# Patient Record
Sex: FEMALE | Race: OTHER | NOT HISPANIC OR LATINO
[De-identification: names, ages, dates, MRNs, and addresses within clinical notes are randomized per-mention and may not be internally consistent; named-entity substitution may affect disease eponyms.]

---

## 2017-08-29 ENCOUNTER — APPOINTMENT (OUTPATIENT)
Dept: HEMATOLOGY ONCOLOGY | Facility: CLINIC | Age: 72
End: 2017-08-29
Payer: MEDICARE

## 2017-08-29 ENCOUNTER — OUTPATIENT (OUTPATIENT)
Dept: OUTPATIENT SERVICES | Facility: HOSPITAL | Age: 72
LOS: 1 days | Discharge: HOME | End: 2017-08-29

## 2017-08-29 ENCOUNTER — RESULT REVIEW (OUTPATIENT)
Age: 72
End: 2017-08-29

## 2017-08-29 VITALS
SYSTOLIC BLOOD PRESSURE: 130 MMHG | RESPIRATION RATE: 16 BRPM | OXYGEN SATURATION: 98 % | TEMPERATURE: 98 F | BODY MASS INDEX: 30.72 KG/M2 | WEIGHT: 152.12 LBS | HEART RATE: 82 BPM | DIASTOLIC BLOOD PRESSURE: 70 MMHG

## 2017-08-29 DIAGNOSIS — Z98.51 TUBAL LIGATION STATUS: Chronic | ICD-10-CM

## 2017-08-29 DIAGNOSIS — D68.2 HEREDITARY DEFICIENCY OF OTHER CLOTTING FACTORS: ICD-10-CM

## 2017-08-29 DIAGNOSIS — Z98.89 OTHER SPECIFIED POSTPROCEDURAL STATES: Chronic | ICD-10-CM

## 2017-08-29 PROCEDURE — 99214 OFFICE O/P EST MOD 30 MIN: CPT

## 2018-06-15 PROBLEM — Z87.39 HISTORY OF ARTHRITIS: Status: RESOLVED | Noted: 2018-06-15 | Resolved: 2018-06-15

## 2018-06-15 PROBLEM — M23.90 INTERNAL DERANGEMENT OF KNEE, UNSPECIFIED LATERALITY: Status: ACTIVE | Noted: 2018-06-15

## 2018-06-15 PROBLEM — Z82.49 FAMILY HISTORY OF HYPERTENSION: Status: ACTIVE | Noted: 2018-06-15

## 2018-06-15 PROBLEM — S92.302A: Status: RESOLVED | Noted: 2018-06-15 | Resolved: 2018-06-15

## 2018-06-15 PROBLEM — Z79.52 ON CORTICOSTEROID THERAPY: Status: RESOLVED | Noted: 2018-06-15 | Resolved: 2018-06-15

## 2018-06-15 PROBLEM — M75.40 SHOULDER IMPINGEMENT SYNDROME: Status: RESOLVED | Noted: 2018-06-15 | Resolved: 2018-06-15

## 2018-06-15 PROBLEM — M48.061 LUMBAR SPINAL STENOSIS: Status: RESOLVED | Noted: 2018-06-15 | Resolved: 2018-06-15

## 2018-06-15 PROBLEM — M47.817 SPONDYLOSIS OF LUMBOSACRAL REGION, UNSPECIFIED SPINAL OSTEOARTHRITIS COMPLICATION STATUS: Status: RESOLVED | Noted: 2018-06-15 | Resolved: 2018-06-15

## 2018-06-15 PROBLEM — Z78.9 DOES NOT USE ILLICIT DRUGS: Status: ACTIVE | Noted: 2018-06-15

## 2018-06-22 ENCOUNTER — APPOINTMENT (OUTPATIENT)
Dept: ORTHOPEDIC SURGERY | Facility: CLINIC | Age: 73
End: 2018-06-22

## 2018-06-22 DIAGNOSIS — Z87.39 PERSONAL HISTORY OF OTHER DISEASES OF THE MUSCULOSKELETAL SYSTEM AND CONNECTIVE TISSUE: ICD-10-CM

## 2018-06-22 DIAGNOSIS — M47.817 SPONDYLOSIS W/OUT MYELOPATHY OR RADICULOPATHY, LUMBOSACRAL REGION: ICD-10-CM

## 2018-06-22 DIAGNOSIS — M23.90 UNSPECIFIED INTERNAL DERANGEMENT OF UNSPECIFIED KNEE: ICD-10-CM

## 2018-06-22 DIAGNOSIS — M75.40 IMPINGEMENT SYNDROME OF UNSPECIFIED SHOULDER: ICD-10-CM

## 2018-06-22 DIAGNOSIS — M48.061 SPINAL STENOSIS, LUMBAR REGION WITHOUT NEUROGENIC CLAUDICATION: ICD-10-CM

## 2018-06-22 DIAGNOSIS — Z78.9 OTHER SPECIFIED HEALTH STATUS: ICD-10-CM

## 2018-06-22 DIAGNOSIS — Z82.49 FAMILY HISTORY OF ISCHEMIC HEART DISEASE AND OTHER DISEASES OF THE CIRCULATORY SYSTEM: ICD-10-CM

## 2018-06-22 DIAGNOSIS — S92.302A FRACTURE OF UNSPECIFIED METATARSAL BONE(S), LEFT FOOT, INITIAL ENCOUNTER FOR CLOSED FRACTURE: ICD-10-CM

## 2018-06-22 DIAGNOSIS — Z79.52 LONG TERM (CURRENT) USE OF SYSTEMIC STEROIDS: ICD-10-CM

## 2018-07-05 ENCOUNTER — EMERGENCY (EMERGENCY)
Facility: HOSPITAL | Age: 73
LOS: 1 days | Discharge: HOME | End: 2018-07-05
Attending: EMERGENCY MEDICINE
Payer: MEDICARE

## 2018-07-05 VITALS
RESPIRATION RATE: 18 BRPM | SYSTOLIC BLOOD PRESSURE: 174 MMHG | WEIGHT: 149.91 LBS | DIASTOLIC BLOOD PRESSURE: 106 MMHG | OXYGEN SATURATION: 100 % | TEMPERATURE: 98 F | HEART RATE: 71 BPM

## 2018-07-05 VITALS
TEMPERATURE: 98 F | HEART RATE: 82 BPM | DIASTOLIC BLOOD PRESSURE: 88 MMHG | SYSTOLIC BLOOD PRESSURE: 144 MMHG | OXYGEN SATURATION: 100 % | RESPIRATION RATE: 17 BRPM

## 2018-07-05 DIAGNOSIS — Z98.51 TUBAL LIGATION STATUS: Chronic | ICD-10-CM

## 2018-07-05 DIAGNOSIS — Z98.89 OTHER SPECIFIED POSTPROCEDURAL STATES: Chronic | ICD-10-CM

## 2018-07-05 LAB
ALBUMIN SERPL ELPH-MCNC: 4.4 G/DL — SIGNIFICANT CHANGE UP (ref 3.3–5)
ALP SERPL-CCNC: 65 U/L — SIGNIFICANT CHANGE UP (ref 40–120)
ALT FLD-CCNC: 23 U/L — SIGNIFICANT CHANGE UP (ref 10–45)
ANION GAP SERPL CALC-SCNC: 15 MMOL/L — SIGNIFICANT CHANGE UP (ref 5–17)
APTT BLD: 33.4 SEC — SIGNIFICANT CHANGE UP (ref 27.5–37.4)
AST SERPL-CCNC: 21 U/L — SIGNIFICANT CHANGE UP (ref 10–40)
BILIRUB SERPL-MCNC: 0.3 MG/DL — SIGNIFICANT CHANGE UP (ref 0.2–1.2)
BUN SERPL-MCNC: 16 MG/DL — SIGNIFICANT CHANGE UP (ref 7–23)
CALCIUM SERPL-MCNC: 9.3 MG/DL — SIGNIFICANT CHANGE UP (ref 8.4–10.5)
CHLORIDE SERPL-SCNC: 104 MMOL/L — SIGNIFICANT CHANGE UP (ref 96–108)
CO2 SERPL-SCNC: 24 MMOL/L — SIGNIFICANT CHANGE UP (ref 22–31)
CREAT SERPL-MCNC: 0.99 MG/DL — SIGNIFICANT CHANGE UP (ref 0.5–1.3)
EOSINOPHIL # BLD AUTO: 0.1 K/UL — SIGNIFICANT CHANGE UP (ref 0–0.5)
EOSINOPHIL NFR BLD AUTO: 3 % — SIGNIFICANT CHANGE UP (ref 0–6)
GLUCOSE SERPL-MCNC: 111 MG/DL — HIGH (ref 70–99)
HCT VFR BLD CALC: 44.6 % — SIGNIFICANT CHANGE UP (ref 34.5–45)
HGB BLD-MCNC: 15.2 G/DL — SIGNIFICANT CHANGE UP (ref 11.5–15.5)
INR BLD: 1.63 RATIO — HIGH (ref 0.88–1.16)
LYMPHOCYTES # BLD AUTO: 55 % — HIGH (ref 13–44)
LYMPHOCYTES # BLD AUTO: 7.1 K/UL — HIGH (ref 1–3.3)
MCHC RBC-ENTMCNC: 32.1 PG — SIGNIFICANT CHANGE UP (ref 27–34)
MCHC RBC-ENTMCNC: 34.2 GM/DL — SIGNIFICANT CHANGE UP (ref 32–36)
MCV RBC AUTO: 93.8 FL — SIGNIFICANT CHANGE UP (ref 80–100)
MONOCYTES # BLD AUTO: 0.5 K/UL — SIGNIFICANT CHANGE UP (ref 0–0.9)
MONOCYTES NFR BLD AUTO: 2 % — SIGNIFICANT CHANGE UP (ref 2–14)
NEUTROPHILS # BLD AUTO: 4.5 K/UL — SIGNIFICANT CHANGE UP (ref 1.8–7.4)
NEUTROPHILS NFR BLD AUTO: 38 % — LOW (ref 43–77)
PLATELET # BLD AUTO: 175 K/UL — SIGNIFICANT CHANGE UP (ref 150–400)
POTASSIUM SERPL-MCNC: 4.5 MMOL/L — SIGNIFICANT CHANGE UP (ref 3.5–5.3)
POTASSIUM SERPL-SCNC: 4.5 MMOL/L — SIGNIFICANT CHANGE UP (ref 3.5–5.3)
PROT SERPL-MCNC: 7.2 G/DL — SIGNIFICANT CHANGE UP (ref 6–8.3)
PROTHROM AB SERPL-ACNC: 17.8 SEC — HIGH (ref 9.8–12.7)
RBC # BLD: 4.75 M/UL — SIGNIFICANT CHANGE UP (ref 3.8–5.2)
RBC # FLD: 12 % — SIGNIFICANT CHANGE UP (ref 10.3–14.5)
SODIUM SERPL-SCNC: 143 MMOL/L — SIGNIFICANT CHANGE UP (ref 135–145)
WBC # BLD: 12.5 K/UL — HIGH (ref 3.8–10.5)
WBC # FLD AUTO: 12.5 K/UL — HIGH (ref 3.8–10.5)

## 2018-07-05 PROCEDURE — 70496 CT ANGIOGRAPHY HEAD: CPT

## 2018-07-05 PROCEDURE — 99284 EMERGENCY DEPT VISIT MOD MDM: CPT | Mod: 25

## 2018-07-05 PROCEDURE — 70498 CT ANGIOGRAPHY NECK: CPT

## 2018-07-05 PROCEDURE — 70450 CT HEAD/BRAIN W/O DYE: CPT

## 2018-07-05 PROCEDURE — 70498 CT ANGIOGRAPHY NECK: CPT | Mod: 26

## 2018-07-05 PROCEDURE — 99284 EMERGENCY DEPT VISIT MOD MDM: CPT

## 2018-07-05 PROCEDURE — 85610 PROTHROMBIN TIME: CPT

## 2018-07-05 PROCEDURE — 70450 CT HEAD/BRAIN W/O DYE: CPT | Mod: 26,59

## 2018-07-05 PROCEDURE — 85730 THROMBOPLASTIN TIME PARTIAL: CPT

## 2018-07-05 PROCEDURE — 85027 COMPLETE CBC AUTOMATED: CPT

## 2018-07-05 PROCEDURE — 80053 COMPREHEN METABOLIC PANEL: CPT

## 2018-07-05 PROCEDURE — 70496 CT ANGIOGRAPHY HEAD: CPT | Mod: 26

## 2018-07-05 RX ORDER — SODIUM CHLORIDE 9 MG/ML
1000 INJECTION INTRAMUSCULAR; INTRAVENOUS; SUBCUTANEOUS ONCE
Qty: 0 | Refills: 0 | Status: COMPLETED | OUTPATIENT
Start: 2018-07-05 | End: 2018-07-05

## 2018-07-05 RX ORDER — ACETAMINOPHEN 500 MG
1000 TABLET ORAL ONCE
Qty: 0 | Refills: 0 | Status: COMPLETED | OUTPATIENT
Start: 2018-07-05 | End: 2018-07-05

## 2018-07-05 RX ORDER — METOCLOPRAMIDE HCL 10 MG
10 TABLET ORAL ONCE
Qty: 0 | Refills: 0 | Status: COMPLETED | OUTPATIENT
Start: 2018-07-05 | End: 2018-07-05

## 2018-07-05 NOTE — ED ADULT NURSE REASSESSMENT NOTE - NS ED NURSE REASSESS COMMENT FT1
Patient is a&ox3. Sitting in bed. No change in her symptoms. Awaiting Ct scan results. Will continue to monitor.

## 2018-07-05 NOTE — ED ADULT NURSE NOTE - CHPI ED SYMPTOMS NEG
no loss of consciousness/no numbness/no nausea/no fever/no change in level of consciousness/no blurred vision/no confusion/no dizziness/no vomiting

## 2018-07-05 NOTE — ED PROVIDER NOTE - FAMILY HISTORY
Family history of prothrombin gene mutation, first cousin     Father  Still living? Unknown  Family history of heart disease, Age at diagnosis: Age Unknown

## 2018-07-05 NOTE — ED ADULT NURSE NOTE - PMH
DVT (deep venous thrombosis), right  october 2013  GERD (gastroesophageal reflux disease)    Herniated lumbar intervertebral disc    HTN - Hypertension    Hx of Breast Cancer    Lower back pain    PE (pulmonary embolism)  3 yrs ago, was on Lovenox and coumadin  Prothrombin gene mutation

## 2018-07-05 NOTE — ED ADULT NURSE NOTE - OBJECTIVE STATEMENT
73F comes to ED c/o head pressure x10 days.  Patient states she noticed posterior head pressure 10 days ago. She states she has PMH clotting disorder for which she is on coumadin. Patient states she has pressure constantly. Denies any trauma/blurry vision/unilateral weakness/fever/chills/N/V/D/abdominal pain/urinary symptoms. She states she saw her MD who told her she had unsteady gait and to get to ED for further eval. Patient is a&ox3 in no distress. States she has felt more depressed with this head pressure, and is very tired. She denies SI/HI. States she feels her body is weak as well. On exam, equal strength all 4 extremities, no abnormalities in gait, soft abdomen, no changes in sensation, no facial droop. Son is at bedside. Will continue to monitor.

## 2018-07-05 NOTE — ED PROVIDER NOTE - MEDICAL DECISION MAKING DETAILS
74 yo F with hx of clotting disorder on coumadin p/w 10 day pressure like headache in the occipital region associated with dizziness. Pt appears well HD stable afebrile, reproducible pain in the sub-occiput; however, given hx of clots in the past, will pursue basic labs/INR/Ct/cta, pain control, reassess   Kiesha Garcia, PGY-2 EM

## 2018-07-05 NOTE — ED ADULT TRIAGE NOTE - CHIEF COMPLAINT QUOTE
I'm on coumadin and for the past couple of weeks I feel pressure in the back of my head and all I do is sleep.

## 2018-07-05 NOTE — ED PROVIDER NOTE - PLAN OF CARE
Please follow up with a neurologist as soon as possible. You can call the [facility] at the following number: [phone]. You can also make an appointment at the following location.  [facility] at [place]  Phone: [phone]  Address: [address] [place] [address], [address] [place] [address]  Office Hours: Monday – Friday: 8am – 5pm   Please follow up with a primary care provider as soon as possible. If you do not have a primary care doctor, you can make an appointment with one at the following location.   [facility] at [place]  Phone: [phone]  Fax: [phone]  Address: [address] [place] [address], [facility] [address], [place] [address]    Additionally, you may use the following web address to find a [facility] physician close to you: [email or web address]   You may take 600mg of ibuprofen (example: motrin or advil) every 4-6 hours for baseline pain control as indicated with respect to the warnings on the label. This is an over the counter medication. You may take 1000mg of Tylenol every 6 hours for baseline pain control with respect to the warnings on the label. Please return to the emergency department if you experience worsening headache, or if you develop neck stiffness, fever/chills, changes in vision, chest pain, shortness of breath, difficulty breathing, palpitations, lightheadedness, weakness, dizziness, numbness, tingling, abdominal pain, nausea, vomiting, diarrhea, changes in your urine, blood in the urine, painful urination, syncope (passing out), or for any other concerns.

## 2018-07-05 NOTE — ED PROVIDER NOTE - OBJECTIVE STATEMENT
74 yo pmh dvt/pe on coumadin (due to unknown bleeding disorder), htn, GERD c/o 10 days occipital headache, at times associated with dizziness and visual changes. Headache is described as pressure, dissimilar to prior headaches in the past. Pt reports the headache has been constant. Denies any recent illness or infectious signs including fever/chills. 74 yo pmh dvt/pe on coumadin (due to unknown bleeding disorder), htn, GERD c/o 10 days occipital headache, at times associated with dizziness and visual changes. Headache is described as pressure, dissimilar to prior headaches in the past. Pt reports the headache has been constant. Did not take anything OTC for the discomfort. Denies any recent illness or infectious signs including fever/chills. Patient denies neck pain, trauma/injury, loc, cp, sob, back pain, abd pain/n/v/d, urinary symptoms, recent travel and sickness.

## 2018-07-05 NOTE — ED ADULT NURSE NOTE - PSH
h/o Wrist Surgery    S/P arthroscopy of right knee  2012  S/P Breast Lumpectomy  cyst removed from left breast.  S/P tubal ligation

## 2018-07-05 NOTE — ED PROVIDER NOTE - CARE PLAN
Principal Discharge DX:	Headache Principal Discharge DX:	Headache  Assessment and plan of treatment:	Please follow up with a neurologist as soon as possible. You can call the [facility] at the following number: [phone]. You can also make an appointment at the following location.  [facility] at [place]  Phone: [phone]  Address: [address] [place] [address]  Office Hours: Monday – Friday: 8am – 5pm   Please follow up with a primary care provider as soon as possible. If you do not have a primary care doctor, you can make an appointment with one at the following location.   [facility] at [place]  Phone: [phone]  Fax: [phone]  Address: [address] [place] [address], [facility] [address], [place] [address]    Additionally, you may use the following web address to find a [facility] physician close to you: [email or web address]   You may take 600mg of ibuprofen (example: motrin or advil) every 4-6 hours for baseline pain control as indicated with respect to the warnings on the label. This is an over the counter medication. You may take 1000mg of Tylenol every 6 hours for baseline pain control with respect to the warnings on the label. Please return to the emergency department if you experience worsening headache, or if you develop neck stiffness, fever/chills, changes in vision, chest pain, shortness of breath, difficulty breathing, palpitations, lightheadedness, weakness, dizziness, numbness, tingling, abdominal pain, nausea, vomiting, diarrhea, changes in your urine, blood in the urine, painful urination, syncope (passing out), or for any other concerns.

## 2018-09-05 ENCOUNTER — APPOINTMENT (OUTPATIENT)
Dept: ORTHOPEDIC SURGERY | Facility: CLINIC | Age: 73
End: 2018-09-05
Payer: MEDICARE

## 2018-09-05 PROCEDURE — 20610 DRAIN/INJ JOINT/BURSA W/O US: CPT | Mod: LT

## 2018-09-05 PROCEDURE — 99214 OFFICE O/P EST MOD 30 MIN: CPT | Mod: 25

## 2018-09-17 ENCOUNTER — INPATIENT (INPATIENT)
Facility: HOSPITAL | Age: 73
LOS: 13 days | Discharge: HOME | DRG: 982 | End: 2018-10-01
Attending: INTERNAL MEDICINE | Admitting: INTERNAL MEDICINE
Payer: MEDICARE

## 2018-09-17 VITALS
HEART RATE: 72 BPM | OXYGEN SATURATION: 99 % | DIASTOLIC BLOOD PRESSURE: 87 MMHG | TEMPERATURE: 98 F | RESPIRATION RATE: 20 BRPM | WEIGHT: 145.51 LBS | SYSTOLIC BLOOD PRESSURE: 144 MMHG

## 2018-09-17 DIAGNOSIS — Z98.51 TUBAL LIGATION STATUS: Chronic | ICD-10-CM

## 2018-09-17 DIAGNOSIS — N18.3 CHRONIC KIDNEY DISEASE, STAGE 3 (MODERATE): ICD-10-CM

## 2018-09-17 DIAGNOSIS — J06.9 ACUTE UPPER RESPIRATORY INFECTION, UNSPECIFIED: ICD-10-CM

## 2018-09-17 DIAGNOSIS — Z98.89 OTHER SPECIFIED POSTPROCEDURAL STATES: Chronic | ICD-10-CM

## 2018-09-17 DIAGNOSIS — I10 ESSENTIAL (PRIMARY) HYPERTENSION: ICD-10-CM

## 2018-09-17 DIAGNOSIS — I26.99 OTHER PULMONARY EMBOLISM WITHOUT ACUTE COR PULMONALE: ICD-10-CM

## 2018-09-17 LAB
ALBUMIN SERPL ELPH-MCNC: 4.3 G/DL — SIGNIFICANT CHANGE UP (ref 3.3–5)
ALP SERPL-CCNC: 72 U/L — SIGNIFICANT CHANGE UP (ref 40–120)
ALT FLD-CCNC: 18 U/L — SIGNIFICANT CHANGE UP (ref 10–45)
ANION GAP SERPL CALC-SCNC: 14 MMOL/L — SIGNIFICANT CHANGE UP (ref 5–17)
APTT BLD: 22.7 SEC — LOW (ref 27.5–37.4)
AST SERPL-CCNC: 21 U/L — SIGNIFICANT CHANGE UP (ref 10–40)
BASOPHILS # BLD AUTO: 0.3 K/UL — HIGH (ref 0–0.2)
BASOPHILS NFR BLD AUTO: 2.2 % — HIGH (ref 0–2)
BILIRUB SERPL-MCNC: 0.5 MG/DL — SIGNIFICANT CHANGE UP (ref 0.2–1.2)
BUN SERPL-MCNC: 13 MG/DL — SIGNIFICANT CHANGE UP (ref 7–23)
CALCIUM SERPL-MCNC: 10.1 MG/DL — SIGNIFICANT CHANGE UP (ref 8.4–10.5)
CHLORIDE SERPL-SCNC: 98 MMOL/L — SIGNIFICANT CHANGE UP (ref 96–108)
CO2 SERPL-SCNC: 24 MMOL/L — SIGNIFICANT CHANGE UP (ref 22–31)
CREAT SERPL-MCNC: 1.02 MG/DL — SIGNIFICANT CHANGE UP (ref 0.5–1.3)
EOSINOPHIL # BLD AUTO: 0.2 K/UL — SIGNIFICANT CHANGE UP (ref 0–0.5)
EOSINOPHIL NFR BLD AUTO: 1.4 % — SIGNIFICANT CHANGE UP (ref 0–6)
GAS PNL BLDV: SIGNIFICANT CHANGE UP
GLUCOSE SERPL-MCNC: 87 MG/DL — SIGNIFICANT CHANGE UP (ref 70–99)
HCT VFR BLD CALC: 46.3 % — HIGH (ref 34.5–45)
HGB BLD-MCNC: 15.5 G/DL — SIGNIFICANT CHANGE UP (ref 11.5–15.5)
INR BLD: 1.17 RATIO — HIGH (ref 0.88–1.16)
LYMPHOCYTES # BLD AUTO: 43.8 % — SIGNIFICANT CHANGE UP (ref 13–44)
LYMPHOCYTES # BLD AUTO: 5.2 K/UL — HIGH (ref 1–3.3)
MCHC RBC-ENTMCNC: 30.6 PG — SIGNIFICANT CHANGE UP (ref 27–34)
MCHC RBC-ENTMCNC: 33.4 GM/DL — SIGNIFICANT CHANGE UP (ref 32–36)
MCV RBC AUTO: 91.6 FL — SIGNIFICANT CHANGE UP (ref 80–100)
MONOCYTES # BLD AUTO: 0.6 K/UL — SIGNIFICANT CHANGE UP (ref 0–0.9)
MONOCYTES NFR BLD AUTO: 4.7 % — SIGNIFICANT CHANGE UP (ref 2–14)
NEUTROPHILS # BLD AUTO: 5.6 K/UL — SIGNIFICANT CHANGE UP (ref 1.8–7.4)
NEUTROPHILS NFR BLD AUTO: 47.9 % — SIGNIFICANT CHANGE UP (ref 43–77)
PLATELET # BLD AUTO: 158 K/UL — SIGNIFICANT CHANGE UP (ref 150–400)
POTASSIUM SERPL-MCNC: 3.6 MMOL/L — SIGNIFICANT CHANGE UP (ref 3.5–5.3)
POTASSIUM SERPL-SCNC: 3.6 MMOL/L — SIGNIFICANT CHANGE UP (ref 3.5–5.3)
PROT SERPL-MCNC: 7.3 G/DL — SIGNIFICANT CHANGE UP (ref 6–8.3)
PROTHROM AB SERPL-ACNC: 12.7 SEC — SIGNIFICANT CHANGE UP (ref 9.8–12.7)
RAPID RVP RESULT: DETECTED
RBC # BLD: 5.06 M/UL — SIGNIFICANT CHANGE UP (ref 3.8–5.2)
RBC # FLD: 12.4 % — SIGNIFICANT CHANGE UP (ref 10.3–14.5)
RV+EV RNA SPEC QL NAA+PROBE: DETECTED
SODIUM SERPL-SCNC: 136 MMOL/L — SIGNIFICANT CHANGE UP (ref 135–145)
WBC # BLD: 11.8 K/UL — HIGH (ref 3.8–10.5)
WBC # FLD AUTO: 11.8 K/UL — HIGH (ref 3.8–10.5)

## 2018-09-17 PROCEDURE — 71045 X-RAY EXAM CHEST 1 VIEW: CPT | Mod: 26

## 2018-09-17 PROCEDURE — 71275 CT ANGIOGRAPHY CHEST: CPT | Mod: 26

## 2018-09-17 PROCEDURE — 93308 TTE F-UP OR LMTD: CPT | Mod: 26

## 2018-09-17 PROCEDURE — 99218: CPT

## 2018-09-17 PROCEDURE — 99223 1ST HOSP IP/OBS HIGH 75: CPT

## 2018-09-17 PROCEDURE — 93010 ELECTROCARDIOGRAM REPORT: CPT

## 2018-09-17 RX ORDER — IPRATROPIUM/ALBUTEROL SULFATE 18-103MCG
3 AEROSOL WITH ADAPTER (GRAM) INHALATION ONCE
Qty: 0 | Refills: 0 | Status: COMPLETED | OUTPATIENT
Start: 2018-09-17 | End: 2018-09-17

## 2018-09-17 RX ORDER — IPRATROPIUM/ALBUTEROL SULFATE 18-103MCG
3 AEROSOL WITH ADAPTER (GRAM) INHALATION ONCE
Qty: 0 | Refills: 0 | Status: DISCONTINUED | OUTPATIENT
Start: 2018-09-17 | End: 2018-09-17

## 2018-09-17 RX ORDER — SODIUM CHLORIDE 9 MG/ML
1000 INJECTION INTRAMUSCULAR; INTRAVENOUS; SUBCUTANEOUS
Qty: 0 | Refills: 0 | Status: DISCONTINUED | OUTPATIENT
Start: 2018-09-17 | End: 2018-09-17

## 2018-09-17 RX ORDER — WARFARIN SODIUM 2.5 MG/1
7.5 TABLET ORAL ONCE
Qty: 0 | Refills: 0 | Status: COMPLETED | OUTPATIENT
Start: 2018-09-17 | End: 2018-09-17

## 2018-09-17 RX ORDER — ENOXAPARIN SODIUM 100 MG/ML
70 INJECTION SUBCUTANEOUS EVERY 12 HOURS
Qty: 0 | Refills: 0 | Status: DISCONTINUED | OUTPATIENT
Start: 2018-09-18 | End: 2018-09-22

## 2018-09-17 RX ORDER — ATORVASTATIN CALCIUM 80 MG/1
10 TABLET, FILM COATED ORAL AT BEDTIME
Qty: 0 | Refills: 0 | Status: DISCONTINUED | OUTPATIENT
Start: 2018-09-17 | End: 2018-10-01

## 2018-09-17 RX ORDER — VERAPAMIL HCL 240 MG
120 CAPSULE, EXTENDED RELEASE PELLETS 24 HR ORAL DAILY
Qty: 0 | Refills: 0 | Status: DISCONTINUED | OUTPATIENT
Start: 2018-09-17 | End: 2018-09-17

## 2018-09-17 RX ORDER — IPRATROPIUM/ALBUTEROL SULFATE 18-103MCG
3 AEROSOL WITH ADAPTER (GRAM) INHALATION EVERY 4 HOURS
Qty: 0 | Refills: 0 | Status: DISCONTINUED | OUTPATIENT
Start: 2018-09-17 | End: 2018-09-18

## 2018-09-17 RX ORDER — ENOXAPARIN SODIUM 100 MG/ML
70 INJECTION SUBCUTANEOUS ONCE
Qty: 0 | Refills: 0 | Status: COMPLETED | OUTPATIENT
Start: 2018-09-17 | End: 2018-09-17

## 2018-09-17 RX ORDER — PANTOPRAZOLE SODIUM 20 MG/1
40 TABLET, DELAYED RELEASE ORAL
Qty: 0 | Refills: 0 | Status: DISCONTINUED | OUTPATIENT
Start: 2018-09-17 | End: 2018-10-01

## 2018-09-17 RX ORDER — INFLUENZA VIRUS VACCINE 15; 15; 15; 15 UG/.5ML; UG/.5ML; UG/.5ML; UG/.5ML
0.5 SUSPENSION INTRAMUSCULAR ONCE
Qty: 0 | Refills: 0 | Status: COMPLETED | OUTPATIENT
Start: 2018-09-17 | End: 2018-10-01

## 2018-09-17 RX ADMIN — Medication 3 MILLILITER(S): at 13:11

## 2018-09-17 RX ADMIN — WARFARIN SODIUM 7.5 MILLIGRAM(S): 2.5 TABLET ORAL at 16:50

## 2018-09-17 RX ADMIN — Medication 125 MILLIGRAM(S): at 13:07

## 2018-09-17 RX ADMIN — Medication 3 MILLILITER(S): at 23:05

## 2018-09-17 RX ADMIN — Medication 3 MILLILITER(S): at 15:24

## 2018-09-17 RX ADMIN — ENOXAPARIN SODIUM 70 MILLIGRAM(S): 100 INJECTION SUBCUTANEOUS at 15:24

## 2018-09-17 NOTE — ED PROVIDER NOTE - PHYSICAL EXAMINATION
GENERAL: tachypneic with hoarse voice; able to speak in full sentences.   HEAD:  Atraumatic, Normocephalic  EYES: EOMI, PERRLA, conjunctiva and sclera clear  ENT: MMM; oropharynx clear  NECK: Supple, No JVD  CHEST/LUNG: +rhonchorous breath sounds with expiratory wheezing.   HEART: Regular rate and rhythm; No murmurs, rubs, or gallops  ABDOMEN: Soft, Nontender, Nondistended; Bowel sounds present  EXTREMITIES:  2+ Peripheral Pulses, No clubbing, cyanosis, or edema  PSYCH: AAOx3  NEUROLOGY: no focal motor or sensory deficits. 5/5 muscle strength in all extremities.   SKIN: No rashes or lesions GENERAL: tachypneic with hoarse voice; able to speak in full sentences.   HEAD:  Atraumatic, Normocephalic  EYES: EOMI, PERRLA, conjunctiva and sclera clear  ENT: MMM; oropharynx clear  NECK: Supple, No JVD  CHEST/LUNG: +rhonchorous breath sounds with expiratory wheezing.   HEART: Regular rate and rhythm; No murmurs, rubs, or gallops  ABDOMEN: Soft, Nontender, Nondistended; Bowel sounds present  EXTREMITIES:  2+ Peripheral Pulses, b/l LE non-pitting edema.   PSYCH: AAOx3  NEUROLOGY: no focal motor or sensory deficits. 5/5 muscle strength in all extremities.   SKIN: No rashes or lesions

## 2018-09-17 NOTE — H&P ADULT - NSHPREVIEWOFSYSTEMS_GEN_ALL_CORE
REVIEW OF SYSTEMS:    CONSTITUTIONAL: No weakness, fevers or chills  ENMT:  No ear pain, No vertigo or throat pain  EYES: No visual changes  or photophobia  NECK: No pain or stiffness  RESPIRATORY: see HPI   CARDIOVASCULAR: No chest pain or palpitations  GASTROINTESTINAL: No abdominal or epigastric pain. No nausea, vomiting, or hematemesis; No diarrhea or constipation. No melena or hematochezia.  MUSCULOSKELETAL: No joint swelling  or warmth.  GENITOURINARY: No dysuria, frequency or hematuria  NEUROLOGICAL: No numbness or weakness  PSYCHIATRIC: No depression or anhedonia.  ENDOCRINE: No sx hypoglycemic episodes.  SKIN: No itching, burning, rashes, or lesions

## 2018-09-17 NOTE — H&P ADULT - HISTORY OF PRESENT ILLNESS
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 Patient reports her symptoms started on Friday. About 1 -week prior she travelled by car to [place] No sick contacts but on Friday started developing shortness of breath, chest congestion, and coughing. The cough is nonproductive. She feels myalgia but no fever or chills. Her dyspnea became progressively worse. Initially shortness of breath was with exertion and now with slight activities becomes dyspneic. She has chronic leg edema but symmetrical in nature. She denies pleuritic chest pain, palpitation, tearing back pain, and denies syncope or near-syncope episode. She did miss her warfarin dose last week and her INR has been monitored once monthly. She otherwise reports adherence to warfarin and does not know exact INR range.

## 2018-09-17 NOTE — H&P ADULT - ATTENDING COMMENTS
Patient assigned to me by night hospitalist in charge for management and care for patient for this evening only. Care to be resumed by day hospitalist (Dr. Ricks) in the morning and thereafter.

## 2018-09-17 NOTE — ED CDU PROVIDER INITIAL DAY NOTE - PROGRESS NOTE DETAILS
CDU PROGRESS NOTE BEN ALVARES: Received pt at 1900 sign-out. Pt resting in stretcher in NAD s/p CTA chest r/o P.E. Case/plan reviewed. VSS. Pt states she is feeling better, but has occasional SOB. S1 S2 noted, RRR, diminshed breath sounds left. BS x4 with soft, nontender abdomen. Will continue to monitor on Telemetry. CDU PROGRESS NOTE BEN ALVARES: Notified by Radiology that patient has multiple Pulmonary Emboli involving left pulmonary artery, upper lobe branches, distal and subsegmental branches. Dr. Millicent rodriguez and Dr. Tello made aware. Pt saturating 95% on RA, no signs of respiratory distress, Diminshed breath sounds left. Will give 2liter nasal cannula and continue to monitor. CDU PROGRESS NOTE BEN ALVARES: Notified by Radiology that patient has multiple Pulmonary Emboli involving left pulmonary artery, upper lobe branches, distal and subsegmental branches. Dr. Millicent rodriguez and Dr. Velazco made aware. Pt saturating 95% on RA, no signs of respiratory distress, Diminished breath sounds left. Will give 2liter nasal cannula and continue to monitor. Hospitalist informed, patient admitted under Dr. Ricks. Pt was medicated in ED Warfarin 7.5mg po and Lovenox 70mg subcut. INR was subtherapeutic at 1.17. Pt admitted to Medicine/Telemetry for P.E and Coumadin failure. c/d/w Dr. Ricks and Dr. Velazco.

## 2018-09-17 NOTE — ED ADULT NURSE NOTE - OBJECTIVE STATEMENT
74 yo  Pt brought to room 33  from triage c/o of  Cough for several days. Sent by PCP for further eval. Pt also endorsed R lower back pain. Pain is non-radiating. Cough is described as dry and non productive. b/l wheeze upon ausculation. Denies Fever or chills. No Dizziness weakness or SOB. Denies N/V. #20 gauge in RAC x's 1 attempt pt. tolerated well, labs drawn and sent, EKG and CXR at bedside. Dr. Schmid aware and at bedside evaluating.

## 2018-09-17 NOTE — ED ADULT NURSE NOTE - CHPI ED NUR SYMPTOMS NEG
no body aches/no fever/no edema/no headache/no hemoptysis/no diaphoresis/no chest pain/no chills/no shortness of breath

## 2018-09-17 NOTE — ED PROVIDER NOTE - OBJECTIVE STATEMENT
74 y/o F hx of HTN, PE on Coumadin, prothrombin gene mutation, chronic back pain presents with dyspnea x 3 day duration.    Patient has been experiencing progressive non-productive cough with hoarseness of voice and difficulty breathing. No associated fevers or chest pain (though notes some discomfort with coughing). Patient denies any hx of asthma/COPD; no smoking hx. Seen by PMD today and sent in to ED for further management. Denies rhinorrhea or myalgias but notes chest congestion and chills. 74 y/o F hx of breast Ca s/p lupectomy, HTN, PE on Coumadin, prothrombin gene mutation, chronic back pain presents with dyspnea x 3 day duration.    Patient has been experiencing progressive non-productive cough with hoarseness of voice and difficulty breathing. No associated fevers or chest pain (though notes some discomfort with coughing). Patient denies any hx of asthma/COPD; no smoking hx. Seen by PMD today and sent in to ED for further management. Denies rhinorrhea or myalgias but notes chest congestion and chills.

## 2018-09-17 NOTE — ED CDU PROVIDER INITIAL DAY NOTE - MEDICAL DECISION MAKING DETAILS
Attending Jaymie Velazco: 72 y/o female with multiple medical issues placed in the cdu for concern for subtherapeutic INR and breathing treatments. pt with h/o PE, is subtherapeutic with INR. with concern for possible PE will give lovenox and continue to monitor INR. pt started on breathing treatments in the ED with improvement. will continue to monitor INR and respiratory status

## 2018-09-17 NOTE — ED PROVIDER NOTE - PROGRESS NOTE DETAILS
Principal Discharge DX:	Fall, initial encounter  Goal:	prevent falls  Assessment and plan of treatment:	fall precautions, supervision  Secondary Diagnosis:	Hypertensive urgency  Assessment and plan of treatment:	continue routine meds  Secondary Diagnosis:	Type 2 diabetes mellitus without complication, without long-term current use of insulin  Assessment and plan of treatment:	off hypoglycemis. monitor blood sugar  Secondary Diagnosis:	Closed fracture of first lumbar vertebra, unspecified fracture morphology, initial encounter  Assessment and plan of treatment:	pain control Subtherapeutic INR; will bridge patient with dose of therapeutic lovenox and increase dose of Coumadin to 7.5 mg (taking 5 mg at home) no rv strain on echo pt with known cta - tx w anticoag will not cta at this time Patient reports improvement with Nebs; less WOB; will give patient two more treatments. Attending Jaymie Velazco: I received sign out. pt feeling better. RVP positive for enterovirus. will d/w CDU for lovenox teaching to bridge to coumadin

## 2018-09-17 NOTE — ED ADULT NURSE NOTE - CHIEF COMPLAINT QUOTE
pt is labored breathing states a couple of days  pt has been coughing for days non productive  sent form md yadira hull

## 2018-09-17 NOTE — H&P ADULT - ASSESSMENT
74 yo F w/ breast CA s/p removal, hx of prothrombin gene mutation, CKD III, prior hx of unprovoked DVT/PE ~ 4-6yr ago (on warfarin) presents with shortness of breath 2/2 to pulmonary embolism c/b viral URI

## 2018-09-17 NOTE — H&P ADULT - NSHPPHYSICALEXAM_GEN_ALL_CORE
PHYSICAL EXAM:  Vital Signs Last 24 Hrs  T(C): 36.6 (09-17-18 @ 19:39)  T(F): 97.9 (09-17-18 @ 19:39), Max: 98.5 (09-17-18 @ 11:22)  HR: 88 (09-17-18 @ 19:39) (72 - 88)  BP: 124/78 (09-17-18 @ 19:39)  BP(mean): --  RR: 18 (09-17-18 @ 19:39) (18 - 20)  SpO2: 95% (09-17-18 @ 19:39) (95% - 99%)  Wt(kg): --    Constitutional: NAD, awake and alert  EYES: EOMI  ENT:  Normal Hearing, no tonsillar exudates   Neck: Soft and supple , no thyromegaly   Respiratory: Breath sounds +wheezing expiratory, no distress, no cyanosis. No crackles   Cardiovascular: S1 and S2, regular rate and rhythm, no Murmurs, gallops or rubs, no JVD,    Gastrointestinal: Bowel Sounds present, soft, nontender, nondistended, no guarding, no rebound  Extremities: No cyanosis or clubbing; warm to touch  Vascular: 2+ peripheral pulses lower ex  Neurological: No focal deficits, CN II-XII intact bilaterally, sensation to light touch intact in all extremities, gait intact. Pupils are equally reactive to light and symmetrical in size.   Musculoskeletal: 5/5 strength b/l upper and lower extremities; no joint swelling.  Skin: No rashes  Psych: no depression or anhedonia, AAOx3  HEME: no bruises, no nose bleeds

## 2018-09-17 NOTE — ED CDU PROVIDER INITIAL DAY NOTE - PHYSICAL EXAMINATION
GENERAL: tachypneic with hoarse voice; able to speak in full sentences.   HEAD:  Atraumatic, Normocephalic  EYES: EOMI, PERRLA, conjunctiva and sclera clear  ENT: MMM; oropharynx clear  NECK: Supple, No JVD  CHEST/LUNG: +rhonchorous breath sounds with expiratory wheezing.   HEART: Regular rate and rhythm; No murmurs, rubs, or gallops  ABDOMEN: Soft, Nontender, Nondistended; Bowel sounds present  EXTREMITIES:  2+ Peripheral Pulses, b/l LE non-pitting edema.   PSYCH: AAOx3  NEUROLOGY: no focal motor or sensory deficits. 5/5 muscle strength in all extremities.   SKIN: No rashes or lesions

## 2018-09-17 NOTE — ED PROVIDER NOTE - MEDICAL DECISION MAKING DETAILS
geraldo mckeon cough inc sob boady aches - sats 100% - w inc work of breathing - nebs and steroids -- ck inr if subtherapeutic consider cta r/o pe -- ho prev pe -pocus r/o rv strain - rvp and reeeval 74 y/o F hx of HTN, PE on Coumadin, prothrombin gene mutation, chronic back pain presents with dyspnea x 3 day duration. DDx includes bronchitis, PNA vs. recurrent PE though less likely with adventitious breath sounds. Plan: CXR, EKG, bronchodilators, steroids, labs, reassess.   geraldo mckeon cough inc sob boady aches - sats 100% - w inc work of breathing - nebs and steroids -- ck inr if subtherapeutic consider cta r/o pe -- ho prev pe -pocus r/o rv strain - rvp and reeeval 74 y/o F hx of HTN, PE on Coumadin, prothrombin gene mutation, chronic back pain presents with dyspnea x 3 day duration. DDx includes bronchitis, PNA vs. recurrent PE though less likely source of dypsnea given adventitious breath sounds. Plan: CXR, EKG, bronchodilators, steroids, labs, reassess.   geraldo mckeon cough inc sob boady aches - sats 100% - w inc work of breathing - nebs and steroids -- ck inr if subtherapeutic consider cta r/o pe -- ho prev pe -pocus r/o rv strain - rvp and reeeval

## 2018-09-17 NOTE — ED CDU PROVIDER DISPOSITION NOTE - CLINICAL COURSE
74 y/o F hx of HTN, PE on Coumadin, prothrombin gene mutation, chronic back pain presents with dyspnea x 3 day duration. Patient has been experiencing progressive non-productive cough with hoarseness of voice and difficulty breathing. No associated fevers or chest pain, denies any hx of asthma/COPD; no smoking hx. Seen by PMD (Dr. Arthur Ricks) today and sent in to ED for further management.   Patient noted to have abnormal CT A Chest concerning for P.E involving the left pulmonary artery, upper lobe, distal and subsegmental branches. Pt was medicated in ED Warfarin 7.5mg po and Lovenox 70mg subcut. INR was subtherapeutic at 1.17. Pt admitted to Medicine for P.E and Coumadin failure. c/d/w Dr. Ricks and Dr. Velazco

## 2018-09-17 NOTE — H&P ADULT - PROBLEM SELECTOR PLAN 3
-Will need caution with lovenox dosing and CKD III  -monitor daily GFR   -Avoid nephrotoxins  -monitor electrolytes

## 2018-09-17 NOTE — ED CDU PROVIDER INITIAL DAY NOTE - OBJECTIVE STATEMENT
72 y/o F hx of HTN, PE on Coumadin, prothrombin gene mutation, chronic back pain presents with dyspnea x 3 day duration.  Patient has been experiencing progressive non-productive cough with hoarseness of voice and difficulty breathing. No associated fevers or chest pain (though notes some discomfort with coughing). Patient denies any hx of asthma/COPD; no smoking hx. Seen by PMD (Dr. Arthur Ricks) today and sent in to ED for further management. Denies rhinorrhea or myalgias but notes chest congestion and chills.

## 2018-09-17 NOTE — H&P ADULT - PROBLEM SELECTOR PLAN 2
-Hold home verapamil in case patient became hypotensive.   -resume CCB tomorrow if no hypotension and otherwise stable.

## 2018-09-17 NOTE — ED CDU PROVIDER INITIAL DAY NOTE - DETAILS
CHEST PAIN  -TELE  -FREQ EVAL  -CTA R/O PE  -Duonebs Q4-6 hrs  -Therapeutic INR  -CASE D/W ATTENDING

## 2018-09-17 NOTE — ED ADULT NURSE NOTE - NSIMPLEMENTINTERV_GEN_ALL_ED
Implemented All Universal Safety Interventions:  [place] to call system. Call bell, personal items and telephone within reach. Instruct patient to call for assistance. Room bathroom lighting operational. Non-slip footwear when patient is off stretcher. Physically safe environment: no spills, clutter or unnecessary equipment. Stretcher in lowest position, wheels locked, appropriate side rails in place.

## 2018-09-17 NOTE — H&P ADULT - PROBLEM SELECTOR PLAN 4
-No signs/sx to suggest superimposed bacterial pneumonia  -hold antimicrobial therapy  -supportive care for positive RVP.  -c/w duoneb q4-6hr for wheezing , if worsening can consider steroid therapy

## 2018-09-17 NOTE — ED ADULT NURSE REASSESSMENT NOTE - NS ED NURSE REASSESS COMMENT FT1
Pt report received from MALENA RN. Pt transferred to cdu bed 3 for dyspnea. Pt a/ox3 denies respiratory distress, sob, dyspnea, C/P, palpitations, n/v/d at this time.  VSS, afebrile, IV clean/dry/intact. Pt aware of plan of care, call bell within reach ,safety maintained. Will monitor and assess.

## 2018-09-18 DIAGNOSIS — D72.828 OTHER ELEVATED WHITE BLOOD CELL COUNT: ICD-10-CM

## 2018-09-18 DIAGNOSIS — I26.99 OTHER PULMONARY EMBOLISM WITHOUT ACUTE COR PULMONALE: ICD-10-CM

## 2018-09-18 DIAGNOSIS — E87.2 ACIDOSIS: ICD-10-CM

## 2018-09-18 LAB
ANION GAP SERPL CALC-SCNC: 16 MMOL/L — SIGNIFICANT CHANGE UP (ref 5–17)
BUN SERPL-MCNC: 13 MG/DL — SIGNIFICANT CHANGE UP (ref 7–23)
CALCIUM SERPL-MCNC: 9.7 MG/DL — SIGNIFICANT CHANGE UP (ref 8.4–10.5)
CHLORIDE SERPL-SCNC: 106 MMOL/L — SIGNIFICANT CHANGE UP (ref 96–108)
CO2 SERPL-SCNC: 13 MMOL/L — LOW (ref 22–31)
CREAT SERPL-MCNC: 0.77 MG/DL — SIGNIFICANT CHANGE UP (ref 0.5–1.3)
GLUCOSE SERPL-MCNC: 119 MG/DL — HIGH (ref 70–99)
HCT VFR BLD CALC: 45.2 % — HIGH (ref 34.5–45)
HGB BLD-MCNC: 16.2 G/DL — HIGH (ref 11.5–15.5)
INR BLD: 1.72 RATIO — HIGH (ref 0.88–1.16)
MAGNESIUM SERPL-MCNC: 2.2 MG/DL — SIGNIFICANT CHANGE UP (ref 1.6–2.6)
MCHC RBC-ENTMCNC: 32.3 PG — SIGNIFICANT CHANGE UP (ref 27–34)
MCHC RBC-ENTMCNC: 35.8 GM/DL — SIGNIFICANT CHANGE UP (ref 32–36)
MCV RBC AUTO: 90.2 FL — SIGNIFICANT CHANGE UP (ref 80–100)
PLATELET # BLD AUTO: 166 K/UL — SIGNIFICANT CHANGE UP (ref 150–400)
POTASSIUM SERPL-MCNC: 4.7 MMOL/L — SIGNIFICANT CHANGE UP (ref 3.5–5.3)
POTASSIUM SERPL-SCNC: 4.7 MMOL/L — SIGNIFICANT CHANGE UP (ref 3.5–5.3)
PROTHROM AB SERPL-ACNC: 19 SEC — HIGH (ref 9.8–12.7)
RBC # BLD: 5.01 M/UL — SIGNIFICANT CHANGE UP (ref 3.8–5.2)
RBC # FLD: 13.5 % — SIGNIFICANT CHANGE UP (ref 10.3–14.5)
SODIUM SERPL-SCNC: 135 MMOL/L — SIGNIFICANT CHANGE UP (ref 135–145)
WBC # BLD: 16.47 K/UL — HIGH (ref 3.8–10.5)
WBC # FLD AUTO: 16.47 K/UL — HIGH (ref 3.8–10.5)

## 2018-09-18 PROCEDURE — 99233 SBSQ HOSP IP/OBS HIGH 50: CPT | Mod: GC

## 2018-09-18 PROCEDURE — 93970 EXTREMITY STUDY: CPT | Mod: 26

## 2018-09-18 RX ORDER — IPRATROPIUM/ALBUTEROL SULFATE 18-103MCG
3 AEROSOL WITH ADAPTER (GRAM) INHALATION
Qty: 0 | Refills: 0 | Status: DISCONTINUED | OUTPATIENT
Start: 2018-09-18 | End: 2018-09-22

## 2018-09-18 RX ORDER — BUDESONIDE, MICRONIZED 100 %
0.5 POWDER (GRAM) MISCELLANEOUS EVERY 12 HOURS
Qty: 0 | Refills: 0 | Status: DISCONTINUED | OUTPATIENT
Start: 2018-09-18 | End: 2018-09-22

## 2018-09-18 RX ORDER — VERAPAMIL HCL 240 MG
120 CAPSULE, EXTENDED RELEASE PELLETS 24 HR ORAL DAILY
Qty: 0 | Refills: 0 | Status: DISCONTINUED | OUTPATIENT
Start: 2018-09-18 | End: 2018-09-20

## 2018-09-18 RX ADMIN — Medication 3 MILLILITER(S): at 18:25

## 2018-09-18 RX ADMIN — PANTOPRAZOLE SODIUM 40 MILLIGRAM(S): 20 TABLET, DELAYED RELEASE ORAL at 05:44

## 2018-09-18 RX ADMIN — Medication 20 MILLIGRAM(S): at 14:22

## 2018-09-18 RX ADMIN — Medication 200 MILLIGRAM(S): at 21:46

## 2018-09-18 RX ADMIN — Medication 600 MILLIGRAM(S): at 10:57

## 2018-09-18 RX ADMIN — Medication 3 MILLILITER(S): at 21:47

## 2018-09-18 RX ADMIN — Medication 200 MILLIGRAM(S): at 14:18

## 2018-09-18 RX ADMIN — ENOXAPARIN SODIUM 70 MILLIGRAM(S): 100 INJECTION SUBCUTANEOUS at 05:44

## 2018-09-18 RX ADMIN — Medication 3 MILLILITER(S): at 09:36

## 2018-09-18 RX ADMIN — Medication 3 MILLILITER(S): at 14:23

## 2018-09-18 RX ADMIN — ATORVASTATIN CALCIUM 10 MILLIGRAM(S): 80 TABLET, FILM COATED ORAL at 21:46

## 2018-09-18 RX ADMIN — Medication 20 MILLIGRAM(S): at 21:46

## 2018-09-18 RX ADMIN — ENOXAPARIN SODIUM 70 MILLIGRAM(S): 100 INJECTION SUBCUTANEOUS at 18:24

## 2018-09-18 RX ADMIN — Medication 3 MILLILITER(S): at 05:47

## 2018-09-18 RX ADMIN — Medication 0.5 MILLIGRAM(S): at 18:36

## 2018-09-18 NOTE — CONSULT NOTE ADULT - ASSESSMENT
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 mutation and a subtherapeutic INR on coumadin - would not consider this a coumadin "failure"     marked metabolic acidosis on SMA7 - perhaps lab error as the VBG is without significant acidemia    h/o breast cancer s/p lumpectomy    hypertension    PLAN/RECOMMENDATIONS:    oxygen supplementation to keep saturation greater than 92%  solumedrol 20mg IVP q6h  albuterol/atrovent nebs q4h holding 2am dose  pulmicort 0.5mg nebs q12h  robitussin DM/tessalon/hycodan (watch for sedation on narcotic based antitussive)  lovenox (full dose) until INR is therapeutic on coumadin - hematology evaluation pending  GI prophylaxis on A/C and steroids - protonix  lower extremity Duplex - if DVTs are present, would consider placement of an IVC filter    Thank you for the courtesy of this referral. Plan of care discussed at length with the patient at bedside and the hematology team.    I will be out of the hospital tomorrow. Please call [phone] with questions or clinical changes    Rd Lira MD, [facility] - [phone]  Pulmonary Medicine

## 2018-09-18 NOTE — CONSULT NOTE ADULT - ASSESSMENT
74 y/o F with history of recurrent DVT/PE and reported history of Prothrombin gene mutation managed as an outpatient with coumadin who is presenting with new PE/DVT in the setting of a subtherapeutic (almost normal) INR.     #PE/DVT  Agree with full anticoagulation with lovenox for now.   Patient expressed her desire to go home without lovenox and on coumadin again.   If patient unable to reliably maintain INR 2-3 although it is untested it is certainly reasonable to consider using DOAC in this setting such as Xarelto.   Monitor O2, on supplemental O2 at the moment.   Nebs as per pulmonary.

## 2018-09-18 NOTE — PROGRESS NOTE ADULT - SUBJECTIVE AND OBJECTIVE BOX
Patient is a 73y old  Female who presents with a chief complaint of shortness of breath (17 Sep 2018 21:18)  73y    HPI:  74 yo F w/ breast CA s/p removal, hx of prothrombin gene mutation, CKD III, prior hx of unprovoked DVT/PE ~ 4-6yr ago (on warfarin) presents with shortness of breath. Patient reports her symptoms started on Friday. About 1 -week prior she travelled by car to [place] No sick contacts but on Friday started developing shortness of breath, chest congestion, and coughing. The cough is nonproductive. She feels myalgia but no fever or chills. Her dyspnea became progressively worse. Initially shortness of breath was with exertion and now with slight activities becomes dyspneic. She has chronic leg edema but symmetrical in nature. She denies pleuritic chest pain, palpitation, tearing back pain, and denies syncope or near-syncope episode. She did miss her warfarin dose last week and her INR has been monitored once monthly. She otherwise reports adherence to warfarin and does not know exact INR range. (17 Sep 2018 21:18)            MEDICATIONS  (STANDING):  ALBUTerol/ipratropium for Nebulization 3 milliLiter(s) Nebulizer every 4 hours  atorvastatin 10 milliGRAM(s) Oral at bedtime  enoxaparin Injectable 70 milliGRAM(s) SubCutaneous every 12 hours  guaiFENesin  milliGRAM(s) Oral every 12 hours  influenza   Vaccine 0.5 milliLiter(s) IntraMuscular once  pantoprazole    Tablet 40 milliGRAM(s) Oral before breakfast    MEDICATIONS  (PRN):      Allergies    No Known Allergies    Intolerances        FAMILY HISTORY:  Family history of prothrombin gene mutation (Mother): first cousin  Family history of heart disease (Father): father        Social HX        REVIEW OF SYSTEMS:  CONSTITUTIONAL: No fever, weight loss, or fatigue  EYES: No eye pain, visual disturbances, or discharge  ENMT:  No difficulty hearing, tinnitus, vertigo; No sinus or throat pain  NECK: No pain or stiffness  BREASTS: No pain, masses, or nipple discharge  RESPIRATORY: No cough, wheezing, chills or hemoptysis; No shortness of breath  CARDIOVASCULAR: No chest pain, palpitations, dizziness, or leg swelling  GASTROINTESTINAL: No abdominal or epigastric pain. No nausea, vomiting, or hematemesis; No diarrhea or constipation. No melena or hematochezia.  GENITOURINARY: No dysuria, frequency, hematuria, or incontinence  NEUROLOGICAL: No headaches, memory loss, loss of strength, numbness, or tremors  SKIN: No itching, burning, rashes, or lesions   LYMPH NODES: No enlarged glands  ENDOCRINE: No heat or cold intolerance; No hair loss  MUSCULOSKELETAL: No joint pain or swelling; No muscle, back, or extremity pain  PSYCHIATRIC: No depression, anxiety, mood swings, or difficulty sleeping  HEME/LYMPH: No easy bruising, or bleeding gums  ALLERY AND IMMUNOLOGIC: No hives or eczema        Vital Signs Last 24 Hrs  T(C): 36.8 (18 Sep 2018 06:21), Max: 36.9 (17 Sep 2018 11:22)  T(F): 98.3 (18 Sep 2018 06:21), Max: 98.5 (17 Sep 2018 11:22)  HR: 74 (18 Sep 2018 06:21) (72 - 88)  BP: 133/78 (18 Sep 2018 06:21) (121/84 - 155/90)  BP(mean): --  RR: 20 (18 Sep 2018 06:21) (18 - 20)  SpO2: 97% (18 Sep 2018 06:21) (95% - 99%)      PHYSICAL EXAM:  GENERAL: NAD, well-groomed, well-developed  HEAD:  Atraumatic, Normocephalic  EYES: EOMI, PERRLA, conjunctiva and sclera clear  ENMT: No tonsillar erythema, exudates, or enlargement; Moist mucous membranes, Good dentition, No lesions  NECK: Supple, No JVD, Normal thyroid  NERVOUS SYSTEM:  Alert & Oriented X3, Good concentration; Motor Strength 5/5 B/L upper and lower extremities; DTRs 2+ intact and symmetric  CHEST/LUNG: Clear to auscultation  bilaterally; No rales, rhonchi, wheezing, or rubs  HEART: Regular rate and rhythm; No murmurs, rubs, or gallops  ABDOMEN: Soft, Nontender, Nondistended; Bowel sounds present  EXTREMITIES:  2+ Peripheral Pulses, No clubbing, cyanosis, or edema  LYMPH: No lymphadenopathy noted  SKIN: No rashes or lesions        CBC Full  -  ( 17 Sep 2018 13:14 )  WBC Count : 11.8 K/uL  Hemoglobin : 15.5 g/dL  Hematocrit : 46.3 %  Platelet Count - Automated : 158 K/uL  Mean Cell Volume : 91.6 fl  Mean Cell Hemoglobin : 30.6 pg  Mean Cell Hemoglobin Concentration : 33.4 gm/dL  Auto Neutrophil # : 5.6 K/uL  Auto Lymphocyte # : 5.2 K/uL  Auto Monocyte # : 0.6 K/uL  Auto Eosinophil # : 0.2 K/uL  Auto Basophil # : 0.3 K/uL  Auto Neutrophil % : 47.9 %  Auto Lymphocyte % : 43.8 %  Auto Monocyte % : 4.7 %  Auto Eosinophil % : 1.4 %  Auto Basophil % : 2.2 %    09-18    135  |  106  |  13  ----------------------------<  119<H>  4.7   |  13<L>  |  0.77    Ca    9.7      18 Sep 2018 06:35  Mg     2.2     09-18    TPro  7.3  /  Alb  4.3  /  TBili  0.5  /  DBili  x   /  AST  21  /  ALT  18  /  AlkPhos  72  09-17    PT/INR - ( 17 Sep 2018 13:02 )   PT: 12.7 sec;   INR: 1.17 ratio         PTT - ( 17 Sep 2018 13:02 )  PTT:22.7 sec Patient is a 73y old  Female who presents with a chief complaint of shortness of breath (17 Sep 2018 21:18)  73y    HPI:  74 yo F w/ breast CA s/p removal, hx of prothrombin gene mutation, CKD III, prior hx of unprovoked DVT/PE ~ 4-6yr ago (on warfarin) presents with shortness of breath. Patient reports her symptoms started on Friday. About 1 -week prior she travelled by car to [place] No sick contacts but on Friday started developing shortness of breath, chest congestion, and coughing. The cough is nonproductive. She feels myalgia but no fever or chills. Her dyspnea became progressively worse. Initially shortness of breath was with exertion and now with slight activities becomes dyspneic. Pt was evaluated in office and appeared very uncomfortable w/ cough and dyspnea and she was referred to the ER for evaluation.  She has chronic leg edema but symmetrical in nature. She denies pleuritic chest pain, palpitation, tearing back pain, and denies syncope or near-syncope episode. She did miss one warfarin dose last week and her INR has been monitored once monthly. She otherwise reports adherence to warfarin and does not know exact INR range. (17 Sep 2018 21:18)        Allergies and Intolerances:        Allergies:  	No Known Allergies:     Home Medications:   * Patient Currently Takes Medications as of 17-Sep-2018 21:22 documented in Structured Notes  · 	verapamil 120 mg oral tablet: 1 tab(s) orally once a day, Last Dose Taken:    · 	omeprazole 40 mg oral delayed release capsule: 1 cap(s) orally once a day, Last Dose Taken:    · 	Lipitor 10 mg oral tablet: 1 tab(s) orally once a day, Last Dose Taken:    · 	Coumadin: 4mg on Monday and Thursday and 2.5mg rest of days , Last Dose Taken:      .    Patient History:    Past Medical History:  DVT (deep venous thrombosis), right  october 2013  GERD (gastroesophageal reflux disease)    Herniated lumbar intervertebral disc    HTN - Hypertension    Hx of Breast Cancer    Lower back pain    PE (pulmonary embolism)  3 yrs ago, was on Lovenox and coumadin  Prothrombin gene mutation.     Past Surgical History:  h/o Wrist Surgery    S/P arthroscopy of right knee  2012  S/P Breast Lumpectomy  cyst removed from left breast.  S/P tubal ligation.     Family History:FAMILY HISTORY:  Family history of prothrombin gene mutation (Mother): first cousin  Family history of heart disease (Father): father     Social History:  Social History (marital status, living situation, occupation, tobacco use, alcohol and drug use, and sexual history): Patient reports no prior or current use of tobacco products, illicit drugs, or alcohol consumption.	     Tobacco Screening:  · Core Measure Site	No	  · Has the patient used tobacco in the past 30 days?	No	        Review of Systems:  Review of Systems: REVIEW OF SYSTEMS:   CONSTITUTIONAL: No weakness, fevers or chills  ENMT:  No ear pain, No vertigo or throat pain  EYES: No visual changes  or photophobia  NECK: No pain or stiffness  RESPIRATORY: see HPI   CARDIOVASCULAR: No chest pain or palpitations  GASTROINTESTINAL: No abdominal or epigastric pain. No nausea, vomiting, or hematemesis; No diarrhea or constipation. No melena or hematochezia.  MUSCULOSKELETAL: No joint swelling  or warmth.  GENITOURINARY: No dysuria, frequency or hematuria  NEUROLOGICAL: No numbness or weakness  PSYCHIATRIC: No depression or anhedonia.  ENDOCRINE: No sx hypoglycemic episodes. SKIN: No itching, burning, rashes, or lesions 	  Vital Signs Last 24 Hrs  T(C): 36.8 (18 Sep 2018 06:21), Max: 36.9 (17 Sep 2018 11:22)  T(F): 98.3 (18 Sep 2018 06:21), Max: 98.5 (17 Sep 2018 11:22)  HR: 74 (18 Sep 2018 06:21) (72 - 88)  BP: 133/78 (18 Sep 2018 06:21) (121/84 - 155/90)  BP(mean): --  RR: 20 (18 Sep 2018 06:21) (18 - 20)  SpO2: 97% (18 Sep 2018 06:21) (95% - 99%)      PHYSICAL EXAM:  GENERAL: NAD, well-groomed, well-developed  HEAD:  Atraumatic, Normocephalic  EYES:  conjunctiva and sclera clear  ENMT: No tonsillar erythema, exudates, or enlargement; Moist mucous membranes, Good dentition, No lesions  NECK: Supple, No JVD, Normal thyroid  NERVOUS SYSTEM:  Alert & Oriented X3, Good concentration; Motor Strength 5/5 B/L upper and lower extremities  CHEST/LUNG: scattered wheezes and rhonchi bilaterally; No rales or rubs  HEART: Regular rate and rhythm; No murmurs, rubs, or gallops  ABDOMEN: Soft, Nontender, Nondistended; Bowel sounds present, neg HSM  EXTREMITIES:   No clubbing, cyanosis, 1+ edema  LYMPH: No lymphadenopathy noted  SKIN: No rashes or lesions        CBC Full  -  ( 17 Sep 2018 13:14 )  WBC Count : 11.8 K/uL  Hemoglobin : 15.5 g/dL  Hematocrit : 46.3 %  Platelet Count - Automated : 158 K/uL  Mean Cell Volume : 91.6 fl  Mean Cell Hemoglobin : 30.6 pg  Mean Cell Hemoglobin Concentration : 33.4 gm/dL  Auto Neutrophil # : 5.6 K/uL  Auto Lymphocyte # : 5.2 K/uL  Auto Monocyte # : 0.6 K/uL  Auto Eosinophil # : 0.2 K/uL  Auto Basophil # : 0.3 K/uL  Auto Neutrophil % : 47.9 %  Auto Lymphocyte % : 43.8 %  Auto Monocyte % : 4.7 %  Auto Eosinophil % : 1.4 %  Auto Basophil % : 2.2 %    09-18    135  |  106  |  13  ----------------------------<  119<H>  4.7   |  13<L>  |  0.77    Ca    9.7      18 Sep 2018 06:35  Mg     2.2     09-18    TPro  7.3  /  Alb  4.3  /  TBili  0.5  /  DBili  x   /  AST  21  /  ALT  18  /  AlkPhos  72  09-17    PT/INR - ( 17 Sep 2018 13:02 )   PT: 12.7 sec;   INR: 1.17 ratio         PTT - ( 17 Sep 2018 13:02 )  PTT:22.7 sec    < from: CT Angio Chest w/ IV Cont (09.17.18 @ 19:29) >  EXAM:  CT ANGIO CHEST (W)AW IC                            PROCEDURE DATE:  09/17/2018            INTERPRETATION:  CLINICAL INFORMATION: Shortness of breath. INR   subtherapeutic.    COMPARISON: CTA chest 6/27/2014    PROCEDURE:   CT Angiography of the Chest.  90 ml of Omnipaque 350 was injected intravenously. 10 ml were discarded.  Sagittal and coronal reformats were performed as well as 3D (MIP)   reconstructions.      FINDINGS:    CHEST:     LUNGS AND LARGE AIRWAYS: Patent central airways.  Bibasilar dependent   atelectasis. Left lower lobe calcified granuloma  PLEURA: Filling defects visualized within the left pulmonary artery, left   upper and lower lobar branches as well as the distal segmental and   subsegmental branches. Emboli are also noted within the segmental and   subsegmental branches of the right lower lobe pulmonary arterial branches.  VESSELS: Within normal limits. The main pulmonary artery measures   approximately 2.8 cm.  HEART: Heart size is normal. No pericardial effusion. No evidence of   right heart strain. Coronary artery calcifications.  MEDIASTINUM AND NAKIA: No lymphadenopathy.  CHEST WALL AND LOWER NECK: Within normal limits.  VISUALIZED UPPER ABDOMEN: Within normal limits.  BONES: Degenerative changes of the spine.    IMPRESSION:   Pulmonary embolism involving the left pulmonary artery, left upper lobe   are branches, as well as the distal segmental subsegmental branches.   Emboli are also noted of the right lower lobe segmental and subsegmental   branches.  No evidence of right heart strain or pulmonary infarct.    Findings were discussed with BEN Stephenson by Dr. Nunez on   9/17/2018 at 7:42 PM with read back.                    KIMBERLY NUNEZ M.D., RADIOLOGY RESIDENT  This document has been electronically signed.    < end of copied text > Patient is a 73y old  Female who presents with a chief complaint of shortness of breath (17 Sep 2018 21:18)  73y    HPI:  72 yo F w/ breast CA s/p removal, hx of prothrombin gene mutation, CKD III, prior hx of unprovoked DVT/PE ~ 4-6yr ago (on warfarin) presents with shortness of breath. Patient reports her symptoms started on Friday. About 1 -week prior she travelled by car to [place] No sick contacts but on Friday started developing shortness of breath, chest congestion, and coughing. The cough is nonproductive. She feels myalgia but no fever or chills. Her dyspnea became progressively worse. Initially shortness of breath was with exertion and now with slight activities becomes dyspneic. Pt was evaluated in office and appeared very uncomfortable w/ cough and dyspnea and she was referred to the ER for evaluation.  She has chronic leg edema but symmetrical in nature. She denies pleuritic chest pain, palpitation, tearing back pain, and denies syncope or near-syncope episode. She did miss one warfarin dose last week and her INR has been monitored once monthly. She otherwise reports adherence to warfarin and does not know exact INR range. (17 Sep 2018 21:18)        Allergies and Intolerances:        Allergies:  	No Known Allergies:     Home Medications:   * Patient Currently Takes Medications as of 17-Sep-2018 21:22 documented in Structured Notes  · 	verapamil 120 mg oral tablet: 1 tab(s) orally once a day, Last Dose Taken:    · 	omeprazole 40 mg oral delayed release capsule: 1 cap(s) orally once a day, Last Dose Taken:    · 	Lipitor 10 mg oral tablet: 1 tab(s) orally once a day, Last Dose Taken:    · 	Coumadin: 4mg on Monday and Thursday and 2.5mg rest of days , Last Dose Taken:      .    Patient History:    Past Medical History:  DVT (deep venous thrombosis), right  october 2013  GERD (gastroesophageal reflux disease)    Herniated lumbar intervertebral disc    HTN - Hypertension    Hx of Breast Cancer    Lower back pain    PE (pulmonary embolism)  3 yrs ago, was on Lovenox and coumadin  Prothrombin gene mutation.     Past Surgical History:  h/o Wrist Surgery    S/P arthroscopy of right knee  2012  S/P Breast Lumpectomy  cyst removed from left breast.  S/P tubal ligation.     Family History:FAMILY HISTORY:  Family history of prothrombin gene mutation (Mother): first cousin  Family history of heart disease (Father): father     Social History:  Social History (marital status, living situation, occupation, tobacco use, alcohol and drug use, and sexual history): Patient reports no prior or current use of tobacco products, illicit drugs, or alcohol consumption.	     Tobacco Screening:  · Core Measure Site	No	  · Has the patient used tobacco in the past 30 days?	No	        Review of Systems:  Review of Systems: REVIEW OF SYSTEMS:   CONSTITUTIONAL: No weakness, fevers or chills  ENMT:  No ear pain, No vertigo or throat pain  EYES: No visual changes  or photophobia  NECK: No pain or stiffness  RESPIRATORY: see HPI   CARDIOVASCULAR: No chest pain or palpitations  GASTROINTESTINAL: No abdominal or epigastric pain. No nausea, vomiting, or hematemesis; No diarrhea or constipation. No melena or hematochezia.  MUSCULOSKELETAL: No joint swelling  or warmth.  GENITOURINARY: No dysuria, frequency or hematuria  NEUROLOGICAL: No numbness or weakness  PSYCHIATRIC: No depression or anhedonia.  ENDOCRINE: No sx hypoglycemic episodes. SKIN: No itching, burning, rashes, or lesions 	  Vital Signs Last 24 Hrs  T(C): 36.8 (18 Sep 2018 06:21), Max: 36.9 (17 Sep 2018 11:22)  T(F): 98.3 (18 Sep 2018 06:21), Max: 98.5 (17 Sep 2018 11:22)  HR: 74 (18 Sep 2018 06:21) (72 - 88)  BP: 133/78 (18 Sep 2018 06:21) (121/84 - 155/90)  BP(mean): --  RR: 20 (18 Sep 2018 06:21) (18 - 20)  SpO2: 97% (18 Sep 2018 06:21) (95% - 99%)      PHYSICAL EXAM:  GENERAL: NAD, well-groomed, well-developed  HEAD:  Atraumatic, Normocephalic  EYES:  conjunctiva and sclera clear  ENMT: No tonsillar erythema, exudates, or enlargement; Moist mucous membranes, Good dentition, No lesions  NECK: Supple, No JVD, Normal thyroid  NERVOUS SYSTEM:  Alert & Oriented X3, Good concentration; Motor Strength 5/5 B/L upper and lower extremities  CHEST/LUNG: scattered wheezes and rhonchi bilaterally; No rales or rubs  HEART: Regular rate and rhythm; No murmurs, rubs, or gallops  ABDOMEN: Soft, Nontender, Nondistended; Bowel sounds present, neg HSM  EXTREMITIES:   No clubbing, cyanosis, 2+ edema  LYMPH: No lymphadenopathy noted  SKIN: No rashes or lesions        CBC Full  -  ( 17 Sep 2018 13:14 )  WBC Count : 11.8 K/uL  Hemoglobin : 15.5 g/dL  Hematocrit : 46.3 %  Platelet Count - Automated : 158 K/uL  Mean Cell Volume : 91.6 fl  Mean Cell Hemoglobin : 30.6 pg  Mean Cell Hemoglobin Concentration : 33.4 gm/dL  Auto Neutrophil # : 5.6 K/uL  Auto Lymphocyte # : 5.2 K/uL  Auto Monocyte # : 0.6 K/uL  Auto Eosinophil # : 0.2 K/uL  Auto Basophil # : 0.3 K/uL  Auto Neutrophil % : 47.9 %  Auto Lymphocyte % : 43.8 %  Auto Monocyte % : 4.7 %  Auto Eosinophil % : 1.4 %  Auto Basophil % : 2.2 %    09-18    135  |  106  |  13  ----------------------------<  119<H>  4.7   |  13<L>  |  0.77    Ca    9.7      18 Sep 2018 06:35  Mg     2.2     09-18    TPro  7.3  /  Alb  4.3  /  TBili  0.5  /  DBili  x   /  AST  21  /  ALT  18  /  AlkPhos  72  09-17    PT/INR - ( 17 Sep 2018 13:02 )   PT: 12.7 sec;   INR: 1.17 ratio         PTT - ( 17 Sep 2018 13:02 )  PTT:22.7 sec    < from: CT Angio Chest w/ IV Cont (09.17.18 @ 19:29) >  EXAM:  CT ANGIO CHEST (W)AW IC                            PROCEDURE DATE:  09/17/2018            INTERPRETATION:  CLINICAL INFORMATION: Shortness of breath. INR   subtherapeutic.    COMPARISON: CTA chest 6/27/2014    PROCEDURE:   CT Angiography of the Chest.  90 ml of Omnipaque 350 was injected intravenously. 10 ml were discarded.  Sagittal and coronal reformats were performed as well as 3D (MIP)   reconstructions.      FINDINGS:    CHEST:     LUNGS AND LARGE AIRWAYS: Patent central airways.  Bibasilar dependent   atelectasis. Left lower lobe calcified granuloma  PLEURA: Filling defects visualized within the left pulmonary artery, left   upper and lower lobar branches as well as the distal segmental and   subsegmental branches. Emboli are also noted within the segmental and   subsegmental branches of the right lower lobe pulmonary arterial branches.  VESSELS: Within normal limits. The main pulmonary artery measures   approximately 2.8 cm.  HEART: Heart size is normal. No pericardial effusion. No evidence of   right heart strain. Coronary artery calcifications.  MEDIASTINUM AND NAKIA: No lymphadenopathy.  CHEST WALL AND LOWER NECK: Within normal limits.  VISUALIZED UPPER ABDOMEN: Within normal limits.  BONES: Degenerative changes of the spine.    IMPRESSION:   Pulmonary embolism involving the left pulmonary artery, left upper lobe   are branches, as well as the distal segmental subsegmental branches.   Emboli are also noted of the right lower lobe segmental and subsegmental   branches.  No evidence of right heart strain or pulmonary infarct.    Findings were discussed with BEN Stephenson by Dr. Nunez on   9/17/2018 at 7:42 PM with read back.                    KIMBERLY NUNEZ M.D., RADIOLOGY RESIDENT  This document has been electronically signed.    < end of copied text >

## 2018-09-18 NOTE — CONSULT NOTE ADULT - CONSULT REASON
pulmonary emboli; rhinorviral infection; dyspnea pulmonary emboli; rhinoviral infection; bronchospasm; dyspnea

## 2018-09-18 NOTE — PROGRESS NOTE ADULT - ASSESSMENT
· Assessment		  74 yo F w/ breast CA s/p removal, hx of prothrombin gene mutation, CKD III, prior hx of unprovoked DVT/PE ~ 4-6yr ago (on warfarin) presents with shortness of breath 2/2 to pulmonary embolism c/b viral URI

## 2018-09-18 NOTE — CONSULT NOTE ADULT - SUBJECTIVE AND OBJECTIVE BOX
HPI:  74 yo F w/ apparent remote history of breast CA s/p surgical management and a history of reported (cannot find result in EMR) prothrombin gene mutation and prior hx of unprovoked DVT/PE starting 6 years ago managed with warfarin presents with SOB/cough. Patient reports her symptoms started on Friday 9/14 and about one week prior she travelled by car to [place]. She had no sick contacts but on that day started developing shortness of breath, chest congestion, and coughing. The cough is nonproductive. She feels myalgia but no fever or chills. Her dyspnea became progressively worse. Initially shortness of breath was with exertion and now with slight activities becomes dyspneic. She has chronic leg edema but symmetrical in nature. She denies pleuritic chest pain, palpitation, tearing back pain, and denies syncope or near-syncope episode. She did miss her warfarin dose last week and her INR has been monitored once monthly. She otherwise reports adherence to warfarin and does not know exact INR range. On admission her INR was 1.17. CT angio showed pulmonary embolism.       PAST MEDICAL & SURGICAL HISTORY:  Prothrombin gene mutation  GERD (gastroesophageal reflux disease)  DVT (deep venous thrombosis), right: october 2013  Herniated lumbar intervertebral disc  Lower back pain  PE (pulmonary embolism): 3 yrs ago, was on Lovenox and coumadin  Hx of Breast Cancer  HTN - Hypertension  S/P tubal ligation  S/P arthroscopy of right knee: 2012  S/P Breast Lumpectomy: cyst removed from left breast.  h/o Wrist Surgery      Allergies    No Known Allergies    Intolerances        MEDICATIONS  (STANDING):  ALBUTerol/ipratropium for Nebulization 3 milliLiter(s) Nebulizer <User Schedule>  atorvastatin 10 milliGRAM(s) Oral at bedtime  benzonatate 200 milliGRAM(s) Oral three times a day  buDESOnide   0.5 milliGRAM(s) Respule 0.5 milliGRAM(s) Inhalation every 12 hours  enoxaparin Injectable 70 milliGRAM(s) SubCutaneous every 12 hours  guaiFENesin/dextromethorphan  Syrup 10 milliLiter(s) Oral four times a day  HYDROcodone/homatropine Syrup 5 milliLiter(s) Oral <User Schedule>  influenza   Vaccine 0.5 milliLiter(s) IntraMuscular once  methylPREDNISolone sodium succinate Injectable 20 milliGRAM(s) IV Push every 6 hours  pantoprazole    Tablet 40 milliGRAM(s) Oral before breakfast    MEDICATIONS  (PRN):      FAMILY HISTORY:  Family history of prothrombin gene mutation (Mother): first cousin  Family history of heart disease (Father): father      SOCIAL HISTORY: No EtOH, no tobacco    REVIEW OF SYSTEMS:    CONSTITUTIONAL: No weakness, fevers or chills  EYES/ENT: No visual changes;  No vertigo or throat pain   NECK: No pain or stiffness  RESPIRATORY: +SOB +COUGH  CARDIOVASCULAR: No chest pain or palpitations  GASTROINTESTINAL: No abdominal or epigastric pain. No nausea, vomiting, or hematemesis; No diarrhea or constipation. No melena or hematochezia.  GENITOURINARY: No dysuria, frequency or hematuria  NEUROLOGICAL: No numbness or weakness  SKIN: No itching, burning, rashes, or lesions   All other review of systems is negative unless indicated above.        T(F): 98.3 (09-18-18 @ 06:21), Max: 98.3 (09-18-18 @ 06:21)  HR: 74 (09-18-18 @ 06:21)  BP: 133/78 (09-18-18 @ 06:21)  RR: 20 (09-18-18 @ 06:21)  SpO2: 97% (09-18-18 @ 06:21)  Wt(kg): --    GENERAL: NAD, well-developed  HEAD:  Atraumatic, Normocephalic  EYES: EOMI, PERRLA, conjunctiva and sclera clear  NECK: Supple, No JVD  CHEST/LUNG: Wheezing +   HEART: Regular rate and rhythm; No murmurs, rubs, or gallops  ABDOMEN: Soft, Nontender, Nondistended; Bowel sounds present  EXTREMITIES:  2+ Peripheral Pulses, No clubbing, cyanosis, or edema  NEUROLOGY: non-focal  SKIN: No rashes or lesions                          16.2   16.47 )-----------( 166      ( 18 Sep 2018 07:54 )             45.2       09-18    135  |  106  |  13  ----------------------------<  119<H>  4.7   |  13<L>  |  0.77    Ca    9.7      18 Sep 2018 06:35  Mg     2.2     09-18    TPro  7.3  /  Alb  4.3  /  TBili  0.5  /  DBili  x   /  AST  21  /  ALT  18  /  AlkPhos  72  09-17      Magnesium, Serum: 2.2 mg/dL (09-18 @ 06:35)      PT/INR - ( 17 Sep 2018 13:02 )   PT: 12.7 sec;   INR: 1.17 ratio         PTT - ( 17 Sep 2018 13:02 )  PTT:22.7 sec    < from: CT Angio Chest w/ IV Cont (09.17.18 @ 19:29) >  IMPRESSION:   Pulmonary embolism involving the left pulmonary artery, left upper lobe   are branches, as well as the distal segmental subsegmental branches.   Emboli are also noted of the right lower lobe segmental and subsegmental   branches.  No evidence of right heart strain or pulmonary infarct.    < end of copied text >

## 2018-09-18 NOTE — PROGRESS NOTE ADULT - PROBLEM SELECTOR PLAN 2
No signs/sx to suggest superimposed bacterial pneumonia  -hold antimicrobial therapy  -supportive care for positive RVP.  -c/w duoneb q4-6hr for wheezing , if worsening can consider steroid therapy.   pulm evaluation

## 2018-09-18 NOTE — CONSULT NOTE ADULT - ATTENDING COMMENTS
The patient has not achieved her goal of an INR in the range of 2 to 3; I discussed increasing her Warfarin dosing; the use of direct acting factor X inhibitors was discussed. Traditionally we have reserved these medication for short term use and not indefinite [id number].  Follow up visit to be scheduled  the patient and Dr Ricks will occur before the patient choses between Warfarin and other less reversible anticoagulation agents

## 2018-09-18 NOTE — CONSULT NOTE ADULT - SUBJECTIVE AND OBJECTIVE BOX
[facility] - [facility]  CONSULT NOTE    HPI: 73 year old gentlewoman, lifelong non-smoker, with personal and family history of prothrombin gene mutation complicated by DVT/PE ~ 5 years ago maintained on lifelong warfarin. ~ 1 week ago, the patient travelled by car to [place]. Several days later, the patient noted the onset of shortness of breath and a hacking dry cough associated with chest congestion and wheeze. The shortness of breath continue to worsen to the point that she was unable to perform basic activities without symptoms. She had no fevers, chills or sweats. No chest pain/pressure or palpitations. No lightheadedness or dizziness. In the ER, the patient was noted to have bilateral pulmonary emboli without evidence of right heart strain. Asked to evaluate    PMHX:  Prothrombin gene mutation  DVT (deep venous thrombosis), right  PE (pulmonary embolism)  GERD (gastroesophageal reflux disease)  Herniated lumbar intervertebral disc  Lower back pain  Breast Cancer  Hypertension      PSHX:  tubal ligation  arthroscopy of right knee  Breast Lumpectomy  Wrist Surgery  Wrist Surgery      FAMILY HISTORY:  mother - prothrombin gene mutation   cousin -  prothrombin gene mutation   father - CAD    SOCIAL HISTORY:    Pulmonary Medications:   ALBUTerol/ipratropium for Nebulization 3 milliLiter(s) Nebulizer every 4 hours  guaiFENesin  milliGRAM(s) Oral every 12 hours      Antimicrobials:      Cardiology:      Other:  atorvastatin 10 milliGRAM(s) Oral at bedtime  enoxaparin Injectable 70 milliGRAM(s) SubCutaneous every 12 hours  influenza   Vaccine 0.5 milliLiter(s) IntraMuscular once  pantoprazole    Tablet 40 milliGRAM(s) Oral before breakfast      Allergies    No Known Allergies    HOME MEDICATIONS: see  H & P    REVIEW OF SYSTEMS:  Constitutional: As per HPI  HEENT: Within normal limits  CV: As per HPI  Resp: As per HPI  GI: Within normal limits   : Within normal limits  Musculoskeletal: Within normal limits  Skin: Within normal limits  Neurological: Within normal limits  Psychiatric: Within normal limits  Endocrine: Within normal limits  Hematologic/Lymphatic: Within normal limits  Allergic/Immunologic: Within normal limits    [x] All other systems negative    OBJECTIVE:    I&O's Detail    17 Sep 2018 07:01  -  18 Sep 2018 07:00  --------------------------------------------------------  IN:  Total IN: 0 mL    OUT:    Voided: 300 mL  Total OUT: 300 mL    Total NET: -300 mL      18 Sep 2018 07:01  -  18 Sep 2018 10:49  --------------------------------------------------------  IN:    Oral Fluid: 420 mL  Total IN: 420 mL    OUT:  Total OUT: 0 mL    Total NET: 420 mL    PHYSICAL EXAM:  ICU Vital Signs Last 24 Hrs  T(C): 36.8 (18 Sep 2018 06:21), Max: 36.9 (17 Sep 2018 11:22)  T(F): 98.3 (18 Sep 2018 06:21), Max: 98.5 (17 Sep 2018 11:22)  HR: 74 (18 Sep 2018 06:21) (72 - 88)  BP: 133/78 (18 Sep 2018 06:21) (121/84 - 155/90)  BP(mean): --  ABP: --  ABP(mean): --  RR: 20 (18 Sep 2018 06:21) (18 - 20)  SpO2: 97% (18 Sep 2018 06:21) (95% - 99%)    General: Awake. Alert. Cooperative. No distress. Appears stated age 	  HEENT:   Atraumatic. Normocephalic. Anicteric. Normal oral mucosa. PERRL. EOMI.  Neck: Supple. Trachea midline. Thyroid without enlargement/tenderness/nodules. No carotid bruit. No JVD.	  Cardiovascular: Regular rate and rhythm. S1 S2 normal. No murmurs, rubs or gallops.  Respiratory: Respirations unlabored. Clear to auscultation and percussion bilaterally. No curvature.  Abdomen: Soft. Non-tender. Non-distended. No organomegaly. No masses. Normal bowel sounds.  Extremities: Warm to touch. No clubbing or cyanosis. No pedal edema.  Pulses: 2+ peripheral pulses all extremities.	  Skin: Normal skin color. No rashes or lesions. No ecchymoses. No cyanosis. Warm to touch.  Lymph Nodes: Cervical, supraclavicular and axillary nodes normal  Neurological: Motor and sensory examination equal and normal. A and O x 3  Psychiatry: Appropriate mood and affect.      LABS:                          15.5   11.8  )-----------( 158      ( 17 Sep 2018 13:14 )             46.3     09-18    135  |  106  |  13  ----------------------------<  119<H>  4.7   |  13<L>  |  0.77    09-17    136  |  98  |  13  ----------------------------<  87  3.6   |  24  |  1.02    Ca      9.7      09-18    Ca      10.1      09-17      Mg       2.2     09-18    TPro  7.3  /  Alb  4.3  /  TBili  0.5  /  DBili  x   /  AST  21  /  ALT  18  /  AlkPhos  72  09-17    PT/INR - ( 17 Sep 2018 13:02 )   PT: 12.7 sec;   INR: 1.17 ratio       PTT - ( 17 Sep 2018 13:02 )  PTT:22.7 sec    Venous Blood Gas:  09-17 @ 13:14  7.36/49/30/27/49  VBG Lactate: 2.5                  MICROBIOLOGY:       RADIOLOGY:  [x ] Chest radiographs reviewed and interpreted by me [facility]  CONSULT NOTE    HPI: 73 year old gentlewoman, lifelong non-smoker, with personal and family history of prothrombin gene mutation complicated by DVT/PE ~ 5 years ago now maintained on lifelong warfarin. ~ 1 week ago, the patient travelled by car to [place] to visit her son. She also took trips to a On license of [facility] and [place]. Several days later, the patient noted the onset a hacking dry cough associated with chest congestion and wheeze. She subsequently developed shortness of breath with exertion which continued to worsen over the last few days. She was sent to the ER by Dr. Ricks yesterday for further evaluation. She had no fevers, chills or sweats. No chest pain/pressure or palpitations. No lightheadedness or dizziness. She has chronic mild lower extremity swelling somewhat worse of late. In the ER, the patient was noted to have bilateral pulmonary emboli without evidence of right heart strain and a rhinoviral infection. Asked to evaluate    PMHX:  Prothrombin gene mutation  DVT (deep venous thrombosis), right  PE (pulmonary embolism)  GERD (gastroesophageal reflux disease)  Herniated lumbar intervertebral disc  Lower back pain  Breast Cancer  Hypertension      PSHX:  tubal ligation  arthroscopy of right knee  Lumpectomy  wrist Surgery    FAMILY HISTORY:  mother - prothrombin gene mutation   cousin -  prothrombin gene mutation   father - CAD    SOCIAL HISTORY:  ; lifelong non-smoker but exposed to second hand smoke (/both sons)    Pulmonary Medications:   ALBUTerol/ipratropium for Nebulization 3 milliLiter(s) Nebulizer every 4 hours  guaiFENesin  milliGRAM(s) Oral every 12 hours      Antimicrobials:      Cardiology:      Other:  atorvastatin 10 milliGRAM(s) Oral at bedtime  enoxaparin Injectable 70 milliGRAM(s) SubCutaneous every 12 hours  influenza   Vaccine 0.5 milliLiter(s) IntraMuscular once  pantoprazole    Tablet 40 milliGRAM(s) Oral before breakfast      Allergies    No Known Allergies    HOME MEDICATIONS: see  H & P    REVIEW OF SYSTEMS:  Constitutional: As per HPI  HEENT: Within normal limits  CV: As per HPI  Resp: As per HPI  GI: Within normal limits   : Within normal limits  Musculoskeletal: Within normal limits  Skin: Within normal limits  Neurological: Within normal limits  Psychiatric: Within normal limits  Endocrine: Within normal limits  Hematologic/Lymphatic: Within normal limits  Allergic/Immunologic: Within normal limits    [x] All other systems negative    OBJECTIVE:    I&O's Detail    17 Sep 2018 07:01  -  18 Sep 2018 07:00  --------------------------------------------------------  IN:  Total IN: 0 mL    OUT:    Voided: 300 mL  Total OUT: 300 mL    Total NET: -300 mL      18 Sep 2018 07:01  -  18 Sep 2018 10:49  --------------------------------------------------------  IN:    Oral Fluid: 420 mL  Total IN: 420 mL    OUT:  Total OUT: 0 mL    Total NET: 420 mL    PHYSICAL EXAM:  ICU Vital Signs Last 24 Hrs  T(C): 36.8 (18 Sep 2018 06:21), Max: 36.9 (17 Sep 2018 11:22)  T(F): 98.3 (18 Sep 2018 06:21), Max: 98.5 (17 Sep 2018 11:22)  HR: 74 (18 Sep 2018 06:21) (72 - 88)  BP: 133/78 (18 Sep 2018 06:21) (121/84 - 155/90)  BP(mean): --  ABP: --  ABP(mean): --  RR: 20 (18 Sep 2018 06:21) (18 - 20)  SpO2: 97% (18 Sep 2018 06:21) (95% - 99%) on 3lpm nasal canula    General: Awake. Alert. Cooperative. Hacking cough. Appears stated age 	  HEENT:   Atraumatic. Normocephalic. Anicteric. Normal oral mucosa. PERRL. EOMI.  Neck: Supple. Trachea midline. Thyroid without enlargement/tenderness/nodules. No carotid bruit. No JVD.	  Cardiovascular: Regular rate and rhythm. S1 S2 normal. No murmurs, rubs or gallops.  Respiratory: Respirations unlabored. Diffuse wheeze with prolonged expiratory phase of respiration. No curvature.  Abdomen: Soft. Non-tender. Non-distended. No organomegaly. No masses. Normal bowel sounds.  Extremities: Warm to touch. No clubbing or cyanosis. Mild bilateral lower extremity edema  Pulses: 2+ peripheral pulses all extremities.	  Skin: Normal skin color. No rashes or lesions. No ecchymoses. No cyanosis. Warm to touch.  Lymph Nodes: Cervical, supraclavicular and axillary nodes normal  Neurological: Motor and sensory examination equal and normal. A and O x 3  Psychiatry: Appropriate mood and affect.      LABS:                          15.5   11.8  )-----------( 158      ( 17 Sep 2018 13:14 )             46.3     09-18    135  |  106  |  13  ----------------------------<  119<H>  4.7   |  13<L>  |  0.77    09-17    136  |  98  |  13  ----------------------------<  87  3.6   |  24  |  1.02    Ca      9.7      09-18    Ca      10.1      09-17      Mg       2.2     09-18    TPro  7.3  /  Alb  4.3  /  TBili  0.5  /  DBili  x   /  AST  21  /  ALT  18  /  AlkPhos  72  09-17    PT/INR - ( 17 Sep 2018 13:02 )   PT: 12.7 sec;   INR: 1.17 ratio       PTT - ( 17 Sep 2018 13:02 )  PTT:22.7 sec    Venous Blood Gas:  09-17 @ 13:14  7.36/49/30/27/49  VBG Lactate: 2.5    < from: US TTE 2D F/U, Limited w/o Contrast (ED) (09.17.18 @ 13:58) >    Procedure was performed in the Emergency Department by a credentialed  Emergency Medicine Attending Physician    EXAM:  ER TTE LIMITED      ORDER COMMENTS:      PROCEDURE DATE:  09/17/2018    FOCUSED ED ULTRASOUND REPORT    INTERPRETATION:  A focused transthoracic cardiac ultrasound examination   was performed.   No pericardial effusion was present.    No global wall motion abnormality was identified  no right heart strain   IMPRESSION:   No Pericardial Effusion.    ARLINE MENDEZ ATTENDING EM PHYSICIAN  This document has been electronically signed. Sep 17 2018  2:00PM      < end of copied text >  ---------------------------------------------------------------------------------------------------------------    MICROBIOLOGY:     Rapid Respiratory Viral Panel (09.17.18 @ 13:23)    Rapid RVP Result: Detected: The [employer] RVP Rapid uses polymerase chain reaction (PCR) and melt  curve analysis to screen for adenovirus; coronavirus HKU1, NL63, 229E,  OC43; human metapneumovirus (hMPV); human enterovirus/rhinovirus  (Entero/RV); influenza A; influenza A/H1;influenza A/H3; influenza  A/H1-2009; influenza B; parainfluenza viruses 1, 2, 3, 4; respiratory  syncytial virus; Bordetella pertussis; Mycoplasma pneumoniae; and  Chlamydophila pneumoniae.    Entero/Rhinovirus (RapRVP): Detected    RADIOLOGY:  [x ] Chest radiographs reviewed and interpreted by me    < from: Xray Chest 1 View AP/PA (09.17.18 @ 12:55) >    EXAM:  XR CHEST AP OR PA 1V                          PROCEDURE DATE:  09/17/2018      INTERPRETATION:  A single chest x-ray was obtained on September 17, 2018.    Indication: Dyspnea.    Impression:    The heart is normal in size. The lungs are clear. Degenerative changes of   the thoracic spine. No pneumothorax.    MEKA BURGOS M.D., ATTENDING RADIOLOGIST  This document has been electronically signed. Sep 17 2018  1:47PM      < end of copied text >  ---------------------------------------------------------------------------------------------------------------    < from: CT Angio Chest w/ IV Cont (09.17.18 @ 19:29) >    EXAM:  CT ANGIO CHEST (W)AW IC                            PROCEDURE DATE:  09/17/2018            INTERPRETATION:  CLINICAL INFORMATION: Shortness of breath. INR   subtherapeutic.    COMPARISON: CTA chest 6/27/2014    PROCEDURE:   CT Angiography of the Chest.  90 ml of Omnipaque 350 was injected intravenously. 10 ml were discarded.  Sagittal and coronal reformats were performed as well as 3D (MIP)   reconstructions.      FINDINGS:    CHEST:     LUNGS AND LARGE AIRWAYS: Patent central airways.  Bibasilar dependent   atelectasis. Left lower lobe calcified granuloma  PLEURA: Filling defects visualized within the left pulmonary artery, left   upper and lower lobar branches as well as the distal segmental and   subsegmental branches. Emboli are also noted within the segmental and   subsegmental branches of the right lower lobe pulmonary arterial branches.  VESSELS: Within normal limits. The main pulmonary artery measures   approximately 2.8 cm.  HEART: Heart size is normal. No pericardial effusion. No evidence of   right heart strain. Coronary artery calcifications.  MEDIASTINUM AND NAKIA: No lymphadenopathy.  CHEST WALL AND LOWER NECK: Within normal limits.  VISUALIZED UPPER ABDOMEN: Within normal limits.  BONES: Degenerative changes of the spine.    IMPRESSION:   Pulmonary embolism involving the left pulmonary artery, left upper lobe   are branches, as well as the distal segmental subsegmental branches.   Emboli are also noted of the right lower lobe segmental and subsegmental   branches.  No evidence of right heart strain or pulmonary infarct.    Findings were discussed with BEN Stephenson by Dr. Sosa on   9/17/2018 at 7:42 PM with read back.    KIMBERLY SOSA M.D., RADIOLOGY RESIDENT  This document has been electronically signed.  MARTA COLLINS M.D., ATTENDING RADIOLOGIST  This document has been electronically signed. Sep 17 2018  8:26PM      < end of copied text >  ---------------------------------------------------------------------------------------------------------------

## 2018-09-19 LAB
ANION GAP SERPL CALC-SCNC: 11 MMOL/L — SIGNIFICANT CHANGE UP (ref 5–17)
ANION GAP SERPL CALC-SCNC: 12 MMOL/L — SIGNIFICANT CHANGE UP (ref 5–17)
BASOPHILS # BLD AUTO: 0 K/UL — SIGNIFICANT CHANGE UP (ref 0–0.2)
BASOPHILS NFR BLD AUTO: 0 % — SIGNIFICANT CHANGE UP (ref 0–2)
BUN SERPL-MCNC: 17 MG/DL — SIGNIFICANT CHANGE UP (ref 7–23)
BUN SERPL-MCNC: 17 MG/DL — SIGNIFICANT CHANGE UP (ref 7–23)
CALCIUM SERPL-MCNC: 9.3 MG/DL — SIGNIFICANT CHANGE UP (ref 8.4–10.5)
CALCIUM SERPL-MCNC: 9.6 MG/DL — SIGNIFICANT CHANGE UP (ref 8.4–10.5)
CHLORIDE SERPL-SCNC: 103 MMOL/L — SIGNIFICANT CHANGE UP (ref 96–108)
CHLORIDE SERPL-SCNC: 103 MMOL/L — SIGNIFICANT CHANGE UP (ref 96–108)
CO2 SERPL-SCNC: 23 MMOL/L — SIGNIFICANT CHANGE UP (ref 22–31)
CO2 SERPL-SCNC: 26 MMOL/L — SIGNIFICANT CHANGE UP (ref 22–31)
CREAT SERPL-MCNC: 0.92 MG/DL — SIGNIFICANT CHANGE UP (ref 0.5–1.3)
CREAT SERPL-MCNC: 0.98 MG/DL — SIGNIFICANT CHANGE UP (ref 0.5–1.3)
EOSINOPHIL # BLD AUTO: 0 K/UL — SIGNIFICANT CHANGE UP (ref 0–0.5)
EOSINOPHIL NFR BLD AUTO: 0 % — SIGNIFICANT CHANGE UP (ref 0–6)
GLUCOSE SERPL-MCNC: 133 MG/DL — HIGH (ref 70–99)
GLUCOSE SERPL-MCNC: 134 MG/DL — HIGH (ref 70–99)
HCT VFR BLD CALC: 40.1 % — SIGNIFICANT CHANGE UP (ref 34.5–45)
HGB BLD-MCNC: 13.9 G/DL — SIGNIFICANT CHANGE UP (ref 11.5–15.5)
IMM GRANULOCYTES NFR BLD AUTO: 0.3 % — SIGNIFICANT CHANGE UP (ref 0–1.5)
INR BLD: 1.89 RATIO — HIGH (ref 0.88–1.16)
LYMPHOCYTES # BLD AUTO: 45.6 % — HIGH (ref 13–44)
LYMPHOCYTES # BLD AUTO: 7.84 K/UL — HIGH (ref 1–3.3)
MAGNESIUM SERPL-MCNC: 2.1 MG/DL — SIGNIFICANT CHANGE UP (ref 1.6–2.6)
MANUAL SMEAR VERIFICATION: SIGNIFICANT CHANGE UP
MCHC RBC-ENTMCNC: 31.4 PG — SIGNIFICANT CHANGE UP (ref 27–34)
MCHC RBC-ENTMCNC: 34.7 GM/DL — SIGNIFICANT CHANGE UP (ref 32–36)
MCV RBC AUTO: 90.5 FL — SIGNIFICANT CHANGE UP (ref 80–100)
MONOCYTES # BLD AUTO: 0.23 K/UL — SIGNIFICANT CHANGE UP (ref 0–0.9)
MONOCYTES NFR BLD AUTO: 1.3 % — LOW (ref 2–14)
NEUTROPHILS # BLD AUTO: 9.05 K/UL — HIGH (ref 1.8–7.4)
NEUTROPHILS NFR BLD AUTO: 52.8 % — SIGNIFICANT CHANGE UP (ref 43–77)
PHOSPHATE SERPL-MCNC: 2.8 MG/DL — SIGNIFICANT CHANGE UP (ref 2.5–4.5)
PLAT MORPH BLD: NORMAL — SIGNIFICANT CHANGE UP
PLATELET # BLD AUTO: 170 K/UL — SIGNIFICANT CHANGE UP (ref 150–400)
POTASSIUM SERPL-MCNC: 3.9 MMOL/L — SIGNIFICANT CHANGE UP (ref 3.5–5.3)
POTASSIUM SERPL-MCNC: 4.9 MMOL/L — SIGNIFICANT CHANGE UP (ref 3.5–5.3)
POTASSIUM SERPL-SCNC: 3.9 MMOL/L — SIGNIFICANT CHANGE UP (ref 3.5–5.3)
POTASSIUM SERPL-SCNC: 4.9 MMOL/L — SIGNIFICANT CHANGE UP (ref 3.5–5.3)
PROTHROM AB SERPL-ACNC: 21.6 SEC — HIGH (ref 10–13.1)
RBC # BLD: 4.43 M/UL — SIGNIFICANT CHANGE UP (ref 3.8–5.2)
RBC # FLD: 13.5 % — SIGNIFICANT CHANGE UP (ref 10.3–14.5)
RBC BLD AUTO: NORMAL — SIGNIFICANT CHANGE UP
SODIUM SERPL-SCNC: 138 MMOL/L — SIGNIFICANT CHANGE UP (ref 135–145)
SODIUM SERPL-SCNC: 140 MMOL/L — SIGNIFICANT CHANGE UP (ref 135–145)
WBC # BLD: 17.18 K/UL — HIGH (ref 3.8–10.5)
WBC # FLD AUTO: 17.18 K/UL — HIGH (ref 3.8–10.5)

## 2018-09-19 RX ORDER — WARFARIN SODIUM 2.5 MG/1
7.5 TABLET ORAL ONCE
Qty: 0 | Refills: 0 | Status: COMPLETED | OUTPATIENT
Start: 2018-09-19 | End: 2018-09-19

## 2018-09-19 RX ADMIN — ENOXAPARIN SODIUM 70 MILLIGRAM(S): 100 INJECTION SUBCUTANEOUS at 16:20

## 2018-09-19 RX ADMIN — ENOXAPARIN SODIUM 70 MILLIGRAM(S): 100 INJECTION SUBCUTANEOUS at 05:02

## 2018-09-19 RX ADMIN — WARFARIN SODIUM 7.5 MILLIGRAM(S): 2.5 TABLET ORAL at 22:59

## 2018-09-19 RX ADMIN — Medication 0.5 MILLIGRAM(S): at 05:02

## 2018-09-19 RX ADMIN — Medication 200 MILLIGRAM(S): at 22:59

## 2018-09-19 RX ADMIN — PANTOPRAZOLE SODIUM 40 MILLIGRAM(S): 20 TABLET, DELAYED RELEASE ORAL at 05:02

## 2018-09-19 RX ADMIN — Medication 200 MILLIGRAM(S): at 05:02

## 2018-09-19 RX ADMIN — ATORVASTATIN CALCIUM 10 MILLIGRAM(S): 80 TABLET, FILM COATED ORAL at 22:59

## 2018-09-19 RX ADMIN — Medication 120 MILLIGRAM(S): at 05:02

## 2018-09-19 RX ADMIN — Medication 3 MILLILITER(S): at 09:29

## 2018-09-19 RX ADMIN — Medication 3 MILLILITER(S): at 18:22

## 2018-09-19 RX ADMIN — Medication 20 MILLIGRAM(S): at 16:20

## 2018-09-19 RX ADMIN — Medication 20 MILLIGRAM(S): at 03:10

## 2018-09-19 RX ADMIN — Medication 0.5 MILLIGRAM(S): at 18:23

## 2018-09-19 RX ADMIN — Medication 3 MILLILITER(S): at 05:02

## 2018-09-19 RX ADMIN — Medication 3 MILLILITER(S): at 22:59

## 2018-09-19 RX ADMIN — Medication 3 MILLILITER(S): at 14:01

## 2018-09-19 RX ADMIN — Medication 20 MILLIGRAM(S): at 22:58

## 2018-09-19 RX ADMIN — Medication 200 MILLIGRAM(S): at 13:54

## 2018-09-19 RX ADMIN — Medication 20 MILLIGRAM(S): at 09:28

## 2018-09-19 NOTE — PROGRESS NOTE ADULT - PROBLEM SELECTOR PLAN 2
No signs/sx to suggest superimposed bacterial pneumonia  -hold antimicrobial therapy  -supportive care for positive RVP.  -c/w duoneb q4-6hr for wheezing   continue IV steroids  robitussin prn cough   appreciate pulm evaluation

## 2018-09-19 NOTE — PROGRESS NOTE ADULT - ASSESSMENT
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 Pt now on Lovenox. Review of office chart shows that pt has been intermittently subtherapeutic over the last several months. INR in range only in august but pt claims to have missed several doses since then and most recent INR <2. Heme note appreciated and in lieu of ? lack of data treating pt's with prothrombin G mutations w/ Xa inhibitors, will continue with coumadin and pt urged to maintain compliance w/ dosing. Continue Lovenox until INR is therapeutic.

## 2018-09-19 NOTE — PROGRESS NOTE ADULT - SUBJECTIVE AND OBJECTIVE BOX
Patient is a 73y old  Female who presents with a chief complaint of shortness of breath (18 Sep 2018 13:51)      INTERVAL HPI/OVERNIGHT EVENTS:    Pt with persistent cough, slightly improved but  increased with talking    T(C): 36.7 (09-19-18 @ 12:00), Max: 36.8 (09-19-18 @ 00:53)  HR: 74 (09-19-18 @ 12:00) (70 - 75)  BP: 116/72 (09-19-18 @ 12:00) (105/69 - 116/72)  RR: 18 (09-19-18 @ 12:00) (18 - 22)  SpO2: 96% (09-19-18 @ 12:00) (96% - 99%)  Wt(kg): --  I&O's Summary    18 Sep 2018 07:01  -  19 Sep 2018 07:00  --------------------------------------------------------  IN: 900 mL / OUT: 0 mL / NET: 900 mL    19 Sep 2018 07:01  -  19 Sep 2018 15:28  --------------------------------------------------------  IN: 900 mL / OUT: 0 mL / NET: 900 mL        LABS:                        13.9   17.18 )-----------( 170      ( 19 Sep 2018 08:01 )             40.1     09-19    138  |  103  |  17  ----------------------------<  134<H>  3.9   |  23  |  0.92    Ca    9.6      19 Sep 2018 06:10  Phos  2.8     09-19  Mg     2.1     09-19      PT/INR - ( 19 Sep 2018 08:02 )   PT: 21.6 sec;   INR: 1.89 ratio             CAPILLARY BLOOD GLUCOSE              REVIEW OF SYSTEMS:  CONSTITUTIONAL: No fever, weight loss, or fatigue  EYES: No eye pain, visual disturbances, or discharge  ENMT:  No difficulty hearing, tinnitus, vertigo; No sinus or throat pain  NECK: No pain or stiffness  BREASTS: No pain, masses, or nipple discharge  RESPIRATORY: cough, wheezing, No shortness of breath  CARDIOVASCULAR: No chest pain, palpitations, dizziness, or leg swelling  GASTROINTESTINAL: No abdominal or epigastric pain. No nausea, vomiting, or hematemesis; No diarrhea or constipation. No melena or hematochezia.  GENITOURINARY: No dysuria, frequency, hematuria, or incontinence  NEUROLOGICAL: No headaches, memory loss, loss of strength, numbness, or tremors  SKIN: No itching, burning, rashes, or lesions   LYMPH NODES: No enlarged glands  ENDOCRINE: No heat or cold intolerance; No hair loss  MUSCULOSKELETAL: No joint pain or swelling; No muscle, back, or extremity pain  PSYCHIATRIC: No depression, anxiety, mood swings, or difficulty sleeping  HEME/LYMPH: No easy bruising, or bleeding gums  ALLERY AND IMMUNOLOGIC: No hives or eczema      Consultant(s) Notes Reviewed:  [x ] YES  [ ] NO    PHYSICAL EXAM:  GENERAL: NAD, well-groomed, well-developed  HEAD:  Atraumatic, Normocephalic  EYES:  conjunctiva and sclera clear  ENMT: No tonsillar erythema, exudates, or enlargement; Moist mucous membranes, Good dentition, No lesions  NECK: Supple, No JVD  NERVOUS SYSTEM:  Alert & Oriented X3, Good concentration; Motor Strength 5/5 B/L upper and lower extremities  CHEST/LUNG: scattered rhonchi and  wheezing at bases  HEART: Regular rate and rhythm; No murmurs, rubs, or gallops  ABDOMEN: Soft, Nontender, Nondistended; Bowel sounds present, neg HSM  EXTREMITIES:  2+  edema  LYMPH: No lymphadenopathy noted  SKIN: SMALL ecchymosis above right ankle    < from: VA Duplex Lower Ext Vein Scan, Bilat (09.18.18 @ 16:47) >  XAM:  DUPLEX SCAN EXT VEINS LOWER BI                            PROCEDURE DATE:  09/18/2018            INTERPRETATION:  CLINICAL INFORMATION: A prior examination, dated   10/10/2013, showed, DVT affecting a right peroneal vein, currently short   of breath, rule out DVT.    TECHNIQUE: Duplex sonography of the BILATERAL LOWER extremities with   color and spectral Doppler, with and without compression.      FINDINGS:    There is normal compressibility of the bilateral common femoral, femoral   and popliteal veins. No calf vein thrombosis is detected.    Doppler examination shows normal spontaneous and phasic flow.    IMPRESSION:     No evidence of bilateral lower extremity deep venous thrombosis.                    < end of copied text >

## 2018-09-20 LAB
BASOPHILS # BLD AUTO: 0 K/UL — SIGNIFICANT CHANGE UP (ref 0–0.2)
BASOPHILS NFR BLD AUTO: 0 % — SIGNIFICANT CHANGE UP (ref 0–2)
EOSINOPHIL # BLD AUTO: 0 K/UL — SIGNIFICANT CHANGE UP (ref 0–0.5)
EOSINOPHIL NFR BLD AUTO: 0 % — SIGNIFICANT CHANGE UP (ref 0–6)
HCT VFR BLD CALC: 39.3 % — SIGNIFICANT CHANGE UP (ref 34.5–45)
HGB BLD-MCNC: 13.7 G/DL — SIGNIFICANT CHANGE UP (ref 11.5–15.5)
INR BLD: 1.72 RATIO — HIGH (ref 0.88–1.16)
LYMPHOCYTES # BLD AUTO: 45 % — HIGH (ref 13–44)
LYMPHOCYTES # BLD AUTO: 8.74 K/UL — HIGH (ref 1–3.3)
MAGNESIUM SERPL-MCNC: 2.2 MG/DL — SIGNIFICANT CHANGE UP (ref 1.6–2.6)
MANUAL SMEAR VERIFICATION: SIGNIFICANT CHANGE UP
MCHC RBC-ENTMCNC: 31.6 PG — SIGNIFICANT CHANGE UP (ref 27–34)
MCHC RBC-ENTMCNC: 34.9 GM/DL — SIGNIFICANT CHANGE UP (ref 32–36)
MCV RBC AUTO: 90.8 FL — SIGNIFICANT CHANGE UP (ref 80–100)
MONOCYTES # BLD AUTO: 0.78 K/UL — SIGNIFICANT CHANGE UP (ref 0–0.9)
MONOCYTES NFR BLD AUTO: 4 % — SIGNIFICANT CHANGE UP (ref 2–14)
NEUTROPHILS # BLD AUTO: 9.91 K/UL — HIGH (ref 1.8–7.4)
NEUTROPHILS NFR BLD AUTO: 51 % — SIGNIFICANT CHANGE UP (ref 43–77)
NRBC # BLD: 0 /100 — SIGNIFICANT CHANGE UP (ref 0–0)
PHOSPHATE SERPL-MCNC: 3.2 MG/DL — SIGNIFICANT CHANGE UP (ref 2.5–4.5)
PLAT MORPH BLD: NORMAL — SIGNIFICANT CHANGE UP
PLATELET # BLD AUTO: 175 K/UL — SIGNIFICANT CHANGE UP (ref 150–400)
PROTHROM AB SERPL-ACNC: 19.6 SEC — HIGH (ref 10–13.1)
RBC # BLD: 4.33 M/UL — SIGNIFICANT CHANGE UP (ref 3.8–5.2)
RBC # FLD: 13.4 % — SIGNIFICANT CHANGE UP (ref 10.3–14.5)
RBC BLD AUTO: SIGNIFICANT CHANGE UP
WBC # BLD: 19.43 K/UL — HIGH (ref 3.8–10.5)
WBC # FLD AUTO: 19.43 K/UL — HIGH (ref 3.8–10.5)

## 2018-09-20 RX ORDER — VERAPAMIL HCL 240 MG
120 CAPSULE, EXTENDED RELEASE PELLETS 24 HR ORAL DAILY
Qty: 0 | Refills: 0 | Status: DISCONTINUED | OUTPATIENT
Start: 2018-09-20 | End: 2018-09-21

## 2018-09-20 RX ORDER — WARFARIN SODIUM 2.5 MG/1
7.5 TABLET ORAL ONCE
Qty: 0 | Refills: 0 | Status: COMPLETED | OUTPATIENT
Start: 2018-09-20 | End: 2018-09-20

## 2018-09-20 RX ADMIN — Medication 3 MILLILITER(S): at 23:19

## 2018-09-20 RX ADMIN — WARFARIN SODIUM 7.5 MILLIGRAM(S): 2.5 TABLET ORAL at 23:19

## 2018-09-20 RX ADMIN — Medication 3 MILLILITER(S): at 19:00

## 2018-09-20 RX ADMIN — ATORVASTATIN CALCIUM 10 MILLIGRAM(S): 80 TABLET, FILM COATED ORAL at 23:19

## 2018-09-20 RX ADMIN — Medication 0.5 MILLIGRAM(S): at 18:35

## 2018-09-20 RX ADMIN — Medication 200 MILLIGRAM(S): at 15:01

## 2018-09-20 RX ADMIN — Medication 200 MILLIGRAM(S): at 05:13

## 2018-09-20 RX ADMIN — Medication 3 MILLILITER(S): at 09:20

## 2018-09-20 RX ADMIN — Medication 20 MILLIGRAM(S): at 18:35

## 2018-09-20 RX ADMIN — Medication 0.5 MILLIGRAM(S): at 05:14

## 2018-09-20 RX ADMIN — Medication 120 MILLIGRAM(S): at 05:13

## 2018-09-20 RX ADMIN — ENOXAPARIN SODIUM 70 MILLIGRAM(S): 100 INJECTION SUBCUTANEOUS at 15:02

## 2018-09-20 RX ADMIN — ENOXAPARIN SODIUM 70 MILLIGRAM(S): 100 INJECTION SUBCUTANEOUS at 05:13

## 2018-09-20 RX ADMIN — Medication 20 MILLIGRAM(S): at 05:13

## 2018-09-20 RX ADMIN — Medication 3 MILLILITER(S): at 05:14

## 2018-09-20 RX ADMIN — Medication 200 MILLIGRAM(S): at 23:19

## 2018-09-20 RX ADMIN — PANTOPRAZOLE SODIUM 40 MILLIGRAM(S): 20 TABLET, DELAYED RELEASE ORAL at 05:13

## 2018-09-20 RX ADMIN — Medication 20 MILLIGRAM(S): at 23:19

## 2018-09-20 RX ADMIN — Medication 20 MILLIGRAM(S): at 12:05

## 2018-09-20 RX ADMIN — Medication 3 MILLILITER(S): at 15:01

## 2018-09-20 NOTE — PHARMACOTHERAPY INTERVENTION NOTE - COMMENTS
Pharmacy student Gal Lee counseled patient on warfarin therapy - indication, administration, monitoring, interactions, consistent vitamin K in diet, and signs and symptoms of possible adverse effects. Pharmacy student Gal Lee counseled patient on warfarin therapy - indication, administration, monitoring, interactions, consistent vitamin K in diet, and signs and symptoms of possible adverse effects. Warfarin education booklet provided to patient.

## 2018-09-20 NOTE — PROGRESS NOTE ADULT - ASSESSMENT
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 mutation and a subtherapeutic INR on coumadin - would not consider this a coumadin "failure"     marked metabolic acidosis on SMA7 - improved    h/o breast cancer s/p lumpectomy    hypertension    PLAN/RECOMMENDATIONS:    oxygen supplementation to keep saturation greater than 92% - trial on room air  continue solumedrol 20mg IVP q6h  albuterol/atrovent nebs q4h holding 2am dose  pulmicort 0.5mg nebs q12h  robitussin DM/tessalon/hycodan (watch for sedation on narcotic based antitussive)  lovenox (full dose) until INR is therapeutic on coumadin - should not be considered a coumadin "failure" given the subtherapeutic INR when admitted  GI prophylaxis on A/C and steroids - protonix  no indication for IVC filter in the absence of lower extremity clot  verapamil/lipitor    Will follow with you. Plan of care discussed with the patient at bedside and Dr. Ricks.    Rd Lira MD, [facility] - [phone]  Pulmonary Medicine

## 2018-09-20 NOTE — PROGRESS NOTE ADULT - SUBJECTIVE AND OBJECTIVE BOX
Patient is a 73y old  Female who presents with a chief complaint of shortness of breath (19 Sep 2018 15:27)      INTERVAL HPI/OVERNIGHT EVENTS:  T(C): 36.8 (09-20-18 @ 05:03), Max: 36.8 (09-20-18 @ 05:03)  HR: 66 (09-20-18 @ 05:03) (62 - 74)  BP: 102/64 (09-20-18 @ 05:03) (102/64 - 116/72)  RR: 18 (09-20-18 @ 05:03) (16 - 18)  SpO2: 96% (09-20-18 @ 05:03) (95% - 96%)  Wt(kg): --  I&O's Summary    19 Sep 2018 07:01  -  20 Sep 2018 07:00  --------------------------------------------------------  IN: 1380 mL / OUT: 250 mL / NET: 1130 mL        LABS:                        13.9   17.18 )-----------( 170      ( 19 Sep 2018 08:01 )             40.1     09-19    138  |  103  |  17  ----------------------------<  134<H>  3.9   |  23  |  0.92    Ca    9.6      19 Sep 2018 06:10  Phos  3.2     09-20  Mg     2.2     09-20      PT/INR - ( 20 Sep 2018 07:43 )   PT: 19.6 sec;   INR: 1.72 ratio             CAPILLARY BLOOD GLUCOSE              REVIEW OF SYSTEMS:  CONSTITUTIONAL: No fever, weight loss, or fatigue  EYES: No eye pain, visual disturbances, or discharge  ENMT:  No difficulty hearing, tinnitus, vertigo; No sinus or throat pain  NECK: No pain or stiffness  BREASTS: No pain, masses, or nipple discharge  RESPIRATORY: No cough, wheezing, chills or hemoptysis; No shortness of breath  CARDIOVASCULAR: No chest pain, palpitations, dizziness, or leg swelling  GASTROINTESTINAL: No abdominal or epigastric pain. No nausea, vomiting, or hematemesis; No diarrhea or constipation. No melena or hematochezia.  GENITOURINARY: No dysuria, frequency, hematuria, or incontinence  NEUROLOGICAL: No headaches, memory loss, loss of strength, numbness, or tremors  SKIN: No itching, burning, rashes, or lesions   LYMPH NODES: No enlarged glands  ENDOCRINE: No heat or cold intolerance; No hair loss  MUSCULOSKELETAL: No joint pain or swelling; No muscle, back, or extremity pain  PSYCHIATRIC: No depression, anxiety, mood swings, or difficulty sleeping  HEME/LYMPH: No easy bruising, or bleeding gums  ALLERY AND IMMUNOLOGIC: No hives or eczema    RADIOLOGY & ADDITIONAL TESTS:    Imaging Personally Reviewed:  [ ] YES  [ ] NO    Consultant(s) Notes Reviewed:  [ ] YES  [ ] NO    PHYSICAL EXAM:  GENERAL: NAD, well-groomed, well-developed  HEAD:  Atraumatic, Normocephalic  EYES: EOMI, PERRLA, conjunctiva and sclera clear  ENMT: No tonsillar erythema, exudates, or enlargement; Moist mucous membranes, Good dentition, No lesions  NECK: Supple, No JVD, Normal thyroid  NERVOUS SYSTEM:  Alert & Oriented X3, Good concentration; Motor Strength 5/5 B/L upper and lower extremities; DTRs 2+ intact and symmetric  CHEST/LUNG: Clear to auscultation bilaterally; No rales, rhonchi, wheezing, or rubs  HEART: Regular rate and rhythm; No murmurs, rubs, or gallops  ABDOMEN: Soft, Nontender, Nondistended; Bowel sounds present  EXTREMITIES:  2+ Peripheral Pulses, No clubbing, cyanosis, or edema  LYMPH: No lymphadenopathy noted  SKIN: No rashes or lesions    Care Discussed with Consultants/Other Providers [ ] YES  [ ] NO Patient is a 73y old  Female who presents with a chief complaint of shortness of breath (19 Sep 2018 15:27)      INTERVAL HPI/OVERNIGHT EVENTS:    Still w/ cough when talking but is slightly improved.   T(C): 36.8 (09-20-18 @ 05:03), Max: 36.8 (09-20-18 @ 05:03)  HR: 66 (09-20-18 @ 05:03) (62 - 74)  BP: 102/64 (09-20-18 @ 05:03) (102/64 - 116/72)  RR: 18 (09-20-18 @ 05:03) (16 - 18)  SpO2: 96% (09-20-18 @ 05:03) (95% - 96%)  Wt(kg): --  I&O's Summary    19 Sep 2018 07:01  -  20 Sep 2018 07:00  --------------------------------------------------------  IN: 1380 mL / OUT: 250 mL / NET: 1130 mL        LABS:                        13.9   17.18 )-----------( 170      ( 19 Sep 2018 08:01 )             40.1     09-19    138  |  103  |  17  ----------------------------<  134<H>  3.9   |  23  |  0.92    Ca    9.6      19 Sep 2018 06:10  Phos  3.2     09-20  Mg     2.2     09-20      PT/INR - ( 20 Sep 2018 07:43 )   PT: 19.6 sec;   INR: 1.72 ratio             REVIEW OF SYSTEMS:  CONSTITUTIONAL: No fever, weight loss, or fatigue  EYES: No eye pain, visual disturbances, or discharge  ENMT:  No difficulty hearing, tinnitus, vertigo; No sinus or throat pain  NECK: No pain or stiffness  BREASTS: No pain, masses, or nipple discharge  RESPIRATORY:  cough  CARDIOVASCULAR: No chest pain, palpitations, dizziness, or leg swelling  GASTROINTESTINAL: No abdominal or epigastric pain. No nausea, vomiting, or hematemesis; No diarrhea or constipation. No melena or hematochezia.  GENITOURINARY: No dysuria, frequency, hematuria, or incontinence  NEUROLOGICAL: No headaches, memory loss, loss of strength, numbness, or tremors  SKIN: No itching, burning, rashes, or lesions   LYMPH NODES: No enlarged glands  ENDOCRINE: No heat or cold intolerance; No hair loss  MUSCULOSKELETAL: No joint pain or swelling; No muscle, back, or extremity pain  PSYCHIATRIC: No depression, anxiety, mood swings, or difficulty sleeping  HEME/LYMPH: No easy bruising, or bleeding gums  ALLERY AND IMMUNOLOGIC: No hives or eczema        Consultant(s) Notes Reviewed:  [x ] YES  [ ] NO    PHYSICAL EXAM:  GENERAL: NAD  HEAD:  Atraumatic, Normocephalic  EYES:  conjunctiva and sclera clear  ENMT: No tonsillar erythema, exudates, or enlargement; Moist mucous membranes, Good dentition, No lesions  NECK: Supple, No JVD  NERVOUS SYSTEM:  Alert & Oriented X3, Good concentration; Motor Strength 5/5 B/L upper and lower extremities  CHEST/LUNG: min  rhonchi and  wheezing at bases, improved from yesterday  HEART: Regular rate and rhythm; No murmurs, rubs, or gallops  ABDOMEN: Soft, Nontender, Nondistended; Bowel sounds present  EXTREMITIES:  1+ edema  LYMPH: No lymphadenopathy noted  SKIN: No rashes or lesions    Care Discussed with Consultants/Other Providers [x ] YES  [ ] NO

## 2018-09-20 NOTE — PROGRESS NOTE ADULT - PROBLEM SELECTOR PLAN 2
No signs/sx to suggest superimposed bacterial pneumonia  -hold antimicrobial therapy  -supportive care for positive RVP.  -c/w duoneb q4-6hr for wheezing   continue IV steroids  robitussin prn cough   appreciate pulm evaluation  d/c O2 as long as O2 sat>90%

## 2018-09-20 NOTE — PROGRESS NOTE ADULT - ASSESSMENT
· Assessment		  74 yo F w/ breast CA s/p removal, hx of prothrombin gene mutation, CKD III, prior hx of unprovoked DVT/PE ~ 4-6yr ago (on warfarin) presents with shortness of breath 2/2 to pulmonary embolism c/b viral URI. Pt now on Lovenox. Review of office chart shows that pt has been intermittently subtherapeutic over the last several months. INR in range only in august but pt claims to have missed several doses since then and most recent INR <2. Heme note appreciated and in lieu of ? lack of data treating pt's with prothrombin G mutations w/ Xa inhibitors, will continue with coumadin and pt urged to maintain compliance w/ dosing. Continue Lovenox until INR is therapeutic. Cough persist but pt w/o SOB will d/c O2 and ck pulso Ox

## 2018-09-20 NOTE — PHARMACOTHERAPY INTERVENTION NOTE - COMMENTS
Patient ordered for verapamil 120 mg daily. Confirmed with patient and pharmacy, on extended-release formulation at home. Recommended change to extended release formulation inpatient.

## 2018-09-20 NOTE — PROGRESS NOTE ADULT - SUBJECTIVE AND OBJECTIVE BOX
[facility]     PROGRESS NOTE    CHIEF COMPLAINT: pulmonary emboli; rhinoviral infection; bronchospasm; dyspnea    INTERVAL HISTORY: ongoing severe cough resulting in chest wall and back pain as well as dizziness, chest congestion and wheeze; no shortness of breath on nasal canula; no fevers, chills or sweats; no anterior chest pain/pressure or palpitations;     REVIEW OF SYSTEMS:  Constitutional: As per interval history  HEENT: Within normal limits  CV: As per interval history  Resp: As per interval history  GI: Within normal limits   : Within normal limits  Musculoskeletal: chest wall/back pain  Skin: Within normal limits  Neurological: dizziness  Psychiatric: Within normal limits  Endocrine: Within normal limits  Hematologic/Lymphatic: Within normal limits  Allergic/Immunologic: Within normal limits      MEDICATIONS:     Pulmonary "  ALBUTerol/ipratropium for Nebulization 3 milliLiter(s) Nebulizer <User Schedule>  benzonatate 200 milliGRAM(s) Oral three times a day  buDESOnide   0.5 milliGRAM(s) Respule 0.5 milliGRAM(s) Inhalation every 12 hours  guaiFENesin/dextromethorphan  Syrup 10 milliLiter(s) Oral four times a day  HYDROcodone/homatropine Syrup 5 milliLiter(s) Oral <User Schedule>      Anti-microbials:      Cardiovascular:  verapamil 120 milliGRAM(s) Oral daily      Other:  atorvastatin 10 milliGRAM(s) Oral at bedtime  enoxaparin Injectable 70 milliGRAM(s) SubCutaneous every 12 hours  influenza   Vaccine 0.5 milliLiter(s) IntraMuscular once  methylPREDNISolone sodium succinate Injectable 20 milliGRAM(s) IV Push every 6 hours  pantoprazole    Tablet 40 milliGRAM(s) Oral before breakfast  warfarin 7.5 milliGRAM(s) Oral once        OBJECTIVE:    I&O's Detail    19 Sep 2018 07:01  -  20 Sep 2018 07:00  --------------------------------------------------------  IN:    Oral Fluid: 1380 mL  Total IN: 1380 mL    OUT:    Voided: 250 mL  Total OUT: 250 mL    Total NET: 1130 mL    Daily Weight in k.4 (20 Sep 2018 08:49)    PHYSICAL EXAM:       ICU Vital Signs Last 24 Hrs  T(C): 36.8 (20 Sep 2018 05:03), Max: 36.8 (20 Sep 2018 05:03)  T(F): 98.3 (20 Sep 2018 05:03), Max: 98.3 (20 Sep 2018 05:03)  HR: 66 (20 Sep 2018 05:03) (62 - 74)  BP: 102/64 (20 Sep 2018 05:03) (102/64 - 116/72)  BP(mean): --  ABP: --  ABP(mean): --  RR: 96 (20 Sep 2018 09:24) (16 - 96)  SpO2: 96% (20 Sep 2018 05:03) (95% - 96%) on 2lpm nasal canula     General: Awake. Alert. Cooperative. Hacking cough. Appears stated age 	  HEENT:   Atraumatic. Normocephalic. Anicteric. Normal oral mucosa. PERRL. EOMI.  Neck: Supple. Trachea midline. Thyroid without enlargement/tenderness/nodules. No carotid bruit. No JVD.	  Cardiovascular: Regular rate and rhythm. S1 S2 normal. No murmurs, rubs or gallops.  Respiratory: Respirations unlabored. Mild improvement in diffuse wheeze with prolonged expiratory phase of respiration. No curvature.  Abdomen: Soft. Non-tender. Non-distended. No organomegaly. No masses. Normal bowel sounds.  Extremities: Warm to touch. No clubbing or cyanosis. Mild bilateral lower extremity edema  Pulses: 2+ peripheral pulses all extremities.	  Skin: Normal skin color. No rashes or lesions. No ecchymoses. No cyanosis. Warm to touch.  Lymph Nodes: Cervical, supraclavicular and axillary nodes normal  Neurological: Motor and sensory examination equal and normal. A and O x 3  Psychiatry: Appropriate mood and affect.    LABS:                        13.7   19.43 )-----------( 175      ( 20 Sep 2018 07:44 )             39.3                         13.9   17.18 )-----------( 170      ( 19 Sep 2018 08:01 )             40.1     09-19    138  |  103  |  17  ----------------------------<  134<H>  3.9   |  23  |  0.92    09-18    140  |  103  |  17  ----------------------------<  133<H>  4.9   |  26  |  0.98    Ca      9.6          Ca      9.3          Phos    3.2         Phos    2.8           Mg       2.2         Mg       2.1         TPro  7.3  /  Alb  4.3  /  TBili  0.5  /  DBili  x   /  AST  21  /  ALT  18  /  AlkPhos  72      PT/INR - ( 20 Sep 2018 07:43 )   PT: 19.6 sec;   INR: 1.72 ratio      PTT - ( 17 Sep 2018 13:02 )  PTT:22.7 sec    Venous Blood Gas:   @ 13:14  7.36/49/30/27/49  VBG Lactate: 2.5    < from: US TTE 2D F/U, Limited w/o Contrast (ED) (18 @ 13:58) >    Procedure was performed in the Emergency Department by a credentialed  Emergency Medicine Attending Physician    EXAM:  ER TTE LIMITED      ORDER COMMENTS:      PROCEDURE DATE:  2018    FOCUSED ED ULTRASOUND REPORT    INTERPRETATION:  A focused transthoracic cardiac ultrasound examination   was performed.   No pericardial effusion was present.    No global wall motion abnormality was identified  no right heart strain   IMPRESSION:   No Pericardial Effusion.    ARLINE MENDEZ ATTENDING EM PHYSICIAN  This document has been electronically signed. Sep 17 2018  2:00PM      < end of copied text >  ---------------------------------------------------------------------------------------------------------------  MICROBIOLOGY:     Rapid Respiratory Viral Panel (18 @ 13:23)    Rapid RVP Result: Detected: The Film[employer] RVP Rapid uses polymerase chain reaction (PCR) and melt  curve analysis to screen for adenovirus; coronavirus HKU1, NL63, 229E,  OC43; human metapneumovirus (hMPV); human enterovirus/rhinovirus  (Entero/RV); influenza A; influenza A/H1;influenza A/H3; influenza  A/H1-2009; influenza B; parainfluenza viruses 1, 2, 3, 4; respiratory  syncytial virus; Bordetella pertussis; Mycoplasma pneumoniae; and  Chlamydophila pneumoniae.    Entero/Rhinovirus (RapRVP): Detected    RADIOLOGY:  [x] Chest radiographs reviewed and interpreted by me    < from: VA Duplex Lower Ext Vein Scan, Bilat (18 @ 16:47) >    EXAM:  DUPLEX SCAN EXT VEINS LOWER BI                          PROCEDURE DATE:  2018      INTERPRETATION:  CLINICAL INFORMATION: A prior examination, dated   10/10/2013, showed, DVT affecting a right peroneal vein, currently short   of breath, rule out DVT.    TECHNIQUE: Duplex sonography of the BILATERAL LOWER extremities with   color and spectral Doppler, with and without compression.      FINDINGS:    There is normal compressibility of the bilateral common femoral, femoral   and popliteal veins. No calf vein thrombosis is detected.    Doppler examination shows normal spontaneous and phasic flow.    IMPRESSION:     No evidence of bilateral lower extremity deep venous thrombosis.    LUIS PORRAS M.D., ATTENDING RADIOLOGIST  This document has been electronically signed. Sep 18 2018  5:25PM    < end of copied text >  ---------------------------------------------------------------------------------------------------------------    < from: Xray Chest 1 View AP/PA (18 @ 12:55) >    EXAM:  XR CHEST AP OR PA 1V                          PROCEDURE DATE:  2018      INTERPRETATION:  A single chest x-ray was obtained on 2018.    Indication: Dyspnea.    Impression:    The heart is normal in size. The lungs are clear. Degenerative changes of   the thoracic spine. No pneumothorax.    MEKA BURGOS M.D., ATTENDING RADIOLOGIST  This document has been electronically signed. Sep 17 2018  1:47PM      < end of copied text >  ---------------------------------------------------------------------------------------------------------------    < from: CT Angio Chest w/ IV Cont (18 @ 19:29) >    EXAM:  CT ANGIO CHEST (W)AW IC                          PROCEDURE DATE:  2018      INTERPRETATION:  CLINICAL INFORMATION: Shortness of breath. INR   subtherapeutic.    COMPARISON: CTA chest 2014    PROCEDURE:   CT Angiography of the Chest.  90 ml of Omnipaque 350 was injected intravenously. 10 ml were discarded.  Sagittal and coronal reformats were performed as well as 3D (MIP)   reconstructions.      FINDINGS:    CHEST:     LUNGS AND LARGE AIRWAYS: Patent central airways.  Bibasilar dependent   atelectasis. Left lower lobe calcified granuloma  PLEURA: Filling defects visualized within the left pulmonary artery, left   upper and lower lobar branches as well as the distal segmental and   subsegmental branches. Emboli are also noted within the segmental and   subsegmental branches of the right lower lobe pulmonary arterial branches.  VESSELS: Within normal limits. The main pulmonary artery measures   approximately 2.8 cm.  HEART: Heart size is normal. No pericardial effusion. No evidence of   right heart strain. Coronary artery calcifications.  MEDIASTINUM AND NAKIA: No lymphadenopathy.  CHEST WALL AND LOWER NECK: Within normal limits.  VISUALIZED UPPER ABDOMEN: Within normal limits.  BONES: Degenerative changes of the spine.    IMPRESSION:   Pulmonary embolism involving the left pulmonary artery, left upper lobe   are branches, as well as the distal segmental subsegmental branches.   Emboli are also noted of the right lower lobe segmental and subsegmental   branches.  No evidence of right heart strain or pulmonary infarct.    Findings were discussed with BEN Stephenson by Dr. Sosa on   2018 at 7:42 PM with read back.    KIMBERLY SOSA M.D., RADIOLOGY RESIDENT  This document has been electronically signed.  MARTA COLLINS M.D., ATTENDING RADIOLOGIST  This document has been electronically signed. Sep 17 2018  8:26PM      < end of copied text >  ---------------------------------------------------------------------------------------------------------------

## 2018-09-21 LAB
ANION GAP SERPL CALC-SCNC: 10 MMOL/L — SIGNIFICANT CHANGE UP (ref 5–17)
BASOPHILS # BLD AUTO: 0.01 K/UL — SIGNIFICANT CHANGE UP (ref 0–0.2)
BASOPHILS NFR BLD AUTO: 0.1 % — SIGNIFICANT CHANGE UP (ref 0–2)
BUN SERPL-MCNC: 19 MG/DL — SIGNIFICANT CHANGE UP (ref 7–23)
CALCIUM SERPL-MCNC: 9.2 MG/DL — SIGNIFICANT CHANGE UP (ref 8.4–10.5)
CHLORIDE SERPL-SCNC: 104 MMOL/L — SIGNIFICANT CHANGE UP (ref 96–108)
CO2 SERPL-SCNC: 26 MMOL/L — SIGNIFICANT CHANGE UP (ref 22–31)
CREAT SERPL-MCNC: 0.83 MG/DL — SIGNIFICANT CHANGE UP (ref 0.5–1.3)
EOSINOPHIL # BLD AUTO: 0 K/UL — SIGNIFICANT CHANGE UP (ref 0–0.5)
EOSINOPHIL NFR BLD AUTO: 0 % — SIGNIFICANT CHANGE UP (ref 0–6)
GLUCOSE SERPL-MCNC: 158 MG/DL — HIGH (ref 70–99)
HCT VFR BLD CALC: 38.9 % — SIGNIFICANT CHANGE UP (ref 34.5–45)
HGB BLD-MCNC: 13 G/DL — SIGNIFICANT CHANGE UP (ref 11.5–15.5)
IMM GRANULOCYTES NFR BLD AUTO: 0.6 % — SIGNIFICANT CHANGE UP (ref 0–1.5)
INR BLD: 2.55 RATIO — HIGH (ref 0.88–1.16)
LYMPHOCYTES # BLD AUTO: 53.4 % — HIGH (ref 13–44)
LYMPHOCYTES # BLD AUTO: 8.63 K/UL — HIGH (ref 1–3.3)
MCHC RBC-ENTMCNC: 31 PG — SIGNIFICANT CHANGE UP (ref 27–34)
MCHC RBC-ENTMCNC: 33.4 GM/DL — SIGNIFICANT CHANGE UP (ref 32–36)
MCV RBC AUTO: 92.6 FL — SIGNIFICANT CHANGE UP (ref 80–100)
MONOCYTES # BLD AUTO: 0.4 K/UL — SIGNIFICANT CHANGE UP (ref 0–0.9)
MONOCYTES NFR BLD AUTO: 2.5 % — SIGNIFICANT CHANGE UP (ref 2–14)
NEUTROPHILS # BLD AUTO: 7.03 K/UL — SIGNIFICANT CHANGE UP (ref 1.8–7.4)
NEUTROPHILS NFR BLD AUTO: 43.4 % — SIGNIFICANT CHANGE UP (ref 43–77)
PLATELET # BLD AUTO: 170 K/UL — SIGNIFICANT CHANGE UP (ref 150–400)
POTASSIUM SERPL-MCNC: 4.3 MMOL/L — SIGNIFICANT CHANGE UP (ref 3.5–5.3)
POTASSIUM SERPL-SCNC: 4.3 MMOL/L — SIGNIFICANT CHANGE UP (ref 3.5–5.3)
PROTHROM AB SERPL-ACNC: 29.4 SEC — HIGH (ref 10–13.1)
RBC # BLD: 4.2 M/UL — SIGNIFICANT CHANGE UP (ref 3.8–5.2)
RBC # FLD: 13.4 % — SIGNIFICANT CHANGE UP (ref 10.3–14.5)
SODIUM SERPL-SCNC: 140 MMOL/L — SIGNIFICANT CHANGE UP (ref 135–145)
WBC # BLD: 16.17 K/UL — HIGH (ref 3.8–10.5)
WBC # FLD AUTO: 16.17 K/UL — HIGH (ref 3.8–10.5)

## 2018-09-21 PROCEDURE — 93306 TTE W/DOPPLER COMPLETE: CPT | Mod: 26

## 2018-09-21 PROCEDURE — 93010 ELECTROCARDIOGRAM REPORT: CPT

## 2018-09-21 PROCEDURE — 99223 1ST HOSP IP/OBS HIGH 75: CPT

## 2018-09-21 RX ORDER — WARFARIN SODIUM 2.5 MG/1
5 TABLET ORAL ONCE
Qty: 0 | Refills: 0 | Status: COMPLETED | OUTPATIENT
Start: 2018-09-21 | End: 2018-09-21

## 2018-09-21 RX ORDER — POLYETHYLENE GLYCOL 3350 17 G/17G
17 POWDER, FOR SOLUTION ORAL ONCE
Qty: 0 | Refills: 0 | Status: COMPLETED | OUTPATIENT
Start: 2018-09-21 | End: 2018-09-21

## 2018-09-21 RX ADMIN — Medication 3 MILLILITER(S): at 12:21

## 2018-09-21 RX ADMIN — Medication 200 MILLIGRAM(S): at 13:59

## 2018-09-21 RX ADMIN — PANTOPRAZOLE SODIUM 40 MILLIGRAM(S): 20 TABLET, DELAYED RELEASE ORAL at 05:00

## 2018-09-21 RX ADMIN — ENOXAPARIN SODIUM 70 MILLIGRAM(S): 100 INJECTION SUBCUTANEOUS at 15:45

## 2018-09-21 RX ADMIN — Medication 3 MILLILITER(S): at 15:45

## 2018-09-21 RX ADMIN — Medication 200 MILLIGRAM(S): at 05:00

## 2018-09-21 RX ADMIN — Medication 3 MILLILITER(S): at 05:01

## 2018-09-21 RX ADMIN — Medication 20 MILLIGRAM(S): at 18:00

## 2018-09-21 RX ADMIN — WARFARIN SODIUM 5 MILLIGRAM(S): 2.5 TABLET ORAL at 22:18

## 2018-09-21 RX ADMIN — Medication 3 MILLILITER(S): at 22:18

## 2018-09-21 RX ADMIN — Medication 3 MILLILITER(S): at 18:07

## 2018-09-21 RX ADMIN — Medication 10 MILLIGRAM(S): at 22:19

## 2018-09-21 RX ADMIN — Medication 0.5 MILLIGRAM(S): at 18:00

## 2018-09-21 RX ADMIN — ATORVASTATIN CALCIUM 10 MILLIGRAM(S): 80 TABLET, FILM COATED ORAL at 22:19

## 2018-09-21 RX ADMIN — Medication 0.5 MILLIGRAM(S): at 05:01

## 2018-09-21 RX ADMIN — Medication 120 MILLIGRAM(S): at 05:00

## 2018-09-21 RX ADMIN — Medication 20 MILLIGRAM(S): at 05:00

## 2018-09-21 RX ADMIN — ENOXAPARIN SODIUM 70 MILLIGRAM(S): 100 INJECTION SUBCUTANEOUS at 05:00

## 2018-09-21 RX ADMIN — Medication 20 MILLIGRAM(S): at 12:22

## 2018-09-21 RX ADMIN — Medication 200 MILLIGRAM(S): at 22:19

## 2018-09-21 RX ADMIN — POLYETHYLENE GLYCOL 3350 17 GRAM(S): 17 POWDER, FOR SOLUTION ORAL at 06:18

## 2018-09-21 NOTE — PROGRESS NOTE ADULT - SUBJECTIVE AND OBJECTIVE BOX
Patient is a 73y old  Female who presents with a chief complaint of shortness of breath (20 Sep 2018 08:42)      INTERVAL HPI/OVERNIGHT EVENTS:  T(C): 36.6 (09-21-18 @ 08:16), Max: 36.7 (09-20-18 @ 20:44)  HR: 72 (09-21-18 @ 09:19) (65 - 87)  BP: 127/82 (09-21-18 @ 09:19) (98/67 - 127/82)  RR: 16 (09-21-18 @ 08:16) (16 - 18)  SpO2: 95% (09-21-18 @ 08:16) (90% - 95%)  Wt(kg): --  I&O's Summary    20 Sep 2018 07:01  -  21 Sep 2018 07:00  --------------------------------------------------------  IN: 480 mL / OUT: 301 mL / NET: 179 mL    21 Sep 2018 07:01  -  21 Sep 2018 09:28  --------------------------------------------------------  IN: 240 mL / OUT: 300 mL / NET: -60 mL        LABS:                        13.0   16.17 )-----------( 170      ( 21 Sep 2018 07:53 )             38.9     09-21    140  |  104  |  19  ----------------------------<  158<H>  4.3   |  26  |  0.83    Ca    9.2      21 Sep 2018 05:15  Phos  3.2     09-20  Mg     2.2     09-20      PT/INR - ( 20 Sep 2018 07:43 )   PT: 19.6 sec;   INR: 1.72 ratio             CAPILLARY BLOOD GLUCOSE              REVIEW OF SYSTEMS:  CONSTITUTIONAL: No fever, weight loss, or fatigue  EYES: No eye pain, visual disturbances, or discharge  ENMT:  No difficulty hearing, tinnitus, vertigo; No sinus or throat pain  NECK: No pain or stiffness  BREASTS: No pain, masses, or nipple discharge  RESPIRATORY: No cough, wheezing, chills or hemoptysis; No shortness of breath  CARDIOVASCULAR: No chest pain, palpitations, dizziness, or leg swelling  GASTROINTESTINAL: No abdominal or epigastric pain. No nausea, vomiting, or hematemesis; No diarrhea or constipation. No melena or hematochezia.  GENITOURINARY: No dysuria, frequency, hematuria, or incontinence  NEUROLOGICAL: No headaches, memory loss, loss of strength, numbness, or tremors  SKIN: No itching, burning, rashes, or lesions   LYMPH NODES: No enlarged glands  ENDOCRINE: No heat or cold intolerance; No hair loss  MUSCULOSKELETAL: No joint pain or swelling; No muscle, back, or extremity pain  PSYCHIATRIC: No depression, anxiety, mood swings, or difficulty sleeping  HEME/LYMPH: No easy bruising, or bleeding gums  ALLERY AND IMMUNOLOGIC: No hives or eczema    RADIOLOGY & ADDITIONAL TESTS:    Imaging Personally Reviewed:  [ ] YES  [ ] NO    Consultant(s) Notes Reviewed:  [ ] YES  [ ] NO    PHYSICAL EXAM:  GENERAL: NAD, well-groomed, well-developed  HEAD:  Atraumatic, Normocephalic  EYES: EOMI, PERRLA, conjunctiva and sclera clear  ENMT: No tonsillar erythema, exudates, or enlargement; Moist mucous membranes, Good dentition, No lesions  NECK: Supple, No JVD, Normal thyroid  NERVOUS SYSTEM:  Alert & Oriented X3, Good concentration; Motor Strength 5/5 B/L upper and lower extremities; DTRs 2+ intact and symmetric  CHEST/LUNG: Clear to auscultation bilaterally; No rales, rhonchi, wheezing, or rubs  HEART: Regular rate and rhythm; No murmurs, rubs, or gallops  ABDOMEN: Soft, Nontender, Nondistended; Bowel sounds present  EXTREMITIES:  2+ Peripheral Pulses, No clubbing, cyanosis, or edema  LYMPH: No lymphadenopathy noted  SKIN: No rashes or lesions    Care Discussed with Consultants/Other Providers [ ] YES  [ ] NO Patient is a 73y old  Female who presents with a chief complaint of shortness of breath (20 Sep 2018 08:42)      INTERVAL HPI/OVERNIGHT EVENTS:    Pt noted to have 2.3 and 4 second pauses while in bathroom. pt was asymptomatic during episodes. Still w/ persistent cough.    T(C): 36.6 (09-21-18 @ 08:16), Max: 36.7 (09-20-18 @ 20:44)  HR: 72 (09-21-18 @ 09:19) (65 - 87)  BP: 127/82 (09-21-18 @ 09:19) (98/67 - 127/82)  RR: 16 (09-21-18 @ 08:16) (16 - 18)  SpO2: 95% (09-21-18 @ 08:16) (90% - 95%)  Wt(kg): --  I&O's Summary    20 Sep 2018 07:01  -  21 Sep 2018 07:00  --------------------------------------------------------  IN: 480 mL / OUT: 301 mL / NET: 179 mL    21 Sep 2018 07:01  -  21 Sep 2018 09:28  --------------------------------------------------------  IN: 240 mL / OUT: 300 mL / NET: -60 mL        LABS:                        13.0   16.17 )-----------( 170      ( 21 Sep 2018 07:53 )             38.9     09-21    140  |  104  |  19  ----------------------------<  158<H>  4.3   |  26  |  0.83    Ca    9.2      21 Sep 2018 05:15  Phos  3.2     09-20  Mg     2.2     09-20      PT/INR - ( 20 Sep 2018 07:43 )   PT: 19.6 sec;   INR: 1.72 ratio       REVIEW OF SYSTEMS:  CONSTITUTIONAL: No fever, weight loss, or fatigue  EYES: No eye pain, visual disturbances, or discharge  ENMT:  No difficulty hearing, tinnitus, vertigo; No sinus or throat pain  NECK: No pain or stiffness  BREASTS: No pain, masses, or nipple discharge  RESPIRATORY:  cough, wheezing,  No shortness of breath  CARDIOVASCULAR: No chest pain, palpitations, dizziness, or leg swelling  GASTROINTESTINAL: No abdominal or epigastric pain. No nausea, vomiting, or hematemesis; No diarrhea or constipation. No melena or hematochezia.  GENITOURINARY: No dysuria, frequency, hematuria, or incontinence  NEUROLOGICAL: No headaches, memory loss, loss of strength, numbness, or tremors  SKIN: No itching, burning, rashes, or lesions   LYMPH NODES: No enlarged glands  ENDOCRINE: No heat or cold intolerance; No hair loss  MUSCULOSKELETAL: No joint pain or swelling; No muscle, back, or extremity pain  PSYCHIATRIC: No depression, anxiety, mood swings, or difficulty sleeping  HEME/LYMPH: No easy bruising, or bleeding gums  ALLERY AND IMMUNOLOGIC: No hives or eczema        Consultant(s) Notes Reviewed:  [ x] YES  [ ] NO    PHYSICAL EXAM:  GENERAL: NAD, well-groomed, well-developed  HEAD:  Atraumatic, Normocephalic  EYES:  conjunctiva and sclera clear  NECK: Supple, No JVD  NERVOUS SYSTEM:  Alert & Oriented X3, Good concentration; Motor Strength 5/5 B/L upper and lower extremities  CHEST/LUNG: Clear to auscultation bilaterally; OCCASSIONAL  rhonchi, NO wheezing or rubs  HEART: Regular rate and rhythm; No murmurs, rubs, or gallops  ABDOMEN: Soft, Nontender, Nondistended; Bowel sounds present,NEG HSM  EXTREMITIES:  1+ edema bilat  LYMPH: No lymphadenopathy noted  SKIN: No rashes or lesions    Care Discussed with Consultants/Other Providers [ x] YES  [ ] NO

## 2018-09-21 NOTE — CHART NOTE - NSCHARTNOTEFT_GEN_A_CORE
HPI:  74 yo F w/ breast CA s/p removal, hx of prothrombin gene mutation, CKD III, prior hx of unprovoked DVT/PE ~ 4-6yr ago (on warfarin) presents with shortness of breath. Patient reports her symptoms started on Friday. About 1 -week prior she travelled by car to [place] No sick contacts but on Friday started developing shortness of breath, chest congestion, and coughing. The cough is nonproductive. She feels myalgia but no fever or chills. Her dyspnea became progressively worse. Initially shortness of breath was with exertion and now with slight activities becomes dyspneic. She has chronic leg edema but symmetrical in nature. She denies pleuritic chest pain, palpitation, tearing back pain, and denies syncope or near-syncope episode. She did miss her warfarin dose last week and her INR has been monitored once monthly. She otherwise reports adherence to warfarin and does not know exact INR range. (17 Sep 2018 21:18)    Vital Signs Last 24 Hrs  T(C): 36.6 (21 Sep 2018 08:16), Max: 36.7 (20 Sep 2018 20:44)  T(F): 97.8 (21 Sep 2018 08:16), Max: 98.1 (20 Sep 2018 20:44)  HR: 72 (21 Sep 2018 09:19) (65 - 87)  BP: 127/82 (21 Sep 2018 09:19) (98/67 - 127/82)  BP(mean): --  RR: 16 (21 Sep 2018 08:16) (16 - 18)  SpO2: 95% (21 Sep 2018 08:16) (90% - 95%)    Event Summary:        Called by the Rn to report patient with 4.07 sec , 2.58 sec, HR 30's pause on monitor  - Pt seen and examined found in the bathroom, denies bowel movement, + dizziness but admits to on and off dizziness prior to hospitalization  - Pt denies chest pain, N/V, Diaphoresis, vitals stable  - Ekg ordered, Verapamil d/c , Echo ordered   - D/w Dr. Ricks , asked to call card and d/c verapamil , Hold coumadin for possible procedure   - D/w Dr. Luevano (cards) asked to call EP  - D/w Abby in ep  - Cont cardiac monitor

## 2018-09-21 NOTE — CHART NOTE - NSCHARTNOTEFT_GEN_A_CORE
Consulted for second opinion regarding anticoagulation. Appeared to have subtherapeutic coumadin level. Agree with lovenox bridging until coumadin is therapeutic at INR 2-3. Please reach out if any further questions.    Opal Bowie, PGY-4  Hematology-Oncology Fellow  [phone] ([place]) [phone] ([facility])

## 2018-09-21 NOTE — PROGRESS NOTE ADULT - ASSESSMENT
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 Pt now on Lovenox. Review of office chart shows that pt has been intermittently subtherapeutic over the last several months. INR in range only in august but pt claims to have missed several doses since then and most recent INR <2. Heme note appreciated and in lieu of ? lack of data treating pt's with prothrombin G mutations w/ Xa inhibitors, will continue with coumadin and pt urged to maintain compliance w/ dosing. Continue Lovenox until INR is therapeutic. Cough persist but pt w/o SOB will d/c O2 and ck pulso Ox. Pt also w/ 4 second pause and several episodes of PAT on monitor. EPS note appreciated but unlikely that pause due to vasovagal from urination or cough as pt was asymptomatic. ? if due to meds or tachybrady syndrome.

## 2018-09-21 NOTE — PROGRESS NOTE ADULT - ASSESSMENT
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 of a prothrombin gene mutation and a subtherapeutic INR on coumadin - would not consider this a coumadin "failure"     4 second "pause" on telemetry    h/o breast cancer s/p lumpectomy    hypertension    PLAN/RECOMMENDATIONS:    stable oxygenation on room air  decrease solumedrol to 10mg IVP q6h  albuterol/atrovent nebs q4h holding 2am dose  pulmicort 0.5mg nebs q12h  robitussin DM/tessalon/hycodan (watch for sedation on narcotic based antitussive)  lovenox (full dose) until INR is therapeutic on coumadin - should not be considered a coumadin "failure" given the subtherapeutic INR when admitted  GI prophylaxis on A/C and steroids - protonix  no indication for IVC filter in the absence of lower extremity clot  lipitor - off verapamil  ECHO  EP evaluation for possible pacemaker placement    Will follow with you. Plan of care discussed with the patient at bedside and Dr. Ricks.    Rd Lira MD, [facility] - [phone]  Pulmonary Medicine

## 2018-09-21 NOTE — CONSULT NOTE ADULT - SUBJECTIVE AND OBJECTIVE BOX
Patient seen and evaluated @ 1800 on 2 DSU  Chief Complaint: Shortness of breath and cough    HPI:  72 yo F w/ breast CA s/p removal, hx of prothrombin gene mutation, CKD III, prior hx of unprovoked DVT/PE ~ 4-6yr ago (on warfarin) presents with shortness of breath. Patient reports her symptoms started on Friday. About 1 -week prior she travelled by car to [place] No sick contacts but on Friday started developing shortness of breath, chest congestion, and coughing. The cough is nonproductive. She feels myalgia but no fever or chills. Her dyspnea became progressively worse. Initially shortness of breath was with exertion and now with slight activities becomes dyspneic. She has chronic leg edema but symmetrical in nature. She denies pleuritic chest pain, palpitation, tearing back pain, and denies syncope or near-syncope episode. She did miss her warfarin dose last week and her INR has been monitored once monthly. She otherwise reports adherence to warfarin and does not know exact INR range. (17 Sep 2018 21:18)    PMH:   Prothrombin gene mutation  GERD (gastroesophageal reflux disease)  DVT (deep venous thrombosis), right  Herniated lumbar intervertebral disc  Lower back pain  PE (pulmonary embolism)  Hx of Breast Cancer  HTN - Hypertension    PSH:   S/P tubal ligation  S/P arthroscopy of right knee  S/P Breast Lumpectomy  h/o Wrist Surgery  S/P Wrist Surgery    Medications:   ALBUTerol/ipratropium for Nebulization 3 milliLiter(s) Nebulizer <User Schedule>  atorvastatin 10 milliGRAM(s) Oral at bedtime  benzonatate 200 milliGRAM(s) Oral three times a day  buDESOnide   0.5 milliGRAM(s) Respule 0.5 milliGRAM(s) Inhalation every 12 hours  enoxaparin Injectable 70 milliGRAM(s) SubCutaneous every 12 hours  guaiFENesin/dextromethorphan  Syrup 10 milliLiter(s) Oral four times a day  HYDROcodone/homatropine Syrup 5 milliLiter(s) Oral <User Schedule>  influenza   Vaccine 0.5 milliLiter(s) IntraMuscular once  methylPREDNISolone sodium succinate Injectable 20 milliGRAM(s) IV Push every 6 hours  pantoprazole    Tablet 40 milliGRAM(s) Oral before breakfast    Allergies:  No Known Allergies    FAMILY HISTORY:  Family history of prothrombin gene mutation (Mother): first cousin  Family history of heart disease (Father): father    Social History: , lives with  and kids  Smoking: nonsmoker but significant exposure to second-hand smoke  Alcohol: no EtOH abuse  Drugs: no illicit drug use    Review of Systems:    Constitutional: No fever, chills, fatigue, or changes in weight  HEENT: No blurry vision, eye pain, headache, runny nose, or sore throat  Respiratory: +cough, shortness of breath  Cardiovascular: No chest pain or palpitations  Gastrointestinal: No nausea, vomiting, diarrhea, or abdominal pain  Genitourinary: No dysuria or incontinence  Extremities: No lower extremity swelling  Neurologic: No focal findings  Lymphatic: No lymphadenopathy   Skin: No rash  Psychiatry: No anxiety or depression  10 point review of systems is otherwise negative except as mentioned above            Physical Exam:  T(C): 36.8 (09-21-18 @ 12:38), Max: 36.8 (09-21-18 @ 12:38)  HR: 90 (09-21-18 @ 12:38) (65 - 90)  BP: 141/70 (09-21-18 @ 12:38) (115/71 - 141/70)  RR: 18 (09-21-18 @ 12:38) (16 - 18)  SpO2: 94% (09-21-18 @ 12:38) (90% - 95%)  Wt(kg): --    09-20 @ 07:01  -  09-21 @ 07:00  --------------------------------------------------------  IN: 480 mL / OUT: 301 mL / NET: 179 mL    09-21 @ 07:01  -  09-21 @ 18:03  --------------------------------------------------------  IN: 480 mL / OUT: 600 mL / NET: -120 mL      Daily     Daily     Appearance: Normal, well groomed, NAD  Eyes: PERRLA, EOMI, pink conjunctiva, no scleral icterus   HENT: Normal oral mucosa  Cardiovascular: RRR, S1, S2, no murmur, rub, or gallop; no edema; no JVD  Respiratory: Clear to auscultation bilaterally  Gastrointestinal: Soft, non-tender, non-distended, BS+  Musculoskeletal: No clubbing or joint deformity   Neurologic: No focal weakness  Lymphatic: No lymphadenopathy  Psychiatry: AAOx3 with appropriate mood and affect  Skin: No rashes, ecchymoses, or cyanosis    Cardiovascular Diagnostic Testing:    Echo: < from: Transthoracic Echocardiogram (09.21.18 @ 10:03) >  ------------------------------------------------------------------------  Dimensions:    Normal Values:  LA:     3.5    2.0 - 4.0 cm  Ao:     3.4    2.0 - 3.8 cm  SEPTUM: 0.8    0.6 - 1.2 cm  PWT:    0.7    0.6 - 1.1 cm  LVIDd:  4.7    3.0 - 5.6 cm  LVIDs:  3.4    1.8 - 4.0 cm  Derived variables:  LVMI: 70 g/m2  RWT: 0.29  Fractional short: 28 %  EF (Visual Estimate): 55-60 %  Doppler Peak Velocity (m/sec): AoV=1.4  ------------------------------------------------------------------------  Observations:  Mitral Valve: Mitral annular calcification, otherwise  normal mitral valve. Minimal mitral regurgitation.  Aortic Valve/Aorta: Calcified trileaflet aortic valve with  normal opening. Peak transaortic valve gradient equals 8 mm  Hg, mean transaortic valve gradient equals 5 mm Hg, aortic  valve velocity time integral equals 32 cm. Minimal aortic  regurgitation. Peak left ventricular outflow tract gradient  equals 4 mm Hg, mean gradient is equal to 2 mm Hg, LVOT  velocity time integral equals 21 cm.  Aortic Root: 3.4 cm.  LVOT diameter: 1.8 cm.  Left Atrium: Normal left atrium.  LA volume index = 27  cc/m2. Mobile interatrial septum.  Left Ventricle: Endocardium not well visualized; grossly  normal left ventricular systolic function. Normal left  ventricular internal dimensions and wall thicknesses.  Right Heart: Normal right atrium. Normal right ventricular  size and function. Normal tricuspid valve. Mild tricuspid  regurgitation. Pulmonic valve not well visualized. No  pulmonic regurgitation.  Pericardium/Pleura: Normal pericardium with no pericardial  effusion.  Hemodynamic: Estimated right ventricular systolic pressure  equals 21 mm Hg, assuming right atrial pressure equals 3 mm  Hg, consistent with normal pulmonary pressures.  ------------------------------------------------------------------------  Conclusions:  1. Mitral annular calcification, otherwise normal mitral  valve. Minimal mitral regurgitation.  2. Calcified trileafletaortic valve with normal opening.  Minimal aortic regurgitation.  3. Endocardium not well visualized; grossly normal left  ventricular systolic function.  4. Normal right ventricular size and function.  *** Compared with echocardiogram of 10/12/2013,results are  similar on today's study.  ------------------------------------------------------------------------  Confirmed on  9/21/2018 - 12:56:58 by Kathryn Díaz M.D.  ------------------------------------------------------------------------    < end of copied text >    Interpretation of Telemetry: PAT and 4 second pause overnight; now NSR    Imaging: < from: CT Angio Chest w/ IV Cont (09.17.18 @ 19:29) >  IMPRESSION:   Pulmonary embolism involving the left pulmonary artery, left upper lobe   are branches, as well as the distal segmental subsegmental branches.   Emboli are also noted of the right lower lobe segmental and subsegmental   branches.  No evidence of right heart strain or pulmonary infarct.    Findings were discussed with BEN Stephenson by Dr. Sosa on   9/17/2018 at 7:42 PM with read back.    KIMBERLY SOSA M.D., RADIOLOGY RESIDENT  This document has been electronically signed.  MARTA COLLINS M.D., ATTENDING RADIOLOGIST  This document has been electronically signed. Sep 17 2018  8:26PM        < end of copied text >      Labs:                        13.0   16.17 )-----------( 170      ( 21 Sep 2018 07:53 )             38.9     09-21    140  |  104  |  19  ----------------------------<  158<H>  4.3   |  26  |  0.83    Ca    9.2      21 Sep 2018 05:15  Phos  3.2     09-20  Mg     2.2     09-20      PT/INR - ( 21 Sep 2018 09:15 )   PT: 29.4 sec;   INR: 2.55 ratio

## 2018-09-21 NOTE — CONSULT NOTE ADULT - SUBJECTIVE AND OBJECTIVE BOX
Reason for Consultation: Pause  Chief Complaint: SOB  Date of Admission: 9/17/18    HPI:  73F with PMHx Breast Cancer s/p removal, Hx Prothrombin Gene Mutation, CKD3, Prior hx of unprovoked DVT/PE ~ 4-6yr ago now on warfarin presents with shortness of breath. Patient reports her symptoms started on Friday. About 1 week prior she travelled by car to [place]. No sick contacts but on Friday started developing shortness of breath, chest congestion, and coughing. Cough is nonproductive. She feels myalgia but no fever or chills. Her dyspnea became progressively worse. Initially shortness of breath was with exertion and now with slight activities becomes dyspneic. She has chronic leg edema but symmetrical in nature. She denies pleuritic chest pain, palpitation, tearing back pain, and denies syncope or near-syncope episode. She did miss her warfarin dose last week and her INR has been monitored once monthly. She otherwise reports adherence to warfarin and does not know exact INR range.    EP consulted for 4sec and 2.5 sec pause on telemetry with HR in 30s.     Plan  - verapamil  - ekg  - echo        Called by the Rn to report patient with 4.07 sec , 2.58 sec, HR 30's pause on monitor  - Pt seen and examined found in the bathroom, denies bowel movement, + dizziness but admits to on and off dizziness prior to hospitalization  - Pt denies chest pain, N/V, Diaphoresis, vitals stable  - Ekg ordered, Verapamil d/c , Echo ordered   - D/w Dr. Ricks , asked to call card and d/c verapamil , Hold coumadin for possible procedure       Past Medical History:   Prothrombin gene mutation  GERD (gastroesophageal reflux disease)  DVT (deep venous thrombosis), right  Herniated lumbar intervertebral disc  Lower back pain  PE (pulmonary embolism)  Hx of Breast Cancer  HTN - Hypertension    Past Surgical History:   S/P tubal ligation  S/P arthroscopy of right knee  S/P Breast Lumpectomy  h/o Wrist Surgery  S/P Wrist Surgery    Medications:   ALBUTerol/ipratropium for Nebulization 3 milliLiter(s) Nebulizer <User Schedule>  atorvastatin 10 milliGRAM(s) Oral at bedtime  benzonatate 200 milliGRAM(s) Oral three times a day  buDESOnide   0.5 milliGRAM(s) Respule 0.5 milliGRAM(s) Inhalation every 12 hours  enoxaparin Injectable 70 milliGRAM(s) SubCutaneous every 12 hours  guaiFENesin/dextromethorphan  Syrup 10 milliLiter(s) Oral four times a day  HYDROcodone/homatropine Syrup 5 milliLiter(s) Oral <User Schedule>  influenza   Vaccine 0.5 milliLiter(s) IntraMuscular once  methylPREDNISolone sodium succinate Injectable 20 milliGRAM(s) IV Push every 6 hours  pantoprazole    Tablet 40 milliGRAM(s) Oral before breakfast    Allergies:  No Known Allergies    FAMILY HISTORY:  Family history of prothrombin gene mutation (Mother): first cousin  Family history of heart disease (Father): father    Social History:  Smoking History:  Alcohol Use:  Drug Use:    Review of Systems:  REVIEW OF SYSTEMS:  CONSTITUTIONAL: No weakness, fevers or chills  EYES/ENT: No visual changes;  No dysphagia  NECK: No pain or stiffness  RESPIRATORY: No cough, wheezing, hemoptysis; No shortness of breath  CARDIOVASCULAR: No chest pain or palpitations; No lower extremity edema  GASTROINTESTINAL: No abdominal or epigastric pain. No nausea, vomiting, or hematemesis; No diarrhea or constipation. No melena or hematochezia.  BACK: No back pain  GENITOURINARY: No dysuria, frequency or hematuria  NEUROLOGICAL: No numbness or weakness  SKIN: No itching, burning, rashes, or lesions   All other review of systems is negative unless indicated above.    Vitals:  T(F): 97.8 (09-21), Max: 98.1 (09-20)  HR: 72 (09-21) (65 - 87)  BP: 127/82 (09-21) (98/67 - 127/82)  RR: 16 (09-21)  SpO2: 95% (09-21)    Physical Exam:  GENERAL: No acute distress, well-developed  HEAD:  Atraumatic, Normocephalic  ENT: EOMI, PERRLA, conjunctiva and sclera clear, Neck supple, No JVD, moist mucosa  CHEST/LUNG: Clear to auscultation bilaterally; No wheeze, equal breath sounds bilaterally   BACK: No spinal tenderness  HEART: Regular rate and rhythm; No murmurs, rubs, or gallops  ABDOMEN: Soft, Nontender, Nondistended; Bowel sounds present  EXTREMITIES:  No clubbing, cyanosis, or edema  PSYCH: Nl behavior, nl affect  NEUROLOGY: AAOx3, non-focal, cranial nerves intact  SKIN: Normal color, No rashes or lesions  LINES:    Cardiovascular Diagnostic Testing:    Interpretation of Tele:    ECG: Personally reviewed    Echo:  < from: Transthoracic Echocardiogram (10.12.13 @ 10:16) >  Conclusions:  1. Mitral annular calcification, otherwise normal mitral  valve. Minimal mitral regurgitation.  2. Normal trileaflet aortic valve. No aortic valve  regurgitation seen.  3. Normal left atrium.  LA volume index = 27 cc/m2.  4. Normal left ventricular internal dimensions and wall  thickness.  5. Normal left ventricular systolic function. EF 65%. No  segmental wall motion abnormalities.  6. Reversal of the E-A  waves of the mitral inflow pattern  is consistent with diastolic LV dysfunction.  7. Normal right atrium.  8. Normal right ventricular size and function.  9. Estimated right ventricular systolic pressure equals 31  mm Hg, assuming right atrial pressure equals 10 mm Hg,  consistent with normal pulmonary pressures.  10. Normal tricuspid valve. Mild tricuspid regurgitation.    Stress Testing:    Cath:      Imaging:    Labs: Personally reviewed                        13.0   16.17 )-----------( 170      ( 21 Sep 2018 07:53 )             38.9     09-21    140  |  104  |  19  ----------------------------<  158<H>  4.3   |  26  |  0.83    Ca    9.2      21 Sep 2018 05:15  Phos  3.2     09-20  Mg     2.2     09-20      PT/INR - ( 21 Sep 2018 09:15 )   PT: 29.4 sec;   INR: 2.55 ratio Reason for Consultation: Pause  Chief Complaint: SOB  Date of Admission: 9/17/18    HPI:  73F with PMHx Breast Cancer s/p removal, Hx Prothrombin Gene Mutation, CKD3, Prior hx of unprovoked DVT/PE ~ 4-6yr ago now on warfarin presents with shortness of breath. Patient reports her symptoms started on Friday. About 1 week prior she travelled by car to [place]. No sick contacts but on Friday started developing shortness of breath, chest congestion, and coughing. Cough is nonproductive. She feels myalgia but no fever or chills. Her dyspnea became progressively worse. Initially shortness of breath was with exertion and now with slight activities becomes dyspneic. She has chronic leg edema but symmetrical in nature. She denies pleuritic chest pain, palpitation, tearing back pain, and denies syncope or near-syncope episode. She did miss her warfarin dose last week and her INR has been monitored once monthly. She otherwise reports adherence to warfarin and does not know exact INR range.    Hospital Course:  Admitted for acute PE. Bilateral US negative for DVT. Echo negative for right heart strain. Vitals stable, not requiring O2. On Lovenox to Coumadin bridge. EP consulted for 4.0 sec and 2.5 sec pause seen on telemetry at 7:45AM. Patient was found to be in the bathroom at this time washing herself up and had just finished urinating. No bowel movement. No associated chest pain, palpitations, shortness of breath, diaphoresis nausea, vomiting, abdominal pain. No dizziness, but does endorse lightheadedness which she described as more of blurry vision (seeing wavy lines). Denies LOC or gait imbalance at this time. On further questioning, she reports infrequent episodes of lightheadedness (perhaps once a month or less) for the last several years which resolve on its own, sometimes after several seconds, sometimes after half an hour. Denies hx of CAD, CHF, Arrythmia. On Verapamil for HTN.     Review of telemetry shows NSR< HR 50-80. Sinus Pause 4.0 sec and 2.5 seconds at 7:45AM. EKG at 8:20AM shows NSR. EKG on Sept 17 shows NSR with PAC     Past Medical History:   Prothrombin gene mutation  GERD (gastroesophageal reflux disease)  DVT (deep venous thrombosis), right  Herniated lumbar intervertebral disc  Lower back pain  PE (pulmonary embolism)  Hx of Breast Cancer  HTN - Hypertension    Past Surgical History:   S/P tubal ligation  S/P arthroscopy of right knee  S/P Breast Lumpectomy  h/o Wrist Surgery  S/P Wrist Surgery    Medications:   ALBUTerol/ipratropium for Nebulization 3 milliLiter(s) Nebulizer <User Schedule>  atorvastatin 10 milliGRAM(s) Oral at bedtime  benzonatate 200 milliGRAM(s) Oral three times a day  buDESOnide   0.5 milliGRAM(s) Respule 0.5 milliGRAM(s) Inhalation every 12 hours  enoxaparin Injectable 70 milliGRAM(s) SubCutaneous every 12 hours  guaiFENesin/dextromethorphan  Syrup 10 milliLiter(s) Oral four times a day  HYDROcodone/homatropine Syrup 5 milliLiter(s) Oral <User Schedule>  influenza   Vaccine 0.5 milliLiter(s) IntraMuscular once  methylPREDNISolone sodium succinate Injectable 20 milliGRAM(s) IV Push every 6 hours  pantoprazole    Tablet 40 milliGRAM(s) Oral before breakfast    Allergies:  No Known Allergies    FAMILY HISTORY:  Family history of prothrombin gene mutation (Mother): first cousin  Family history of heart disease (Father): father    Social History:  Smoking History:  Alcohol Use:  Drug Use:    Review of Systems:  REVIEW OF SYSTEMS:  CONSTITUTIONAL: No weakness, fevers or chills  EYES/ENT: No visual changes;  No dysphagia  NECK: No pain or stiffness  RESPIRATORY: No cough, wheezing, hemoptysis; No shortness of breath  CARDIOVASCULAR: No chest pain or palpitations; No lower extremity edema  GASTROINTESTINAL: No abdominal or epigastric pain. No nausea, vomiting, or hematemesis; No diarrhea or constipation. No melena or hematochezia.  BACK: No back pain  GENITOURINARY: No dysuria, frequency or hematuria  NEUROLOGICAL: No numbness or weakness  SKIN: No itching, burning, rashes, or lesions   All other review of systems is negative unless indicated above.    Vitals:  T(F): 97.8 (09-21), Max: 98.1 (09-20)  HR: 72 (09-21) (65 - 87)  BP: 127/82 (09-21) (98/67 - 127/82)  RR: 16 (09-21)  SpO2: 95% (09-21)    Physical Exam:  GENERAL: No acute distress, well-developed  HEAD:  Atraumatic, Normocephalic  ENT: EOMI, PERRLA, conjunctiva and sclera clear, Neck supple, No JVD, moist mucosa  CHEST/LUNG: Clear to auscultation bilaterally; No wheeze, equal breath sounds bilaterally   BACK: No spinal tenderness  HEART: Regular rate and rhythm; No murmurs, rubs, or gallops  ABDOMEN: Soft, Nontender, Nondistended; Bowel sounds present  EXTREMITIES:  No clubbing, cyanosis, or edema  NEUROLOGY: AAOx3, non-focal, cranial nerves intact  SKIN: Normal color, No rashes or lesions      Cardiovascular Diagnostic Testing:    Interpretation of Tele:    ECG: Personally reviewed    Echo:  < from: Transthoracic Echocardiogram (10.12.13 @ 10:16) >  Conclusions:  1. Mitral annular calcification, otherwise normal mitral  valve. Minimal mitral regurgitation.  2. Normal trileaflet aortic valve. No aortic valve  regurgitation seen.  3. Normal left atrium.  LA volume index = 27 cc/m2.  4. Normal left ventricular internal dimensions and wall  thickness.  5. Normal left ventricular systolic function. EF 65%. No  segmental wall motion abnormalities.  6. Reversal of the E-A  waves of the mitral inflow pattern  is consistent with diastolic LV dysfunction.  7. Normal right atrium.  8. Normal right ventricular size and function.  9. Estimated right ventricular systolic pressure equals 31  mm Hg, assuming right atrial pressure equals 10 mm Hg,  consistent with normal pulmonary pressures.  10. Normal tricuspid valve. Mild tricuspid regurgitation.    Stress Testing:    Cath:      Imaging:  < from: CT Angio Chest w/ IV Cont (09.17.18 @ 19:29) >  IMPRESSION:   Pulmonary embolism involving the left pulmonary artery, left upper lobe   are branches, as well as the distal segmental subsegmental branches.   Emboli are also noted of the right lower lobe segmental and subsegmental   branches.  No evidence of right heart strain or pulmonary infarct.    < end of copied text >      Labs: Personally reviewed                        13.0   16.17 )-----------( 170      ( 21 Sep 2018 07:53 )             38.9     09-21    140  |  104  |  19  ----------------------------<  158<H>  4.3   |  26  |  0.83    Ca    9.2      21 Sep 2018 05:15  Phos  3.2     09-20  Mg     2.2     09-20      PT/INR - ( 21 Sep 2018 09:15 )   PT: 29.4 sec;   INR: 2.55 ratio

## 2018-09-21 NOTE — CONSULT NOTE ADULT - ASSESSMENT
73 year-old woman with known hypercoagulability as above, on warfarin for DVT/PE, now presents with recurrent DVT/PE in the setting of subtherapeutic INR, also noted to have respiratory viral syndrome exacerbating reactive airway disease.    Appreciate pulmonary consult from Dr. Lira.    Continue anticoagulation with warfarin. Maintain INR 2.0 - 3.0.    Patient seen to have PAT and pauses up to 4 seconds on tele. She was asymptomatic of pauses. Verapamil currently on hold.    Appreciate EP consult.  No urgent indication for PPM, but if patient demonstrates further tachy-artur, will reconsider PPM.

## 2018-09-21 NOTE — PROGRESS NOTE ADULT - SUBJECTIVE AND OBJECTIVE BOX
[facility] - [facility]     PROGRESS NOTE    CHIEF COMPLAINT:  pulmonary emboli; rhinoviral infection; bronchospasm; dyspnea    INTERVAL HISTORY: 4 second pause while in the bathroom this morning after using the toilet and washing out; improving cough, chest congestion and wheeze; no shortness of breath on room air; no fevers, chills or sweats; no anterior chest pain/pressure or palpitations;     REVIEW OF SYSTEMS:  Constitutional: As per interval history  HEENT: Within normal limits  CV: As per interval history  Resp: As per interval history  GI: Within normal limits   : Within normal limits  Musculoskeletal: Within normal limits  Skin: Within normal limits  Neurological: Within normal limits  Psychiatric: Within normal limits  Endocrine: Within normal limits  Hematologic/Lymphatic: Within normal limits  Allergic/Immunologic: Within normal limits      MEDICATIONS:     Pulmonary "  ALBUTerol/ipratropium for Nebulization 3 milliLiter(s) Nebulizer <User Schedule>  benzonatate 200 milliGRAM(s) Oral three times a day  buDESOnide   0.5 milliGRAM(s) Respule 0.5 milliGRAM(s) Inhalation every 12 hours  guaiFENesin/dextromethorphan  Syrup 10 milliLiter(s) Oral four times a day  HYDROcodone/homatropine Syrup 5 milliLiter(s) Oral <User Schedule>      Anti-microbials:      Cardiovascular:      Other:  atorvastatin 10 milliGRAM(s) Oral at bedtime  enoxaparin Injectable 70 milliGRAM(s) SubCutaneous every 12 hours  influenza   Vaccine 0.5 milliLiter(s) IntraMuscular once  methylPREDNISolone sodium succinate Injectable 20 milliGRAM(s) IV Push every 6 hours  pantoprazole    Tablet 40 milliGRAM(s) Oral before breakfast        OBJECTIVE:    I&O's Detail    20 Sep 2018 07:01  -  21 Sep 2018 07:00  --------------------------------------------------------  IN:    Oral Fluid: 480 mL  Total IN: 480 mL    OUT:    Voided: 301 mL  Total OUT: 301 mL    Total NET: 179 mL      21 Sep 2018 07:01  -  21 Sep 2018 10:48  --------------------------------------------------------  IN:    Oral Fluid: 240 mL  Total IN: 240 mL    OUT:    Voided: 300 mL  Total OUT: 300 mL    Total NET: -60 mL    PHYSICAL EXAM:       ICU Vital Signs Last 24 Hrs  T(C): 36.6 (21 Sep 2018 08:16), Max: 36.7 (20 Sep 2018 20:44)  T(F): 97.8 (21 Sep 2018 08:16), Max: 98.1 (20 Sep 2018 20:44)  HR: 72 (21 Sep 2018 09:19) (65 - 87)  BP: 127/82 (21 Sep 2018 09:19) (98/67 - 127/82)  BP(mean): --  ABP: --  ABP(mean): --  RR: 16 (21 Sep 2018 08:16) (16 - 18)  SpO2: 95% (21 Sep 2018 08:16) (90% - 95%) on room air     General: Awake. Alert. Cooperative. Decreased cough. Appears stated age 	  HEENT:   Atraumatic. Normocephalic. Anicteric. Normal oral mucosa. PERRL. EOMI.  Neck: Supple. Trachea midline. Thyroid without enlargement/tenderness/nodules. No carotid bruit. No JVD.	  Cardiovascular: Regular rate and rhythm. S1 S2 normal. No murmurs, rubs or gallops.  Respiratory: Respirations unlabored. Marked improvement in diffuse wheeze. No curvature.  Abdomen: Soft. Non-tender. Non-distended. No organomegaly. No masses. Normal bowel sounds.  Extremities: Warm to touch. No clubbing or cyanosis. Mild bilateral lower extremity edema  Pulses: 2+ peripheral pulses all extremities.	  Skin: Normal skin color. No rashes or lesions. No ecchymoses. No cyanosis. Warm to touch.  Lymph Nodes: Cervical, supraclavicular and axillary nodes normal  Neurological: Motor and sensory examination equal and normal. A and O x 3  Psychiatry: Appropriate mood and affect.    LABS:                        13.0   16.17 )-----------( 170      ( 21 Sep 2018 07:53 )             38.9                         13.7   19.43 )-----------( 175      ( 20 Sep 2018 07:44 )             39.3     09-21    140  |  104  |  19  ----------------------------<  158<H>  4.3   |  26  |  0.83    09-19    138  |  103  |  17  ----------------------------<  134<H>  3.9   |  23  |  0.92    Ca      9.2      09-21    Ca      9.6      09-19    Phos    3.2     09-20    Phos    2.8     09-19      Mg       2.2     09-20    Mg       2.1     09-19    TPro  7.3  /  Alb  4.3  /  TBili  0.5  /  DBili  x   /  AST  21  /  ALT  18  /  AlkPhos  72  09-17    PT/INR - ( 21 Sep 2018 09:15 )   PT: 29.4 sec;   INR: 2.55 ratio      PTT - ( 17 Sep 2018 13:02 )  PTT:22.7 sec    Venous Blood Gas:  09-17 @ 13:14  7.36/49/30/27/49  VBG Lactate: 2.5    < from: US TTE 2D F/U, Limited w/o Contrast (ED) (09.17.18 @ 13:58) >    Procedure was performed in the Emergency Department by a credentialed  Emergency Medicine Attending Physician    EXAM:  ER TTE LIMITED      ORDER COMMENTS:      PROCEDURE DATE:  09/17/2018    FOCUSED ED ULTRASOUND REPORT    INTERPRETATION:  A focused transthoracic cardiac ultrasound examination   was performed.   No pericardial effusion was present.    No global wall motion abnormality was identified  no right heart strain   IMPRESSION:   No Pericardial Effusion.    ARLINE MENDEZ ATTENDING EM PHYSICIAN  This document has been electronically signed. Sep 17 2018  2:00PM      < end of copied text >  ---------------------------------------------------------------------------------------------------------------  MICROBIOLOGY:     Rapid Respiratory Viral Panel (09.17.18 @ 13:23)    Rapid RVP Result: Detected: The [employer] RVP Rapid uses polymerase chain reaction (PCR) and melt  curve analysis to screen for adenovirus; coronavirus HKU1, NL63, 229E,  OC43; human metapneumovirus (hMPV); human enterovirus/rhinovirus  (Entero/RV); influenza A; influenza A/H1;influenza A/H3; influenza  A/H1-2009; influenza B; parainfluenza viruses 1, 2, 3, 4; respiratory  syncytial virus; Bordetella pertussis; Mycoplasma pneumoniae; and  Chlamydophila pneumoniae.    Entero/Rhinovirus (RapRVP): Detected    RADIOLOGY:  [x] Chest radiographs reviewed and interpreted by me    < from: [facility] Duplex Lower Ext Vein Scan, Emeterio (09.18.18 @ 16:47) >    EXAM:  DUPLEX SCAN EXT VEINS LOWER BI                          PROCEDURE DATE:  09/18/2018      INTERPRETATION:  CLINICAL INFORMATION: A prior examination, dated   10/10/2013, showed, DVT affecting a right peroneal vein, currently short   of breath, rule out DVT.    TECHNIQUE: Duplex sonography of the BILATERAL LOWER extremities with   color and spectral Doppler, with and without compression.      FINDINGS:    There is normal compressibility of the bilateral common femoral, femoral   and popliteal veins. No calf vein thrombosis is detected.    Doppler examination shows normal spontaneous and phasic flow.    IMPRESSION:     No evidence of bilateral lower extremity deep venous thrombosis.    LUIS PORRAS M.D., ATTENDING RADIOLOGIST  This document has been electronically signed. Sep 18 2018  5:25PM    < end of copied text >  ---------------------------------------------------------------------------------------------------------------    < from: Xray Chest 1 View AP/PA (09.17.18 @ 12:55) >    EXAM:  XR CHEST AP OR PA 1V                          PROCEDURE DATE:  09/17/2018      INTERPRETATION:  A single chest x-ray was obtained on September 17, 2018.    Indication: Dyspnea.    Impression:    The heart is normal in size. The lungs are clear. Degenerative changes of   the thoracic spine. No pneumothorax.    MEKA BURGOS M.D., ATTENDING RADIOLOGIST  This document has been electronically signed. Sep 17 2018  1:47PM      < end of copied text >  ---------------------------------------------------------------------------------------------------------------    < from: CT Angio Chest w/ IV Cont (09.17.18 @ 19:29) >    EXAM:  CT ANGIO CHEST (W)AW IC                          PROCEDURE DATE:  09/17/2018      INTERPRETATION:  CLINICAL INFORMATION: Shortness of breath. INR   subtherapeutic.    COMPARISON: CTA chest 6/27/2014    PROCEDURE:   CT Angiography of the Chest.  90 ml of Omnipaque 350 was injected intravenously. 10 ml were discarded.  Sagittal and coronal reformats were performed as well as 3D (MIP)   reconstructions.      FINDINGS:    CHEST:     LUNGS AND LARGE AIRWAYS: Patent central airways.  Bibasilar dependent   atelectasis. Left lower lobe calcified granuloma  PLEURA: Filling defects visualized within the left pulmonary artery, left   upper and lower lobar branches as well as the distal segmental and   subsegmental branches. Emboli are also noted within the segmental and   subsegmental branches of the right lower lobe pulmonary arterial branches.  VESSELS: Within normal limits. The main pulmonary artery measures   approximately 2.8 cm.  HEART: Heart size is normal. No pericardial effusion. No evidence of   right heart strain. Coronary artery calcifications.  MEDIASTINUM AND NAKIA: No lymphadenopathy.  CHEST WALL AND LOWER NECK: Within normal limits.  VISUALIZED UPPER ABDOMEN: Within normal limits.  BONES: Degenerative changes of the spine.    IMPRESSION:   Pulmonary embolism involving the left pulmonary artery, left upper lobe   are branches, as well as the distal segmental subsegmental branches.   Emboli are also noted of the right lower lobe segmental and subsegmental   branches.  No evidence of right heart strain or pulmonary infarct.    Findings were discussed with BEN Stephenson by Dr. Sosa on   9/17/2018 at 7:42 PM with read back.    KIMBERLY SOSA M.D., RADIOLOGY RESIDENT  This document has been electronically signed.  MARTA COLLINS M.D., ATTENDING RADIOLOGIST  This document has been electronically signed. Sep 17 2018  8:26PM      < end of copied text >  ---------------------------------------------------------------------------------------------------------------

## 2018-09-21 NOTE — CONSULT NOTE ADULT - ASSESSMENT
73F with PMHx Breast Cancer s/p removal, Hx Prothrombin Gene Mutation, CKD3, Prior hx of unprovoked DVT/PE ~ 4-6yr ago on warfarin presents on 9/17 with shortness of breath., found to have acute PE due to subtherapeutic INR, now on Lovenox to Warfarin bridge. EP consulted for 4 second and 2.5 second pause on telemetry this morning.    Sinus Pause  - likely vagal given that patient was in bathroom and had just urinated  - endorsed lightheadedness, but no other cardiac symptoms  - HOLD AV sukumar blockers including home Verapamil  - continue to monitor on tele    Cecilio Montes MD  Cardiology Fellow [id number]  Please call [phone] if after 5pm 73F with PMHx Breast Cancer s/p removal, Hx Prothrombin Gene Mutation, CKD3, Prior hx of unprovoked DVT/PE ~ 4-6yr ago on warfarin presents on 9/17 with shortness of breath., found to have acute PE due to subtherapeutic INR, now on Lovenox to Warfarin bridge. EP consulted for 4 second and 2.5 second pause on telemetry this morning.    Sinus Pause  - likely vagal given that patient was in bathroom and had just urinated  - endorsed lightheadedness, but no other cardiac symptoms  - HOLD AV sukumar blockers including home Verapamil  - recommend HCTZ or Norvasc for BP control   - continue to monitor on tele    EP signing off. Please reconsult if needed.    Cecilio Montes MD  Cardiology Fellow [id number]  Please call [phone] if after 5pm

## 2018-09-22 DIAGNOSIS — R00.1 BRADYCARDIA, UNSPECIFIED: ICD-10-CM

## 2018-09-22 LAB
HCT VFR BLD CALC: 39.3 % — SIGNIFICANT CHANGE UP (ref 34.5–45)
HGB BLD-MCNC: 13.5 G/DL — SIGNIFICANT CHANGE UP (ref 11.5–15.5)
INR BLD: 3.25 RATIO — HIGH (ref 0.88–1.16)
MCHC RBC-ENTMCNC: 31.6 PG — SIGNIFICANT CHANGE UP (ref 27–34)
MCHC RBC-ENTMCNC: 34.3 GM/DL — SIGNIFICANT CHANGE UP (ref 32–36)
MCV RBC AUTO: 92 FL — SIGNIFICANT CHANGE UP (ref 80–100)
PLATELET # BLD AUTO: 175 K/UL — SIGNIFICANT CHANGE UP (ref 150–400)
PROTHROM AB SERPL-ACNC: 37.6 SEC — HIGH (ref 10–13.1)
RBC # BLD: 4.27 M/UL — SIGNIFICANT CHANGE UP (ref 3.8–5.2)
RBC # FLD: 12.1 % — SIGNIFICANT CHANGE UP (ref 10.3–14.5)
WBC # BLD: 17.8 K/UL — HIGH (ref 3.8–10.5)
WBC # FLD AUTO: 17.8 K/UL — HIGH (ref 3.8–10.5)

## 2018-09-22 PROCEDURE — 99233 SBSQ HOSP IP/OBS HIGH 50: CPT

## 2018-09-22 RX ORDER — MOMETASONE FUROATE 220 UG/1
1 INHALANT RESPIRATORY (INHALATION)
Qty: 0 | Refills: 0 | Status: DISCONTINUED | OUTPATIENT
Start: 2018-09-22 | End: 2018-09-25

## 2018-09-22 RX ADMIN — ENOXAPARIN SODIUM 70 MILLIGRAM(S): 100 INJECTION SUBCUTANEOUS at 04:59

## 2018-09-22 RX ADMIN — Medication 3 MILLILITER(S): at 14:47

## 2018-09-22 RX ADMIN — MOMETASONE FUROATE 1 PUFF(S): 220 INHALANT RESPIRATORY (INHALATION) at 17:11

## 2018-09-22 RX ADMIN — Medication 200 MILLIGRAM(S): at 05:01

## 2018-09-22 RX ADMIN — Medication 20 MILLIGRAM(S): at 00:19

## 2018-09-22 RX ADMIN — Medication 3 MILLILITER(S): at 09:25

## 2018-09-22 RX ADMIN — Medication 0.5 MILLIGRAM(S): at 05:01

## 2018-09-22 RX ADMIN — PANTOPRAZOLE SODIUM 40 MILLIGRAM(S): 20 TABLET, DELAYED RELEASE ORAL at 05:01

## 2018-09-22 RX ADMIN — Medication 40 MILLIGRAM(S): at 17:07

## 2018-09-22 RX ADMIN — Medication 20 MILLIGRAM(S): at 05:01

## 2018-09-22 RX ADMIN — Medication 200 MILLIGRAM(S): at 14:48

## 2018-09-22 RX ADMIN — ATORVASTATIN CALCIUM 10 MILLIGRAM(S): 80 TABLET, FILM COATED ORAL at 21:20

## 2018-09-22 RX ADMIN — Medication 20 MILLIGRAM(S): at 11:22

## 2018-09-22 RX ADMIN — Medication 3 MILLILITER(S): at 05:01

## 2018-09-22 RX ADMIN — Medication 200 MILLIGRAM(S): at 21:20

## 2018-09-22 NOTE — PROGRESS NOTE ADULT - PROBLEM SELECTOR PLAN 2
No signs/sx to suggest superimposed bacterial pneumonia  -hold antimicrobial therapy  -supportive care for positive RVP.  -c/w duoneb q4-6hr for wheezing   change to po prednisone as per pulm  robitussin prn cough

## 2018-09-22 NOTE — PROGRESS NOTE ADULT - SUBJECTIVE AND OBJECTIVE BOX
Seen in room 254 [facility] for Dr. Hugo Beckford  Complaining of a dry cough and mild shortness of breath which she notices when she talks.  States this has improved.     Review Of Systems:  Constitutional: No Fever, Chills,  No Fatigue, No Weight change   HEENT: No Blurred vision, No Headache   Respiratory: + Dry Cough, No sputum production, No Wheezing, + Shortness of breath  Cardiovascular: No Chest Pain, No Palpitations, No Lightheadedness, No Falling, No Syncope, No LOMELI, No PND, No Orthopnea, No Peripehral Edema  Gastrointestinal: No Abdominal Pain, No Diarrhea, No Constipation, No Nausea, No Vomiting, Normal Appetite and bowel habits on stool softner  Genitourinary: No Dysuria  Extremities: No Swelling, No Claudication,   Neurologic:  No Focal deficit, No Weakness, No Dysphagia, No Paresthesias, No Syncope  Skin: No Rash,  No Ecchymoses, No Wounds, No Tenderness, No Drainage     Medications:  ALBUTerol/ipratropium for Nebulization 3 milliLiter(s) Nebulizer <User Schedule>  atorvastatin 10 milliGRAM(s) Oral at bedtime  benzonatate 200 milliGRAM(s) Oral three times a day  buDESOnide   0.5 milliGRAM(s) Respule 0.5 milliGRAM(s) Inhalation every 12 hours  enoxaparin Injectable 70 milliGRAM(s) SubCutaneous every 12 hours  guaiFENesin/dextromethorphan  Syrup 10 milliLiter(s) Oral four times a day  HYDROcodone/homatropine Syrup 5 milliLiter(s) Oral <User Schedule>  influenza   Vaccine 0.5 milliLiter(s) IntraMuscular once  methylPREDNISolone sodium succinate Injectable 20 milliGRAM(s) IV Push every 6 hours  pantoprazole    Tablet 40 milliGRAM(s) Oral before breakfast    PMH/PSH/FH/SH: Unchanged  Vitals:  T(C): 36.8 (18 @ 04:57), Max: 36.8 (18 @ 12:38)  HR: 67 (18 @ 04:57) (67 - 90)  BP: 128/73 (18 @ 04:57) (119/76 - 141/70)  BP(mean): --  RR: 16 (09-22-18 @ 04:57) (16 - 18)  SpO2: 94% (18 @ 04:57) (92% - 94%)  Daily Weight in k.4 (22 Sep 2018 08:37)    Physical Exam:  Appearance:  Normal, NAD  Eyes: PERRL, EOMI  HENT:  Normal oral muscosa, NC/AT  Neck: without heptojugular reflux, carotid 2+ equal without bruits  No Thyromegaly  Cardiovascular: normal regular S1, physiologic split S2,  Occasional ectopics, No m/r/g   Respiratory: Clear to percussion and auscultation bilaterally  Gastrointestinal: Soft, Non-tender, Non-distended, BS+, No masses  Neurologic: Non-focal, No focal neurologic deficits  Skin: No rashes, No ecchymoses, No cyanosis, no peripheral edema  Pulses-2+ equal to dorsalis pedis        140  |  104  |  19  ----------------------------<  158<H>  4.3   |  26  |  0.83    Ca    9.2      21 Sep 2018 05:15    PT/INR - ( 22 Sep 2018 06:54 )   PT: 37.6 sec;   INR: 3.25 ratio      WBC 17.8  INR 3.25     @ 07:  -   @ 07:00  --------------------------------------------------------  IN: 720 mL / OUT: 600 mL / NET: 120 mL    Cardiac Testing:  Telemetry:  Sinus rhythm rate  beats per minute, with PVCs and rare ventricular couplets

## 2018-09-22 NOTE — PROGRESS NOTE ADULT - SUBJECTIVE AND OBJECTIVE BOX
Patient is a 73y old  Female who presents with a chief complaint of shortness of breath (21 Sep 2018 18:00)      INTERVAL HPI/OVERNIGHT EVENTS:    Pt with recurrent pauses again of 2 and 4 seconds  T(C): 36.8 (09-22-18 @ 04:57), Max: 36.8 (09-21-18 @ 12:38)  HR: 67 (09-22-18 @ 04:57) (67 - 90)  BP: 128/73 (09-22-18 @ 04:57) (119/76 - 141/70)  RR: 16 (09-22-18 @ 04:57) (16 - 18)  SpO2: 94% (09-22-18 @ 04:57) (92% - 94%)  Wt(kg): --  I&O's Summary    21 Sep 2018 07:01  -  22 Sep 2018 07:00  --------------------------------------------------------  IN: 720 mL / OUT: 600 mL / NET: 120 mL        LABS:                        13.5   17.8  )-----------( 175      ( 22 Sep 2018 07:03 )             39.3     09-21    140  |  104  |  19  ----------------------------<  158<H>  4.3   |  26  |  0.83    Ca    9.2      21 Sep 2018 05:15      PT/INR - ( 22 Sep 2018 06:54 )   PT: 37.6 sec;   INR: 3.25 ratio             CAPILLARY BLOOD GLUCOSE              REVIEW OF SYSTEMS:  CONSTITUTIONAL: No fever, weight loss, or fatigue  EYES: No eye pain, visual disturbances, or discharge  ENMT:  No difficulty hearing, tinnitus, vertigo; No sinus or throat pain  NECK: No pain or stiffness  BREASTS: No pain, masses, or nipple discharge  RESPIRATORY: No cough, wheezing, chills or hemoptysis; No shortness of breath  CARDIOVASCULAR: No chest pain, palpitations, dizziness, or leg swelling  GASTROINTESTINAL: No abdominal or epigastric pain. No nausea, vomiting, or hematemesis; No diarrhea or constipation. No melena or hematochezia.  GENITOURINARY: No dysuria, frequency, hematuria, or incontinence  NEUROLOGICAL: No headaches, memory loss, loss of strength, numbness, or tremors  SKIN: No itching, burning, rashes, or lesions   LYMPH NODES: No enlarged glands  ENDOCRINE: No heat or cold intolerance; No hair loss  MUSCULOSKELETAL: No joint pain or swelling; No muscle, back, or extremity pain  PSYCHIATRIC: No depression, anxiety, mood swings, or difficulty sleeping  HEME/LYMPH: No easy bruising, or bleeding gums  ALLERY AND IMMUNOLOGIC: No hives or eczema    RADIOLOGY & ADDITIONAL TESTS:    Imaging Personally Reviewed:  [ ] YES  [ ] NO    Consultant(s) Notes Reviewed:  [ ] YES  [ ] NO    PHYSICAL EXAM:  GENERAL: NAD, well-groomed, well-developed  HEAD:  Atraumatic, Normocephalic  EYES: EOMI, PERRLA, conjunctiva and sclera clear  ENMT: No tonsillar erythema, exudates, or enlargement; Moist mucous membranes, Good dentition, No lesions  NECK: Supple, No JVD, Normal thyroid  NERVOUS SYSTEM:  Alert & Oriented X3, Good concentration; Motor Strength 5/5 B/L upper and lower extremities; DTRs 2+ intact and symmetric  CHEST/LUNG: Clear to auscultation bilaterally; No rales, rhonchi, wheezing, or rubs  HEART: Regular rate and rhythm; No murmurs, rubs, or gallops  ABDOMEN: Soft, Nontender, Nondistended; Bowel sounds present  EXTREMITIES:  2+ Peripheral Pulses, No clubbing, cyanosis, or edema  LYMPH: No lymphadenopathy noted  SKIN: No rashes or lesions    Care Discussed with Consultants/Other Providers [ ] YES  [ ] NO Patient is a 73y old  Female who presents with a chief complaint of shortness of breath (21 Sep 2018 18:00)      INTERVAL HPI/OVERNIGHT EVENTS:    Called this am that pt  with recurrent pauses again of 2 and 4 seconds, however it was an error by the telemetry nurse as events were from yesterday not today and pt had only some PVCs and has not had any pauses since yesterday's events. Pt still w/ cough when speaking too long.    T(C): 36.8 (09-22-18 @ 04:57), Max: 36.8 (09-21-18 @ 12:38)  HR: 67 (09-22-18 @ 04:57) (67 - 90)  BP: 128/73 (09-22-18 @ 04:57) (119/76 - 141/70)  RR: 16 (09-22-18 @ 04:57) (16 - 18)  SpO2: 94% (09-22-18 @ 04:57) (92% - 94%)  Wt(kg): --  I&O's Summary    21 Sep 2018 07:01  -  22 Sep 2018 07:00  --------------------------------------------------------  IN: 720 mL / OUT: 600 mL / NET: 120 mL        LABS:                        13.5   17.8  )-----------( 175      ( 22 Sep 2018 07:03 )             39.3     09-21    140  |  104  |  19  ----------------------------<  158<H>  4.3   |  26  |  0.83    Ca    9.2      21 Sep 2018 05:15      PT/INR - ( 22 Sep 2018 06:54 )   PT: 37.6 sec;   INR: 3.25 ratio             CAPILLARY BLOOD GLUCOSE              REVIEW OF SYSTEMS:  CONSTITUTIONAL: No fever, weight loss, or fatigue  EYES: No eye pain, visual disturbances, or discharge  ENMT:  No difficulty hearing, tinnitus, vertigo; No sinus or throat pain  NECK: No pain or stiffness  BREASTS: No pain, masses, or nipple discharge  RESPIRATORY: No cough, wheezing, chills or hemoptysis; No shortness of breath  CARDIOVASCULAR: No chest pain, palpitations, dizziness, or leg swelling  GASTROINTESTINAL: No abdominal or epigastric pain. No nausea, vomiting, or hematemesis; No diarrhea or constipation. No melena or hematochezia.  GENITOURINARY: No dysuria, frequency, hematuria, or incontinence  NEUROLOGICAL: No headaches, memory loss, loss of strength, numbness, or tremors  SKIN: No itching, burning, rashes, or lesions   LYMPH NODES: No enlarged glands  ENDOCRINE: No heat or cold intolerance; No hair loss  MUSCULOSKELETAL: No joint pain or swelling; No muscle, back, or extremity pain  PSYCHIATRIC: No depression, anxiety, mood swings, or difficulty sleeping  HEME/LYMPH: No easy bruising, or bleeding gums  ALLERY AND IMMUNOLOGIC: No hives or eczema    RADIOLOGY & ADDITIONAL TESTS:    Imaging Personally Reviewed:  [ ] YES  [ ] NO    Consultant(s) Notes Reviewed:  [ ] YES  [ ] NO    PHYSICAL EXAM:  GENERAL: NAD, well-groomed, well-developed  HEAD:  Atraumatic, Normocephalic  EYES: EOMI, PERRLA, conjunctiva and sclera clear  ENMT: No tonsillar erythema, exudates, or enlargement; Moist mucous membranes, Good dentition, No lesions  NECK: Supple, No JVD, Normal thyroid  NERVOUS SYSTEM:  Alert & Oriented X3, Good concentration; Motor Strength 5/5 B/L upper and lower extremities; DTRs 2+ intact and symmetric  CHEST/LUNG: Clear to auscultation bilaterally; No rales, rhonchi, wheezing, or rubs  HEART: Regular rate and rhythm; No murmurs, rubs, or gallops  ABDOMEN: Soft, Nontender, Nondistended; Bowel sounds present  EXTREMITIES:  2+ Peripheral Pulses, No clubbing, cyanosis, or edema  LYMPH: No lymphadenopathy noted  SKIN: No rashes or lesions    Care Discussed with Consultants/Other Providers [ ] YES  [ ] NO Patient is a 73y old  Female who presents with a chief complaint of shortness of breath (21 Sep 2018 18:00)      INTERVAL HPI/OVERNIGHT EVENTS:    Called this am that pt  with recurrent pauses again of 2 and 4 seconds, however it was an error by the telemetry nurse as events were from yesterday not today and pt had only some PVCs and has not had any pauses since yesterday's events. Pt still w/ cough when speaking too long but is improved  T(C): 36.8 (09-22-18 @ 04:57), Max: 36.8 (09-21-18 @ 12:38)  HR: 67 (09-22-18 @ 04:57) (67 - 90)  BP: 128/73 (09-22-18 @ 04:57) (119/76 - 141/70)  RR: 16 (09-22-18 @ 04:57) (16 - 18)  SpO2: 94% (09-22-18 @ 04:57) (92% - 94%)  Wt(kg): --  I&O's Summary    21 Sep 2018 07:01  -  22 Sep 2018 07:00  --------------------------------------------------------  IN: 720 mL / OUT: 600 mL / NET: 120 mL        LABS:                        13.5   17.8  )-----------( 175      ( 22 Sep 2018 07:03 )             39.3     09-21    140  |  104  |  19  ----------------------------<  158<H>  4.3   |  26  |  0.83    Ca    9.2      21 Sep 2018 05:15      PT/INR - ( 22 Sep 2018 06:54 )   PT: 37.6 sec;   INR: 3.25 ratio         REVIEW OF SYSTEMS:  CONSTITUTIONAL: No fever, weight loss, or fatigue  EYES: No eye pain, visual disturbances, or discharge  ENMT:  No difficulty hearing, tinnitus, vertigo; No sinus or throat pain  NECK: No pain or stiffness  BREASTS: No pain, masses, or nipple discharge  RESPIRATORY:  cough, no wheezing, chills or hemoptysis; No shortness of breath  CARDIOVASCULAR: No chest pain, palpitations, dizziness, or leg swelling  GASTROINTESTINAL: No abdominal or epigastric pain. No nausea, vomiting, or hematemesis; No diarrhea or constipation. No melena or hematochezia.  GENITOURINARY: No dysuria, frequency, hematuria, or incontinence  NEUROLOGICAL: No headaches, memory loss, loss of strength, numbness, or tremors  SKIN: No itching, burning, rashes, or lesions   LYMPH NODES: No enlarged glands  ENDOCRINE: No heat or cold intolerance; No hair loss  MUSCULOSKELETAL: No joint pain or swelling; No muscle, back, or extremity pain  PSYCHIATRIC: No depression, anxiety, mood swings, or difficulty sleeping  HEME/LYMPH: No easy bruising, or bleeding gums  ALLERY AND IMMUNOLOGIC: No hives or eczema        Consultant(s) Notes Reviewed:  [x ] YES  [ ] NO    PHYSICAL EXAM:  GENERAL: NAD, well-groomed, well-developed  HEAD:  Atraumatic, Normocephalic  EYES:conjunctiva and sclera clear  NECK: Supple, No JVD, Normal thyroid  NERVOUS SYSTEM:  Alert & Oriented X3, Good concentration; Motor Strength 5/5 B/L upper and lower extremities  CHEST/LUNG: Clear to auscultation bilaterally; No rales, rhonchi, wheezing, or rubs  HEART: Regular rate and rhythm; No murmurs, rubs, or gallops  ABDOMEN: Soft, Nontender, Nondistended; Bowel sounds present  EXTREMITIES:  1+ edema          < from: Transthoracic Echocardiogram (09.21.18 @ 10:03) >  Patient name: JOHNSON BIANCHI  YOB: 1945   Age: 73 (F)   MR#: [id number]  Study Date: 9/21/2018  [id number] Fernando Espino RDCS  Study quality: Technically fair  Referring Physician: Arthur Ricks MD  Blood Pressure: 128/82 mmHg  Height: 150 cm  Weight: 66 kg  BSA: 1.6 m2  ------------------------------------------------------------------------  PROCEDURE: Transthoracic echocardiogram with 2-D, M-Mode  and complete spectral and color flow Doppler.  INDICATION: Supraventricular tachycardia (I47.1)  ------------------------------------------------------------------------  Dimensions:    Normal Values:  LA:     3.5    2.0 - 4.0 cm  Ao:     3.4    2.0 - 3.8 cm  SEPTUM: 0.8    0.6 - 1.2 cm  PWT:    0.7    0.6 - 1.1 cm  LVIDd:  4.7    3.0 - 5.6 cm  LVIDs:  3.4    1.8 - 4.0 cm  Derived variables:  LVMI: 70 g/m2  RWT: 0.29  Fractional short: 28 %  EF (Visual Estimate): 55-60 %  Doppler Peak Velocity (m/sec): AoV=1.4  ------------------------------------------------------------------------  Observations:  Mitral Valve: Mitral annular calcification, otherwise  normal mitral valve. Minimal mitral regurgitation.  Aortic Valve/Aorta: Calcified trileaflet aortic valve with  normal opening. Peak transaortic valve gradient equals 8 mm  Hg, mean transaortic valve gradient equals 5 mm Hg, aortic  valve velocity time integral equals 32 cm. Minimal aortic  regurgitation. Peak left ventricular outflow tract gradient  equals 4 mm Hg, mean gradient is equal to 2 mm Hg, LVOT  velocity time integral equals 21 cm.  Aortic Root: 3.4 cm.  LVOT diameter: 1.8 cm.  Left Atrium: Normal left atrium.  LA volume index = 27  cc/m2. Mobile interatrial septum.  Left Ventricle: Endocardium not well visualized; grossly  normal left ventricular systolic function. Normal left  ventricular internal dimensions and wall thicknesses.  Right Heart: Normal right atrium. Normal right ventricular  size and function. Normal tricuspid valve. Mild tricuspid  regurgitation. Pulmonic valve not well visualized. No  pulmonic regurgitation.  Pericardium/Pleura: Normal pericardium with no pericardial  effusion.  Hemodynamic: Estimated right ventricular systolic pressure  equals 21 mm Hg, assuming right atrial pressure equals 3 mm  Hg, consistent with normal pulmonary pressures.  ------------------------------------------------------------------------  Conclusions:  1. Mitral annular calcification, otherwise normal mitral  valve. Minimal mitral regurgitation.  2. Calcified trileafletaortic valve with normal opening.  Minimal aortic regurgitation.  3. Endocardium not well visualized; grossly normal left  ventricular systolic function.  4. Normal right ventricular size and function.  *** Compared with echocardiogram of 10/12/2013,results are  similar on today's study.  ------------------------------------------------------------------------  Confirmed on  9/21/2018 - 12:56:58 by Kathryn Díaz M.D.  ------------------------------------------------------------------------    < end of copied text >  LYMPH: No lymphadenopathy noted  SKIN: No rashes or lesions

## 2018-09-22 NOTE — PROGRESS NOTE ADULT - ASSESSMENT
MS. Susana Delgado is feeling better.  HAs a dry cough and shortness of breath.  States she only notices the shortness of breath when she is talking although this has improved.  No elevated temperature.  Significant elevation of WBC which has not appreciably changed although remains on steroids.  INR is supratherapeutic.  Optimal INR 2.0-3.0.  Stable renal function.  No significant cardiac arrhythmia.  Sinus rhythm with PVCs, no significant atrial arrhythmia.  No tachyarrhythmia.  Continue treatment with anticoagulation and steroids.

## 2018-09-22 NOTE — PROGRESS NOTE ADULT - ASSESSMENT
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 and a subtherapeutic INR on coumadin - would not consider this a coumadin "failure"     "pauses" on telemetry - felt to represent a vaso-vagal reaction    h/o breast cancer s/p lumpectomy    hypertension    PLAN/RECOMMENDATIONS:    stable oxygenation on room air  change IV to oral steroids - prednisone 40mg daily  discontinue nebs  asmanex 220mcg 2 times daily  robitussin DM/tessalon/hycodan (watch for sedation on narcotic based antitussive)  coumadin for INR 2 - 3 - should not be considered a coumadin "failure" given the subtherapeutic INR when admitted  GI prophylaxis on A/C and steroids - protonix  no indication for IVC filter in the absence of lower extremity clot  cardiac meds: lipitor - off verapamil  ECHO unremarkable (as above)  EP and cardiology evaluation noted    Will follow with you. Plan of care discussed with the patient at bedside.    Rd Lira MD, [facility] - [phone]  Pulmonary Medicine

## 2018-09-22 NOTE — PROGRESS NOTE ADULT - ASSESSMENT
· Assessment		  74 yo F w/ breast CA s/p removal, hx of prothrombin gene mutation, CKD III, prior hx of unprovoked DVT/PE ~ 4-6yr ago (on warfarin) presents with shortness of breath 2/2 to pulmonary embolism c/b viral URI. Pt now on Lovenox. Review of office chart shows that pt has been intermittently subtherapeutic over the last several months. INR in range only in august but pt claims to have missed several doses since then and most recent INR <2. Heme note appreciated and in lieu of ? lack of data treating pt's with prothrombin G mutations w/ Xa inhibitors, will continue with coumadin and pt urged to maintain compliance w/ dosing. Continue Lovenox until INR is therapeutic. Cough persist but pt w/o SOB will d/c O2 and ck pulso Ox. Pt also w/ 4 second pause and several episodes of PAT on monitor. EPS note appreciated but unlikely that pause due to vasovagal from urination or cough as pt was asymptomatic. ? if due to meds or tachybrady syndrome but no new events today.

## 2018-09-22 NOTE — PROGRESS NOTE ADULT - SUBJECTIVE AND OBJECTIVE BOX
[facility] - [facility]     PROGRESS NOTE    CHIEF COMPLAINT: pulmonary emboli; rhinoviral infection; bronchospasm; dyspnea    INTERVAL HISTORY: no recurrent pauses on telemetry - felt to have pauses due to a vaso-vagal reaction by EP - no intervention is felt to be needed at this time; persistent cough without shortness of breath, sputum production, chest congestion or wheeze;  no fevers, chills or sweats; no anterior chest pain/pressure or palpitations;     REVIEW OF SYSTEMS:  Constitutional: As per interval history  HEENT: Within normal limits  CV: As per interval history  Resp: As per interval history  GI: Within normal limits   : Within normal limits  Musculoskeletal: Within normal limits  Skin: Within normal limits  Neurological: Within normal limits  Psychiatric: Within normal limits  Endocrine: Within normal limits  Hematologic/Lymphatic: Within normal limits  Allergic/Immunologic: Within normal limits    MEDICATIONS:     Pulmonary "  ALBUTerol/ipratropium for Nebulization 3 milliLiter(s) Nebulizer <User Schedule>  benzonatate 200 milliGRAM(s) Oral three times a day  buDESOnide   0.5 milliGRAM(s) Respule 0.5 milliGRAM(s) Inhalation every 12 hours  guaiFENesin/dextromethorphan  Syrup 10 milliLiter(s) Oral four times a day  HYDROcodone/homatropine Syrup 5 milliLiter(s) Oral <User Schedule>      Anti-microbials:      Cardiovascular:      Other:  atorvastatin 10 milliGRAM(s) Oral at bedtime  influenza   Vaccine 0.5 milliLiter(s) IntraMuscular once  methylPREDNISolone sodium succinate Injectable 20 milliGRAM(s) IV Push every 6 hours  pantoprazole    Tablet 40 milliGRAM(s) Oral before breakfast        OBJECTIVE:    I&O's Detail    21 Sep 2018 07:  -  22 Sep 2018 07:00  --------------------------------------------------------  IN:    IV PiggyBack: 240 mL    Oral Fluid: 480 mL  Total IN: 720 mL    OUT:    Voided: 600 mL  Total OUT: 600 mL    Total NET: 120 mL      22 Sep 2018 07:  -  22 Sep 2018 14:41  --------------------------------------------------------  IN:    Oral Fluid: 480 mL  Total IN: 480 mL    OUT:  Total OUT: 0 mL    Total NET: 480 mL     Daily Weight in k.4 (22 Sep 2018 08:37)    PHYSICAL EXAM:       ICU Vital Signs Last 24 Hrs  T(C): 36.6 (22 Sep 2018 14:28), Max: 36.8 (22 Sep 2018 04:57)  T(F): 97.9 (22 Sep 2018 14:28), Max: 98.3 (22 Sep 2018 04:57)  HR: 58 (22 Sep 2018 14:28) (58 - 68)  BP: 121/76 (22 Sep 2018 14:28) (119/76 - 128/73)  BP(mean): --  ABP: --  ABP(mean): --  RR: 18 (22 Sep 2018 14:28) (16 - 18)  SpO2: 92% (22 Sep 2018 14:28) (92% - 94%) on room air     General: Awake. Alert. Cooperative. Hacking cough with deep inspiration. Appears stated age 	  HEENT:   Atraumatic. Normocephalic. Anicteric. Normal oral mucosa. PERRL. EOMI.  Neck: Supple. Trachea midline. Thyroid without enlargement/tenderness/nodules. No carotid bruit. No JVD.	  Cardiovascular: Regular rate and rhythm. S1 S2 normal. No murmurs, rubs or gallops.  Respiratory: Respirations unlabored. Marked improvement in diffuse wheeze. No curvature.  Abdomen: Soft. Non-tender. Non-distended. No organomegaly. No masses. Normal bowel sounds.  Extremities: Warm to touch. No clubbing or cyanosis. Mild bilateral lower extremity edema  Pulses: 2+ peripheral pulses all extremities.	  Skin: Normal skin color. No rashes or lesions. No ecchymoses. No cyanosis. Warm to touch.  Lymph Nodes: Cervical, supraclavicular and axillary nodes normal  Neurological: Motor and sensory examination equal and normal. A and O x 3  Psychiatry: Appropriate mood and affect.    LABS:                        13.5   17.8  )-----------( 175      ( 22 Sep 2018 07:03 )             39.3                         13.0   16.17 )-----------( 170      ( 21 Sep 2018 07:53 )             38.9     -21    140  |  104  |  19  ----------------------------<  158<H>  4.3   |  26  |  0.83        138  |  103  |  17  ----------------------------<  134<H>  3.9   |  23  |  0.92    Ca      9.2      -    Ca      9.6      -    Phos    3.2     -    Phos    2.8     -      Mg       2.2     -    Mg       2.1     -    PT/INR - ( 22 Sep 2018 06:54 )   PT: 37.6 sec;   INR: 3.25 ratio      < from: Transthoracic Echocardiogram (18 @ 10:03) >    Patient name: JOHNSON BIANCHI  YOB: 1945   Age: 73 (F)   MR#: [id number]  Study Date: 2018  [id number] Fernando Espino RDCS  Study quality: Technically fair  Referring Physician: Arthur Rciks MD  Blood Pressure: 128/82 mmHg  Height: 150 cm  Weight: 66 kg  BSA: 1.6 m2  ------------------------------------------------------------------------  PROCEDURE: Transthoracic echocardiogram with 2-D, M-Mode  and complete spectral and color flow Doppler.  INDICATION: Supraventricular tachycardia (I47.1)  ------------------------------------------------------------------------  Dimensions:    Normal Values:  LA:     3.5    2.0 - 4.0 cm  Ao:     3.4    2.0 - 3.8 cm  SEPTUM: 0.8    0.6 - 1.2 cm  PWT:    0.7    0.6 - 1.1 cm  LVIDd:  4.7    3.0 - 5.6 cm  LVIDs:  3.4    1.8 - 4.0 cm  Derived variables:  LVMI: 70 g/m2  RWT: 0.29  Fractional short: 28 %  EF (Visual Estimate): 55-60 %  Doppler Peak Velocity (m/sec): AoV=1.4  ------------------------------------------------------------------------  Observations:  Mitral Valve: Mitral annular calcification, otherwise  normal mitral valve. Minimal mitral regurgitation.  Aortic Valve/Aorta: Calcified trileaflet aortic valve with  normal opening. Peak transaortic valve gradient equals 8 mm  Hg, mean transaortic valve gradient equals 5 mm Hg, aortic  valve velocity time integral equals 32 cm. Minimal aortic  regurgitation. Peak left ventricular outflow tract gradient  equals 4 mm Hg, mean gradient is equal to 2 mm Hg, LVOT  velocity time integral equals 21 cm.  Aortic Root: 3.4 cm.  LVOT diameter: 1.8 cm.  Left Atrium: Normal left atrium.  LA volume index = 27  cc/m2. Mobile interatrial septum.  Left Ventricle: Endocardium not well visualized; grossly  normal left ventricular systolic function. Normal left  ventricular internal dimensions and wall thicknesses.  Right Heart: Normal right atrium. Normal right ventricular  size and function. Normal tricuspid valve. Mild tricuspid  regurgitation. Pulmonic valve not well visualized. No  pulmonic regurgitation.  Pericardium/Pleura: Normal pericardium with no pericardial  effusion.  Hemodynamic: Estimated right ventricular systolic pressure  equals 21 mm Hg, assuming right atrial pressure equals 3 mm  Hg, consistent with normal pulmonary pressures.  ------------------------------------------------------------------------  Conclusions:  1. Mitral annular calcification, otherwise normal mitral  valve. Minimal mitral regurgitation.  2. Calcified trileaflet aortic valve with normal opening.  Minimal aortic regurgitation.  3. Endocardium not well visualized; grossly normal left  ventricular systolic function.  4. Normal right ventricular size and function.  *** Compared with echocardiogram of 10/12/2013,results are  similar on today's study.  ------------------------------------------------------------------------  Confirmed on  2018 - 12:56:58 by Kathryn Díaz M.D.  ------------------------------------------------------------------------    < end of copied text >  ---------------------------------------------------------------------------------------------------------------  MICROBIOLOGY:     Rapid Respiratory Viral Panel (18 @ 13:23)    Rapid RVP Result: Detected: The Film[employer] RVP Rapid uses polymerase chain reaction (PCR) and melt  curve analysis to screen for adenovirus; coronavirus HKU1, NL63, 229E,  OC43; human metapneumovirus (hMPV); human enterovirus/rhinovirus  (Entero/RV); influenza A; influenza A/H1;influenza A/H3; influenza  A/H1-2009; influenza B; parainfluenza viruses 1, 2, 3, 4; respiratory  syncytial virus; Bordetella pertussis; Mycoplasma pneumoniae; and  Chlamydophila pneumoniae.    Entero/Rhinovirus (RapRVP): Detected      RADIOLOGY:  [x] Chest radiographs reviewed and interpreted by me    < from: [facility] Duplex Lower Ext Vein Scan, Emeterio (18 @ 16:47) >    EXAM:  DUPLEX SCAN EXT VEINS LOWER BI                          PROCEDURE DATE:  2018      INTERPRETATION:  CLINICAL INFORMATION: A prior examination, dated   10/10/2013, showed, DVT affecting a right peroneal vein, currently short   of breath, rule out DVT.    TECHNIQUE: Duplex sonography of the BILATERAL LOWER extremities with   color and spectral Doppler, with and without compression.      FINDINGS:    There is normal compressibility of the bilateral common femoral, femoral   and popliteal veins. No calf vein thrombosis is detected.    Doppler examination shows normal spontaneous and phasic flow.    IMPRESSION:     No evidence of bilateral lower extremity deep venous thrombosis.    LUIS PORRAS M.D., ATTENDING RADIOLOGIST  This document has been electronically signed. Sep 18 2018  5:25PM    < end of copied text >  ---------------------------------------------------------------------------------------------------------------    < from: Xray Chest 1 View AP/PA (18 @ 12:55) >    EXAM:  XR CHEST AP OR PA 1V                          PROCEDURE DATE:  2018      INTERPRETATION:  A single chest x-ray was obtained on 2018.    Indication: Dyspnea.    Impression:    The heart is normal in size. The lungs are clear. Degenerative changes of   the thoracic spine. No pneumothorax.    MEKA BURGOS M.D., ATTENDING RADIOLOGIST  This document has been electronically signed. Sep 17 2018  1:47PM      < end of copied text >  ---------------------------------------------------------------------------------------------------------------    < from: CT Angio Chest w/ IV Cont (18 @ 19:29) >    EXAM:  CT ANGIO CHEST (W)AW IC                          PROCEDURE DATE:  2018      INTERPRETATION:  CLINICAL INFORMATION: Shortness of breath. INR   subtherapeutic.    COMPARISON: CTA chest 2014    PROCEDURE:   CT Angiography of the Chest.  90 ml of Omnipaque 350 was injected intravenously. 10 ml were discarded.  Sagittal and coronal reformats were performed as well as 3D (MIP)   reconstructions.      FINDINGS:    CHEST:     LUNGS AND LARGE AIRWAYS: Patent central airways.  Bibasilar dependent   atelectasis. Left lower lobe calcified granuloma  PLEURA: Filling defects visualized within the left pulmonary artery, left   upper and lower lobar branches as well as the distal segmental and   subsegmental branches. Emboli are also noted within the segmental and   subsegmental branches of the right lower lobe pulmonary arterial branches.  VESSELS: Within normal limits. The main pulmonary artery measures   approximately 2.8 cm.  HEART: Heart size is normal. No pericardial effusion. No evidence of   right heart strain. Coronary artery calcifications.  MEDIASTINUM AND NAKIA: No lymphadenopathy.  CHEST WALL AND LOWER NECK: Within normal limits.  VISUALIZED UPPER ABDOMEN: Within normal limits.  BONES: Degenerative changes of the spine.    IMPRESSION:   Pulmonary embolism involving the left pulmonary artery, left upper lobe   are branches, as well as the distal segmental subsegmental branches.   Emboli are also noted of the right lower lobe segmental and subsegmental   branches.  No evidence of right heart strain or pulmonary infarct.    Findings were discussed with BEN Stephenson by Dr. Sosa on   2018 at 7:42 PM with read back.    KIMBERLY SOSA M.D., RADIOLOGY RESIDENT  This document has been electronically signed.  MARTA COLLINS M.D., ATTENDING RADIOLOGIST  This document has been electronically signed. Sep 17 2018  8:26PM      < end of copied text >  ---------------------------------------------------------------------------------------------------------------

## 2018-09-23 LAB
INR BLD: 2.95 RATIO — HIGH (ref 0.88–1.16)
MAGNESIUM SERPL-MCNC: 2.4 MG/DL — SIGNIFICANT CHANGE UP (ref 1.6–2.6)
PHOSPHATE SERPL-MCNC: 3.6 MG/DL — SIGNIFICANT CHANGE UP (ref 2.5–4.5)
PROTHROM AB SERPL-ACNC: 34.1 SEC — HIGH (ref 10–13.1)

## 2018-09-23 PROCEDURE — 99232 SBSQ HOSP IP/OBS MODERATE 35: CPT

## 2018-09-23 PROCEDURE — 99233 SBSQ HOSP IP/OBS HIGH 50: CPT

## 2018-09-23 RX ORDER — WARFARIN SODIUM 2.5 MG/1
5 TABLET ORAL ONCE
Qty: 0 | Refills: 0 | Status: COMPLETED | OUTPATIENT
Start: 2018-09-23 | End: 2018-09-23

## 2018-09-23 RX ORDER — BENZOCAINE AND MENTHOL 5; 1 G/100ML; G/100ML
1 LIQUID ORAL THREE TIMES A DAY
Qty: 0 | Refills: 0 | Status: DISCONTINUED | OUTPATIENT
Start: 2018-09-23 | End: 2018-10-01

## 2018-09-23 RX ADMIN — Medication 200 MILLIGRAM(S): at 05:29

## 2018-09-23 RX ADMIN — PANTOPRAZOLE SODIUM 40 MILLIGRAM(S): 20 TABLET, DELAYED RELEASE ORAL at 05:29

## 2018-09-23 RX ADMIN — Medication 200 MILLIGRAM(S): at 21:46

## 2018-09-23 RX ADMIN — MOMETASONE FUROATE 1 PUFF(S): 220 INHALANT RESPIRATORY (INHALATION) at 17:12

## 2018-09-23 RX ADMIN — Medication 200 MILLIGRAM(S): at 14:24

## 2018-09-23 RX ADMIN — WARFARIN SODIUM 5 MILLIGRAM(S): 2.5 TABLET ORAL at 21:46

## 2018-09-23 RX ADMIN — Medication 40 MILLIGRAM(S): at 05:29

## 2018-09-23 RX ADMIN — ATORVASTATIN CALCIUM 10 MILLIGRAM(S): 80 TABLET, FILM COATED ORAL at 21:46

## 2018-09-23 RX ADMIN — MOMETASONE FUROATE 1 PUFF(S): 220 INHALANT RESPIRATORY (INHALATION) at 05:29

## 2018-09-23 NOTE — PROGRESS NOTE ADULT - ASSESSMENT
73F with PMHx Breast Cancer s/p removal, Hx Prothrombin Gene Mutation, CKD3, Prior hx of unprovoked DVT/PE ~ 4-6yr ago on warfarin presents on 9/17 with shortness of breath., found to have acute PE due to subtherapeutic INR, now on Lovenox to Warfarin bridge. EP originally consulted for 4 second and 2.5 second pause which resolved with discontinuation of Verapamil.    #APCs  - Pt had a 2.15 second run of APCs. Pt also have few PVCs, and is otherwise sinus 70-90  - Could be due to increased catecholaminergic tone in the setting of prednisone use.   - Pt was also on nebulizers, but is now off of them.   - No change in medications.    - No intervention needed at this time.     Briana Jennings MD  Cardiology Fellow - PGY-4  LINDSE[place]: [id number]  NS: [phone]  [id number]

## 2018-09-23 NOTE — PROGRESS NOTE ADULT - SUBJECTIVE AND OBJECTIVE BOX
Patient seen and examined at bedside.    Overnight Events: Pt states she feels well. Overall no complaints. Did not have any symptoms during APC episode.       REVIEW OF SYSTEMS:  Constitutional:     [ ] negative [ ] fevers [ ] chills [ ] weight loss [ ] weight gain  HEENT:                  [ ] negative [ ] dry eyes [ ] eye irritation [ ] postnasal drip [ ] nasal congestion  CV:                         [x ] negative  [ ] chest pain [ ] orthopnea [ ] palpitations [ ] murmur  Resp:                     [ ] negative [x ] cough [ ] shortness of breath [ ] dyspnea [ ] wheezing [ ] sputum [ ]hemoptysis  GI:                          [ ] negative [ ] nausea [ ] vomiting [ ] diarrhea [ ] constipation [ ] abd pain [ ] dysphagia   :                        [ ] negative [ ] dysuria [ ] nocturia [ ] hematuria [ ] increased urinary frequency  Musculoskeletal: [ ] negative [ ] back pain [ ] myalgias [ ] arthralgias [ ] fracture  Skin:                       [ ] negative [ ] rash [ ] itch  Neurological:        [ ] negative [ ] headache [ ] dizziness [ ] syncope [ ] weakness [ ] numbness  Psychiatric:           [ ] negative [ ] anxiety [ ] depression  Endocrine:            [ ] negative [ ] diabetes [ ] thyroid problem  Heme/Lymph:      [ ] negative [ ] anemia [ ] bleeding problem  Allergic/Immune: [ ] negative [ ] itchy eyes [ ] nasal discharge [ ] hives [ ] angioedema    [x ] All other systems negative  [ ] Unable to assess ROS because sedated with anoxic brain injury.    Current Meds:  atorvastatin 10 milliGRAM(s) Oral at bedtime  benzocaine 15 mG/menthol 3.6 mG Lozenge 1 Lozenge Oral three times a day PRN  benzonatate 200 milliGRAM(s) Oral three times a day  guaiFENesin/dextromethorphan  Syrup 10 milliLiter(s) Oral four times a day  HYDROcodone/homatropine Syrup 5 milliLiter(s) Oral <User Schedule>  influenza   Vaccine 0.5 milliLiter(s) IntraMuscular once  mometasone 220 MICROgram(s) Inhaler 1 Puff(s) Inhalation two times a day  pantoprazole    Tablet 40 milliGRAM(s) Oral before breakfast  warfarin 5 milliGRAM(s) Oral once      PAST MEDICAL & SURGICAL HISTORY:  Prothrombin gene mutation  GERD (gastroesophageal reflux disease)  DVT (deep venous thrombosis), right: october 2013  Herniated lumbar intervertebral disc  Lower back pain  PE (pulmonary embolism): 3 yrs ago, was on Lovenox and coumadin  Hx of Breast Cancer  HTN - Hypertension  S/P tubal ligation  S/P arthroscopy of right knee: 2012  S/P Breast Lumpectomy: cyst removed from left breast.  h/o Wrist Surgery      Vitals:  T(F): 98.8 (09-23), Max: 98.8 (09-23)  HR: 55 (09-23) (55 - 67)  BP: 142/82 (09-23) (117/67 - 142/82)  RR: 18 (09-23)  SpO2: 93% (09-23)  I&O's Summary    22 Sep 2018 07:01  -  23 Sep 2018 07:00  --------------------------------------------------------  IN: 900 mL / OUT: 0 mL / NET: 900 mL    23 Sep 2018 07:01  -  23 Sep 2018 12:41  --------------------------------------------------------  IN: 240 mL / OUT: 0 mL / NET: 240 mL        Physical Exam:  Appearance: No acute distress; well appearing  Eyes: PERRL, EOMI, pink conjunctiva  HENT: Normal oral mucosa  Cardiovascular: RRR, S1, S2, no murmurs, rubs, or gallops; no edema; no JVD  Respiratory: Clear to auscultation bilaterally  Gastrointestinal: soft, non-tender, non-distended with normal bowel sounds  Musculoskeletal: No clubbing; no joint deformity   Neurologic: Non-focal  Lymphatic: No lymphadenopathy  Psychiatry: AAOx3, mood & affect appropriate  Skin: No rashes, ecchymoses, or cyanosis                          13.5   17.8  )-----------( 175      ( 22 Sep 2018 07:03 )             39.3       Phos  3.6     09-23  Mg     2.4     09-23      PT/INR - ( 23 Sep 2018 08:51 )   PT: 34.1 sec;   INR: 2.95 ratio                       New ECG(s): Personally reviewed    Echo:  < from: Transthoracic Echocardiogram (09.21.18 @ 10:03) >  ------------------------------------------------------------------------  Conclusions:  1. Mitral annular calcification, otherwise normal mitral  valve. Minimal mitral regurgitation.  2. Calcified trileafletaortic valve with normal opening.  Minimal aortic regurgitation.  3. Endocardium not well visualized; grossly normal left  ventricular systolic function.  4. Normal right ventricular size and function.  *** Compared with echocardiogram of 10/12/2013,results are  similar on today's study.  ------------------------------------------------------------------------  Confirmed on  9/21/2018 - 12:56:58 by Kathryn Díaz M.D.  ------------------------------------------------------------------------    < end of copied text >    Imaging:    Interpretation of Telemetry: Sinus 60s - 70s. 2,15 s of APCs, PVCs.

## 2018-09-23 NOTE — PROGRESS NOTE ADULT - ASSESSMENT
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 Pt now on Lovenox. Review of office chart shows that pt has been intermittently subtherapeutic over the last several months. INR in range only in august but pt claims to have missed several doses since then and most recent INR <2. Heme note appreciated and in lieu of ? lack of data treating pt's with prothrombin G mutations w/ Xa inhibitors, will continue with coumadin and pt urged to maintain compliance w/ dosing. Continue Lovenox until INR is therapeutic. Cough persist but pt w/o SOB will d/c O2 and ck pulso Ox. Pt also w/ 4 second pause and several episodes of PAT on monitor on 9/21 and bradycardia w/ 2 sec pause and ? junctional escape rhythm. EPS note appreciated but unlikely that pause due to vasovagal from urination or cough as pt was asymptomatic. ? if due to meds but no verapamil for 48 hrs, ? tachybrady syndrome.

## 2018-09-23 NOTE — PROGRESS NOTE ADULT - SUBJECTIVE AND OBJECTIVE BOX
[facility]     PROGRESS NOTE    CHIEF COMPLAINT: pulmonary emboli; rhinoviral infection; bronchospasm; dyspnea    INTERVAL HISTORY: bouts of bradycardia again while in the bathroom with junctional escape;  persistent cough without shortness of breath, sputum production, chest congestion or wheeze;  no fevers, chills or sweats; no anterior chest pain/pressure or palpitations;     REVIEW OF SYSTEMS:  Constitutional: As per interval history  HEENT: Within normal limits  CV: As per interval history  Resp: As per interval history  GI: Within normal limits   : Within normal limits  Musculoskeletal: Within normal limits  Skin: Within normal limits  Neurological: Within normal limits  Psychiatric: Within normal limits  Endocrine: Within normal limits  Hematologic/Lymphatic: Within normal limits  Allergic/Immunologic: Within normal limits    MEDICATIONS:     Pulmonary "  benzonatate 200 milliGRAM(s) Oral three times a day  guaiFENesin/dextromethorphan  Syrup 10 milliLiter(s) Oral four times a day  HYDROcodone/homatropine Syrup 5 milliLiter(s) Oral <User Schedule>  mometasone 220 MICROgram(s) Inhaler 1 Puff(s) Inhalation two times a day      Anti-microbials:      Cardiovascular:      Other:  atorvastatin 10 milliGRAM(s) Oral at bedtime  benzocaine 15 mG/menthol 3.6 mG Lozenge 1 Lozenge Oral three times a day PRN  influenza   Vaccine 0.5 milliLiter(s) IntraMuscular once  pantoprazole    Tablet 40 milliGRAM(s) Oral before breakfast  predniSONE   Tablet 40 milliGRAM(s) Oral daily        OBJECTIVE:    I&O's Detail    22 Sep 2018 07:01  -  23 Sep 2018 07:00  --------------------------------------------------------  IN:    Oral Fluid: 900 mL  Total IN: 900 mL    OUT:  Total OUT: 0 mL    Total NET: 900 mL    PHYSICAL EXAM:       ICU Vital Signs Last 24 Hrs  T(C): 37.1 (23 Sep 2018 04:47), Max: 37.1 (23 Sep 2018 04:47)  T(F): 98.8 (23 Sep 2018 04:47), Max: 98.8 (23 Sep 2018 04:47)  HR: 55 (23 Sep 2018 04:47) (55 - 67)  BP: 142/82 (23 Sep 2018 04:47) (117/67 - 142/82)  BP(mean): --  ABP: --  ABP(mean): --  RR: 18 (23 Sep 2018 04:47) (18 - 18)  SpO2: 93% (23 Sep 2018 04:47) (92% - 93%) on room air      General: Awake. Alert. Cooperative. Hacking cough with deep inspiration. Appears stated age 	  HEENT:   Atraumatic. Normocephalic. Anicteric. Normal oral mucosa. PERRL. EOMI.  Neck: Supple. Trachea midline. Thyroid without enlargement/tenderness/nodules. No carotid bruit. No JVD.	  Cardiovascular: Regular rate and rhythm. S1 S2 normal. No murmurs, rubs or gallops.  Respiratory: Respirations unlabored. Clear to auscultation and percussion. No curvature.  Abdomen: Soft. Non-tender. Non-distended. No organomegaly. No masses. Normal bowel sounds.  Extremities: Warm to touch. No clubbing or cyanosis. Mild bilateral lower extremity edema  Pulses: 2+ peripheral pulses all extremities.	  Skin: Normal skin color. No rashes or lesions. No ecchymoses. No cyanosis. Warm to touch.  Lymph Nodes: Cervical, supraclavicular and axillary nodes normal  Neurological: Motor and sensory examination equal and normal. A and O x 3  Psychiatry: Appropriate mood and affect.    LABS:                        13.5   17.8  )-----------( 175      ( 22 Sep 2018 07:03 )             39.3     09-21    140  |  104  |  19  ----------------------------<  158<H>  4.3   |  26  |  0.83    09-19    138  |  103  |  17  ----------------------------<  134<H>  3.9   |  23  |  0.92    Ca      9.2      09-21    Ca      9.6      09-19    Phos    3.6     09-23    Phos    3.2     09-20      Mg       2.4     09-23    Mg       2.2     09-20    PT/INR - ( 23 Sep 2018 08:51 )   PT: 34.1 sec;   INR: 2.95 ratio      < from: Transthoracic Echocardiogram (09.21.18 @ 10:03) >    Patient name: JOHNSON BIANCHI  YOB: 1945   Age: 73 (F)   MR#: [id number]  Study Date: 9/21/2018  Location: [address] [place], [place] [id number]: Fernando Espino RDCS  Study quality: Technically fair  Referring Physician: Arthur Ricks MD  Blood Pressure: 128/82 mmHg  Height: 150 cm  Weight: 66 kg  BSA: 1.6 m2  ------------------------------------------------------------------------  PROCEDURE: Transthoracic echocardiogram with 2-D, M-Mode  and complete spectral and color flow Doppler.  INDICATION: Supraventricular tachycardia (I47.1)  ------------------------------------------------------------------------  Dimensions:    Normal Values:  LA:     3.5    2.0 - 4.0 cm  Ao:     3.4    2.0 - 3.8 cm  SEPTUM: 0.8    0.6 - 1.2 cm  PWT:    0.7    0.6 - 1.1 cm  LVIDd:  4.7    3.0 - 5.6 cm  LVIDs:  3.4    1.8 - 4.0 cm  Derived variables:  LVMI: 70 g/m2  RWT: 0.29  Fractional short: 28 %  EF (Visual Estimate): 55-60 %  Doppler Peak Velocity (m/sec): AoV=1.4  ------------------------------------------------------------------------  Observations:  Mitral Valve: Mitral annular calcification, otherwise  normal mitral valve. Minimal mitral regurgitation.  Aortic Valve/Aorta: Calcified trileaflet aortic valve with  normal opening. Peak transaortic valve gradient equals 8 mm  Hg, mean transaortic valve gradient equals 5 mm Hg, aortic  valve velocity time integral equals 32 cm. Minimal aortic  regurgitation. Peak left ventricular outflow tract gradient  equals 4 mm Hg, mean gradient is equal to 2 mm Hg, LVOT  velocity time integral equals 21 cm.  Aortic Root: 3.4 cm.  LVOT diameter: 1.8 cm.  Left Atrium: Normal left atrium.  LA volume index = 27  cc/m2. Mobile interatrial septum.  Left Ventricle: Endocardium not well visualized; grossly  normal left ventricular systolic function. Normal left  ventricular internal dimensions and wall thicknesses.  Right Heart: Normal right atrium. Normal right ventricular  size and function. Normal tricuspid valve. Mild tricuspid  regurgitation. Pulmonic valve not well visualized. No  pulmonic regurgitation.  Pericardium/Pleura: Normal pericardium with no pericardial  effusion.  Hemodynamic: Estimated right ventricular systolic pressure  equals 21 mm Hg, assuming right atrial pressure equals 3 mm  Hg, consistent with normal pulmonary pressures.  ------------------------------------------------------------------------  Conclusions:  1. Mitral annular calcification, otherwise normal mitral  valve. Minimal mitral regurgitation.  2. Calcified trileaflet aortic valve with normal opening.  Minimal aortic regurgitation.  3. Endocardium not well visualized; grossly normal left  ventricular systolic function.  4. Normal right ventricular size and function.  *** Compared with echocardiogram of 10/12/2013,results are  similar on today's study.  ------------------------------------------------------------------------  Confirmed on  9/21/2018 - 12:56:58 by Kathryn Díaz M.D.  ------------------------------------------------------------------------    < end of copied text >  ---------------------------------------------------------------------------------------------------------------  MICROBIOLOGY:     Rapid Respiratory Viral Panel (09.17.18 @ 13:23)    Rapid RVP Result: Detected: The [employer] RVP Rapid uses polymerase chain reaction (PCR) and melt  curve analysis to screen for adenovirus; coronavirus HKU1, NL63, 229E,  OC43; human metapneumovirus (hMPV); human enterovirus/rhinovirus  (Entero/RV); influenza A; influenza A/H1;influenza A/H3; influenza  A/H1-2009; influenza B; parainfluenza viruses 1, 2, 3, 4; respiratory  syncytial virus; Bordetella pertussis; Mycoplasma pneumoniae; and  Chlamydophila pneumoniae.    Entero/Rhinovirus (RapRVP): Detected    RADIOLOGY:  [x] Chest radiographs reviewed and interpreted by me    < from: [facility] Duplex Lower Ext Vein ScanEmeterio (09.18.18 @ 16:47) >    EXAM:  DUPLEX SCAN EXT VEINS LOWER BI                          PROCEDURE DATE:  09/18/2018      INTERPRETATION:  CLINICAL INFORMATION: A prior examination, dated   10/10/2013, showed, DVT affecting a right peroneal vein, currently short   of breath, rule out DVT.    TECHNIQUE: Duplex sonography of the BILATERAL LOWER extremities with   color and spectral Doppler, with and without compression.      FINDINGS:    There is normal compressibility of the bilateral common femoral, femoral   and popliteal veins. No calf vein thrombosis is detected.    Doppler examination shows normal spontaneous and phasic flow.    IMPRESSION:     No evidence of bilateral lower extremity deep venous thrombosis.    LUIS PORRAS M.D., ATTENDING RADIOLOGIST  This document has been electronically signed. Sep 18 2018  5:25PM    < end of copied text >  ---------------------------------------------------------------------------------------------------------------    < from: Xray Chest 1 View AP/PA (09.17.18 @ 12:55) >    EXAM:  XR CHEST AP OR PA 1V                          PROCEDURE DATE:  09/17/2018      INTERPRETATION:  A single chest x-ray was obtained on September 17, 2018.    Indication: Dyspnea.    Impression:    The heart is normal in size. The lungs are clear. Degenerative changes of   the thoracic spine. No pneumothorax.    MEKA BURGOS M.D., ATTENDING RADIOLOGIST  This document has been electronically signed. Sep 17 2018  1:47PM      < end of copied text >  ---------------------------------------------------------------------------------------------------------------    < from: CT Angio Chest w/ IV Cont (09.17.18 @ 19:29) >    EXAM:  CT ANGIO CHEST (W)AW IC                          PROCEDURE DATE:  09/17/2018      INTERPRETATION:  CLINICAL INFORMATION: Shortness of breath. INR   subtherapeutic.    COMPARISON: CTA chest 6/27/2014    PROCEDURE:   CT Angiography of the Chest.  90 ml of Omnipaque 350 was injected intravenously. 10 ml were discarded.  Sagittal and coronal reformats were performed as well as 3D (MIP)   reconstructions.      FINDINGS:    CHEST:     LUNGS AND LARGE AIRWAYS: Patent central airways.  Bibasilar dependent   atelectasis. Left lower lobe calcified granuloma  PLEURA: Filling defects visualized within the left pulmonary artery, left   upper and lower lobar branches as well as the distal segmental and   subsegmental branches. Emboli are also noted within the segmental and   subsegmental branches of the right lower lobe pulmonary arterial branches.  VESSELS: Within normal limits. The main pulmonary artery measures   approximately 2.8 cm.  HEART: Heart size is normal. No pericardial effusion. No evidence of   right heart strain. Coronary artery calcifications.  MEDIASTINUM AND NAKIA: No lymphadenopathy.  CHEST WALL AND LOWER NECK: Within normal limits.  VISUALIZED UPPER ABDOMEN: Within normal limits.  BONES: Degenerative changes of the spine.    IMPRESSION:   Pulmonary embolism involving the left pulmonary artery, left upper lobe   are branches, as well as the distal segmental subsegmental branches.   Emboli are also noted of the right lower lobe segmental and subsegmental   branches.  No evidence of right heart strain or pulmonary infarct.    Findings were discussed with EBN Stephenson by Dr. Sosa on   9/17/2018 at 7:42 PM with read back.    KIMBERLY SOSA M.D., RADIOLOGY RESIDENT  This document has been electronically signed.  MARTA COLLINS M.D., ATTENDING RADIOLOGIST  This document has been electronically signed. Sep 17 2018  8:26PM      < end of copied text >  ---------------------------------------------------------------------------------------------------------------

## 2018-09-23 NOTE — PROGRESS NOTE ADULT - PROBLEM SELECTOR PLAN 2
No signs/sx to suggest superimposed bacterial pneumonia  -supportive care for positive RVP.  change to po prednisone as per pulm  robitussin prn cough  asmanex bid as per pulm

## 2018-09-23 NOTE — PROGRESS NOTE ADULT - ASSESSMENT
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     "pauses" and bradycardia on telemetry - felt to represent a vaso-vagal reaction    h/o breast cancer s/p lumpectomy    hypertension    PLAN/RECOMMENDATIONS:    stable oxygenation on room air  prednisone 40mg daily - taper by 10mg every 3 days  discontinue nebs  asmanex 220mcg 2 times daily  robitussin DM/tessalon/hycodan (watch for sedation on narcotic based antitussive)  coumadin for INR 2 - 3 - should not be considered a coumadin "failure" given the subtherapeutic INR when admitted  GI prophylaxis on A/C and steroids - protonix  no indication for IVC filter in the absence of lower extremity clot  cardiac meds: lipitor - off verapamil  ECHO unremarkable (as above)  EP and cardiology evaluation noted    Will follow with you. Plan of care discussed with the patient at bedside.    Rd Lira MD, [facility] - [phone]  Pulmonary Medicine

## 2018-09-23 NOTE — PROGRESS NOTE ADULT - SUBJECTIVE AND OBJECTIVE BOX
Seen in room 254 [facility] for Dr. Beckford.  Anxious to go home.  Has persistent dry cough.  Not wearing supplemental oxygen.      Review Of Systems:  Constitutional: No Fever, Chills,  No Fatigue, No Weight change   HEENT: No Blurred vision, No Headache   Respiratory: + Dry Cough, No sputum production, No Wheezing, + Shortness of breath  Cardiovascular: No Chest Pain, No Palpitations, No Lightheadedness, No Falling, No Syncope, No LOMELI, No PND, No Orthopnea, No Peripehral Edema  Gastrointestinal: No Abdominal Pain, No Diarrhea, No Constipation, No Nausea, No Vomiting, Normal Appetite   Genitourinary: No Dysuria  Extremities: No Swelling, No Claudication,   Neurologic:  No Focal deficit, No Weakness, No Dysphagia, No Paresthesias, No Syncope  Skin: No Rash,  No Ecchymoses, No Wounds, No Tenderness, No Drainage     Medications:  atorvastatin 10 milliGRAM(s) Oral at bedtime  benzocaine 15 mG/menthol 3.6 mG Lozenge 1 Lozenge Oral three times a day PRN  benzonatate 200 milliGRAM(s) Oral three times a day  guaiFENesin/dextromethorphan  Syrup 10 milliLiter(s) Oral four times a day  HYDROcodone/homatropine Syrup 5 milliLiter(s) Oral <User Schedule>  influenza   Vaccine 0.5 milliLiter(s) IntraMuscular once  mometasone 220 MICROgram(s) Inhaler 1 Puff(s) Inhalation two times a day  pantoprazole    Tablet 40 milliGRAM(s) Oral before breakfast  predniSONE   Tablet 40 milliGRAM(s) Oral daily    PMH/PSH/FH/SH: Unchanged  Vitals:  T(C): 37.1 (09-23-18 @ 04:47), Max: 37.1 (09-23-18 @ 04:47)  HR: 55 (09-23-18 @ 04:47) (55 - 67)  BP: 142/82 (09-23-18 @ 04:47) (117/67 - 142/82)  RR: 18 (09-23-18 @ 04:47) (18 - 18)  SpO2: 93% (09-23-18 @ 04:47) (92% - 93%)    Physical Exam:  Appearance:  Normal, NAD  Eyes: PERRL, EOMI  HENT:  Normal oral muscosa, NC/AT  Neck: without heptojugular reflux, carotid 2+ equal without bruits  No Thyromegaly  Cardiovascular: normal regular S1, physiologic split S2,  No m/r/g   Respiratory: Clear to percussion and auscultation bilaterally  Gastrointestinal: Soft, Non-tender, Non-distended, BS+, No masses  Neurologic: Non-focal, No focal neurologic deficits  Skin: No rashes, No ecchymoses, No cyanosis, no peripheral edema  Pulses-2+ equal to dorsalis pedis    Phos  3.6     09-23  Mg     2.4     09-23    PT/INR - ( 23 Sep 2018 08:51 )   PT: 34.1 sec;   INR: 2.95 ratio       09-22 @ 07:01  -  09-23 @ 07:00  --------------------------------------------------------  IN: 900 mL / OUT: 0 mL / NET: 900 mL

## 2018-09-23 NOTE — PROGRESS NOTE ADULT - ASSESSMENT
Ms. Aggarwal continues to have shortness of breath when she speaks although this has improved.  Continues to have a dry cough which is unchanged.  WBC remains elevated.  On anticoagulation with optimal INR 2.95.  Remains on oral steroids for viral infection.   Chest examination is clear.  Ms. Aggarwal is anxious to go home.  I suggested she discuss this with Dr. Beckford tomorrow.   continue current treatment.   Start physical therapy.

## 2018-09-23 NOTE — PROGRESS NOTE ADULT - SUBJECTIVE AND OBJECTIVE BOX
Patient is a 73y old  Female who presents with a chief complaint of shortness of breath (23 Sep 2018 09:59)      INTERVAL HPI/OVERNIGHT EVENTS:    Pt still w/ cough when speaking but improved. Found to have 1 min episode of bradycardia to 30s with ? junctional escape rhythm and occassonal PATs. Pt asymptomatic during episodes.     T(C): 37.1 (09-23-18 @ 04:47), Max: 37.1 (09-23-18 @ 04:47)  HR: 55 (09-23-18 @ 04:47) (55 - 67)  BP: 142/82 (09-23-18 @ 04:47) (117/67 - 142/82)  RR: 18 (09-23-18 @ 04:47) (18 - 18)  SpO2: 93% (09-23-18 @ 04:47) (92% - 93%)  Wt(kg): --  I&O's Summary    22 Sep 2018 07:01  -  23 Sep 2018 07:00  --------------------------------------------------------  IN: 900 mL / OUT: 0 mL / NET: 900 mL    23 Sep 2018 07:01  -  23 Sep 2018 10:51  --------------------------------------------------------  IN: 240 mL / OUT: 0 mL / NET: 240 mL        LABS:                        13.5   17.8  )-----------( 175      ( 22 Sep 2018 07:03 )             39.3       Phos  3.6     09-23  Mg     2.4     09-23      PT/INR - ( 23 Sep 2018 08:51 )   PT: 34.1 sec;   INR: 2.95 ratio           REVIEW OF SYSTEMS:  CONSTITUTIONAL: No fever, weight loss, or fatigue  EYES: No eye pain, visual disturbances, or discharge  ENMT:  No difficulty hearing, tinnitus, vertigo; No sinus or throat pain  NECK: No pain or stiffness  BREASTS: No pain, masses, or nipple discharge  RESPIRATORY:  cough  no wheezing, chills or hemoptysis; No shortness of breath  CARDIOVASCULAR: No chest pain, palpitations, dizziness, or leg swelling  GASTROINTESTINAL: No abdominal or epigastric pain. No nausea, vomiting, or hematemesis; No diarrhea or constipation. No melena or hematochezia.  GENITOURINARY: No dysuria, frequency, hematuria, or incontinence  NEUROLOGICAL: No headaches, memory loss, loss of strength, numbness, or tremors  SKIN: No itching, burning, rashes, or lesions   LYMPH NODES: No enlarged glands  ENDOCRINE: No heat or cold intolerance; No hair loss  MUSCULOSKELETAL: No joint pain or swelling; No muscle, back, or extremity pain  PSYCHIATRIC: No depression, anxiety, mood swings, or difficulty sleeping  HEME/LYMPH: No easy bruising, or bleeding gums  ALLERY AND IMMUNOLOGIC: No hives or eczema        Consultant(s) Notes Reviewed:  [x ] YES  [ ] NO    PHYSICAL EXAM:  GENERAL: NAD  HEAD:  Atraumatic, Normocephalic  EYES:  conjunctiva and sclera clear  ENMT: No tonsillar erythema, exudates, or enlargement; Moist mucous membranes, Good dentition, No lesions  NECK: Supple, No JVD  NERVOUS SYSTEM:  Alert & Oriented X3, Good concentration; Motor Strength 5/5 B/L upper and lower extremities  CHEST/LUNG: Clear to auscultation bilaterally; No rales, rhonchi, wheezing, or rubs  HEART: Regular rate and rhythm; No murmurs, rubs, or gallops  ABDOMEN: Soft, Nontender, Nondistended; Bowel sounds present,neg HSM  EXTREMITIES: 1+ edema  LYMPH: No lymphadenopathy noted  SKIN: No rashes or lesions

## 2018-09-24 DIAGNOSIS — I49.3 VENTRICULAR PREMATURE DEPOLARIZATION: ICD-10-CM

## 2018-09-24 LAB
ANION GAP SERPL CALC-SCNC: 10 MMOL/L — SIGNIFICANT CHANGE UP (ref 5–17)
BASOPHILS # BLD AUTO: 0.02 K/UL — SIGNIFICANT CHANGE UP (ref 0–0.2)
BASOPHILS NFR BLD AUTO: 0.1 % — SIGNIFICANT CHANGE UP (ref 0–2)
BUN SERPL-MCNC: 22 MG/DL — SIGNIFICANT CHANGE UP (ref 7–23)
CALCIUM SERPL-MCNC: 8.9 MG/DL — SIGNIFICANT CHANGE UP (ref 8.4–10.5)
CHLORIDE SERPL-SCNC: 102 MMOL/L — SIGNIFICANT CHANGE UP (ref 96–108)
CK MB BLD-MCNC: 4.4 % — HIGH (ref 0–3.5)
CK MB CFR SERPL CALC: 1.7 NG/ML — SIGNIFICANT CHANGE UP (ref 0–3.8)
CK SERPL-CCNC: 39 U/L — SIGNIFICANT CHANGE UP (ref 25–170)
CO2 SERPL-SCNC: 28 MMOL/L — SIGNIFICANT CHANGE UP (ref 22–31)
CREAT SERPL-MCNC: 0.93 MG/DL — SIGNIFICANT CHANGE UP (ref 0.5–1.3)
EOSINOPHIL # BLD AUTO: 0.1 K/UL — SIGNIFICANT CHANGE UP (ref 0–0.5)
EOSINOPHIL NFR BLD AUTO: 0.6 % — SIGNIFICANT CHANGE UP (ref 0–6)
GLUCOSE SERPL-MCNC: 108 MG/DL — HIGH (ref 70–99)
HCT VFR BLD CALC: 40.2 % — SIGNIFICANT CHANGE UP (ref 34.5–45)
HGB BLD-MCNC: 13.6 G/DL — SIGNIFICANT CHANGE UP (ref 11.5–15.5)
IMM GRANULOCYTES NFR BLD AUTO: 0.7 % — SIGNIFICANT CHANGE UP (ref 0–1.5)
INR BLD: 2.26 RATIO — HIGH (ref 0.88–1.16)
LYMPHOCYTES # BLD AUTO: 13.01 K/UL — HIGH (ref 1–3.3)
LYMPHOCYTES # BLD AUTO: 71.7 % — HIGH (ref 13–44)
MAGNESIUM SERPL-MCNC: 2.4 MG/DL — SIGNIFICANT CHANGE UP (ref 1.6–2.6)
MANUAL SMEAR VERIFICATION: SIGNIFICANT CHANGE UP
MCHC RBC-ENTMCNC: 31.1 PG — SIGNIFICANT CHANGE UP (ref 27–34)
MCHC RBC-ENTMCNC: 33.8 GM/DL — SIGNIFICANT CHANGE UP (ref 32–36)
MCV RBC AUTO: 91.8 FL — SIGNIFICANT CHANGE UP (ref 80–100)
MONOCYTES # BLD AUTO: 0.69 K/UL — SIGNIFICANT CHANGE UP (ref 0–0.9)
MONOCYTES NFR BLD AUTO: 3.8 % — SIGNIFICANT CHANGE UP (ref 2–14)
NEUTROPHILS # BLD AUTO: 4.2 K/UL — SIGNIFICANT CHANGE UP (ref 1.8–7.4)
NEUTROPHILS NFR BLD AUTO: 23.1 % — LOW (ref 43–77)
PHOSPHATE SERPL-MCNC: 3.5 MG/DL — SIGNIFICANT CHANGE UP (ref 2.5–4.5)
PLAT MORPH BLD: NORMAL — SIGNIFICANT CHANGE UP
PLATELET # BLD AUTO: 162 K/UL — SIGNIFICANT CHANGE UP (ref 150–400)
POTASSIUM SERPL-MCNC: 3.7 MMOL/L — SIGNIFICANT CHANGE UP (ref 3.5–5.3)
POTASSIUM SERPL-SCNC: 3.7 MMOL/L — SIGNIFICANT CHANGE UP (ref 3.5–5.3)
PROTHROM AB SERPL-ACNC: 26 SEC — HIGH (ref 10–13.1)
RBC # BLD: 4.38 M/UL — SIGNIFICANT CHANGE UP (ref 3.8–5.2)
RBC # FLD: 13.7 % — SIGNIFICANT CHANGE UP (ref 10.3–14.5)
RBC BLD AUTO: NORMAL — SIGNIFICANT CHANGE UP
SODIUM SERPL-SCNC: 140 MMOL/L — SIGNIFICANT CHANGE UP (ref 135–145)
TROPONIN T, HIGH SENSITIVITY RESULT: 8 NG/L — SIGNIFICANT CHANGE UP (ref 0–51)
WBC # BLD: 18.15 K/UL — HIGH (ref 3.8–10.5)
WBC # FLD AUTO: 18.15 K/UL — HIGH (ref 3.8–10.5)

## 2018-09-24 PROCEDURE — 93010 ELECTROCARDIOGRAM REPORT: CPT

## 2018-09-24 PROCEDURE — 99233 SBSQ HOSP IP/OBS HIGH 50: CPT

## 2018-09-24 RX ORDER — WARFARIN SODIUM 2.5 MG/1
5 TABLET ORAL ONCE
Qty: 0 | Refills: 0 | Status: COMPLETED | OUTPATIENT
Start: 2018-09-24 | End: 2018-09-24

## 2018-09-24 RX ORDER — POTASSIUM CHLORIDE 20 MEQ
40 PACKET (EA) ORAL ONCE
Qty: 0 | Refills: 0 | Status: COMPLETED | OUTPATIENT
Start: 2018-09-24 | End: 2018-09-24

## 2018-09-24 RX ADMIN — Medication 40 MILLIEQUIVALENT(S): at 15:02

## 2018-09-24 RX ADMIN — MOMETASONE FUROATE 1 PUFF(S): 220 INHALANT RESPIRATORY (INHALATION) at 06:00

## 2018-09-24 RX ADMIN — WARFARIN SODIUM 5 MILLIGRAM(S): 2.5 TABLET ORAL at 21:57

## 2018-09-24 RX ADMIN — PANTOPRAZOLE SODIUM 40 MILLIGRAM(S): 20 TABLET, DELAYED RELEASE ORAL at 05:21

## 2018-09-24 RX ADMIN — Medication 200 MILLIGRAM(S): at 15:01

## 2018-09-24 RX ADMIN — Medication 40 MILLIGRAM(S): at 05:21

## 2018-09-24 RX ADMIN — BENZOCAINE AND MENTHOL 1 LOZENGE: 5; 1 LIQUID ORAL at 15:01

## 2018-09-24 RX ADMIN — MOMETASONE FUROATE 1 PUFF(S): 220 INHALANT RESPIRATORY (INHALATION) at 17:35

## 2018-09-24 RX ADMIN — Medication 200 MILLIGRAM(S): at 21:57

## 2018-09-24 RX ADMIN — ATORVASTATIN CALCIUM 10 MILLIGRAM(S): 80 TABLET, FILM COATED ORAL at 21:57

## 2018-09-24 RX ADMIN — Medication 200 MILLIGRAM(S): at 05:21

## 2018-09-24 NOTE — PROGRESS NOTE ADULT - SUBJECTIVE AND OBJECTIVE BOX
COVERAGE FOR RYAN ROJO MD  HPI:  2.4 second pause noted overnight while sleeping. Patient was asymptomatic.    Review Of Systems:           Respiratory: +cough; no wheeze  Cardiovascular: No chest pain or palpitations  10 point review of systems is otherwise negative except as mentioned above        Medications:  atorvastatin 10 milliGRAM(s) Oral at bedtime  benzocaine 15 mG/menthol 3.6 mG Lozenge 1 Lozenge Oral three times a day PRN  benzonatate 200 milliGRAM(s) Oral three times a day  guaiFENesin/dextromethorphan  Syrup 10 milliLiter(s) Oral four times a day  HYDROcodone/homatropine Syrup 5 milliLiter(s) Oral <User Schedule>  influenza   Vaccine 0.5 milliLiter(s) IntraMuscular once  mometasone 220 MICROgram(s) Inhaler 1 Puff(s) Inhalation two times a day  pantoprazole    Tablet 40 milliGRAM(s) Oral before breakfast  predniSONE   Tablet 40 milliGRAM(s) Oral daily  warfarin 5 milliGRAM(s) Oral once    PAST MEDICAL & SURGICAL HISTORY:  Prothrombin gene mutation  GERD (gastroesophageal reflux disease)  DVT (deep venous thrombosis), right: 2013  Herniated lumbar intervertebral disc  Lower back pain  PE (pulmonary embolism): 3 yrs ago, was on Lovenox and coumadin  Hx of Breast Cancer  HTN - Hypertension  S/P tubal ligation  S/P arthroscopy of right knee:   S/P Breast Lumpectomy: cyst removed from left breast.  h/o Wrist Surgery    Vitals:  T(C): 36.8 (18 @ 12:40), Max: 36.8 (18 @ 20:35)  HR: 82 (18 @ 12:40) (56 - 90)  BP: 102/63 (18 @ 12:40) (102/63 - 131/79)  BP(mean): --  RR: 16 (18 @ 12:40) (16 - 20)  SpO2: 95% (18 @ 12:40) (94% - 96%)  Wt(kg): --  Daily     Daily Weight in k.4 (24 Sep 2018 04:39)  I&O's Summary    23 Sep 2018 07:01  -  24 Sep 2018 07:00  --------------------------------------------------------  IN: 900 mL / OUT: 0 mL / NET: 900 mL    24 Sep 2018 07:01  -  24 Sep 2018 18:30  --------------------------------------------------------  IN: 480 mL / OUT: 0 mL / NET: 480 mL        Physical Exam:  Appearance: Normal, well groomed, NAD  Eyes: PERRLA, EOMI, pink conjunctiva, no scleral icterus   HENT: Normal oral mucosa  Cardiovascular: RRR, S1, S2, no murmur, rub, or gallop; no edema; no JVD  Respiratory: Clear to auscultation bilaterally  Gastrointestinal: Soft, non-tender, non-distended, BS+  Musculoskeletal: No clubbing or joint deformity   Neurologic: No focal weakness  Lymphatic: No lymphadenopathy  Psychiatry: AAOx3 with appropriate mood and affect  Skin: No rashes, ecchymoses, or cyanosis                          13.6   18.15 )-----------( 162      ( 24 Sep 2018 09:21 )             40.2         140  |  102  |  22  ----------------------------<  108<H>  3.7   |  28  |  0.93    Ca    8.9      24 Sep 2018 06:50  Phos  3.5       Mg     2.4           PT/INR - ( 24 Sep 2018 09:29 )   PT: 26.0 sec;   INR: 2.26 ratio           CARDIAC MARKERS ( 24 Sep 2018 06:50 )  x     / x     / 39 U/L / x     / 1.7 ng/mL        Echo: < from: Transthoracic Echocardiogram (18 @ 10:03) >  ------------------------------------------------------------------------  Dimensions:    Normal Values:  LA:     3.5    2.0 - 4.0 cm  Ao:     3.4    2.0 - 3.8 cm  SEPTUM: 0.8    0.6 - 1.2 cm  PWT:    0.7    0.6 - 1.1 cm  LVIDd:  4.7    3.0 - 5.6 cm  LVIDs:  3.4    1.8 - 4.0 cm  Derived variables:  LVMI: 70 g/m2  RWT: 0.29  Fractional short: 28 %  EF (Visual Estimate): 55-60 %  Doppler Peak Velocity (m/sec): AoV=1.4  ------------------------------------------------------------------------  Observations:  Mitral Valve: Mitral annular calcification, otherwise  normal mitral valve. Minimal mitral regurgitation.  Aortic Valve/Aorta: Calcified trileaflet aortic valve with  normal opening. Peak transaortic valve gradient equals 8 mm  Hg, mean transaortic valve gradient equals 5 mm Hg, aortic  valve velocity time integral equals 32 cm. Minimal aortic  regurgitation. Peak left ventricular outflow tract gradient  equals 4 mm Hg, mean gradient is equal to 2 mm Hg, LVOT  velocity time integral equals 21 cm.  Aortic Root: 3.4 cm.  LVOT diameter: 1.8 cm.  Left Atrium: Normal left atrium.  LA volume index = 27  cc/m2. Mobile interatrial septum.  Left Ventricle: Endocardium not well visualized; grossly  normal left ventricular systolic function. Normal left  ventricular internal dimensions and wall thicknesses.  Right Heart: Normal right atrium. Normal right ventricular  size and function. Normal tricuspid valve. Mild tricuspid  regurgitation. Pulmonic valve not well visualized. No  pulmonic regurgitation.  Pericardium/Pleura: Normal pericardium with no pericardial  effusion.  Hemodynamic: Estimated right ventricular systolic pressure  equals 21 mm Hg, assuming right atrial pressure equals 3 mm  Hg, consistent with normal pulmonary pressures.  ------------------------------------------------------------------------  Conclusions:  1. Mitral annular calcification, otherwise normal mitral  valve. Minimal mitral regurgitation.  2. Calcified trileafletaortic valve with normal opening.  Minimal aortic regurgitation.  3. Endocardium not well visualized; grossly normal left  ventricular systolic function.  4. Normal right ventricular size and function.  *** Compared with echocardiogram of 10/12/2013,results are  similar on today's study.  ------------------------------------------------------------------------  Confirmed on  2018 - 12:56:58 by Kathryn Díaz M.D.  ------------------------------------------------------------------------    < end of copied text >    Interpretation of Telemetry: PAT and 4 second pause overnight; now NSR    Imaging: < from: CT Angio Chest w/ IV Cont (18 @ 19:29) >  IMPRESSION:   Pulmonary embolism involving the left pulmonary artery, left upper lobe   are branches, as well as the distal segmental subsegmental branches.   Emboli are also noted of the right lower lobe segmental and subsegmental   branches.  No evidence of right heart strain or pulmonary infarct.    Findings were discussed with BEN Stephenson by Dr. Sosa on   2018 at 7:42 PM with read back.    KIMBERLY SOSA M.D., RADIOLOGY RESIDENT  This document has been electronically signed.  MARTA COLLINS M.D., ATTENDING RADIOLOGIST  This document has been electronically signed. Sep 17 2018  8:26PM        < end of copied text >

## 2018-09-24 NOTE — PROGRESS NOTE ADULT - ASSESSMENT
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 Pt now on Lovenox. Review of office chart shows that pt has been intermittently subtherapeutic over the last several months. INR in range only in august but pt claims to have missed several doses since then and most recent INR <2. Heme note appreciated and in lieu of ? lack of data treating pt's with prothrombin G mutations w/ Xa inhibitors, will continue with coumadin and pt urged to maintain compliance w/ dosing.  Lovenox d/zain as  INR is therapeutic. Cough persists but is improving and  pt w/o SOB . Pt also w/ 4 second pause and several episodes of PAT on monitor on 9/21 and bradycardia w/ 2 sec pause and ? junctional escape rhythm. EPS note appreciated and suggest continued observation. Pt with CP this am doubt cardiac but pt w/ multiple VPCs on monitor and will d/w cardiology re possible ETT.

## 2018-09-24 NOTE — PHYSICAL THERAPY INITIAL EVALUATION ADULT - LIVES WITH, PROFILE
spouse/Lives in a house with a few steps to enter, 1 flight to basement. I and active PTA, no device used PTA

## 2018-09-24 NOTE — PHYSICAL THERAPY INITIAL EVALUATION ADULT - PERTINENT HX OF CURRENT PROBLEM, REHAB EVAL
presents with shortness of breath 2/2 to pulmonary embolism c/b viral URI. LE dopplers negative. TTE negative pericardial effusion

## 2018-09-24 NOTE — PHYSICAL THERAPY INITIAL EVALUATION ADULT - ADDITIONAL COMMENTS
CT chest: Pulmonary embolism involving the left pulmonary artery, left upper lobe are branches, as well as the distal segmental subsegmental branches. Emboli are also noted of the right lower lobe segmental and subsegmental branches. No evidence of right heart strain or pulmonary infarct.

## 2018-09-24 NOTE — CHART NOTE - NSCHARTNOTEFT_GEN_A_CORE
CC: Chest pain    Notified by RN; pt. is complaining of chest pain. Pt. seen and examined at bedside. Reports chest pain has since resolved. Describes the pain as a "tightness" and reports radiation to the back at the time of the episode. Denies alleviating/provoking factors. Rates the pain was an 8/10, but has since resolved. Denies current cp, sob, abdominal pain, HA, lightheadedness/dizziness, palpitations, N/V, and syncope. Pt. originally admitted w/ PE.     Allergies  No Known Allergies    FAMILY HISTORY:  Family history of prothrombin gene mutation (Mother): first cousin  Family history of heart disease (Father): father      PAST MEDICAL & SURGICAL HISTORY:  Prothrombin gene mutation  GERD (gastroesophageal reflux disease)  DVT (deep venous thrombosis), right: october 2013  Herniated lumbar intervertebral disc  Lower back pain  PE (pulmonary embolism): 3 yrs ago, was on Lovenox and coumadin  Hx of Breast Cancer  HTN - Hypertension  S/P tubal ligation  S/P arthroscopy of right knee: 2012  S/P Breast Lumpectomy: cyst removed from left breast.  h/o Wrist Surgery      REVIEW OF SYSTEMS:  CONSTITUTIONAL: No fever, weight loss, or fatigue  EYES: No eye pain, visual disturbances, or discharge  ENMT:  No difficulty hearing, tinnitus, vertigo; No sinus or throat pain  NECK: No pain or stiffness  BREASTS: No pain, masses, or nipple discharge  RESPIRATORY: No cough, wheezing, chills or hemoptysis; No shortness of breath  CARDIOVASCULAR: No chest pain, palpitations, dizziness, or leg swelling  GASTROINTESTINAL: No abdominal or epigastric pain. No nausea, vomiting, or hematemesis; No diarrhea or constipation. No melena or hematochezia.  GENITOURINARY: No dysuria, frequency, hematuria, or incontinence  NEUROLOGICAL: No headaches, memory loss, loss of strength, numbness, or tremors  SKIN: No itching, burning, rashes, or lesions   LYMPH NODES: No enlarged glands  ENDOCRINE: No heat or cold intolerance; No hair loss  MUSCULOSKELETAL: No joint pain or swelling; No muscle, back, or extremity pain  PSYCHIATRIC: No depression, anxiety, mood swings, or difficulty sleeping  HEME/LYMPH: No easy bruising, or bleeding gums  ALLERGY AND IMMUNOLOGIC: No hives or eczema    Vital Signs Last 24 Hrs  T(C): 36.4 (24 Sep 2018 04:39), Max: 36.8 (23 Sep 2018 20:35)  T(F): 97.5 (24 Sep 2018 04:39), Max: 98.2 (23 Sep 2018 20:35)  HR: 69 (24 Sep 2018 04:39) (56 - 78)  BP: 131/79 (24 Sep 2018 04:39) (118/76 - 131/79)  RR: 20 (24 Sep 2018 04:39) (18 - 20)  SpO2: 94% (24 Sep 2018 04:39) (91% - 96%)    PHYSICAL EXAM:  GENERAL: NAD, well-groomed, well-developed  HEAD:  Atraumatic, Normocephalic  EYES: EOMI, PERRLA, conjunctiva and sclera clear  ENMT: No tonsillar erythema, exudates, or enlargement; Moist mucous membranes, Good dentition, No lesions  NECK: Supple, No JVD, Normal thyroid  NERVOUS SYSTEM:  Alert & Oriented X3, Good concentration; Motor Strength 5/5 B/L upper and lower extremities; DTRs 2+ intact and symmetric  CHEST/LUNG: Clear to percussion bilaterally; No rales, rhonchi, wheezing, or rubs  HEART: Regular rate and rhythm; No murmurs, rubs, or gallops  ABDOMEN: Soft, Nontender, Nondistended; Bowel sounds present  EXTREMITIES:  2+ Peripheral Pulses, No clubbing, cyanosis, or edema  LYMPH: No lymphadenopathy noted  SKIN: No rashes or lesions    LABS:                        13.5   17.8  )-----------( 175      ( 22 Sep 2018 07:03 )             39.3       Phos  3.6     09-23  Mg     2.4     09-23      PT/INR - ( 23 Sep 2018 08:51 )   PT: 34.1 sec;   INR: 2.95 ratio        ASSESSMENT/PLAN  Pt. is a 74 yo F w/ breast CA s/p removal, hx of prothrombin gene mutation, CKD III, prior hx of unprovoked DVT/PE ~ 4-6yr ago (on warfarin) originally admitted for sob is now being seen for chest pain    1)Chest Pain, r/o ACS   -Chest pain has resolved.   -EKG performed shows Sinus bradycardia at 56 bpm w/ PVC.   -Cardiac enzymes ordered, BMP, CBC, Mg, and P ordered.  -Trend cardiac enzymes.  -Continue to monitor pt. throughout the night on telemetry.  -F/u w/ primary team in AM.    -Amilcar Sage PA-C. #[id number].

## 2018-09-24 NOTE — PROGRESS NOTE ADULT - ASSESSMENT
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     "pauses" and bradycardia on telemetry - felt to represent a vaso-vagal reaction    h/o breast cancer s/p lumpectomy    hypertension    PLAN/RECOMMENDATIONS:    stable oxygenation on room air  prednisone taper ongoing  off nebs  asmanex 220mcg 2 times daily  robitussin DM/tessalon/hycodan (watch for sedation on narcotic based antitussive)  coumadin for INR 2 - 3 - should not be considered a coumadin "failure" given the subtherapeutic INR when admitted  GI prophylaxis on A/C and steroids - protonix  no indication for IVC filter in the absence of lower extremity clot  cardiac meds: lipitor - off verapamil  ECHO unremarkable (as above)  EP and cardiology evaluation noted    Will follow with you. Plan of care discussed with the patient at bedside.    Rd Lira MD, [facility] - [phone]  Pulmonary Medicine

## 2018-09-24 NOTE — PROGRESS NOTE ADULT - ASSESSMENT
73 year-old woman with known hypercoagulability as above, on warfarin for DVT/PE, now presents with recurrent DVT/PE in the setting of subtherapeutic INR, also noted to have respiratory viral syndrome exacerbating reactive airway disease.    Appreciate pulmonary consult from Dr. Lira.    Continue anticoagulation with warfarin. Maintain INR 2.0 - 3.0.    Patient seen to have PAT and pauses up to 4 seconds on tele prior to the weekend. She was asymptomatic of pauses. Verapamil currently on hold.    Appreciate EP input.  No urgent indication for PPM, but if patient demonstrates further tachy-artur, will reconsider PPM.

## 2018-09-24 NOTE — PROGRESS NOTE ADULT - SUBJECTIVE AND OBJECTIVE BOX
Patient is a 73y old  Female who presents with a chief complaint of shortness of breath (23 Sep 2018 12:41)      INTERVAL HPI/OVERNIGHT EVENTS:  T(C): 36.4 (09-24-18 @ 04:39), Max: 36.8 (09-23-18 @ 20:35)  HR: 69 (09-24-18 @ 04:39) (56 - 78)  BP: 131/79 (09-24-18 @ 04:39) (118/76 - 131/79)  RR: 20 (09-24-18 @ 04:39) (18 - 20)  SpO2: 94% (09-24-18 @ 04:39) (91% - 96%)  Wt(kg): --  I&O's Summary    23 Sep 2018 07:01  -  24 Sep 2018 07:00  --------------------------------------------------------  IN: 900 mL / OUT: 0 mL / NET: 900 mL        LABS:    09-24    140  |  102  |  22  ----------------------------<  108<H>  3.7   |  28  |  0.93    Ca    8.9      24 Sep 2018 06:50  Phos  3.5     09-24  Mg     2.4     09-24      PT/INR - ( 23 Sep 2018 08:51 )   PT: 34.1 sec;   INR: 2.95 ratio             CAPILLARY BLOOD GLUCOSE              REVIEW OF SYSTEMS:  CONSTITUTIONAL: No fever, weight loss, or fatigue  EYES: No eye pain, visual disturbances, or discharge  ENMT:  No difficulty hearing, tinnitus, vertigo; No sinus or throat pain  NECK: No pain or stiffness  BREASTS: No pain, masses, or nipple discharge  RESPIRATORY: No cough, wheezing, chills or hemoptysis; No shortness of breath  CARDIOVASCULAR: No chest pain, palpitations, dizziness, or leg swelling  GASTROINTESTINAL: No abdominal or epigastric pain. No nausea, vomiting, or hematemesis; No diarrhea or constipation. No melena or hematochezia.  GENITOURINARY: No dysuria, frequency, hematuria, or incontinence  NEUROLOGICAL: No headaches, memory loss, loss of strength, numbness, or tremors  SKIN: No itching, burning, rashes, or lesions   LYMPH NODES: No enlarged glands  ENDOCRINE: No heat or cold intolerance; No hair loss  MUSCULOSKELETAL: No joint pain or swelling; No muscle, back, or extremity pain  PSYCHIATRIC: No depression, anxiety, mood swings, or difficulty sleeping  HEME/LYMPH: No easy bruising, or bleeding gums  ALLERY AND IMMUNOLOGIC: No hives or eczema    RADIOLOGY & ADDITIONAL TESTS:    Imaging Personally Reviewed:  [ ] YES  [ ] NO    Consultant(s) Notes Reviewed:  [ ] YES  [ ] NO    PHYSICAL EXAM:  GENERAL: NAD, well-groomed, well-developed  HEAD:  Atraumatic, Normocephalic  EYES: EOMI, PERRLA, conjunctiva and sclera clear  ENMT: No tonsillar erythema, exudates, or enlargement; Moist mucous membranes, Good dentition, No lesions  NECK: Supple, No JVD, Normal thyroid  NERVOUS SYSTEM:  Alert & Oriented X3, Good concentration; Motor Strength 5/5 B/L upper and lower extremities; DTRs 2+ intact and symmetric  CHEST/LUNG: Clear to auscultation bilaterally; No rales, rhonchi, wheezing, or rubs  HEART: Regular rate and rhythm; No murmurs, rubs, or gallops  ABDOMEN: Soft, Nontender, Nondistended; Bowel sounds present  EXTREMITIES:  2+ Peripheral Pulses, No clubbing, cyanosis, or edema  LYMPH: No lymphadenopathy noted  SKIN: No rashes or lesions    Care Discussed with Consultants/Other Providers [ ] YES  [ ] NO Patient is a 73y old  Female who presents with a chief complaint of shortness of breath (23 Sep 2018 12:41)      INTERVAL HPI/OVERNIGHT EVENTS:    Pt had episode of midsternal chest pressure while lying in bed, that radiated in a band around the back. Pt w/o any assoc sx of SOB, N/V/SWEATS. Sx lasted several minutes and resolved. Pt also w/ multiple VPCS on monitor.     T(C): 36.4 (09-24-18 @ 04:39), Max: 36.8 (09-23-18 @ 20:35)  HR: 69 (09-24-18 @ 04:39) (56 - 78)  BP: 131/79 (09-24-18 @ 04:39) (118/76 - 131/79)  RR: 20 (09-24-18 @ 04:39) (18 - 20)  SpO2: 94% (09-24-18 @ 04:39) (91% - 96%)  Wt(kg): --  I&O's Summary    23 Sep 2018 07:01  -  24 Sep 2018 07:00  --------------------------------------------------------  IN: 900 mL / OUT: 0 mL / NET: 900 mL        LABS:    09-24    140  |  102  |  22  ----------------------------<  108<H>  3.7   |  28  |  0.93    Ca    8.9      24 Sep 2018 06:50  Phos  3.5     09-24  Mg     2.4     09-24      PT/INR - ( 23 Sep 2018 08:51 )   PT: 34.1 sec;   INR: 2.95 ratio         REVIEW OF SYSTEMS:  CONSTITUTIONAL: No fever, weight loss, or fatigue  EYES: No eye pain, visual disturbances, or discharge  ENMT:  No difficulty hearing, tinnitus, vertigo; No sinus or throat pain  NECK: No pain or stiffness  BREASTS: No pain, masses, or nipple discharge  RESPIRATORY:  cough, No wheezing, chills or hemoptysis; No shortness of breath  CARDIOVASCULAR:  chest pain  as above  GASTROINTESTINAL: No abdominal or epigastric pain. No nausea, vomiting, or hematemesis; No diarrhea or constipation. No melena or hematochezia.  GENITOURINARY: No dysuria, frequency, hematuria, or incontinence  NEUROLOGICAL: No headaches, memory loss, loss of strength, numbness, or tremors  SKIN: No itching, burning, rashes, or lesions   LYMPH NODES: No enlarged glands  ENDOCRINE: No heat or cold intolerance; No hair loss  MUSCULOSKELETAL: No joint pain or swelling; No muscle, back, or extremity pain  PSYCHIATRIC: No depression, anxiety, mood swings, or difficulty sleeping  HEME/LYMPH: No easy bruising, or bleeding gums  ALLERY AND IMMUNOLOGIC: No hives or eczema      Consultant(s) Notes Reviewed:  [x ] YES  [ ] NO    PHYSICAL EXAM:  GENERAL: NAD  HEAD:  Atraumatic, Normocephalic  EYES: conjunctiva and sclera clear  ENMT: No tonsillar erythema, exudates, or enlargement; Moist mucous membranes, Good dentition, No lesions  NECK: Supple, No JVD  NERVOUS SYSTEM:  Alert & Oriented X3, Good concentration; Motor Strength 5/5 B/L upper and lower extremities  CHEST/LUNG: Clear to auscultation bilaterally; No rales, rhonchi, wheezing, or rubs  HEART: Regular rate and rhythm; No murmurs, rubs, or gallops  ABDOMEN: Soft, Nontender, Nondistended; Bowel sounds present, neg HSM  EXTREMITIES:  1+ edema  LYMPH: No lymphadenopathy noted  SKIN: No rashes or lesions    Care Discussed with Consultants/Other Providers [ ] YES  [ ] NO

## 2018-09-24 NOTE — PROGRESS NOTE ADULT - SUBJECTIVE AND OBJECTIVE BOX
[facility] - [facility]     PROGRESS NOTE    CHIEF COMPLAINT: pulmonary emboli; rhinoviral infection; bronchospasm; dyspnea    INTERVAL HISTORY: 4 second episode of PAT earlier - seen by EP - conservative management; self limited bout of chest pressure without nausea, lightheadedness, diaphoresis - cardiac enzymes not elevated; cough slowly improving; no shortness of breath, sputum production, chest congestion or wheeze;  no fevers, chills or sweats;     REVIEW OF SYSTEMS:  Constitutional: As per interval history  HEENT: Within normal limits  CV: As per interval history  Resp: As per interval history  GI: Within normal limits   : Within normal limits  Musculoskeletal: Within normal limits  Skin: Within normal limits  Neurological: Within normal limits  Psychiatric: Within normal limits  Endocrine: Within normal limits  Hematologic/Lymphatic: Within normal limits  Allergic/Immunologic: Within normal limits    MEDICATIONS:     Pulmonary "  benzonatate 200 milliGRAM(s) Oral three times a day  guaiFENesin/dextromethorphan  Syrup 10 milliLiter(s) Oral four times a day  HYDROcodone/homatropine Syrup 5 milliLiter(s) Oral <User Schedule>  mometasone 220 MICROgram(s) Inhaler 1 Puff(s) Inhalation two times a day      Anti-microbials:      Cardiovascular:      Other:  atorvastatin 10 milliGRAM(s) Oral at bedtime  benzocaine 15 mG/menthol 3.6 mG Lozenge 1 Lozenge Oral three times a day PRN  influenza   Vaccine 0.5 milliLiter(s) IntraMuscular once  pantoprazole    Tablet 40 milliGRAM(s) Oral before breakfast  predniSONE   Tablet 40 milliGRAM(s) Oral daily  warfarin 5 milliGRAM(s) Oral once        OBJECTIVE:    I&O's Detail    23 Sep 2018 07:  -  24 Sep 2018 07:00  --------------------------------------------------------  IN:    Oral Fluid: 900 mL  Total IN: 900 mL    OUT:  Total OUT: 0 mL    Total NET: 900 mL      24 Sep 2018 07:  -  24 Sep 2018 21:43  --------------------------------------------------------  IN:    Oral Fluid: 480 mL  Total IN: 480 mL    OUT:  Total OUT: 0 mL    Total NET: 480 mL     Daily Weight in k.4 (24 Sep 2018 04:39)    PHYSICAL EXAM:       ICU Vital Signs Last 24 Hrs  T(C): 36.7 (24 Sep 2018 20:56), Max: 36.8 (24 Sep 2018 12:40)  T(F): 98.1 (24 Sep 2018 20:56), Max: 98.2 (24 Sep 2018 12:40)  HR: 73 (24 Sep 2018 20:56) (69 - 90)  BP: 110/66 (24 Sep 2018 20:56) (102/63 - 131/79)  BP(mean): --  ABP: --  ABP(mean): --  RR: 18 (24 Sep 2018 20:56) (16 - 20)  SpO2: 94% (24 Sep 2018 20:56) (94% - 96%) on room air       General: Awake. Alert. Cooperative. No distress. Appears stated age 	  HEENT:   Atraumatic. Normocephalic. Anicteric. Normal oral mucosa. PERRL. EOMI.  Neck: Supple. Trachea midline. Thyroid without enlargement/tenderness/nodules. No carotid bruit. No JVD.	  Cardiovascular: Regular rate and rhythm. S1 S2 normal. No murmurs, rubs or gallops.  Respiratory: Respirations unlabored. Clear to auscultation and percussion. No curvature.  Abdomen: Soft. Non-tender. Non-distended. No organomegaly. No masses. Normal bowel sounds.  Extremities: Warm to touch. No clubbing or cyanosis. Mild bilateral lower extremity edema  Pulses: 2+ peripheral pulses all extremities.	  Skin: Normal skin color. No rashes or lesions. No ecchymoses. No cyanosis. Warm to touch.  Lymph Nodes: Cervical, supraclavicular and axillary nodes normal  Neurological: Motor and sensory examination equal and normal. A and O x 3  Psychiatry: Appropriate mood and affect.    LABS:                        13.6   18.15 )-----------( 162      ( 24 Sep 2018 09:21 )             40.2     09-24    140  |  102  |  22  ----------------------------<  108<H>  3.7   |  28  |  0.93        140  |  104  |  19  ----------------------------<  158<H>  4.3   |  26  |  0.83    Ca      8.9          Ca      9.2          Phos    3.5         Phos    3.6           Mg       2.4         Mg       2.4         PT/INR - ( 24 Sep 2018 09:29 )   PT: 26.0 sec;   INR: 2.26 ratio      CARDIAC MARKERS ( 24 Sep 2018 06:50 )  x     / x     / 39 U/L / x     / 1.7 ng/mL    < from: Transthoracic Echocardiogram (18 @ 10:03) >    Patient name: JOHNSON BIANCHI  YOB: 1945   Age: 73 (F)   MR#: [id number]  Study Date: 2018  Location: [address] [place], [place] [id number]: Fernando Espino ABEL  Study quality: Technically fair  Referring Physician: Arthur Ricks MD  Blood Pressure: 128/82 mmHg  Height: 150 cm  Weight: 66 kg  BSA: 1.6 m2  ------------------------------------------------------------------------  PROCEDURE: Transthoracic echocardiogram with 2-D, M-Mode  and complete spectral and color flow Doppler.  INDICATION: Supraventricular tachycardia (I47.1)  ------------------------------------------------------------------------  Dimensions:    Normal Values:  LA:     3.5    2.0 - 4.0 cm  Ao:     3.4    2.0 - 3.8 cm  SEPTUM: 0.8    0.6 - 1.2 cm  PWT:    0.7    0.6 - 1.1 cm  LVIDd:  4.7    3.0 - 5.6 cm  LVIDs:  3.4    1.8 - 4.0 cm  Derived variables:  LVMI: 70 g/m2  RWT: 0.29  Fractional short: 28 %  EF (Visual Estimate): 55-60 %  Doppler Peak Velocity (m/sec): AoV=1.4  ------------------------------------------------------------------------  Observations:  Mitral Valve: Mitral annular calcification, otherwise  normal mitral valve. Minimal mitral regurgitation.  Aortic Valve/Aorta: Calcified trileaflet aortic valve with  normal opening. Peak transaortic valve gradient equals 8 mm  Hg, mean transaortic valve gradient equals 5 mm Hg, aortic  valve velocity time integral equals 32 cm. Minimal aortic  regurgitation. Peak left ventricular outflow tract gradient  equals 4 mm Hg, mean gradient is equal to 2 mm Hg, LVOT  velocity time integral equals 21 cm.  Aortic Root: 3.4 cm.  LVOT diameter: 1.8 cm.  Left Atrium: Normal left atrium.  LA volume index = 27  cc/m2. Mobile interatrial septum.  Left Ventricle: Endocardium not well visualized; grossly  normal left ventricular systolic function. Normal left  ventricular internal dimensions and wall thicknesses.  Right Heart: Normal right atrium. Normal right ventricular  size and function. Normal tricuspid valve. Mild tricuspid  regurgitation. Pulmonic valve not well visualized. No  pulmonic regurgitation.  Pericardium/Pleura: Normal pericardium with no pericardial  effusion.  Hemodynamic: Estimated right ventricular systolic pressure  equals 21 mm Hg, assuming right atrial pressure equals 3 mm  Hg, consistent with normal pulmonary pressures.  ------------------------------------------------------------------------  Conclusions:  1. Mitral annular calcification, otherwise normal mitral  valve. Minimal mitral regurgitation.  2. Calcified trileaflet aortic valve with normal opening.  Minimal aortic regurgitation.  3. Endocardium not well visualized; grossly normal left  ventricular systolic function.  4. Normal right ventricular size and function.  *** Compared with echocardiogram of 10/12/2013,results are  similar on today's study.  ------------------------------------------------------------------------  Confirmed on  2018 - 12:56:58 by Kathryn Díaz M.D.  ------------------------------------------------------------------------    < end of copied text >  ---------------------------------------------------------------------------------------------------------------  MICROBIOLOGY:     Rapid Respiratory Viral Panel (18 @ 13:23)    Rapid RVP Result: Detected: The Film[employer] RVP Rapid uses polymerase chain reaction (PCR) and melt  curve analysis to screen for adenovirus; coronavirus HKU1, NL63, 229E,  OC43; human metapneumovirus (hMPV); human enterovirus/rhinovirus  (Entero/RV); influenza A; influenza A/H1;influenza A/H3; influenza  A/H1-2009; influenza B; parainfluenza viruses 1, 2, 3, 4; respiratory  syncytial virus; Bordetella pertussis; Mycoplasma pneumoniae; and  Chlamydophila pneumoniae.    Entero/Rhinovirus (RapRVP): Detected    RADIOLOGY:  [x] Chest radiographs reviewed and interpreted by me    < from: [facility] Duplex Lower Ext Vein Scan, Bilat (18 @ 16:47) >    EXAM:  DUPLEX SCAN EXT VEINS LOWER BI                          PROCEDURE DATE:  2018      INTERPRETATION:  CLINICAL INFORMATION: A prior examination, dated   10/10/2013, showed, DVT affecting a right peroneal vein, currently short   of breath, rule out DVT.    TECHNIQUE: Duplex sonography of the BILATERAL LOWER extremities with   color and spectral Doppler, with and without compression.      FINDINGS:    There is normal compressibility of the bilateral common femoral, femoral   and popliteal veins. No calf vein thrombosis is detected.    Doppler examination shows normal spontaneous and phasic flow.    IMPRESSION:     No evidence of bilateral lower extremity deep venous thrombosis.    LUIS PORRAS M.D., ATTENDING RADIOLOGIST  This document has been electronically signed. Sep 18 2018  5:25PM    < end of copied text >  ---------------------------------------------------------------------------------------------------------------    < from: Xray Chest 1 View AP/PA (18 @ 12:55) >    EXAM:  XR CHEST AP OR PA 1V                          PROCEDURE DATE:  2018      INTERPRETATION:  A single chest x-ray was obtained on 2018.    Indication: Dyspnea.    Impression:    The heart is normal in size. The lungs are clear. Degenerative changes of   the thoracic spine. No pneumothorax.    MEKA BURGOS M.D., ATTENDING RADIOLOGIST  This document has been electronically signed. Sep 17 2018  1:47PM      < end of copied text >  ---------------------------------------------------------------------------------------------------------------    < from: CT Angio Chest w/ IV Cont (18 @ 19:29) >    EXAM:  CT ANGIO CHEST (W)AW IC                          PROCEDURE DATE:  2018      INTERPRETATION:  CLINICAL INFORMATION: Shortness of breath. INR   subtherapeutic.    COMPARISON: CTA chest 2014    PROCEDURE:   CT Angiography of the Chest.  90 ml of Omnipaque 350 was injected intravenously. 10 ml were discarded.  Sagittal and coronal reformats were performed as well as 3D (MIP)   reconstructions.      FINDINGS:    CHEST:     LUNGS AND LARGE AIRWAYS: Patent central airways.  Bibasilar dependent   atelectasis. Left lower lobe calcified granuloma  PLEURA: Filling defects visualized within the left pulmonary artery, left   upper and lower lobar branches as well as the distal segmental and   subsegmental branches. Emboli are also noted within the segmental and   subsegmental branches of the right lower lobe pulmonary arterial branches.  VESSELS: Within normal limits. The main pulmonary artery measures   approximately 2.8 cm.  HEART: Heart size is normal. No pericardial effusion. No evidence of   right heart strain. Coronary artery calcifications.  MEDIASTINUM AND NAKIA: No lymphadenopathy.  CHEST WALL AND LOWER NECK: Within normal limits.  VISUALIZED UPPER ABDOMEN: Within normal limits.  BONES: Degenerative changes of the spine.    IMPRESSION:   Pulmonary embolism involving the left pulmonary artery, left upper lobe   are branches, as well as the distal segmental subsegmental branches.   Emboli are also noted of the right lower lobe segmental and subsegmental   branches.  No evidence of right heart strain or pulmonary infarct.    Findings were discussed with BEN Stephenson by Dr. Sosa on   2018 at 7:42 PM with read back.    KIMBERLY SOSA M.D., RADIOLOGY RESIDENT  This document has been electronically signed.  MARTA COLLINS M.D., ATTENDING RADIOLOGIST  This document has been electronically signed. Sep 17 2018  8:26PM      < end of copied text >  ---------------------------------------------------------------------------------------------------------------

## 2018-09-24 NOTE — PHYSICAL THERAPY INITIAL EVALUATION ADULT - DID THE PATIENT HAVE SURGERY?
[facility] Department of Anesthesiology  Pre-Anesthesia Evaluation/Consultation       Name:  Edward Sims  : 1947  Age:  69 y.o.                                            MRN:  [id number]  Date: 2017           Procedure (Scheduled):  left ulnar nerve decompression/Left CTR  Surgeon:  Dr. Ivania Keller   Allergen Reactions    Ace Inhibitors      cough    Actifed Cold-Allergy [Chlorpheniramine-Phenylephrine]     Cephalexin Other (See Comments)     Yeast infection if taken over a long period of time    Codeine Other (See Comments)     dizziness    Erythromycin Base      Patient denies on 2013    Lisinopril Other (See Comments)     Cough     Lovastatin Other (See Comments)     Myalgias; mouth sores; ELEV LFT    Sudafed [Pseudoephedrine Hcl] Other (See Comments)     Shakes     Trinalin [Azatadine-Pseudoephedrine]      Patient Active Problem List   Diagnosis    SOB (shortness of breath)    Hypertension    Hyperlipemia    COPD, moderate ([address]    Anxiety state    Osteoarthritis    Iron deficiency anemia    Myalgia, lower legs    Degenerative disc disease, lumbar    Type 2 diabetes mellitus ([address]    PMB (postmenopausal bleeding)    Arthritis of right knee    Degenerative arthritis of right knee    Arthritis of left knee    Myositis of multiple sites    Medication reaction    Bilateral carpal tunnel syndrome    Functional diarrhea    Left carpal tunnel syndrome    Ulnar neuropathy of left upper extremity     Past Medical History:   Diagnosis Date    Anxiety     BCC (basal cell carcinoma of skin)     NBCC     COPD (chronic obstructive pulmonary disease) ([address]     Hyperlipidemia     Hypertension     Obesity     Osteoarthritis     Type 2 diabetes mellitus ([address] 2015    Wears partial dentures      Past Surgical History:   Procedure Laterality Date    CARDIAC CATHETERIZATION      CARPAL TUNNEL RELEASE Right 10/19/2017    COLONOSCOPY      DILATION AND CURETTAGE OF UTERUS N/A 05/08/2015    DILATATION AND CURETTAGE, HYSTEROSCOPY WITH MYOSURE    KNEE ARTHROPLASTY Right 11/03/2015    RIGHT TOTAL KNEE ARTHROPLASTY             SPINE SURGERY  02/07/2013    TONSILLECTOMY      TOTAL KNEE ARTHROPLASTY Left [id number]    ULNAR TUNNEL RELEASE Right 10/19/2017     Social History   Substance Use Topics    Smoking status: Never Smoker    Smokeless tobacco: Never Used    Alcohol use 0.0 oz/week      Comment: soc     Medications  Current Outpatient Prescriptions on File Prior to Encounter   Medication Sig Dispense Refill    atenolol (TENORMIN) 50 MG tablet TAKE 1 TABLET DAILY 90 tablet 0    FLUoxetine (PROZAC) 20 MG capsule TAKE 1 CAPSULE DAILY 90 capsule 0    losartan (COZAAR) 50 MG tablet TAKE 1 TABLET DAILY 90 tablet 0    umeclidinium-vilanterol (ANORO ELLIPTA) 62.5-25 MCG/INH AEPB inhaler Inhale 1 puff into the lungs daily 3 each 3    traMADol (ULTRAM) 50 MG tablet Take 1 tablet by mouth every 6 hours as needed for Pain 20 tablet 0    aspirin 81 MG tablet Take 81 mg by mouth daily      metFORMIN, OSM, (FORTAMET) 500 MG extended release tablet Take 2 tablets by mouth 2 times daily (with meals) 360 tablet 3    glimepiride (AMARYL) 2 MG tablet Take 1 tablet by mouth every morning 30 tablet 2    albuterol sulfate  (90 Base) MCG/ACT inhaler Inhale 2 puffs into the lungs every 6 hours as needed for Wheezing 1 Inhaler 11    glucose blood VI test strips (ONE TOUCH TEST STRIPS) strip 1 each by In Vitro route daily. Check FBS and 4 pm  each 3    ONE TOUCH LANCETS MISC Check FBS and 4 pm  each 3    Blood Glucose Monitoring Suppl (ONE TOUCH ULTRA 2) W/DEVICE KIT 1 kit by Does not apply route daily as needed. 1 kit 0    risperiDONE (RISPERDAL) 0.5 MG tablet Take 1 tablet by mouth nightly as needed. Pt is due for APPT 90 tablet 2     No current facility-administered medications on file prior to encounter.       Current Outpatient Prescriptions n/a the last 72 hours. Coags    Lab Results   Component Value Date    PROTIME 11.8 03/01/2016    INR 1.03 03/01/2016    APTT 34.8 02/96/8081     HCG (If Applicable) No results found for: PREGTESTUR, PREGSERUM, HCG, HCGQUANT   ABGs No results found for: PHART, PO2ART, VUC9GNB, QGB8FBR, BEART, [id number]   Type & Screen (If Applicable)  No results found for: LABABO, LABRH                         BMI: Wt Readings from Last 3 Encounters:       NPO Status:                          Anesthesia Evaluation  Patient summary reviewed no history of anesthetic complications:   Airway: Mallampati: III  TM distance: >3 FB   Neck ROM: full  Mouth opening: > = 3 FB Dental: normal exam         Pulmonary: breath sounds clear to auscultation  (+) COPD (daily treatments but well controlled):  shortness of breath: chronic and no interval change,      (-) sleep apnea and wheezes                           Cardiovascular:    (+) hypertension:,     (-) valvular problems/murmurs, past MI, CABG/stent, dysrhythmias and  angina    ECG reviewed  Rhythm: regular  Rate: normal           Beta Blocker:  Dose within 24 Hrs         Neuro/Psych:   (+) neuromuscular disease:, psychiatric history:   (-) seizures, TIA and CVA           GI/Hepatic/Renal:        (-) GERD and liver disease       Endo/Other:    (+) Type II DM, , : arthritis:. Abdominal:   (+) obese,         Vascular:                                        Anesthesia Plan      general     ASA 3           MIPS: Postoperative opioids intended and Prophylactic antiemetics administered. Anesthetic plan and risks discussed with patient. Plan discussed with CRNA. This pre-anesthesia assessment may be used as a history and physical.    DOS STAFF ADDENDUM:    Pt seen and examined, chart reviewed (including anesthesia, drug and allergy history). No interval changes to history and physical examination.   Anesthetic plan, risks, benefits, alternatives, and personnel

## 2018-09-25 LAB
ANION GAP SERPL CALC-SCNC: 7 MMOL/L — SIGNIFICANT CHANGE UP (ref 5–17)
APPEARANCE UR: CLEAR — SIGNIFICANT CHANGE UP
BACTERIA # UR AUTO: NEGATIVE — SIGNIFICANT CHANGE UP
BASOPHILS # BLD AUTO: 0 K/UL — SIGNIFICANT CHANGE UP (ref 0–0.2)
BASOPHILS NFR BLD AUTO: 0 % — SIGNIFICANT CHANGE UP (ref 0–2)
BILIRUB UR-MCNC: NEGATIVE — SIGNIFICANT CHANGE UP
BUN SERPL-MCNC: 23 MG/DL — SIGNIFICANT CHANGE UP (ref 7–23)
CALCIUM SERPL-MCNC: 8.1 MG/DL — LOW (ref 8.4–10.5)
CHLORIDE SERPL-SCNC: 105 MMOL/L — SIGNIFICANT CHANGE UP (ref 96–108)
CO2 SERPL-SCNC: 29 MMOL/L — SIGNIFICANT CHANGE UP (ref 22–31)
COLOR SPEC: YELLOW — SIGNIFICANT CHANGE UP
CREAT SERPL-MCNC: 0.91 MG/DL — SIGNIFICANT CHANGE UP (ref 0.5–1.3)
DIFF PNL FLD: NEGATIVE — SIGNIFICANT CHANGE UP
EOSINOPHIL # BLD AUTO: 0.2 K/UL — SIGNIFICANT CHANGE UP (ref 0–0.5)
EOSINOPHIL NFR BLD AUTO: 1 % — SIGNIFICANT CHANGE UP (ref 0–6)
EPI CELLS # UR: 1 /HPF — SIGNIFICANT CHANGE UP (ref 0–5)
GLUCOSE SERPL-MCNC: 98 MG/DL — SIGNIFICANT CHANGE UP (ref 70–99)
GLUCOSE UR QL: NEGATIVE MG/DL — SIGNIFICANT CHANGE UP
HCT VFR BLD CALC: 39.1 % — SIGNIFICANT CHANGE UP (ref 34.5–45)
HGB BLD-MCNC: 12.9 G/DL — SIGNIFICANT CHANGE UP (ref 11.5–15.5)
HYALINE CASTS # UR AUTO: 1 /LPF — SIGNIFICANT CHANGE UP (ref 0–7)
INR BLD: 2.02 RATIO — HIGH (ref 0.88–1.16)
KETONES UR-MCNC: NEGATIVE — SIGNIFICANT CHANGE UP
LEUKOCYTE ESTERASE UR-ACNC: NEGATIVE — SIGNIFICANT CHANGE UP
LYMPHOCYTES # BLD AUTO: 14 K/UL — HIGH (ref 1–3.3)
LYMPHOCYTES # BLD AUTO: 69 % — HIGH (ref 13–44)
MAGNESIUM SERPL-MCNC: 2.4 MG/DL — SIGNIFICANT CHANGE UP (ref 1.6–2.6)
MANUAL SMEAR VERIFICATION: SIGNIFICANT CHANGE UP
MCHC RBC-ENTMCNC: 30.9 PG — SIGNIFICANT CHANGE UP (ref 27–34)
MCHC RBC-ENTMCNC: 33 GM/DL — SIGNIFICANT CHANGE UP (ref 32–36)
MCV RBC AUTO: 93.5 FL — SIGNIFICANT CHANGE UP (ref 80–100)
MONOCYTES # BLD AUTO: 0.61 K/UL — SIGNIFICANT CHANGE UP (ref 0–0.9)
MONOCYTES NFR BLD AUTO: 3 % — SIGNIFICANT CHANGE UP (ref 2–14)
NEUTROPHILS # BLD AUTO: 5.48 K/UL — SIGNIFICANT CHANGE UP (ref 1.8–7.4)
NEUTROPHILS NFR BLD AUTO: 27 % — LOW (ref 43–77)
NITRITE UR-MCNC: NEGATIVE — SIGNIFICANT CHANGE UP
NRBC # BLD: 0 /100 — SIGNIFICANT CHANGE UP (ref 0–0)
PH UR: 7 — SIGNIFICANT CHANGE UP (ref 5–8)
PLAT MORPH BLD: NORMAL — SIGNIFICANT CHANGE UP
PLATELET # BLD AUTO: 174 K/UL — SIGNIFICANT CHANGE UP (ref 150–400)
POTASSIUM SERPL-MCNC: 4.5 MMOL/L — SIGNIFICANT CHANGE UP (ref 3.5–5.3)
POTASSIUM SERPL-SCNC: 4.5 MMOL/L — SIGNIFICANT CHANGE UP (ref 3.5–5.3)
PROT UR-MCNC: NEGATIVE MG/DL — SIGNIFICANT CHANGE UP
PROTHROM AB SERPL-ACNC: 23.2 SEC — HIGH (ref 10–13.1)
RBC # BLD: 4.18 M/UL — SIGNIFICANT CHANGE UP (ref 3.8–5.2)
RBC # FLD: 13.7 % — SIGNIFICANT CHANGE UP (ref 10.3–14.5)
RBC BLD AUTO: NORMAL — SIGNIFICANT CHANGE UP
RBC CASTS # UR COMP ASSIST: 2 /HPF — SIGNIFICANT CHANGE UP (ref 0–4)
SODIUM SERPL-SCNC: 141 MMOL/L — SIGNIFICANT CHANGE UP (ref 135–145)
SP GR SPEC: 1.04 — HIGH (ref 1.01–1.02)
UROBILINOGEN FLD QL: 1 MG/DL — SIGNIFICANT CHANGE UP
WBC # BLD: 20.29 K/UL — HIGH (ref 3.8–10.5)
WBC # FLD AUTO: 20.29 K/UL — HIGH (ref 3.8–10.5)
WBC UR QL: 6 /HPF — HIGH (ref 0–5)

## 2018-09-25 PROCEDURE — 99233 SBSQ HOSP IP/OBS HIGH 50: CPT

## 2018-09-25 PROCEDURE — 75574 CT ANGIO HRT W/3D IMAGE: CPT | Mod: 26

## 2018-09-25 RX ORDER — METOPROLOL TARTRATE 50 MG
25 TABLET ORAL ONCE
Qty: 0 | Refills: 0 | Status: COMPLETED | OUTPATIENT
Start: 2018-09-25 | End: 2018-09-25

## 2018-09-25 RX ORDER — WARFARIN SODIUM 2.5 MG/1
7.5 TABLET ORAL ONCE
Qty: 0 | Refills: 0 | Status: COMPLETED | OUTPATIENT
Start: 2018-09-25 | End: 2018-09-25

## 2018-09-25 RX ADMIN — PANTOPRAZOLE SODIUM 40 MILLIGRAM(S): 20 TABLET, DELAYED RELEASE ORAL at 05:49

## 2018-09-25 RX ADMIN — Medication 25 MILLIGRAM(S): at 14:19

## 2018-09-25 RX ADMIN — WARFARIN SODIUM 7.5 MILLIGRAM(S): 2.5 TABLET ORAL at 21:56

## 2018-09-25 RX ADMIN — Medication 40 MILLIGRAM(S): at 05:49

## 2018-09-25 RX ADMIN — MOMETASONE FUROATE 1 PUFF(S): 220 INHALANT RESPIRATORY (INHALATION) at 05:50

## 2018-09-25 RX ADMIN — Medication 200 MILLIGRAM(S): at 05:49

## 2018-09-25 RX ADMIN — Medication 200 MILLIGRAM(S): at 21:56

## 2018-09-25 RX ADMIN — ATORVASTATIN CALCIUM 10 MILLIGRAM(S): 80 TABLET, FILM COATED ORAL at 21:56

## 2018-09-25 RX ADMIN — Medication 200 MILLIGRAM(S): at 13:21

## 2018-09-25 NOTE — PROGRESS NOTE ADULT - SUBJECTIVE AND OBJECTIVE BOX
Patient is a 73y old  Female who presents with a chief complaint of shortness of breath (24 Sep 2018 18:30)      INTERVAL HPI/OVERNIGHT EVENTS:  T(C): 36.8 (09-25-18 @ 04:29), Max: 36.8 (09-24-18 @ 12:40)  HR: 68 (09-25-18 @ 04:29) (68 - 90)  BP: 114/72 (09-25-18 @ 04:29) (102/63 - 115/70)  RR: 18 (09-25-18 @ 04:29) (16 - 18)  SpO2: 96% (09-25-18 @ 04:29) (94% - 96%)  Wt(kg): --  I&O's Summary    24 Sep 2018 07:01  -  25 Sep 2018 07:00  --------------------------------------------------------  IN: 480 mL / OUT: 0 mL / NET: 480 mL        LABS:                        12.9   20.29 )-----------( 174      ( 25 Sep 2018 07:58 )             39.1     09-25    141  |  105  |  23  ----------------------------<  98  4.5   |  29  |  0.91    Ca    8.1<L>      25 Sep 2018 06:06  Phos  3.5     09-24  Mg     2.4     09-25      PT/INR - ( 25 Sep 2018 07:41 )   PT: 23.2 sec;   INR: 2.02 ratio             CAPILLARY BLOOD GLUCOSE              REVIEW OF SYSTEMS:  CONSTITUTIONAL: No fever, weight loss, or fatigue  EYES: No eye pain, visual disturbances, or discharge  ENMT:  No difficulty hearing, tinnitus, vertigo; No sinus or throat pain  NECK: No pain or stiffness  BREASTS: No pain, masses, or nipple discharge  RESPIRATORY: No cough, wheezing, chills or hemoptysis; No shortness of breath  CARDIOVASCULAR: No chest pain, palpitations, dizziness, or leg swelling  GASTROINTESTINAL: No abdominal or epigastric pain. No nausea, vomiting, or hematemesis; No diarrhea or constipation. No melena or hematochezia.  GENITOURINARY: No dysuria, frequency, hematuria, or incontinence  NEUROLOGICAL: No headaches, memory loss, loss of strength, numbness, or tremors  SKIN: No itching, burning, rashes, or lesions   LYMPH NODES: No enlarged glands  ENDOCRINE: No heat or cold intolerance; No hair loss  MUSCULOSKELETAL: No joint pain or swelling; No muscle, back, or extremity pain  PSYCHIATRIC: No depression, anxiety, mood swings, or difficulty sleeping  HEME/LYMPH: No easy bruising, or bleeding gums  ALLERY AND IMMUNOLOGIC: No hives or eczema    RADIOLOGY & ADDITIONAL TESTS:    Imaging Personally Reviewed:  [ ] YES  [ ] NO    Consultant(s) Notes Reviewed:  [ ] YES  [ ] NO    PHYSICAL EXAM:  GENERAL: NAD, well-groomed, well-developed  HEAD:  Atraumatic, Normocephalic  EYES: EOMI, PERRLA, conjunctiva and sclera clear  ENMT: No tonsillar erythema, exudates, or enlargement; Moist mucous membranes, Good dentition, No lesions  NECK: Supple, No JVD, Normal thyroid  NERVOUS SYSTEM:  Alert & Oriented X3, Good concentration; Motor Strength 5/5 B/L upper and lower extremities; DTRs 2+ intact and symmetric  CHEST/LUNG: Clear to auscultation bilaterally; No rales, rhonchi, wheezing, or rubs  HEART: Regular rate and rhythm; No murmurs, rubs, or gallops  ABDOMEN: Soft, Nontender, Nondistended; Bowel sounds present  EXTREMITIES:  2+ Peripheral Pulses, No clubbing, cyanosis, or edema  LYMPH: No lymphadenopathy noted  SKIN: No rashes or lesions    Care Discussed with Consultants/Other Providers [ ] YES  [ ] NO Patient is a 73y old  Female who presents with a chief complaint of shortness of breath (24 Sep 2018 18:30)      INTERVAL HPI/OVERNIGHT EVENTS:  Pt stable w/o complaints except for occassional cough.     T(C): 36.8 (09-25-18 @ 04:29), Max: 36.8 (09-24-18 @ 12:40)  HR: 68 (09-25-18 @ 04:29) (68 - 90)  BP: 114/72 (09-25-18 @ 04:29) (102/63 - 115/70)  RR: 18 (09-25-18 @ 04:29) (16 - 18)  SpO2: 96% (09-25-18 @ 04:29) (94% - 96%)  Wt(kg): --  I&O's Summary    24 Sep 2018 07:01  -  25 Sep 2018 07:00  --------------------------------------------------------  IN: 480 mL / OUT: 0 mL / NET: 480 mL        LABS:                        12.9   20.29 )-----------( 174      ( 25 Sep 2018 07:58 )             39.1     09-25    141  |  105  |  23  ----------------------------<  98  4.5   |  29  |  0.91    Ca    8.1<L>      25 Sep 2018 06:06  Phos  3.5     09-24  Mg     2.4     09-25      PT/INR - ( 25 Sep 2018 07:41 )   PT: 23.2 sec;   INR: 2.02 ratio         REVIEW OF SYSTEMS:  CONSTITUTIONAL: No fever, weight loss, or fatigue  EYES: No eye pain, visual disturbances, or discharge  ENMT:  No difficulty hearing, tinnitus, vertigo; No sinus or throat pain  NECK: No pain or stiffness  BREASTS: No pain, masses, or nipple discharge  RESPIRATORY: + cough, no wheezing, chills or hemoptysis; No shortness of breath  CARDIOVASCULAR: No chest pain, palpitations, dizziness, or leg swelling  GASTROINTESTINAL: No abdominal or epigastric pain. No nausea, vomiting, or hematemesis; No diarrhea or constipation. No melena or hematochezia.  GENITOURINARY: No dysuria, frequency, hematuria, or incontinence  NEUROLOGICAL: No headaches, memory loss, loss of strength, numbness, or tremors  SKIN: No itching, burning, rashes, or lesions   LYMPH NODES: No enlarged glands  ENDOCRINE: No heat or cold intolerance; No hair loss  MUSCULOSKELETAL: No joint pain or swelling; No muscle, back, or extremity pain  PSYCHIATRIC: No depression, anxiety, mood swings, or difficulty sleeping  HEME/LYMPH: No easy bruising, or bleeding gums  ALLERY AND IMMUNOLOGIC: No hives or eczema      Consultant(s) Notes Reviewed:  [ x] YES  [ ] NO    PHYSICAL EXAM:  GENERAL: NAD, well-groomed, well-developed  HEAD:  Atraumatic, Normocephalic  EYES: conjunctiva and sclera clear  ENMT: No tonsillar erythema, exudates, or enlargement; Moist mucous membranes, Good dentition, No lesions  NECK: Supple, No JVD  NERVOUS SYSTEM:  Alert & Oriented X3, Good concentration; Motor Strength 5/5 B/L upper and lower extremities  CHEST/LUNG: Clear to auscultation bilaterally; No rales, rhonchi, wheezing, or rubs  HEART: Regular rate and rhythm; No murmurs, rubs, or gallops  ABDOMEN: Soft, Nontender, Nondistended; Bowel sounds present, neg HSM  EXTREMITIES:  1+ edema  LYMPH: No lymphadenopathy noted  SKIN: No rashes or lesions    Care Discussed with Consultants/Other Providers [ x] YES  [ ] NO

## 2018-09-25 NOTE — PROGRESS NOTE ADULT - ASSESSMENT
· Assessment		  74 yo F w/ breast CA s/p removal, hx of prothrombin gene mutation, CKD III, prior hx of unprovoked DVT/PE ~ 4-6yr ago (on warfarin) presents with shortness of breath 2/2 to pulmonary embolism c/b viral URI. Pt now on Lovenox. Review of office chart shows that pt has been intermittently subtherapeutic over the last several months. INR in range only in august but pt claims to have missed several doses since then and most recent INR <2. Heme note appreciated and in lieu of ? lack of data treating pt's with prothrombin G mutations w/ Xa inhibitors, will continue with coumadin and pt urged to maintain compliance w/ dosing.  Lovenox d/zain as  INR is therapeutic. Cough persists but is improving and  pt w/o SOB . Pt also w/ 4 second pause and several episodes of PAT on monitor and on 9/21 and bradycardia w/ 4 sec pause and ? junctional escape rhythm. EPS note appreciated and suggest continued observation. Pt with CP yesterday doubt cardiac but due to multiple VPCs on monitor will need to r/o. Case d/w cardiology and due to pt's recent PE and current sx of bronchospasm concern over having a treadmill ETT or a dobutamine thallium test. Instead will order CT angio and further w/u based and results. Pt also with increased wbc but no clear source of infection  and CT scan can evaluate for any new infiltrates as well.

## 2018-09-25 NOTE — PROGRESS NOTE ADULT - SUBJECTIVE AND OBJECTIVE BOX
[facility] - [facility]     PROGRESS NOTE    CHIEF COMPLAINT: pulmonary emboli; rhinoviral infection; bronchospasm; dyspnea; cough    INTERVAL HISTORY: continues to have ectopy on telemetry -  seen by EP - conservative management; no further chest pain/pressure - no palpitations; cough slowly improving exacerbated by talking and deep inspiration;  no shortness of breath, sputum production, chest congestion or wheeze;  no fevers, chills or sweats; walking without difficulty    REVIEW OF SYSTEMS:  Constitutional: As per interval history  HEENT: Within normal limits  CV: As per interval history  Resp: As per interval history  GI: Within normal limits   : Within normal limits  Musculoskeletal: Within normal limits  Skin: Within normal limits  Neurological: Within normal limits  Psychiatric: Within normal limits  Endocrine: Within normal limits  Hematologic/Lymphatic: Within normal limits  Allergic/Immunologic: Within normal limits    MEDICATIONS:     Pulmonary "  benzonatate 200 milliGRAM(s) Oral three times a day  guaiFENesin/dextromethorphan  Syrup 10 milliLiter(s) Oral four times a day  HYDROcodone/homatropine Syrup 5 milliLiter(s) Oral <User Schedule>  mometasone 220 MICROgram(s) Inhaler 1 Puff(s) Inhalation two times a day      Anti-microbials:      Cardiovascular:      Other:  atorvastatin 10 milliGRAM(s) Oral at bedtime  benzocaine 15 mG/menthol 3.6 mG Lozenge 1 Lozenge Oral three times a day PRN  influenza   Vaccine 0.5 milliLiter(s) IntraMuscular once  pantoprazole    Tablet 40 milliGRAM(s) Oral before breakfast  predniSONE   Tablet 40 milliGRAM(s) Oral daily        OBJECTIVE:    I&O's Detail    24 Sep 2018 07:01  -  25 Sep 2018 07:00  --------------------------------------------------------  IN:    Oral Fluid: 480 mL  Total IN: 480 mL    OUT:  Total OUT: 0 mL    Total NET: 480 mL    PHYSICAL EXAM:       ICU Vital Signs Last 24 Hrs  T(C): 36.8 (25 Sep 2018 04:29), Max: 36.8 (24 Sep 2018 12:40)  T(F): 98.3 (25 Sep 2018 04:29), Max: 98.3 (25 Sep 2018 04:29)  HR: 68 (25 Sep 2018 04:29) (68 - 90)  BP: 114/72 (25 Sep 2018 04:29) (102/63 - 115/70)  BP(mean): --  ABP: --  ABP(mean): --  RR: 18 (25 Sep 2018 04:29) (16 - 18)  SpO2: 96% (25 Sep 2018 04:29) (94% - 96%) on room air     General: Awake. Alert. Cooperative. Intermittent hacking cough. Appears stated age 	  HEENT:   Atraumatic. Normocephalic. Anicteric. Normal oral mucosa. PERRL. EOMI.  Neck: Supple. Trachea midline. Thyroid without enlargement/tenderness/nodules. No carotid bruit. No JVD.	  Cardiovascular: Regular rate and rhythm. S1 S2 normal. No murmurs, rubs or gallops.  Respiratory: Respirations unlabored. Bibasilar rales. No curvature.  Abdomen: Soft. Non-tender. Non-distended. No organomegaly. No masses. Normal bowel sounds.  Extremities: Warm to touch. No clubbing or cyanosis. Mild bilateral lower extremity edema  Pulses: 2+ peripheral pulses all extremities.	  Skin: Normal skin color. No rashes or lesions. No ecchymoses. No cyanosis. Warm to touch.  Lymph Nodes: Cervical, supraclavicular and axillary nodes normal  Neurological: Motor and sensory examination equal and normal. A and O x 3  Psychiatry: Appropriate mood and affect.    LABS:                        13.6   18.15 )-----------( 162      ( 24 Sep 2018 09:21 )             40.2     09-25    141  |  105  |  23  ----------------------------<  98  4.5   |  29  |  0.91    09-24    140  |  102  |  22  ----------------------------<  108<H>  3.7   |  28  |  0.93    Ca      8.1<L>      09-25    Ca      8.9      09-24    Phos    3.5     09-24    Phos    3.6     09-23      Mg       2.4     09-25    Mg       2.4     09-24    PT/INR - ( 25 Sep 2018 07:41 )   PT: 23.2 sec;   INR: 2.02 ratio      CARDIAC MARKERS ( 24 Sep 2018 06:50 )  x     / x     / 39 U/L / x     / 1.7 ng/mL    < from: Transthoracic Echocardiogram (09.21.18 @ 10:03) >    Patient name: JOHNSON BIANCHI  YOB: 1945   Age: 73 (F)   MR#: [id number]  Study Date: 9/21/2018  Location: [address] [place], [place] [id number]: Fenrando Espino ABEL  Study quality: Technically fair  Referring Physician: Arthur Ricks MD  Blood Pressure: 128/82 mmHg  Height: 150 cm  Weight: 66 kg  BSA: 1.6 m2  ------------------------------------------------------------------------  PROCEDURE: Transthoracic echocardiogram with 2-D, M-Mode  and complete spectral and color flow Doppler.  INDICATION: Supraventricular tachycardia (I47.1)  ------------------------------------------------------------------------  Dimensions:    Normal Values:  LA:     3.5    2.0 - 4.0 cm  Ao:     3.4    2.0 - 3.8 cm  SEPTUM: 0.8    0.6 - 1.2 cm  PWT:    0.7    0.6 - 1.1 cm  LVIDd:  4.7    3.0 - 5.6 cm  LVIDs:  3.4    1.8 - 4.0 cm  Derived variables:  LVMI: 70 g/m2  RWT: 0.29  Fractional short: 28 %  EF (Visual Estimate): 55-60 %  Doppler Peak Velocity (m/sec): AoV=1.4  ------------------------------------------------------------------------  Observations:  Mitral Valve: Mitral annular calcification, otherwise  normal mitral valve. Minimal mitral regurgitation.  Aortic Valve/Aorta: Calcified trileaflet aortic valve with  normal opening. Peak transaortic valve gradient equals 8 mm  Hg, mean transaortic valve gradient equals 5 mm Hg, aortic  valve velocity time integral equals 32 cm. Minimal aortic  regurgitation. Peak left ventricular outflow tract gradient  equals 4 mm Hg, mean gradient is equal to 2 mm Hg, LVOT  velocity time integral equals 21 cm.  Aortic Root: 3.4 cm.  LVOT diameter: 1.8 cm.  Left Atrium: Normal left atrium.  LA volume index = 27  cc/m2. Mobile interatrial septum.  Left Ventricle: Endocardium not well visualized; grossly  normal left ventricular systolic function. Normal left  ventricular internal dimensions and wall thicknesses.  Right Heart: Normal right atrium. Normal right ventricular  size and function. Normal tricuspid valve. Mild tricuspid  regurgitation. Pulmonic valve not well visualized. No  pulmonic regurgitation.  Pericardium/Pleura: Normal pericardium with no pericardial  effusion.  Hemodynamic: Estimated right ventricular systolic pressure  equals 21 mm Hg, assuming right atrial pressure equals 3 mm  Hg, consistent with normal pulmonary pressures.  ------------------------------------------------------------------------  Conclusions:  1. Mitral annular calcification, otherwise normal mitral  valve. Minimal mitral regurgitation.  2. Calcified trileaflet aortic valve with normal opening.  Minimal aortic regurgitation.  3. Endocardium not well visualized; grossly normal left  ventricular systolic function.  4. Normal right ventricular size and function.  *** Compared with echocardiogram of 10/12/2013,results are  similar on today's study.  ------------------------------------------------------------------------  Confirmed on  9/21/2018 - 12:56:58 by Kathryn Díaz M.D.  ------------------------------------------------------------------------    < end of copied text >  ---------------------------------------------------------------------------------------------------------------      MICROBIOLOGY:     Rapid Respiratory Viral Panel (09.17.18 @ 13:23)    Rapid RVP Result: Detected: The [employer] RVP Rapid uses polymerase chain reaction (PCR) and melt  curve analysis to screen for adenovirus; coronavirus HKU1, NL63, 229E,  OC43; human metapneumovirus (hMPV); human enterovirus/rhinovirus  (Entero/RV); influenza A; influenza A/H1;influenza A/H3; influenza  A/H1-2009; influenza B; parainfluenza viruses 1, 2, 3, 4; respiratory  syncytial virus; Bordetella pertussis; Mycoplasma pneumoniae; and  Chlamydophila pneumoniae.    Entero/Rhinovirus (RapRVP): Detected      RADIOLOGY:  [x] Chest radiographs reviewed and interpreted by me    < from: [facility] Duplex Lower Ext Vein Scan, Bilat (09.18.18 @ 16:47) >    EXAM:  DUPLEX SCAN EXT VEINS LOWER BI                          PROCEDURE DATE:  09/18/2018      INTERPRETATION:  CLINICAL INFORMATION: A prior examination, dated   10/10/2013, showed, DVT affecting a right peroneal vein, currently short   of breath, rule out DVT.    TECHNIQUE: Duplex sonography of the BILATERAL LOWER extremities with   color and spectral Doppler, with and without compression.      FINDINGS:    There is normal compressibility of the bilateral common femoral, femoral   and popliteal veins. No calf vein thrombosis is detected.    Doppler examination shows normal spontaneous and phasic flow.    IMPRESSION:     No evidence of bilateral lower extremity deep venous thrombosis.    LUIS PORRAS M.D., ATTENDING RADIOLOGIST  This document has been electronically signed. Sep 18 2018  5:25PM    < end of copied text >  ---------------------------------------------------------------------------------------------------------------    < from: Xray Chest 1 View AP/PA (09.17.18 @ 12:55) >    EXAM:  XR CHEST AP OR PA 1V                          PROCEDURE DATE:  09/17/2018      INTERPRETATION:  A single chest x-ray was obtained on September 17, 2018.    Indication: Dyspnea.    Impression:    The heart is normal in size. The lungs are clear. Degenerative changes of   the thoracic spine. No pneumothorax.    MEKA BURGOS M.D., ATTENDING RADIOLOGIST  This document has been electronically signed. Sep 17 2018  1:47PM      < end of copied text >  ---------------------------------------------------------------------------------------------------------------    < from: CT Angio Chest w/ IV Cont (09.17.18 @ 19:29) >    EXAM:  CT ANGIO CHEST (W)AW IC                          PROCEDURE DATE:  09/17/2018      INTERPRETATION:  CLINICAL INFORMATION: Shortness of breath. INR   subtherapeutic.    COMPARISON: CTA chest 6/27/2014    PROCEDURE:   CT Angiography of the Chest.  90 ml of Omnipaque 350 was injected intravenously. 10 ml were discarded.  Sagittal and coronal reformats were performed as well as 3D (MIP)   reconstructions.      FINDINGS:    CHEST:     LUNGS AND LARGE AIRWAYS: Patent central airways.  Bibasilar dependent   atelectasis. Left lower lobe calcified granuloma  PLEURA: Filling defects visualized within the left pulmonary artery, left   upper and lower lobar branches as well as the distal segmental and   subsegmental branches. Emboli are also noted within the segmental and   subsegmental branches of the right lower lobe pulmonary arterial branches.  VESSELS: Within normal limits. The main pulmonary artery measures   approximately 2.8 cm.  HEART: Heart size is normal. No pericardial effusion. No evidence of   right heart strain. Coronary artery calcifications.  MEDIASTINUM AND NAKIA: No lymphadenopathy.  CHEST WALL AND LOWER NECK: Within normal limits.  VISUALIZED UPPER ABDOMEN: Within normal limits.  BONES: Degenerative changes of the spine.    IMPRESSION:   Pulmonary embolism involving the left pulmonary artery, left upper lobe   are branches, as well as the distal segmental subsegmental branches.   Emboli are also noted of the right lower lobe segmental and subsegmental   branches.  No evidence of right heart strain or pulmonary infarct.    Findings were discussed with BEN Stephenson by Dr. Sosa on   9/17/2018 at 7:42 PM with read back.    KIMBERLY SOSA M.D., RADIOLOGY RESIDENT  This document has been electronically signed.  MARTA COLLINS M.D., ATTENDING RADIOLOGIST  This document has been electronically signed. Sep 17 2018  8:26PM      < end of copied text >  ---------------------------------------------------------------------------------------------------------------

## 2018-09-25 NOTE — DIETITIAN INITIAL EVALUATION ADULT. - OTHER INFO
Pt seen for LOS admission. Pt seen for LOS admission. Pt currently reports good po intake/appetite. Denies GI distress, denies chewing or swallowing difficulty. No recent weight change with UBW of 150 pounds, consistent with current weight 150.7 pounds. Pt on Coumadin; requests further clarification re: vitamin K and Coumadin interaction, feels she was getting too many leafy green salads in house and enjoys cooked greens at home.

## 2018-09-25 NOTE — DIETITIAN INITIAL EVALUATION ADULT. - PROBLEM SELECTOR PLAN 1
-c/w lovenox therapy for pulmonary embolism.  -Monitor H&H.  -c/w nasal canula oxygen therapy for goal O2 >90%  -Doubt this is true warfarin failure if patient skipped dose and is subtherapeutic on INR.  -No headaches or suggestive findings for neuroimaging prior to anticoagulation   -At this point no indication for TTE

## 2018-09-25 NOTE — DIETITIAN INITIAL EVALUATION ADULT. - NS AS NUTRI INTERV ED CONTENT
Purpose of the nutrition education/Nutrition relationship to health/disease/Educated pt on Coumadin/vitamin K interaction. Discussed foods high in vitamin K to limit, vitamin K consistency, reviewed Coumadin booklet and vitamin K points system, use of MVI with vitamin K consistent diet. Pt voiced understanding. Coumadin booklet provided./Recommended modifications

## 2018-09-25 NOTE — PROGRESS NOTE ADULT - ASSESSMENT
73 year-old woman with known hypercoagulability as above, on warfarin for DVT/PE, now presents with recurrent DVT/PE in the setting of subtherapeutic INR, also noted to have respiratory viral syndrome exacerbating reactive airway disease.    Appreciate pulmonary consult from Dr. Lira.    Continue anticoagulation with warfarin. Maintain INR 2.0 - 3.0.    Patient seen to have PAT and pauses up to 4 seconds on tele prior to the weekend. She was asymptomatic of pauses. Verapamil currently on hold.    Appreciate EP input. Plan for outpatient monitoring per Dr. Beckford.  No urgent indication for PPM, but if patient demonstrates further tachy-artur, will reconsider PPM.    CT coronaries with nonobstructive coronary disease. Would start ASA and statin therapy (this can be initiated as outpatient once steroid course completed).

## 2018-09-25 NOTE — DIETITIAN INITIAL EVALUATION ADULT. - ENERGY NEEDS
ht: 59 inches per prior RD note. wt: 150.7 pounds (current, standing, +1 B/L ankle edema noted). BMI: 30.4 kG/m2. UBW: 150 pounds. IBW: 95 pounds +/- 10%. %IBW: 159%  Other pertinent objective information: 73 year old female pt with PMH breast CA s/p lumpectomy, hx of prothrombin gene mutation, CKD III, prior hx of unprovoked DVT/PE ~ 4-6yr ago (on warfarin) presents with shortness of breath. Found to have PE, enterorhino virus. On Coumadin. No pressure ulcers noted.

## 2018-09-25 NOTE — PROGRESS NOTE ADULT - SUBJECTIVE AND OBJECTIVE BOX
HPI:  Patient with cardiovascular risk factors and evidence of coronary artery calcification on prior chest CT now with atypical chest pain and frequent PVCs.  No candidate for nuclear stress test (cannot exercise due to PE, cannot get adenosine agent given bronchospasm) - will pursue CT coronaries.    Review Of Systems:           Respiratory: No shortness of breath, cough, or wheezing  Cardiovascular: No chest pain or palpitations  10 point review of systems is otherwise negative except as mentioned above        Medications:  ARNUITY ELLIPTA 200MCG 1 Inhalation 1 Inhalation Oral daily  atorvastatin 10 milliGRAM(s) Oral at bedtime  benzocaine 15 mG/menthol 3.6 mG Lozenge 1 Lozenge Oral three times a day PRN  benzonatate 200 milliGRAM(s) Oral three times a day  guaiFENesin/dextromethorphan  Syrup 10 milliLiter(s) Oral four times a day  HYDROcodone/homatropine Syrup 5 milliLiter(s) Oral <User Schedule>  influenza   Vaccine 0.5 milliLiter(s) IntraMuscular once  pantoprazole    Tablet 40 milliGRAM(s) Oral before breakfast  predniSONE   Tablet 40 milliGRAM(s) Oral daily  warfarin 7.5 milliGRAM(s) Oral once    PAST MEDICAL & SURGICAL HISTORY:  Prothrombin gene mutation  GERD (gastroesophageal reflux disease)  DVT (deep venous thrombosis), right: 2013  Herniated lumbar intervertebral disc  Lower back pain  PE (pulmonary embolism): 3 yrs ago, was on Lovenox and coumadin  Hx of Breast Cancer  HTN - Hypertension  S/P tubal ligation  S/P arthroscopy of right knee:   S/P Breast Lumpectomy: cyst removed from left breast.  h/o Wrist Surgery    Vitals:  T(C): 37.2 (18 @ 20:39), Max: 37.2 (18 @ 20:39)  HR: 60 (18 @ 20:39) (60 - 82)  BP: 94/61 (18 @ 20:39) (94/61 - 114/72)  BP(mean): --  RR: 17 (18 @ 20:39) (17 - 18)  SpO2: 96% (18 @ 20:39) (92% - 96%)  Wt(kg): --  Daily     Daily Weight in k.6 (25 Sep 2018 12:09)  I&O's Summary    24 Sep 2018 07:  -  25 Sep 2018 07:00  --------------------------------------------------------  IN: 480 mL / OUT: 0 mL / NET: 480 mL    25 Sep 2018 07:  -  25 Sep 2018 20:56  --------------------------------------------------------  IN: 780 mL / OUT: 250 mL / NET: 530 mL        Physical Exam:  Appearance: No acute distress; well appearing  Eyes: PERRL, EOMI, pink conjunctiva  HENT: Normal oral mucosa  Cardiovascular: RRR, S1, S2, no murmurs, rubs, or gallops; no edema; no JVD  Respiratory: Clear to auscultation bilaterally  Gastrointestinal: soft, non-tender, non-distended with normal bowel sounds  Musculoskeletal: No clubbing; no joint deformity   Neurologic: Non-focal  Lymphatic: No lymphadenopathy  Psychiatry: AAOx3, mood & affect appropriate  Skin: No rashes, ecchymoses, or cyanosis                          12.9   20.29 )-----------( 174      ( 25 Sep 2018 07:58 )             39.1     -    141  |  105  |  23  ----------------------------<  98  4.5   |  29  |  0.91    Ca    8.1<L>      25 Sep 2018 06:06  Phos  3.5       Mg     2.4           PT/INR - ( 25 Sep 2018 07:41 )   PT: 23.2 sec;   INR: 2.02 ratio           CARDIAC MARKERS ( 24 Sep 2018 06:50 )  x     / x     / 39 U/L / x     / 1.7 ng/mL          Echo: < from: Transthoracic Echocardiogram (18 @ 10:03) >  ------------------------------------------------------------------------  Dimensions:    Normal Values:  LA:     3.5    2.0 - 4.0 cm  Ao:     3.4    2.0 - 3.8 cm  SEPTUM: 0.8    0.6 - 1.2 cm  PWT:    0.7    0.6 - 1.1 cm  LVIDd:  4.7    3.0 - 5.6 cm  LVIDs:  3.4    1.8 - 4.0 cm  Derived variables:  LVMI: 70 g/m2  RWT: 0.29  Fractional short: 28 %  EF (Visual Estimate): 55-60 %  Doppler Peak Velocity (m/sec): AoV=1.4  ------------------------------------------------------------------------  Observations:  Mitral Valve: Mitral annular calcification, otherwise  normal mitral valve. Minimal mitral regurgitation.  Aortic Valve/Aorta: Calcified trileaflet aortic valve with  normal opening. Peak transaortic valve gradient equals 8 mm  Hg, mean transaortic valve gradient equals 5 mm Hg, aortic  valve velocity time integral equals 32 cm. Minimal aortic  regurgitation. Peak left ventricular outflow tract gradient  equals 4 mm Hg, mean gradient is equal to 2 mm Hg, LVOT  velocity time integral equals 21 cm.  Aortic Root: 3.4 cm.  LVOT diameter: 1.8 cm.  Left Atrium: Normal left atrium.  LA volume index = 27  cc/m2. Mobile interatrial septum.  Left Ventricle: Endocardium not well visualized; grossly  normal left ventricular systolic function. Normal left  ventricular internal dimensions and wall thicknesses.  Right Heart: Normal right atrium. Normal right ventricular  size and function. Normal tricuspid valve. Mild tricuspid  regurgitation. Pulmonic valve not well visualized. No  pulmonic regurgitation.  Pericardium/Pleura: Normal pericardium with no pericardial  effusion.  Hemodynamic: Estimated right ventricular systolic pressure  equals 21 mm Hg, assuming right atrial pressure equals 3 mm  Hg, consistent with normal pulmonary pressures.  ------------------------------------------------------------------------  Conclusions:  1. Mitral annular calcification, otherwise normal mitral  valve. Minimal mitral regurgitation.  2. Calcified trileafletaortic valve with normal opening.  Minimal aortic regurgitation.  3. Endocardium not well visualized; grossly normal left  ventricular systolic function.  4. Normal right ventricular size and function.  *** Compared with echocardiogram of 10/12/2013,results are  similar on today's study.  ------------------------------------------------------------------------  Confirmed on  2018 - 12:56:58 by Kathryn Díaz M.D.  ------------------------------------------------------------------------    < end of copied text >    Interpretation of Telemetry: PAT and 4 second pause overnight; now NSR    Imaging: < from: CT Angio Chest w/ IV Cont (18 @ 19:29) >  IMPRESSION:   Pulmonary embolism involving the left pulmonary artery, left upper lobe   are branches, as well as the distal segmental subsegmental branches.   Emboli are also noted of the right lower lobe segmental and subsegmental   branches.  No evidence of right heart strain or pulmonary infarct.    Findings were discussed with BEN Stephenson by Dr. Sosa on   2018 at 7:42 PM with read back.    KIMBERLY SOSA M.D., RADIOLOGY RESIDENT  This document has been electronically signed.  MARTA COLLINS M.D., ATTENDING RADIOLOGIST  This document has been electronically signed. Sep 17 2018  8:26PM        < end of copied text >    < from: CT Heart with Coronaries (18 @ 16:33) >  FINDINGS:    Cardiovascular:    Coronary arteries:   Coronary artery calcium Agatston score:    Left main (LM)coronary artery:  0  Left anterior descending (LAD) coronary artery:  83  Left circumflex (LCX) coronary artery:  0  Right coronary artery (RCA):  0  Total:  83    Right-dominant coronary circulation.   The LM coronary artery is angiographically normal.   The LAD coronary artery has mild (30-50%) calcified and non-calcified   plaque in the proximal segment.  Small-caliber diagonal branches are   present.   The large-caliber ramus intermedius (RI) branch has minimal (<30%)   non-calcified plaque.  The LCX coronary artery has minimal (<30%) non-calcified plaque in the   proximal segment.  The LCX terminates as a left posterolateral (LPL)   branch.  The obtuse marginal (OM) branch is angiographically normal.   The RCA is angiographically normal. The visualized right posterior   descending artery (PDA) is angiographically normal.     Aorta:  No thoracic aortic dissection noted in the visualized thoracic aorta.      There is minimal non-calcified and calcified plaque in the visualized   thoracic aorta.    Pericardium:  There is no pericardial effusion.      Chambers:  The cardiac chambers are qualitatively normal in size.    There is a patent foramen ovale with evidence of a small volume of left   to right flow of contrast agent.    Valves:  Minimal mitral annular calcification noted.    Non-cardiac:  No hilar and/or mediastinal adenopathy is noted. Visualized lungs are   clear. Visualized osseous structures are within normal limits.    IMPRESSION:    1.  Non-obstructive coronary artery disease:  LM:  angiographically normal  LAD:  mild (30-50%) calcified and non-calcified plaque in the proximal   segment  RI:  minimal (<30%) non-calcified plaque  LCX:  minimal (<30%) non-calcified plaque in the proximal segment  RCA:  angiographically normal  2.  Mild coronary artery calcium (Agatston score 83) as delineated above.    The observed calcium score is at percentile 59 for individuals of the   same age, gender, and race/ethnicity who are free of clinical   cardiovascular disease and treated diabetes.    LORI MOBLEY M.D., ATTENDING CARDIOLOGIST  This document has been electronically signed.  PAULINE JACK M.D., ATTENDING RADIOLOGIST  This document has been electronically signed. Sep 25 2018  5:36PM    < end of copied text >

## 2018-09-25 NOTE — PROGRESS NOTE ADULT - ASSESSMENT
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 PVCs on telemetry     h/o breast cancer s/p lumpectomy    hypertension    PLAN/RECOMMENDATIONS:    stable oxygenation on room air  prednisone taper ongoing  off nebs  asmanex 220mcg 2 times daily - would discharge on Arnuity ellipta 200mcg 1 inhalation daily  robitussin DM/tessalon/hycodan (watch for sedation/constipation on narcotic based antitussive)  coumadin for INR 2 - 3 - should not be considered a coumadin "failure" given the subtherapeutic INR when admitted  GI prophylaxis on A/C and steroids - protonix  no indication for IVC filter in the absence of lower extremity clot  cardiac meds: lipitor - off verapamil  ECHO unremarkable (as above)  EP and cardiology evaluation noted    Will follow with you. Plan of care discussed with the patient at bedside.    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 PVCs on telemetry     h/o breast cancer s/p lumpectomy    hypertension    PLAN/RECOMMENDATIONS:    stable oxygenation on room air  prednisone taper ongoing  off nebs  asmanex 220mcg 2 times daily - would discharge on Arnuity ellipta 200mcg 1 inhalation daily  robitussin DM/tessalon/hycodan (watch for sedation/constipation on narcotic based antitussive)  coumadin for INR 2 - 3 - should not be considered a coumadin "failure" given the subtherapeutic INR when admitted  GI prophylaxis on A/C and steroids - protonix  no indication for IVC filter in the absence of lower extremity clot  cardiac meds: lipitor - off verapamil  ECHO unremarkable (as above)  EP and cardiology evaluation noted - being scheduled for a nuclear stress test  as patient will be in the hospital, will obtain a follow-up CT scan of the chest to evaluate abnormal lung examination and persistent cough    Will follow with you. Plan of care discussed with the patient at bedside and Dr. 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 PVCs on telemetry     h/o breast cancer s/p lumpectomy    hypertension    PLAN/RECOMMENDATIONS:    stable oxygenation on room air  prednisone taper ongoing  off nebs  switch asmanex to Arnuity ellipta 200mcg 1 inhalation daily - patient given samples and instructed in the use of the device  robitussin DM/tessalon/hycodan (watch for sedation/constipation on narcotic based antitussive)  coumadin for INR 2 - 3 - should not be considered a coumadin "failure" given the subtherapeutic INR when admitted  GI prophylaxis on A/C and steroids - protonix  no indication for IVC filter in the absence of lower extremity clot  cardiac meds: lipitor - off verapamil  ECHO unremarkable (as above)  EP and cardiology evaluation noted - being scheduled for a CT of the coronary arteries  as patient will be in the hospital, will obtain a follow-up CT scan of the chest to evaluate abnormal lung examination and persistent cough    Will follow with you. Plan of care discussed with the patient at bedside and Dr. Isaac Lira MD, [facility] - [phone]  Pulmonary Medicine

## 2018-09-25 NOTE — DIETITIAN INITIAL EVALUATION ADULT. - NS AS NUTRI INTERV MEALS SNACK
Continue current DASH/TLC diet for cardiovascular health. Pt's food preferences obtained and honored on menu,

## 2018-09-25 NOTE — DIETITIAN INITIAL EVALUATION ADULT. - ORAL INTAKE PTA
Pt reports good po intake/appetite PTA. Typical meal intake includes: Special K and egg for breakfast. Lunch: [place] or/good Pt reports good po intake/appetite PTA. Typical meal intake includes: Special K and egg for breakfast. Lunch: [place] or leftovers from dinner the night before. Dinner: Pork chop, steak, chicken with vegetable and starch. Pt denies EtOH use. Takes MVI PTA. Denies food allergy or intolerance./good

## 2018-09-25 NOTE — DIETITIAN INITIAL EVALUATION ADULT. - ADHERENCE
n/a/Pt adhered to a general healthful diet PTA per diet recall. Admits to frequent intake of leafy green vegetables PTA.

## 2018-09-26 DIAGNOSIS — I47.1 SUPRAVENTRICULAR TACHYCARDIA: ICD-10-CM

## 2018-09-26 LAB
ANION GAP SERPL CALC-SCNC: 7 MMOL/L — SIGNIFICANT CHANGE UP (ref 5–17)
BASOPHILS # BLD AUTO: 0.01 K/UL — SIGNIFICANT CHANGE UP (ref 0–0.2)
BASOPHILS NFR BLD AUTO: 0.1 % — SIGNIFICANT CHANGE UP (ref 0–2)
BUN SERPL-MCNC: 22 MG/DL — SIGNIFICANT CHANGE UP (ref 7–23)
CALCIUM SERPL-MCNC: 8.5 MG/DL — SIGNIFICANT CHANGE UP (ref 8.4–10.5)
CHLORIDE SERPL-SCNC: 106 MMOL/L — SIGNIFICANT CHANGE UP (ref 96–108)
CO2 SERPL-SCNC: 28 MMOL/L — SIGNIFICANT CHANGE UP (ref 22–31)
CREAT SERPL-MCNC: 0.91 MG/DL — SIGNIFICANT CHANGE UP (ref 0.5–1.3)
CULTURE RESULTS: NO GROWTH — SIGNIFICANT CHANGE UP
EOSINOPHIL # BLD AUTO: 0.15 K/UL — SIGNIFICANT CHANGE UP (ref 0–0.5)
EOSINOPHIL NFR BLD AUTO: 0.9 % — SIGNIFICANT CHANGE UP (ref 0–6)
GLUCOSE SERPL-MCNC: 106 MG/DL — HIGH (ref 70–99)
HCT VFR BLD CALC: 38.8 % — SIGNIFICANT CHANGE UP (ref 34.5–45)
HGB BLD-MCNC: 12.7 G/DL — SIGNIFICANT CHANGE UP (ref 11.5–15.5)
IMM GRANULOCYTES NFR BLD AUTO: 0.8 % — SIGNIFICANT CHANGE UP (ref 0–1.5)
INR BLD: 2.13 RATIO — HIGH (ref 0.88–1.16)
LYMPHOCYTES # BLD AUTO: 10.2 K/UL — HIGH (ref 1–3.3)
LYMPHOCYTES # BLD AUTO: 63.6 % — HIGH (ref 13–44)
MAGNESIUM SERPL-MCNC: 2.3 MG/DL — SIGNIFICANT CHANGE UP (ref 1.6–2.6)
MANUAL SMEAR VERIFICATION: SIGNIFICANT CHANGE UP
MCHC RBC-ENTMCNC: 30.6 PG — SIGNIFICANT CHANGE UP (ref 27–34)
MCHC RBC-ENTMCNC: 32.7 GM/DL — SIGNIFICANT CHANGE UP (ref 32–36)
MCV RBC AUTO: 93.5 FL — SIGNIFICANT CHANGE UP (ref 80–100)
MONOCYTES # BLD AUTO: 0.49 K/UL — SIGNIFICANT CHANGE UP (ref 0–0.9)
MONOCYTES NFR BLD AUTO: 3.1 % — SIGNIFICANT CHANGE UP (ref 2–14)
NEUTROPHILS # BLD AUTO: 5.05 K/UL — SIGNIFICANT CHANGE UP (ref 1.8–7.4)
NEUTROPHILS NFR BLD AUTO: 31.5 % — LOW (ref 43–77)
PHOSPHATE SERPL-MCNC: 3.4 MG/DL — SIGNIFICANT CHANGE UP (ref 2.5–4.5)
PLAT MORPH BLD: NORMAL — SIGNIFICANT CHANGE UP
PLATELET # BLD AUTO: 175 K/UL — SIGNIFICANT CHANGE UP (ref 150–400)
POTASSIUM SERPL-MCNC: 4.4 MMOL/L — SIGNIFICANT CHANGE UP (ref 3.5–5.3)
POTASSIUM SERPL-SCNC: 4.4 MMOL/L — SIGNIFICANT CHANGE UP (ref 3.5–5.3)
PROTHROM AB SERPL-ACNC: 24.4 SEC — HIGH (ref 10–13.1)
RBC # BLD: 4.15 M/UL — SIGNIFICANT CHANGE UP (ref 3.8–5.2)
RBC # FLD: 13.7 % — SIGNIFICANT CHANGE UP (ref 10.3–14.5)
RBC BLD AUTO: NORMAL — SIGNIFICANT CHANGE UP
SMUDGE CELLS # BLD: PRESENT — SIGNIFICANT CHANGE UP
SODIUM SERPL-SCNC: 141 MMOL/L — SIGNIFICANT CHANGE UP (ref 135–145)
SPECIMEN SOURCE: SIGNIFICANT CHANGE UP
WBC # BLD: 16.03 K/UL — HIGH (ref 3.8–10.5)
WBC # FLD AUTO: 16.03 K/UL — HIGH (ref 3.8–10.5)

## 2018-09-26 RX ORDER — WARFARIN SODIUM 2.5 MG/1
6 TABLET ORAL ONCE
Qty: 0 | Refills: 0 | Status: COMPLETED | OUTPATIENT
Start: 2018-09-26 | End: 2018-09-26

## 2018-09-26 RX ORDER — VERAPAMIL HCL 240 MG
120 CAPSULE, EXTENDED RELEASE PELLETS 24 HR ORAL DAILY
Qty: 0 | Refills: 0 | Status: DISCONTINUED | OUTPATIENT
Start: 2018-09-26 | End: 2018-09-28

## 2018-09-26 RX ADMIN — WARFARIN SODIUM 6 MILLIGRAM(S): 2.5 TABLET ORAL at 21:29

## 2018-09-26 RX ADMIN — PANTOPRAZOLE SODIUM 40 MILLIGRAM(S): 20 TABLET, DELAYED RELEASE ORAL at 05:14

## 2018-09-26 RX ADMIN — Medication 200 MILLIGRAM(S): at 05:14

## 2018-09-26 RX ADMIN — Medication 40 MILLIGRAM(S): at 05:14

## 2018-09-26 RX ADMIN — Medication 120 MILLIGRAM(S): at 14:23

## 2018-09-26 RX ADMIN — ATORVASTATIN CALCIUM 10 MILLIGRAM(S): 80 TABLET, FILM COATED ORAL at 21:29

## 2018-09-26 RX ADMIN — Medication 200 MILLIGRAM(S): at 21:29

## 2018-09-26 NOTE — PROGRESS NOTE ADULT - ASSESSMENT
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 Pt now on Lovenox. Review of office chart shows that pt has been intermittently subtherapeutic over the last several months. INR in range only in august but pt claims to have missed several doses since then and most recent INR <2. Heme note appreciated and in lieu of ? lack of data treating pt's with prothrombin G mutations w/ Xa inhibitors, will continue with coumadin and pt urged to maintain compliance w/ dosing.  Lovenox d/zain as  INR is therapeutic. Cough persists but is improving and  pt w/o SOB . Pt also w/ 4 second pause and several episodes of PAT on monitor and on 9/21 and bradycardia w/ 4 sec pause and ? junctional escape rhythm. EPS note appreciated and suggestED  continued observation. Pt had CT angio yesterday which showed nonobstructive lesions in coronary vessels.  Pt still w/ PATs on monitor, d/w cardiology who will reconsult EP to consider ablation of re-entry track. If unable to perform procedure than may consider restarting low dose verapamil and monitor for several more days on telemetry to assess for additional pauses.  Pt also with increased wbc but no clear source of infection ? if still due to steroids.

## 2018-09-26 NOTE — PROVIDER CONTACT NOTE (OTHER) - BACKGROUND
Patient admitted with PE
Patient admitted with pulmonary embolism
Pt admitted for PE
Pt admitted with other pulmonary embolism without acute cor pulmonale.
Pt came in for pulmonary embolism
Pt came in for pulmonary embolism
adm with b.l PE  pt has hx of PAT 7 sec this adm
74 y/o female admitted with PE. PMH DVT PE GERD HTN & prothrombin gene mutation

## 2018-09-26 NOTE — PROGRESS NOTE ADULT - SUBJECTIVE AND OBJECTIVE BOX
Patient is a 73y old  Female who presents with a chief complaint of shortness of breath (25 Sep 2018 11:56)      INTERVAL HPI/OVERNIGHT EVENTS:    Pt still w/ cough    T(C): 37.1 (18 @ 05:00), Max: 37.2 (18 @ 20:39)  HR: 66 (18 @ 05:00) (60 - 82)  BP: 105/70 (18 @ 05:00) (94/61 - 106/61)  RR: 18 (18 @ 05:00) (17 - 18)  SpO2: 97% (18 @ 05:00) (92% - 97%)  Wt(kg): --  I&O's Summary    25 Sep 2018 07:  -  26 Sep 2018 07:00  --------------------------------------------------------  IN: 780 mL / OUT: 550 mL / NET: 230 mL    26 Sep 2018 07:01  -  26 Sep 2018 09:32  --------------------------------------------------------  IN: 360 mL / OUT: 0 mL / NET: 360 mL        LABS:                        12.9   20.29 )-----------( 174      ( 25 Sep 2018 07:58 )             39.1         141  |  106  |  22  ----------------------------<  106<H>  4.4   |  28  |  0.91    Ca    8.5      26 Sep 2018 06:38  Phos  3.4       Mg     2.3           PT/INR - ( 26 Sep 2018 08:06 )   PT: 24.4 sec;   INR: 2.13 ratio           Urinalysis Basic - ( 25 Sep 2018 20:13 )    Color: Yellow / Appearance: Clear / S.044 / pH: x  Gluc: x / Ketone: Negative  / Bili: Negative / Urobili: 1.0 mg/dL   Blood: x / Protein: Negative mg/dL / Nitrite: Negative   Leuk Esterase: Negative / RBC: 2 /HPF / WBC 6 /HPF   Sq Epi: x / Non Sq Epi: 1 /HPF / Bacteria: Negative    Urinalysis Basic - ( 25 Sep 2018 20:13 )    Color: Yellow / Appearance: Clear / S.044 / pH: x  Gluc: x / Ketone: Negative  / Bili: Negative / Urobili: 1.0 mg/dL   Blood: x / Protein: Negative mg/dL / Nitrite: Negative   Leuk Esterase: Negative / RBC: 2 /HPF / WBC 6 /HPF   Sq Epi: x / Non Sq Epi: 1 /HPF / Bacteria: Negative      REVIEW OF SYSTEMS:  CONSTITUTIONAL: No fever, weight loss, or fatigue  EYES: No eye pain, visual disturbances, or discharge  ENMT:  No difficulty hearing, tinnitus, vertigo; No sinus or throat pain  NECK: No pain or stiffness  BREASTS: No pain, masses, or nipple discharge  RESPIRATORY:  cough, no wheezing, chills or hemoptysis; No shortness of breath  CARDIOVASCULAR: No chest pain, palpitations, dizziness, or leg swelling  GASTROINTESTINAL: No abdominal or epigastric pain. No nausea, vomiting, or hematemesis; No diarrhea or constipation. No melena or hematochezia.  GENITOURINARY: No dysuria, frequency, hematuria, or incontinence  NEUROLOGICAL: No headaches, memory loss, loss of strength, numbness, or tremors  SKIN: No itching, burning, rashes, or lesions   LYMPH NODES: No enlarged glands  ENDOCRINE: No heat or cold intolerance; No hair loss  MUSCULOSKELETAL: No joint pain or swelling; No muscle, back, or extremity pain  PSYCHIATRIC: No depression, anxiety, mood swings, or difficulty sleeping  HEME/LYMPH: No easy bruising, or bleeding gums  ALLERY AND IMMUNOLOGIC: No hives or eczema      Consultant(s) Notes Reviewed:  [x ] YES  [ ] NO    PHYSICAL EXAM:  GENERAL: NAD, well-groomed, well-developed  HEAD:  Atraumatic, Normocephalic  EYES: , conjunctiva and sclera clear  NECK: Supple, No JVD  NERVOUS SYSTEM:  Alert & Oriented X3, Good concentration; Motor Strength 5/5 B/L upper and lower extremities  CHEST/LUNG: Clear to auscultation bilaterally; No rales, rhonchi, wheezing, or rubs  HEART: Regular rate and rhythm; No murmurs, rubs, or gallops  ABDOMEN: Soft, Nontender, Nondistended; Bowel sounds present, neg HSM  EXTREMITIES:  1+ edema  LYMPH: No lymphadenopathy noted  SKIN: No rashes or lesions    Care Discussed with Consultants/Other Providers [X ] YES  [ ] NO    < from: CT Heart with Coronaries (18 @ 16:33) >  EXAM:  CT HEART WITH CORONARIES                            PROCEDURE DATE:  2018            INTERPRETATION:  Indication:  Coronary artery disease    History:  Ms. Delgado is a 73-year-old-woman with prothrombin gene   mutation, pulmonary embolus and history of breast cancer noted to have   premature ventricular contractions.    Acquisitions:  1.  Non-contrast prospectively-gated 320-multidtector volumetric computed   tomography heart  2.  Contrast-enhanced prospectively-gated 320-multidetector volumetric   computed tomography heart    Pre-medications:  1.  Metoprolol 2.5 mg intravenous  2.  Nitroglycerin 0.6 mg sublingual    Contrast:  65 mL Omnipaque 350     Resting heart rate (contrast-enhanced acquisition):  48 beats per minute    Quality:  Good    Post-processing:  Three-dimensional volume-rendered and multiplanar   reconstructions generated with [employer] software.     FINDINGS:    Cardiovascular:    Coronary arteries:   Coronary artery calcium Agatston score:    Left main (LM)coronary artery:  0  Left anterior descending (LAD) coronary artery:  83  Left circumflex (LCX) coronary artery:  0  Right coronary artery (RCA):  0  Total:  83    Right-dominant coronary circulation.   The LM coronary artery is angiographically normal.   The LAD coronary artery has mild (30-50%) calcified and non-calcified   plaque in the proximal segment.  Small-caliber diagonal branches are   present.   The large-caliber ramus intermedius (RI) branch has minimal (<30%)   non-calcified plaque.  The LCX coronary artery has minimal (<30%) non-calcified plaque in the   proximal segment.  The LCX terminates as a left posterolateral (LPL)   branch.  The obtuse marginal (OM) branch is angiographically normal.   The RCA is angiographically normal. The visualized right posterior   descending artery (PDA) is angiographically normal.     Aorta:  No thoracic aortic dissection noted in the visualized thoracic aorta.      There is minimal non-calcified and calcified plaque in the visualized   thoracic aorta.    Pericardium:  There is no pericardial effusion.      Chambers:  The cardiac chambers are qualitatively normal in size.    There is a patent foramen ovale with evidence of a small volume of left   to right flow of contrast agent.    Valves:  Minimal mitral annular calcification noted.    Non-cardiac:  No hilar and/or mediastinal adenopathy is noted. Visualized lungs are   clear. Visualized osseous structures are within normal limits.    IMPRESSION:    1.  Non-obstructive coronary artery disease:  LM:  angiographically normal  LAD:  mild (30-50%) calcified and non-calcified plaque in the proximal   segment  RI:  minimal (<30%) non-calcified plaque  LCX:  minimal (<30%) non-calcified plaque in the proximal segment  RCA:  angiographically normal  2.  Mild coronary artery calcium (Agatston score 83) as delineated above.    The observed calcium score is at percentile 59 for individuals of the   same age, gender, and race/ethnicity who are free of clinical   cardiovascular disease and treated diabetes.                LORI MOBLEY M.D., ATTENDING CARDIOLOGIST  This document has been electronically signed.  PAULINE JACK M.D., ATTENDING RADIOLOGIST  This document has been electronically signed. Sep 25 2018  5:36PM              < end of copied text >

## 2018-09-26 NOTE — PROGRESS NOTE ADULT - PROBLEM SELECTOR PLAN 2
improving cough  change to po prednisone as per pulm and taper  robitussin prn cough  inhalers as per pulm

## 2018-09-26 NOTE — PROGRESS NOTE ADULT - ASSESSMENT
Impression:  Frequent atrial arrhythmias which pre-dated recent pulmonary embolus.                       Prior pauses ? sinus node dysfunction, medication effect, vagal.    Plan:  Patient requires treatment of her SVT.             Will speak to EPS about potential ablation.  If cannot be done because of recent PE, then will re-started verapamil and observe on tele for bradyarrhythmias that might necessitate PPM.

## 2018-09-26 NOTE — PROGRESS NOTE ADULT - SUBJECTIVE AND OBJECTIVE BOX
[facility] - [facility]     PROGRESS NOTE    CHIEF COMPLAINT: pulmonary emboli; rhinoviral infection; bronchospasm; dyspnea; cough    INTERVAL HISTORY: ongoing episodes of SVT - previously with pauses; CTA of the coronory arteries without obstructive lung disease - unable to adequately assess the lung parenchyma; no further chest pain/pressure - no palpitations; cough slowly improving exacerbated by talking and deep inspiration;  no shortness of breath, sputum production, chest congestion or wheeze;  no fevers, chills or sweats; walking without difficulty    REVIEW OF SYSTEMS:  Constitutional: As per interval history  HEENT: Within normal limits  CV: As per interval history  Resp: As per interval history  GI: Within normal limits   : Within normal limits  Musculoskeletal: Within normal limits  Skin: Within normal limits  Neurological: Within normal limits  Psychiatric: Within normal limits  Endocrine: Within normal limits  Hematologic/Lymphatic: Within normal limits  Allergic/Immunologic: Within normal limits      MEDICATIONS:     Pulmonary "  benzonatate 200 milliGRAM(s) Oral three times a day  guaiFENesin/dextromethorphan  Syrup 10 milliLiter(s) Oral four times a day  HYDROcodone/homatropine Syrup 5 milliLiter(s) Oral every 4 hours PRN      Anti-microbials:      Cardiovascular:      Other:  ARNUITY ELLIPTA 200MCG 1 Inhalation 1 Inhalation Oral daily  atorvastatin 10 milliGRAM(s) Oral at bedtime  benzocaine 15 mG/menthol 3.6 mG Lozenge 1 Lozenge Oral three times a day PRN  influenza   Vaccine 0.5 milliLiter(s) IntraMuscular once  pantoprazole    Tablet 40 milliGRAM(s) Oral before breakfast  warfarin 6 milliGRAM(s) Oral once        OBJECTIVE:    I&O's Detail    25 Sep 2018 07:  -  26 Sep 2018 07:00  --------------------------------------------------------  IN:    Oral Fluid: 780 mL  Total IN: 780 mL    OUT:    Voided: 550 mL  Total OUT: 550 mL    Total NET: 230 mL      26 Sep 2018 07:  -  26 Sep 2018 11:04  --------------------------------------------------------  IN:    Oral Fluid: 360 mL  Total IN: 360 mL    OUT:  Total OUT: 0 mL    Total NET: 360 mL    Daily Weight in k.8 (26 Sep 2018 06:33)    PHYSICAL EXAM:       ICU Vital Signs Last 24 Hrs  T(C): 37.1 (26 Sep 2018 05:00), Max: 37.2 (25 Sep 2018 20:39)  T(F): 98.7 (26 Sep 2018 05:00), Max: 98.9 (25 Sep 2018 20:39)  HR: 66 (26 Sep 2018 05:00) (60 - 82)  BP: 105/70 (26 Sep 2018 05:00) (94/61 - 106/61)  BP(mean): --  ABP: --  ABP(mean): --  RR: 18 (26 Sep 2018 05:00) (17 - 18)  SpO2: 97% (26 Sep 2018 05:00) (92% - 97%) on room air     General: Awake. Alert. Cooperative. Intermittent hacking cough. Appears stated age 	  HEENT:   Atraumatic. Normocephalic. Anicteric. Normal oral mucosa. PERRL. EOMI.  Neck: Supple. Trachea midline. Thyroid without enlargement/tenderness/nodules. No carotid bruit. No JVD.	  Cardiovascular: Regular rate and rhythm. S1 S2 normal. No murmurs, rubs or gallops.  Respiratory: Respirations unlabored. Clear to auscultation and percussion. No curvature.  Abdomen: Soft. Non-tender. Non-distended. No organomegaly. No masses. Normal bowel sounds.  Extremities: Warm to touch. No clubbing or cyanosis. Mild bilateral lower extremity edema  Pulses: 2+ peripheral pulses all extremities.	  Skin: Normal skin color. No rashes or lesions. No ecchymoses. No cyanosis. Warm to touch.  Lymph Nodes: Cervical, supraclavicular and axillary nodes normal  Neurological: Motor and sensory examination equal and normal. A and O x 3  Psychiatry: Appropriate mood and affect.    LABS:                        12.7   16.03 )-----------( 175      ( 26 Sep 2018 08:00 )             38.8                         12.9   20.29 )-----------( 174      ( 25 Sep 2018 07:58 )             39.1         141  |  106  |  22  ----------------------------<  106<H>  4.4   |  28  |  0.91        141  |  105  |  23  ----------------------------<  98  4.5   |  29  |  0.91    Ca      8.5          Ca      8.1<L>          Phos    3.4         Phos    3.5           Mg       2.3         Mg       2.4         PT/INR - ( 26 Sep 2018 08:06 )   PT: 24.4 sec;   INR: 2.13 ratio      CARDIAC MARKERS ( 24 Sep 2018 06:50 )  x     / x     / 39 U/L / x     / 1.7 ng/mL    < from: Transthoracic Echocardiogram (18 @ 10:03) >    Patient name: JOHNSON DELGADO  YOB: 1945   Age: 73 (F)   MR#: [id number]  Study Date: 2018  Location: [address] [place], [place] [id number]: Fernando Espino RDCS  Study quality: Technically fair  Referring Physician: Arthur Ricks MD  Blood Pressure: 128/82 mmHg  Height: 150 cm  Weight: 66 kg  BSA: 1.6 m2  ------------------------------------------------------------------------  PROCEDURE: Transthoracic echocardiogram with 2-D, M-Mode  and complete spectral and color flow Doppler.  INDICATION: Supraventricular tachycardia (I47.1)  ------------------------------------------------------------------------  Dimensions:    Normal Values:  LA:     3.5    2.0 - 4.0 cm  Ao:     3.4    2.0 - 3.8 cm  SEPTUM: 0.8    0.6 - 1.2 cm  PWT:    0.7    0.6 - 1.1 cm  LVIDd:  4.7    3.0 - 5.6 cm  LVIDs:  3.4    1.8 - 4.0 cm  Derived variables:  LVMI: 70 g/m2  RWT: 0.29  Fractional short: 28 %  EF (Visual Estimate): 55-60 %  Doppler Peak Velocity (m/sec): AoV=1.4  ------------------------------------------------------------------------  Observations:  Mitral Valve: Mitral annular calcification, otherwise  normal mitral valve. Minimal mitral regurgitation.  Aortic Valve/Aorta: Calcified trileaflet aortic valve with  normal opening. Peak transaortic valve gradient equals 8 mm  Hg, mean transaortic valve gradient equals 5 mm Hg, aortic  valve velocity time integral equals 32 cm. Minimal aortic  regurgitation. Peak left ventricular outflow tract gradient  equals 4 mm Hg, mean gradient is equal to 2 mm Hg, LVOT  velocity time integral equals 21 cm.  Aortic Root: 3.4 cm.  LVOT diameter: 1.8 cm.  Left Atrium: Normal left atrium.  LA volume index = 27  cc/m2. Mobile interatrial septum.  Left Ventricle: Endocardium not well visualized; grossly  normal left ventricular systolic function. Normal left  ventricular internal dimensions and wall thicknesses.  Right Heart: Normal right atrium. Normal right ventricular  size and function. Normal tricuspid valve. Mild tricuspid  regurgitation. Pulmonic valve not well visualized. No  pulmonic regurgitation.  Pericardium/Pleura: Normal pericardium with no pericardial  effusion.  Hemodynamic: Estimated right ventricular systolic pressure  equals 21 mm Hg, assuming right atrial pressure equals 3 mm  Hg, consistent with normal pulmonary pressures.  ------------------------------------------------------------------------  Conclusions:  1. Mitral annular calcification, otherwise normal mitral  valve. Minimal mitral regurgitation.  2. Calcified trileaflet aortic valve with normal opening.  Minimal aortic regurgitation.  3. Endocardium not well visualized; grossly normal left  ventricular systolic function.  4. Normal right ventricular size and function.  *** Compared with echocardiogram of 10/12/2013,results are  similar on today's study.  ------------------------------------------------------------------------  Confirmed on  2018 - 12:56:58 by Kathryn Díaz M.D.  ------------------------------------------------------------------------    < end of copied text >  ---------------------------------------------------------------------------------------------------------------   MICROBIOLOGY:   Urinalysis Basic - ( 25 Sep 2018 20:13 )    Color: Yellow / Appearance: Clear / S.044 / pH: x  Gluc: x / Ketone: Negative  / Bili: Negative / Urobili: 1.0 mg/dL   Blood: x / Protein: Negative mg/dL / Nitrite: Negative   Leuk Esterase: Negative / RBC: 2 /HPF / WBC 6 /HPF   Sq Epi: x / Non Sq Epi: 1 /HPF / Bacteria: Negative    Rapid Respiratory Viral Panel (18 @ 13:23)    Rapid RVP Result: Detected: The FilmArray RVP Rapid uses polymerase chain reaction (PCR) and melt  curve analysis to screen for adenovirus; coronavirus HKU1, NL63, 229E,  OC43; human metapneumovirus (hMPV); human enterovirus/rhinovirus  (Entero/RV); influenza A; influenza A/H1;influenza A/H3; influenza  A/H1-2009; influenza B; parainfluenza viruses 1, 2, 3, 4; respiratory  syncytial virus; Bordetella pertussis; Mycoplasma pneumoniae; and  Chlamydophila pneumoniae.    Entero/Rhinovirus (RapRVP): Detected    RADIOLOGY:  [x] Chest radiographs reviewed and interpreted by me    < from: CT Heart with Coronaries (18 @ 16:33) >    EXAM:  CT HEART WITH CORONARIES                            PROCEDURE DATE:  2018            INTERPRETATION:  Indication:  Coronary artery disease    History:  Ms. Delgado is a 73-year-old-woman with prothrombin gene   mutation, pulmonary embolus and history of breast cancer noted to have   premature ventricular contractions.    Resting heart rate (contrast-enhanced acquisition):  48 beats per minute    Quality:  Good    Post-processing:  Three-dimensional volume-rendered and multiplanar   reconstructions generated with Vitrea software.     FINDINGS:    Cardiovascular:    Coronary arteries:   Coronary artery calcium Agatston score:    Left main (LM)coronary artery:  0  Left anterior descending (LAD) coronary artery:  83  Left circumflex (LCX) coronary artery:  0  Right coronary artery (RCA):  0  Total:  83    Right-dominant coronary circulation.   The LM coronary artery is angiographically normal.   The LAD coronary artery has mild (30-50%) calcified and non-calcified   plaque in the proximal segment.  Small-caliber diagonal branches are   present.   The large-caliber ramus intermedius (RI) branch has minimal (<30%)   non-calcified plaque.  The LCX coronary artery has minimal (<30%) non-calcified plaque in the   proximal segment.  The LCX terminates as a left posterolateral (LPL)   branch.  The obtuse marginal (OM) branch is angiographically normal.   The RCA is angiographically normal. The visualized right posterior   descending artery (PDA) is angiographically normal.     Aorta:  No thoracic aortic dissection noted in the visualized thoracic aorta.      There is minimal non-calcified and calcified plaque in the visualized   thoracic aorta.    Pericardium:  There is no pericardial effusion.      Chambers:  The cardiac chambers are qualitatively normal in size.    There is a patent foramen ovale with evidence of a small volume of left   to right flow of contrast agent.    Valves:  Minimal mitral annular calcification noted.    Non-cardiac:  No hilar and/or mediastinal adenopathy is noted. Visualized lungs are   clear. Visualized osseous structures are within normal limits.    IMPRESSION:    1.  Non-obstructive coronary artery disease:  LM:  angiographically normal  LAD:  mild (30-50%) calcified and non-calcified plaque in the proximal   segment  RI:  minimal (<30%) non-calcified plaque  LCX:  minimal (<30%) non-calcified plaque in the proximal segment  RCA:  angiographically normal  2.  Mild coronary artery calcium (Agatston score 83) as delineated above.    The observed calcium score is at percentile 59 for individuals of the   same age, gender, and race/ethnicity who are free of clinical   cardiovascular disease and treated diabetes.    LORI MOBLEY M.D., ATTENDING CARDIOLOGIST  This document has been electronically signed.  PAULINE JACK M.D., ATTENDING RADIOLOGIST  This document has been electronically signed. Sep 25 2018  5:36PM    < end of copied text >  ---------------------------------------------------------------------------------------------------------------  < from: [facility] Duplex Lower Ext Vein Scan, Emeterio (18 @ 16:47) >    EXAM:  DUPLEX SCAN EXT VEINS LOWER BI                          PROCEDURE DATE:  2018      INTERPRETATION:  CLINICAL INFORMATION: A prior examination, dated   10/10/2013, showed, DVT affecting a right peroneal vein, currently short   of breath, rule out DVT.    TECHNIQUE: Duplex sonography of the BILATERAL LOWER extremities with   color and spectral Doppler, with and without compression.      FINDINGS:    There is normal compressibility of the bilateral common femoral, femoral   and popliteal veins. No calf vein thrombosis is detected.    Doppler examination shows normal spontaneous and phasic flow.    IMPRESSION:     No evidence of bilateral lower extremity deep venous thrombosis.    LUIS PORRAS M.D., ATTENDING RADIOLOGIST  This document has been electronically signed. Sep 18 2018  5:25PM    < end of copied text >  ---------------------------------------------------------------------------------------------------------------    < from: Xray Chest 1 View AP/PA (18 @ 12:55) >    EXAM:  XR CHEST AP OR PA 1V                          PROCEDURE DATE:  2018      INTERPRETATION:  A single chest x-ray was obtained on 2018.    Indication: Dyspnea.    Impression:    The heart is normal in size. The lungs are clear. Degenerative changes of   the thoracic spine. No pneumothorax.    MEKA BURGOS M.D., ATTENDING RADIOLOGIST  This document has been electronically signed. Sep 17 2018  1:47PM      < end of copied text >  ---------------------------------------------------------------------------------------------------------------    < from: CT Angio Chest w/ IV Cont (18 @ 19:29) >    EXAM:  CT ANGIO CHEST (W)AW IC                          PROCEDURE DATE:  2018      INTERPRETATION:  CLINICAL INFORMATION: Shortness of breath. INR   subtherapeutic.    COMPARISON: CTA chest 2014    PROCEDURE:   CT Angiography of the Chest.  90 ml of Omnipaque 350 was injected intravenously. 10 ml were discarded.  Sagittal and coronal reformats were performed as well as 3D (MIP)   reconstructions.      FINDINGS:    CHEST:     LUNGS AND LARGE AIRWAYS: Patent central airways.  Bibasilar dependent   atelectasis. Left lower lobe calcified granuloma  PLEURA: Filling defects visualized within the left pulmonary artery, left   upper and lower lobar branches as well as the distal segmental and   subsegmental branches. Emboli are also noted within the segmental and   subsegmental branches of the right lower lobe pulmonary arterial branches.  VESSELS: Within normal limits. The main pulmonary artery measures   approximately 2.8 cm.  HEART: Heart size is normal. No pericardial effusion. No evidence of   right heart strain. Coronary artery calcifications.  MEDIASTINUM AND NAKIA: No lymphadenopathy.  CHEST WALL AND LOWER NECK: Within normal limits.  VISUALIZED UPPER ABDOMEN: Within normal limits.  BONES: Degenerative changes of the spine.    IMPRESSION:   Pulmonary embolism involving the left pulmonary artery, left upper lobe   are branches, as well as the distal segmental subsegmental branches.   Emboli are also noted of the right lower lobe segmental and subsegmental   branches.  No evidence of right heart strain or pulmonary infarct.    Findings were discussed with BEN Stephenson by Dr. Sosa on   2018 at 7:42 PM with read back.    KIMBERLY SOSA M.D., RADIOLOGY RESIDENT  This document has been electronically signed.  MARTA COLLINS M.D., ATTENDING RADIOLOGIST  This document has been electronically signed. Sep 17 2018  8:26PM      < end of copied text >  --------------------------------------------------------------------------------------------------------------- [facility]     PROGRESS NOTE    CHIEF COMPLAINT: pulmonary emboli; rhinoviral infection; bronchospasm; dyspnea; cough    INTERVAL HISTORY: ongoing episodes of SVT - previously with pauses; CTA of the coronory arteries without significant CAD - unable to adequately assess the lung parenchyma; no further chest pain/pressure - no palpitations; cough slowly improving exacerbated by talking and deep inspiration;  no shortness of breath, sputum production, chest congestion or wheeze;  no fevers, chills or sweats; walking without difficulty    REVIEW OF SYSTEMS:  Constitutional: As per interval history  HEENT: Within normal limits  CV: As per interval history  Resp: As per interval history  GI: Within normal limits   : Within normal limits  Musculoskeletal: Within normal limits  Skin: Within normal limits  Neurological: Within normal limits  Psychiatric: Within normal limits  Endocrine: Within normal limits  Hematologic/Lymphatic: Within normal limits  Allergic/Immunologic: Within normal limits      MEDICATIONS:     Pulmonary "  benzonatate 200 milliGRAM(s) Oral three times a day  guaiFENesin/dextromethorphan  Syrup 10 milliLiter(s) Oral four times a day  HYDROcodone/homatropine Syrup 5 milliLiter(s) Oral every 4 hours PRN      Anti-microbials:      Cardiovascular:      Other:  ARNUITY ELLIPTA 200MCG 1 Inhalation 1 Inhalation Oral daily  atorvastatin 10 milliGRAM(s) Oral at bedtime  benzocaine 15 mG/menthol 3.6 mG Lozenge 1 Lozenge Oral three times a day PRN  influenza   Vaccine 0.5 milliLiter(s) IntraMuscular once  pantoprazole    Tablet 40 milliGRAM(s) Oral before breakfast  warfarin 6 milliGRAM(s) Oral once        OBJECTIVE:    I&O's Detail    25 Sep 2018 07:  -  26 Sep 2018 07:00  --------------------------------------------------------  IN:    Oral Fluid: 780 mL  Total IN: 780 mL    OUT:    Voided: 550 mL  Total OUT: 550 mL    Total NET: 230 mL      26 Sep 2018 07:  -  26 Sep 2018 11:04  --------------------------------------------------------  IN:    Oral Fluid: 360 mL  Total IN: 360 mL    OUT:  Total OUT: 0 mL    Total NET: 360 mL    Daily Weight in k.8 (26 Sep 2018 06:33)    PHYSICAL EXAM:       ICU Vital Signs Last 24 Hrs  T(C): 37.1 (26 Sep 2018 05:00), Max: 37.2 (25 Sep 2018 20:39)  T(F): 98.7 (26 Sep 2018 05:00), Max: 98.9 (25 Sep 2018 20:39)  HR: 66 (26 Sep 2018 05:00) (60 - 82)  BP: 105/70 (26 Sep 2018 05:00) (94/61 - 106/61)  BP(mean): --  ABP: --  ABP(mean): --  RR: 18 (26 Sep 2018 05:00) (17 - 18)  SpO2: 97% (26 Sep 2018 05:00) (92% - 97%) on room air     General: Awake. Alert. Cooperative. Intermittent hacking cough. Appears stated age 	  HEENT:   Atraumatic. Normocephalic. Anicteric. Normal oral mucosa. PERRL. EOMI.  Neck: Supple. Trachea midline. Thyroid without enlargement/tenderness/nodules. No carotid bruit. No JVD.	  Cardiovascular: Regular rate and rhythm. S1 S2 normal. No murmurs, rubs or gallops.  Respiratory: Respirations unlabored. Clear to auscultation and percussion. No curvature.  Abdomen: Soft. Non-tender. Non-distended. No organomegaly. No masses. Normal bowel sounds.  Extremities: Warm to touch. No clubbing or cyanosis. Mild bilateral lower extremity edema  Pulses: 2+ peripheral pulses all extremities.	  Skin: Normal skin color. No rashes or lesions. No ecchymoses. No cyanosis. Warm to touch.  Lymph Nodes: Cervical, supraclavicular and axillary nodes normal  Neurological: Motor and sensory examination equal and normal. A and O x 3  Psychiatry: Appropriate mood and affect.    LABS:                        12.7   16.03 )-----------( 175      ( 26 Sep 2018 08:00 )             38.8                         12.9   20.29 )-----------( 174      ( 25 Sep 2018 07:58 )             39.1         141  |  106  |  22  ----------------------------<  106<H>  4.4   |  28  |  0.91        141  |  105  |  23  ----------------------------<  98  4.5   |  29  |  0.91    Ca      8.5          Ca      8.1<L>          Phos    3.4         Phos    3.5           Mg       2.3         Mg       2.4         PT/INR - ( 26 Sep 2018 08:06 )   PT: 24.4 sec;   INR: 2.13 ratio      CARDIAC MARKERS ( 24 Sep 2018 06:50 )  x     / x     / 39 U/L / x     / 1.7 ng/mL    < from: Transthoracic Echocardiogram (18 @ 10:03) >    Patient name: JOHNSON DELGADO  YOB: 1945   Age: 73 (F)   MR#: [id number]  Study Date: 2018  Location: [address] [place], [place] [id number]: Fernando Espino RDCS  Study quality: Technically fair  Referring Physician: Arthur Ricks MD  Blood Pressure: 128/82 mmHg  Height: 150 cm  Weight: 66 kg  BSA: 1.6 m2  ------------------------------------------------------------------------  PROCEDURE: Transthoracic echocardiogram with 2-D, M-Mode  and complete spectral and color flow Doppler.  INDICATION: Supraventricular tachycardia (I47.1)  ------------------------------------------------------------------------  Dimensions:    Normal Values:  LA:     3.5    2.0 - 4.0 cm  Ao:     3.4    2.0 - 3.8 cm  SEPTUM: 0.8    0.6 - 1.2 cm  PWT:    0.7    0.6 - 1.1 cm  LVIDd:  4.7    3.0 - 5.6 cm  LVIDs:  3.4    1.8 - 4.0 cm  Derived variables:  LVMI: 70 g/m2  RWT: 0.29  Fractional short: 28 %  EF (Visual Estimate): 55-60 %  Doppler Peak Velocity (m/sec): AoV=1.4  ------------------------------------------------------------------------  Observations:  Mitral Valve: Mitral annular calcification, otherwise  normal mitral valve. Minimal mitral regurgitation.  Aortic Valve/Aorta: Calcified trileaflet aortic valve with  normal opening. Peak transaortic valve gradient equals 8 mm  Hg, mean transaortic valve gradient equals 5 mm Hg, aortic  valve velocity time integral equals 32 cm. Minimal aortic  regurgitation. Peak left ventricular outflow tract gradient  equals 4 mm Hg, mean gradient is equal to 2 mm Hg, LVOT  velocity time integral equals 21 cm.  Aortic Root: 3.4 cm.  LVOT diameter: 1.8 cm.  Left Atrium: Normal left atrium.  LA volume index = 27  cc/m2. Mobile interatrial septum.  Left Ventricle: Endocardium not well visualized; grossly  normal left ventricular systolic function. Normal left  ventricular internal dimensions and wall thicknesses.  Right Heart: Normal right atrium. Normal right ventricular  size and function. Normal tricuspid valve. Mild tricuspid  regurgitation. Pulmonic valve not well visualized. No  pulmonic regurgitation.  Pericardium/Pleura: Normal pericardium with no pericardial  effusion.  Hemodynamic: Estimated right ventricular systolic pressure  equals 21 mm Hg, assuming right atrial pressure equals 3 mm  Hg, consistent with normal pulmonary pressures.  ------------------------------------------------------------------------  Conclusions:  1. Mitral annular calcification, otherwise normal mitral  valve. Minimal mitral regurgitation.  2. Calcified trileaflet aortic valve with normal opening.  Minimal aortic regurgitation.  3. Endocardium not well visualized; grossly normal left  ventricular systolic function.  4. Normal right ventricular size and function.  *** Compared with echocardiogram of 10/12/2013,results are  similar on today's study.  ------------------------------------------------------------------------  Confirmed on  2018 - 12:56:58 by Kathryn Díaz M.D.  ------------------------------------------------------------------------    < end of copied text >  ---------------------------------------------------------------------------------------------------------------   MICROBIOLOGY:   Urinalysis Basic - ( 25 Sep 2018 20:13 )    Color: Yellow / Appearance: Clear / S.044 / pH: x  Gluc: x / Ketone: Negative  / Bili: Negative / Urobili: 1.0 mg/dL   Blood: x / Protein: Negative mg/dL / Nitrite: Negative   Leuk Esterase: Negative / RBC: 2 /HPF / WBC 6 /HPF   Sq Epi: x / Non Sq Epi: 1 /HPF / Bacteria: Negative    Rapid Respiratory Viral Panel (18 @ 13:23)    Rapid RVP Result: Detected: The FilmArray RVP Rapid uses polymerase chain reaction (PCR) and melt  curve analysis to screen for adenovirus; coronavirus HKU1, NL63, 229E,  OC43; human metapneumovirus (hMPV); human enterovirus/rhinovirus  (Entero/RV); influenza A; influenza A/H1;influenza A/H3; influenza  A/H1-2009; influenza B; parainfluenza viruses 1, 2, 3, 4; respiratory  syncytial virus; Bordetella pertussis; Mycoplasma pneumoniae; and  Chlamydophila pneumoniae.    Entero/Rhinovirus (RapRVP): Detected    RADIOLOGY:  [x] Chest radiographs reviewed and interpreted by me    < from: CT Heart with Coronaries (18 @ 16:33) >    EXAM:  CT HEART WITH CORONARIES                            PROCEDURE DATE:  2018            INTERPRETATION:  Indication:  Coronary artery disease    History:  Ms. Delgado is a 73-year-old-woman with prothrombin gene   mutation, pulmonary embolus and history of breast cancer noted to have   premature ventricular contractions.    Resting heart rate (contrast-enhanced acquisition):  48 beats per minute    Quality:  Good    Post-processing:  Three-dimensional volume-rendered and multiplanar   reconstructions generated with [employer] software.     FINDINGS:    Cardiovascular:    Coronary arteries:   Coronary artery calcium Agatston score:    Left main (LM)coronary artery:  0  Left anterior descending (LAD) coronary artery:  83  Left circumflex (LCX) coronary artery:  0  Right coronary artery (RCA):  0  Total:  83    Right-dominant coronary circulation.   The LM coronary artery is angiographically normal.   The LAD coronary artery has mild (30-50%) calcified and non-calcified   plaque in the proximal segment.  Small-caliber diagonal branches are   present.   The large-caliber ramus intermedius (RI) branch has minimal (<30%)   non-calcified plaque.  The LCX coronary artery has minimal (<30%) non-calcified plaque in the   proximal segment.  The LCX terminates as a left posterolateral (LPL)   branch.  The obtuse marginal (OM) branch is angiographically normal.   The RCA is angiographically normal. The visualized right posterior   descending artery (PDA) is angiographically normal.     Aorta:  No thoracic aortic dissection noted in the visualized thoracic aorta.      There is minimal non-calcified and calcified plaque in the visualized   thoracic aorta.    Pericardium:  There is no pericardial effusion.      Chambers:  The cardiac chambers are qualitatively normal in size.    There is a patent foramen ovale with evidence of a small volume of left   to right flow of contrast agent.    Valves:  Minimal mitral annular calcification noted.    Non-cardiac:  No hilar and/or mediastinal adenopathy is noted. Visualized lungs are   clear. Visualized osseous structures are within normal limits.    IMPRESSION:    1.  Non-obstructive coronary artery disease:  LM:  angiographically normal  LAD:  mild (30-50%) calcified and non-calcified plaque in the proximal   segment  RI:  minimal (<30%) non-calcified plaque  LCX:  minimal (<30%) non-calcified plaque in the proximal segment  RCA:  angiographically normal  2.  Mild coronary artery calcium (Agatston score 83) as delineated above.    The observed calcium score is at percentile 59 for individuals of the   same age, gender, and race/ethnicity who are free of clinical   cardiovascular disease and treated diabetes.    LORI MOBLEY M.D., ATTENDING CARDIOLOGIST  This document has been electronically signed.  PAULINE JACK M.D., ATTENDING RADIOLOGIST  This document has been electronically signed. Sep 25 2018  5:36PM    < end of copied text >  ---------------------------------------------------------------------------------------------------------------  < from: [facility] Duplex Lower Ext Vein Scan, Emeterio (18 @ 16:47) >    EXAM:  DUPLEX SCAN EXT VEINS LOWER BI                          PROCEDURE DATE:  2018      INTERPRETATION:  CLINICAL INFORMATION: A prior examination, dated   10/10/2013, showed, DVT affecting a right peroneal vein, currently short   of breath, rule out DVT.    TECHNIQUE: Duplex sonography of the BILATERAL LOWER extremities with   color and spectral Doppler, with and without compression.      FINDINGS:    There is normal compressibility of the bilateral common femoral, femoral   and popliteal veins. No calf vein thrombosis is detected.    Doppler examination shows normal spontaneous and phasic flow.    IMPRESSION:     No evidence of bilateral lower extremity deep venous thrombosis.    LUIS PORRAS M.D., ATTENDING RADIOLOGIST  This document has been electronically signed. Sep 18 2018  5:25PM    < end of copied text >  ---------------------------------------------------------------------------------------------------------------    < from: Xray Chest 1 View AP/PA (18 @ 12:55) >    EXAM:  XR CHEST AP OR PA 1V                          PROCEDURE DATE:  2018      INTERPRETATION:  A single chest x-ray was obtained on 2018.    Indication: Dyspnea.    Impression:    The heart is normal in size. The lungs are clear. Degenerative changes of   the thoracic spine. No pneumothorax.    MEKA BURGOS M.D., ATTENDING RADIOLOGIST  This document has been electronically signed. Sep 17 2018  1:47PM      < end of copied text >  ---------------------------------------------------------------------------------------------------------------    < from: CT Angio Chest w/ IV Cont (18 @ 19:29) >    EXAM:  CT ANGIO CHEST (W)AW IC                          PROCEDURE DATE:  2018      INTERPRETATION:  CLINICAL INFORMATION: Shortness of breath. INR   subtherapeutic.    COMPARISON: CTA chest 2014    PROCEDURE:   CT Angiography of the Chest.  90 ml of Omnipaque 350 was injected intravenously. 10 ml were discarded.  Sagittal and coronal reformats were performed as well as 3D (MIP)   reconstructions.      FINDINGS:    CHEST:     LUNGS AND LARGE AIRWAYS: Patent central airways.  Bibasilar dependent   atelectasis. Left lower lobe calcified granuloma  PLEURA: Filling defects visualized within the left pulmonary artery, left   upper and lower lobar branches as well as the distal segmental and   subsegmental branches. Emboli are also noted within the segmental and   subsegmental branches of the right lower lobe pulmonary arterial branches.  VESSELS: Within normal limits. The main pulmonary artery measures   approximately 2.8 cm.  HEART: Heart size is normal. No pericardial effusion. No evidence of   right heart strain. Coronary artery calcifications.  MEDIASTINUM AND NAKIA: No lymphadenopathy.  CHEST WALL AND LOWER NECK: Within normal limits.  VISUALIZED UPPER ABDOMEN: Within normal limits.  BONES: Degenerative changes of the spine.    IMPRESSION:   Pulmonary embolism involving the left pulmonary artery, left upper lobe   are branches, as well as the distal segmental subsegmental branches.   Emboli are also noted of the right lower lobe segmental and subsegmental   branches.  No evidence of right heart strain or pulmonary infarct.    Findings were discussed with BEN Stephenson by Dr. Sosa on   2018 at 7:42 PM with read back.    KIMBERLY SOSA M.D., RADIOLOGY RESIDENT  This document has been electronically signed.  MARTA COLLINS M.D., ATTENDING RADIOLOGIST  This document has been electronically signed. Sep 17 2018  8:26PM      < end of copied text >  ---------------------------------------------------------------------------------------------------------------

## 2018-09-26 NOTE — PROGRESS NOTE ADULT - PROBLEM SELECTOR PLAN 7
asymptomatic, ? due to PE or bronchospasm  reconsult EP  as above
asymptomatic, ? due to PE or bronchospasm  d/w cardiology may require further monitoring as outpt

## 2018-09-26 NOTE — PROGRESS NOTE ADULT - ASSESSMENT
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     "pauses", bradycardia, PAT, PVCs on telemetry     h/o breast cancer s/p lumpectomy    hypertension    PLAN/RECOMMENDATIONS:    stable oxygenation on room air  prednisone taper ongoing  off nebs  Arnuity ellipta 200mcg 1 inhalation daily - patient given samples and instructed in the use of the device  robitussin DM/tessalon/hycodan as needed  coumadin for INR 2 - 3 - should not be considered a coumadin "failure" given the subtherapeutic INR when admitted  GI prophylaxis on A/C and steroids - protonix  no indication for IVC filter in the absence of lower extremity clot  cardiac meds: lipitor - off verapamil  ECHO unremarkable (as above)  coronary CTA without obstructive CAD  cardiology evaluation noted - EP reevaluation for possible ablation - if unable to be done at present due to recent PE, to be restarted ib verapamil and observed on telemetry for artur arrhythmias that might required pacemaker placement  follow-up CT scan of the chest to evaluate abnormal lung examination and persistent cough    Will follow with you. Plan of care discussed with the patient at bedside.    Rd Lira MD, [facility] - [phone]  Pulmonary Medicine

## 2018-09-26 NOTE — PROVIDER CONTACT NOTE (OTHER) - ASSESSMENT
stat BMP ordered but AM BMP resulted
pt resting in bed
pt walked to use restroom, denies dizziness, chest pain or shortnesss of breath. 130/75 77
Pt asymptomatic  /69  HR 69  RR 22  O2 99% on 3 LNC
Pt denies chest pain/palpitations. VSS. 98F HR 82 /61 RR 18 92% on 2 L nasal cannula.
VSS. 127/80 HR: 73 O2:97%
VSS. BP: 124/74 HR: 59 O2:95%. Patient asymptomatic
pt A&Ox4, vital sign: 98.7F, HR 66, /70, RR 18, o2 sat 97% on RA. Pt is asymptomatic, no complaints of sob or c/p.
pt denies chest pain/palpitations. VSS.
vSS, pt asymptomatic
Pt in the bathroom at the time. Pt denies straining, dizziness, palpitations or lightheadness. VSS

## 2018-09-26 NOTE — PROVIDER CONTACT NOTE (OTHER) - RECOMMENDATIONS
Continue to monitor
Continue to monitor
EKG. cardiac enzymes
PA notified. Will continue to monitor.
Will continue to monitor.
Notify provder

## 2018-09-26 NOTE — PROGRESS NOTE ADULT - SUBJECTIVE AND OBJECTIVE BOX
Events since admission noted.    Although patient had her pauses after urination, they occured at least several minutes later when washing up and brushing her teeth.  Even prior to verapamil having been discontinued, patient did have SVT on tele.  She is now having continued episodes of SVT, asymptomatic.  Electrolytes have been normal.  CTCA revealed non-obstructive CAD.        REVIEW OF SYSTEMS:  CARDIOVASCULAR: No chest pain, dyspnea or palpitations  All other review of systems is negative unless indicated above    Medications:  ARNUITY ELLIPTA 200MCG 1 Inhalation 1 Inhalation Oral daily  atorvastatin 10 milliGRAM(s) Oral at bedtime  benzocaine 15 mG/menthol 3.6 mG Lozenge 1 Lozenge Oral three times a day PRN  benzonatate 200 milliGRAM(s) Oral three times a day  guaiFENesin/dextromethorphan  Syrup 10 milliLiter(s) Oral four times a day  HYDROcodone/homatropine Syrup 5 milliLiter(s) Oral <User Schedule>  influenza   Vaccine 0.5 milliLiter(s) IntraMuscular once  pantoprazole    Tablet 40 milliGRAM(s) Oral before breakfast  warfarin 6 milliGRAM(s) Oral once      Physical Exam:  Vitals:  T(C): 37.1 (18 @ 05:00), Max: 37.2 (18 @ 20:39)  HR: 66 (18 @ 05:00) (60 - 82)  BP: 105/70 (18 @ 05:00) (94/61 - 106/61)  BP(mean): --  RR: 18 (18 @ 05:00) (17 - 18)  SpO2: 97% (18 @ 05:00) (92% - 97%)  Wt(kg): --  Daily     Daily Weight in k.8 (26 Sep 2018 06:33)  I&O's Summary    25 Sep 2018 07:  -  26 Sep 2018 07:00  --------------------------------------------------------  IN: 780 mL / OUT: 550 mL / NET: 230 mL    26 Sep 2018 07:  -  26 Sep 2018 10:58  --------------------------------------------------------  IN: 360 mL / OUT: 0 mL / NET: 360 mL        Appearance:  Normal, NAD  Eyes:  EOMI  Neck:  No JVD  Respiratory: Clear to auscultation bilaterally  Cardiovascular: Normal S1 and S2 with soft systolic murmur LSB.  No rubs or gallops  Abdomen:   BS normal, Soft,  Non-tender  Extremities: Without edema              Complete Blood Count + Automated Diff in AM (18 @ 08:00)    WBC Count: 16.03 K/uL    RBC Count: 4.15: Test Repeated Specimen integrity verified. M/uL    Hemoglobin: 12.7 g/dL    Hematocrit: 38.8 %    Mean Cell Volume: 93.5 fl    Mean Cell Hemoglobin: 30.6 pg    Mean Cell Hemoglobin Conc: 32.7 gm/dL    Red Cell Distrib Width: 13.7 %    Platelet Count - Automated: 175 K/uL          141  |  106  |  22  ----------------------------<  106<H>  4.4   |  28  |  0.91    Ca    8.5      26 Sep 2018 06:38  Phos  3.4       Mg     2.3           PT/INR - ( 26 Sep 2018 08:06 )   PT: 24.4 sec;   INR: 2.13 ratio

## 2018-09-27 LAB
ANION GAP SERPL CALC-SCNC: 11 MMOL/L — SIGNIFICANT CHANGE UP (ref 5–17)
BUN SERPL-MCNC: 21 MG/DL — SIGNIFICANT CHANGE UP (ref 7–23)
CALCIUM SERPL-MCNC: 8.4 MG/DL — SIGNIFICANT CHANGE UP (ref 8.4–10.5)
CHLORIDE SERPL-SCNC: 103 MMOL/L — SIGNIFICANT CHANGE UP (ref 96–108)
CO2 SERPL-SCNC: 26 MMOL/L — SIGNIFICANT CHANGE UP (ref 22–31)
CREAT SERPL-MCNC: 0.91 MG/DL — SIGNIFICANT CHANGE UP (ref 0.5–1.3)
GLUCOSE SERPL-MCNC: 93 MG/DL — SIGNIFICANT CHANGE UP (ref 70–99)
HCT VFR BLD CALC: 38.3 % — SIGNIFICANT CHANGE UP (ref 34.5–45)
HGB BLD-MCNC: 12.9 G/DL — SIGNIFICANT CHANGE UP (ref 11.5–15.5)
INR BLD: 2.62 RATIO — HIGH (ref 0.88–1.16)
MCHC RBC-ENTMCNC: 31.2 PG — SIGNIFICANT CHANGE UP (ref 27–34)
MCHC RBC-ENTMCNC: 33.7 GM/DL — SIGNIFICANT CHANGE UP (ref 32–36)
MCV RBC AUTO: 92.5 FL — SIGNIFICANT CHANGE UP (ref 80–100)
PLATELET # BLD AUTO: 168 K/UL — SIGNIFICANT CHANGE UP (ref 150–400)
POTASSIUM SERPL-MCNC: 4.3 MMOL/L — SIGNIFICANT CHANGE UP (ref 3.5–5.3)
POTASSIUM SERPL-SCNC: 4.3 MMOL/L — SIGNIFICANT CHANGE UP (ref 3.5–5.3)
PROTHROM AB SERPL-ACNC: 30.2 SEC — HIGH (ref 10–13.1)
RBC # BLD: 4.14 M/UL — SIGNIFICANT CHANGE UP (ref 3.8–5.2)
RBC # FLD: 13.8 % — SIGNIFICANT CHANGE UP (ref 10.3–14.5)
SODIUM SERPL-SCNC: 140 MMOL/L — SIGNIFICANT CHANGE UP (ref 135–145)
WBC # BLD: 17.03 K/UL — HIGH (ref 3.8–10.5)
WBC # FLD AUTO: 17.03 K/UL — HIGH (ref 3.8–10.5)

## 2018-09-27 RX ORDER — WARFARIN SODIUM 2.5 MG/1
5 TABLET ORAL ONCE
Qty: 0 | Refills: 0 | Status: COMPLETED | OUTPATIENT
Start: 2018-09-27 | End: 2018-09-27

## 2018-09-27 RX ADMIN — Medication 200 MILLIGRAM(S): at 21:54

## 2018-09-27 RX ADMIN — Medication 30 MILLIGRAM(S): at 05:46

## 2018-09-27 RX ADMIN — ATORVASTATIN CALCIUM 10 MILLIGRAM(S): 80 TABLET, FILM COATED ORAL at 21:59

## 2018-09-27 RX ADMIN — WARFARIN SODIUM 5 MILLIGRAM(S): 2.5 TABLET ORAL at 21:54

## 2018-09-27 RX ADMIN — Medication 120 MILLIGRAM(S): at 05:46

## 2018-09-27 RX ADMIN — Medication 200 MILLIGRAM(S): at 05:46

## 2018-09-27 RX ADMIN — PANTOPRAZOLE SODIUM 40 MILLIGRAM(S): 20 TABLET, DELAYED RELEASE ORAL at 05:46

## 2018-09-27 NOTE — PROGRESS NOTE ADULT - SUBJECTIVE AND OBJECTIVE BOX
[facility] - [facility]     PROGRESS NOTE    CHIEF COMPLAINT: pulmonary emboli; rhinoviral infection; bronchospasm; dyspnea; cough    INTERVAL HISTORY: EP team is uncomfortable pursuing an ablation procedure at present due to the patient's recent pulmonary embolism - restarted on verapamil with no recurrent SVT - having frequent PVCs and brief episodes of bradycardia; CTA of the coronory arteries without obstructive disease - unable to adequately assess the lung parenchyma; no chest pain/pressure or palpitations; cough slowly improving exacerbated by deep inspiration; no shortness of breath, sputum production, chest congestion or wheeze on room air;  no fevers, chills or sweats; walking without difficulty    REVIEW OF SYSTEMS:  Constitutional: As per interval history  HEENT: Within normal limits  CV: As per interval history  Resp: As per interval history  GI: Within normal limits   : Within normal limits  Musculoskeletal: Within normal limits  Skin: Within normal limits  Neurological: Within normal limits  Psychiatric: Within normal limits  Endocrine: Within normal limits  Hematologic/Lymphatic: Within normal limits  Allergic/Immunologic: Within normal limits    MEDICATIONS:     Pulmonary "  benzonatate 200 milliGRAM(s) Oral three times a day  guaiFENesin/dextromethorphan  Syrup 10 milliLiter(s) Oral four times a day  HYDROcodone/homatropine Syrup 5 milliLiter(s) Oral every 4 hours PRN      Anti-microbials:      Cardiovascular:  verapamil  milliGRAM(s) Oral daily      Other:  ARNUITY ELLIPTA 200MCG 1 Inhalation 1 Inhalation Oral daily  atorvastatin 10 milliGRAM(s) Oral at bedtime  benzocaine 15 mG/menthol 3.6 mG Lozenge 1 Lozenge Oral three times a day PRN  influenza   Vaccine 0.5 milliLiter(s) IntraMuscular once  pantoprazole    Tablet 40 milliGRAM(s) Oral before breakfast  predniSONE   Tablet 30 milliGRAM(s) Oral daily        OBJECTIVE:    I&O's Detail    26 Sep 2018 07:  -  27 Sep 2018 07:00  --------------------------------------------------------  IN:    Oral Fluid: 840 mL  Total IN: 840 mL    OUT:    Voided: 400 mL  Total OUT: 400 mL    Total NET: 440 mL      27 Sep 2018 07:01  -  27 Sep 2018 08:52  --------------------------------------------------------  IN:    Oral Fluid: 360 mL  Total IN: 360 mL    OUT:  Total OUT: 0 mL    Total NET: 360 mL    Daily Weight in k.2 (27 Sep 2018 05:50)    PHYSICAL EXAM:       ICU Vital Signs Last 24 Hrs  T(C): 36.8 (27 Sep 2018 04:48), Max: 36.8 (26 Sep 2018 13:28)  T(F): 98.2 (27 Sep 2018 04:48), Max: 98.3 (26 Sep 2018 13:28)  HR: 71 (27 Sep 2018 04:48) (61 - 71)  BP: 118/72 (27 Sep 2018 04:48) (96/59 - 118/72)  BP(mean): --  ABP: --  ABP(mean): --  RR: 18 (27 Sep 2018 04:48) (17 - 18)  SpO2: 95% (27 Sep 2018 04:48) (95% - 95%) on room air     General: Awake. Alert. Cooperative. Decreased cough. Appears stated age 	  HEENT:   Atraumatic. Normocephalic. Anicteric. Normal oral mucosa. PERRL. EOMI.  Neck: Supple. Trachea midline. Thyroid without enlargement/tenderness/nodules. No carotid bruit. No JVD.	  Cardiovascular: Regular rate and rhythm. S1 S2 normal. No murmurs, rubs or gallops.  Respiratory: Respirations unlabored. Clear to auscultation and percussion. No curvature.  Abdomen: Soft. Non-tender. Non-distended. No organomegaly. No masses. Normal bowel sounds.  Extremities: Warm to touch. No clubbing or cyanosis. No lower extremity edema  Pulses: 2+ peripheral pulses all extremities.	  Skin: Normal skin color. No rashes or lesions. No ecchymoses. No cyanosis. Warm to touch.  Lymph Nodes: Cervical, supraclavicular and axillary nodes normal  Neurological: Motor and sensory examination equal and normal. A and O x 3  Psychiatry: Appropriate mood and affect.    LABS:                        12.9   17.03 )-----------( 168      ( 27 Sep 2018 08:22 )             38.3                         12.7   16.03 )-----------( 175      ( 26 Sep 2018 08:00 )             38.8     09-27    140  |  103  |  21  ----------------------------<  93  4.3   |  26  |  0.91    -26    141  |  106  |  22  ----------------------------<  106<H>  4.4   |  28  |  0.91    Ca      8.4      -    Ca      8.5      -    Phos    3.4         Phos    3.5           Mg       2.3         Mg       2.4         PT/INR - ( 26 Sep 2018 08:06 )   PT: 24.4 sec;   INR: 2.13 ratio      CARDIAC MARKERS ( 24 Sep 2018 06:50 )  x     / x     / 39 U/L / x     / 1.7 ng/mL    < from: Transthoracic Echocardiogram (18 @ 10:03) >    Patient name: JOHNSON DELGADO  YOB: 1945   Age: 73 (F)   MR#: [id number]  Study Date: 2018  Location: [address] [place], [place] [id number]: Fernando Espino RDCS  Study quality: Technically fair  Referring Physician: Arthur Ricks MD  Blood Pressure: 128/82 mmHg  Height: 150 cm  Weight: 66 kg  BSA: 1.6 m2  ------------------------------------------------------------------------  PROCEDURE: Transthoracic echocardiogram with 2-D, M-Mode  and complete spectral and color flow Doppler.  INDICATION: Supraventricular tachycardia (I47.1)  ------------------------------------------------------------------------  Dimensions:    Normal Values:  LA:     3.5    2.0 - 4.0 cm  Ao:     3.4    2.0 - 3.8 cm  SEPTUM: 0.8    0.6 - 1.2 cm  PWT:    0.7    0.6 - 1.1 cm  LVIDd:  4.7    3.0 - 5.6 cm  LVIDs:  3.4    1.8 - 4.0 cm  Derived variables:  LVMI: 70 g/m2  RWT: 0.29  Fractional short: 28 %  EF (Visual Estimate): 55-60 %  Doppler Peak Velocity (m/sec): AoV=1.4  ------------------------------------------------------------------------  Observations:  Mitral Valve: Mitral annular calcification, otherwise  normal mitral valve. Minimal mitral regurgitation.  Aortic Valve/Aorta: Calcified trileaflet aortic valve with  normal opening. Peak transaortic valve gradient equals 8 mm  Hg, mean transaortic valve gradient equals 5 mm Hg, aortic  valve velocity time integral equals 32 cm. Minimal aortic  regurgitation. Peak left ventricular outflow tract gradient  equals 4 mm Hg, mean gradient is equal to 2 mm Hg, LVOT  velocity time integral equals 21 cm.  Aortic Root: 3.4 cm.  LVOT diameter: 1.8 cm.  Left Atrium: Normal left atrium.  LA volume index = 27  cc/m2. Mobile interatrial septum.  Left Ventricle: Endocardium not well visualized; grossly  normal left ventricular systolic function. Normal left  ventricular internal dimensions and wall thicknesses.  Right Heart: Normal right atrium. Normal right ventricular  size and function. Normal tricuspid valve. Mild tricuspid  regurgitation. Pulmonic valve not well visualized. No  pulmonic regurgitation.  Pericardium/Pleura: Normal pericardium with no pericardial  effusion.  Hemodynamic: Estimated right ventricular systolic pressure  equals 21 mm Hg, assuming right atrial pressure equals 3 mm  Hg, consistent with normal pulmonary pressures.  ------------------------------------------------------------------------  Conclusions:  1. Mitral annular calcification, otherwise normal mitral  valve. Minimal mitral regurgitation.  2. Calcified trileaflet aortic valve with normal opening.  Minimal aortic regurgitation.  3. Endocardium not well visualized; grossly normal left  ventricular systolic function.  4. Normal right ventricular size and function.  *** Compared with echocardiogram of 10/12/2013,results are  similar on today's study.  ------------------------------------------------------------------------  Confirmed on  2018 - 12:56:58 by Kathryn Díaz M.D.  ------------------------------------------------------------------------    < end of copied text >  ---------------------------------------------------------------------------------------------------------------   MICROBIOLOGY:   Urinalysis Basic - ( 25 Sep 2018 20:13 )    Color: Yellow / Appearance: Clear / S.044 / pH: x  Gluc: x / Ketone: Negative  / Bili: Negative / Urobili: 1.0 mg/dL   Blood: x / Protein: Negative mg/dL / Nitrite: Negative   Leuk Esterase: Negative / RBC: 2 /HPF / WBC 6 /HPF   Sq Epi: x / Non Sq Epi: 1 /HPF / Bacteria: Negative    Culture - Urine (18 @ 20:29)    Specimen Source: .Urine Clean Catch (Midstream)    Culture Results:   No growth    Rapid Respiratory Viral Panel (18 @ 13:23)    Rapid RVP Result: Detected: The Film[employer] RVP Rapid uses polymerase chain reaction (PCR) and melt  curve analysis to screen for adenovirus; coronavirus HKU1, NL63, 229E,  OC43; human metapneumovirus (hMPV); human enterovirus/rhinovirus  (Entero/RV); influenza A; influenza A/H1;influenza A/H3; influenza  A/H1-2009; influenza B; parainfluenza viruses 1, 2, 3, 4; respiratory  syncytial virus; Bordetella pertussis; Mycoplasma pneumoniae; and  Chlamydophila pneumoniae.    Entero/Rhinovirus (RapRVP): Detected    RADIOLOGY:  [x] Chest radiographs reviewed and interpreted by me    < from: CT Heart with Coronaries (18 @ 16:33) >    EXAM:  CT HEART WITH CORONARIES                          PROCEDURE DATE:  2018      INTERPRETATION:  Indication:  Coronary artery disease    History:  Ms. Delgado is a 73-year-old-woman with prothrombin gene   mutation, pulmonary embolus and history of breast cancer noted to have   premature ventricular contractions.    Resting heart rate (contrast-enhanced acquisition):  48 beats per minute    Quality:  Good    Post-processing:  Three-dimensional volume-rendered and multiplanar   reconstructions generated with Vitrea software.     FINDINGS:    Cardiovascular:    Coronary arteries:   Coronary artery calcium Agatston score:    Left main (LM)coronary artery:  0  Left anterior descending (LAD) coronary artery:  83  Left circumflex (LCX) coronary artery:  0  Right coronary artery (RCA):  0  Total:  83    Right-dominant coronary circulation.   The LM coronary artery is angiographically normal.   The LAD coronary artery has mild (30-50%) calcified and non-calcified   plaque in the proximal segment.  Small-caliber diagonal branches are   present.   The large-caliber ramus intermedius (RI) branch has minimal (<30%)   non-calcified plaque.  The LCX coronary artery has minimal (<30%) non-calcified plaque in the   proximal segment.  The LCX terminates as a left posterolateral (LPL)   branch.  The obtuse marginal (OM) branch is angiographically normal.   The RCA is angiographically normal. The visualized right posterior   descending artery (PDA) is angiographically normal.     Aorta:  No thoracic aortic dissection noted in the visualized thoracic aorta.      There is minimal non-calcified and calcified plaque in the visualized   thoracic aorta.    Pericardium:  There is no pericardial effusion.      Chambers:  The cardiac chambers are qualitatively normal in size.    There is a patent foramen ovale with evidence of a small volume of left   to right flow of contrast agent.    Valves:  Minimal mitral annular calcification noted.    Non-cardiac:  No hilar and/or mediastinal adenopathy is noted. Visualized lungs are   clear. Visualized osseous structures are within normal limits.    IMPRESSION:    1.  Non-obstructive coronary artery disease:  LM:  angiographically normal  LAD:  mild (30-50%) calcified and non-calcified plaque in the proximal   segment  RI:  minimal (<30%) non-calcified plaque  LCX:  minimal (<30%) non-calcified plaque in the proximal segment  RCA:  angiographically normal  2.  Mild coronary artery calcium (Agatston score 83) as delineated above.    The observed calcium score is at percentile 59 for individuals of the   same age, gender, and race/ethnicity who are free of clinical   cardiovascular disease and treated diabetes.    LORI MOBLEY M.D., ATTENDING CARDIOLOGIST  This document has been electronically signed.  PAULINE JACK M.D., ATTENDING RADIOLOGIST  This document has been electronically signed. Sep 25 2018  5:36PM    < end of copied text >  ---------------------------------------------------------------------------------------------------------------  < from: [facility] Duplex Lower Ext Vein Scan, Emeterio (18 @ 16:47) >    EXAM:  DUPLEX SCAN EXT VEINS LOWER BI                          PROCEDURE DATE:  2018      INTERPRETATION:  CLINICAL INFORMATION: A prior examination, dated   10/10/2013, showed, DVT affecting a right peroneal vein, currently short   of breath, rule out DVT.    TECHNIQUE: Duplex sonography of the BILATERAL LOWER extremities with   color and spectral Doppler, with and without compression.      FINDINGS:    There is normal compressibility of the bilateral common femoral, femoral   and popliteal veins. No calf vein thrombosis is detected.    Doppler examination shows normal spontaneous and phasic flow.    IMPRESSION:     No evidence of bilateral lower extremity deep venous thrombosis.    LUIS PORRAS M.D., ATTENDING RADIOLOGIST  This document has been electronically signed. Sep 18 2018  5:25PM    < end of copied text >  ---------------------------------------------------------------------------------------------------------------    < from: Xray Chest 1 View AP/PA (18 @ 12:55) >    EXAM:  XR CHEST AP OR PA 1V                          PROCEDURE DATE:  2018      INTERPRETATION:  A single chest x-ray was obtained on 2018.    Indication: Dyspnea.    Impression:    The heart is normal in size. The lungs are clear. Degenerative changes of   the thoracic spine. No pneumothorax.    MEKA BURGOS M.D., ATTENDING RADIOLOGIST  This document has been electronically signed. Sep 17 2018  1:47PM      < end of copied text >  ---------------------------------------------------------------------------------------------------------------    < from: CT Angio Chest w/ IV Cont (18 @ 19:29) >    EXAM:  CT ANGIO CHEST (W)AW IC                          PROCEDURE DATE:  2018      INTERPRETATION:  CLINICAL INFORMATION: Shortness of breath. INR   subtherapeutic.    COMPARISON: CTA chest 2014    PROCEDURE:   CT Angiography of the Chest.  90 ml of Omnipaque 350 was injected intravenously. 10 ml were discarded.  Sagittal and coronal reformats were performed as well as 3D (MIP)   reconstructions.      FINDINGS:    CHEST:     LUNGS AND LARGE AIRWAYS: Patent central airways.  Bibasilar dependent   atelectasis. Left lower lobe calcified granuloma  PLEURA: Filling defects visualized within the left pulmonary artery, left   upper and lower lobar branches as well as the distal segmental and   subsegmental branches. Emboli are also noted within the segmental and   subsegmental branches of the right lower lobe pulmonary arterial branches.  VESSELS: Within normal limits. The main pulmonary artery measures   approximately 2.8 cm.  HEART: Heart size is normal. No pericardial effusion. No evidence of   right heart strain. Coronary artery calcifications.  MEDIASTINUM AND NAKIA: No lymphadenopathy.  CHEST WALL AND LOWER NECK: Within normal limits.  VISUALIZED UPPER ABDOMEN: Within normal limits.  BONES: Degenerative changes of the spine.    IMPRESSION:   Pulmonary embolism involving the left pulmonary artery, left upper lobe   are branches, as well as the distal segmental subsegmental branches.   Emboli are also noted of the right lower lobe segmental and subsegmental   branches.  No evidence of right heart strain or pulmonary infarct.    Findings were discussed with BEN Stephenson by Dr. Sosa on   2018 at 7:42 PM with read back.    KIMBERLY SOSA M.D., RADIOLOGY RESIDENT  This document has been electronically signed.  MARTA COLLINS M.D., ATTENDING RADIOLOGIST  This document has been electronically signed. Sep 17 2018  8:26PM      < end of copied text >  ---------------------------------------------------------------------------------------------------------------

## 2018-09-27 NOTE — PROGRESS NOTE ADULT - SUBJECTIVE AND OBJECTIVE BOX
No c/o palpitation, dyspnea.    EPS contacted.  Cannot do SVT ablation at this time due to recent PE.    Started verapamil yesterday and received today's dose already.  No artur.  No further SVT since dosed.  Having frequent   single PVCs.        REVIEW OF SYSTEMS:  CARDIOVASCULAR: No chest pain, dyspnea or palpitations  All other review of systems is negative unless indicated above    Medications:  ARNUITY ELLIPTA 200MCG 1 Inhalation 1 Inhalation Oral daily  atorvastatin 10 milliGRAM(s) Oral at bedtime  benzocaine 15 mG/menthol 3.6 mG Lozenge 1 Lozenge Oral three times a day PRN  benzonatate 200 milliGRAM(s) Oral three times a day  guaiFENesin/dextromethorphan  Syrup 10 milliLiter(s) Oral four times a day  HYDROcodone/homatropine Syrup 5 milliLiter(s) Oral every 4 hours PRN  influenza   Vaccine 0.5 milliLiter(s) IntraMuscular once  pantoprazole    Tablet 40 milliGRAM(s) Oral before breakfast  predniSONE   Tablet 30 milliGRAM(s) Oral daily  verapamil  milliGRAM(s) Oral daily      Physical Exam:  Vitals:  T(C): 36.8 (18 @ 04:48), Max: 36.8 (18 @ 13:28)  HR: 71 (18 @ 04:48) (61 - 71)  BP: 118/72 (18 @ 04:48) (96/59 - 118/72)  BP(mean): --  RR: 18 (18 @ 04:48) (17 - 18)  SpO2: 95% (18 @ 04:48) (95% - 95%)  Wt(kg): --  Daily     Daily Weight in k.2 (27 Sep 2018 05:50)  I&O's Summary    26 Sep 2018 07:  -  27 Sep 2018 07:00  --------------------------------------------------------  IN: 840 mL / OUT: 400 mL / NET: 440 mL    27 Sep 2018 07:  -  27 Sep 2018 08:42  --------------------------------------------------------  IN: 360 mL / OUT: 0 mL / NET: 360 mL      Appearance:  Normal, NAD  Eyes:  EOMI  Neck:  No JVD  Respiratory: Clear to auscultation bilaterally  Cardiovascular: Normal S1 and S2 with soft systolic murmur LSB.  No rubs or gallops  Abdomen:   BS normal, Soft,  Non-tender  Extremities: Without edema              Complete Blood Count (18 @ 08:22)    WBC Count: 17.03 K/uL    RBC Count: 4.14 M/uL    Hemoglobin: 12.9 g/dL    Hematocrit: 38.3 %    Mean Cell Volume: 92.5 fl    Mean Cell Hemoglobin: 31.2 pg    Mean Cell Hemoglobin Conc: 33.7 gm/dL    Red Cell Distrib Width: 13.8 %    Platelet Count - Automated: 168 K/uL        140  |  103  |  21  ----------------------------<  93  4.3   |  26  |  0.91    Ca    8.4      27 Sep 2018 06:13  Phos  3.4       Mg     2.3           PT/INR - ( 26 Sep 2018 08:06 )   PT: 24.4 sec;   INR: 2.13 ratio

## 2018-09-27 NOTE — PROGRESS NOTE ADULT - ASSESSMENT
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     "pauses", bradycardia, PAT, PVCs on telemetry     h/o breast cancer s/p lumpectomy    hypertension    leukocytosis likely related to steroid therapy    PLAN/RECOMMENDATIONS:    stable oxygenation on room air  prednisone taper ongoing  off nebs  Arnuity ellipta 200mcg 1 inhalation daily - patient given samples and instructed in the use of the device  robitussin DM/tessalon/hycodan as needed and at bedtime  coumadin for INR 2 - 3 - should not be considered a coumadin "failure" given the subtherapeutic INR when admitted  GI prophylaxis on A/C and steroids - protonix  no indication for IVC filter in the absence of lower extremity clot  cardiology evaluation noted - cardiac meds: lipitor - restarted on verapamil - continue telemetry monitoring  ECHO unremarkable (as above)  coronary CTA without obstructive CAD  would hold off with a CT scan of the chest for now    Will follow with you. Plan of care discussed with the patient at bedside.    Rd Lira MD, [facility] - [phone]  Pulmonary Medicine

## 2018-09-27 NOTE — PROGRESS NOTE ADULT - ASSESSMENT
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 Pt now on Lovenox. Review of office chart shows that pt has been intermittently subtherapeutic over the last several months. INR in range only in august but pt claims to have missed several doses since then and most recent INR <2. Heme note appreciated and in lieu of ? lack of data treating pt's with prothrombin G mutations w/ Xa inhibitors, will continue with coumadin and pt urged to maintain compliance w/ dosing.  Lovenox d/zain as  INR is therapeutic. Cough persists but is improving and  pt w/o SOB . Pt also w/ 4 second pause and several episodes of PAT on monitor and on 9/21 and bradycardia w/ 4 sec pause and ? junctional escape rhythm. EPS note appreciated and suggestED  continued observation. Pt had CT angio  which showed nonobstructive lesions in coronary vessels.  Pt restarted on verapamil as per cardiology and no PATs only VPCs on monitor since restart.

## 2018-09-27 NOTE — PROGRESS NOTE ADULT - SUBJECTIVE AND OBJECTIVE BOX
Patient is a 73y old  Female who presents with a chief complaint of shortness of breath (25 Sep 2018 11:56)      INTERVAL HPI/OVERNIGHT EVENTS:    Pt still w/ cough    T(C): 37.1 (18 @ 05:00), Max: 37.2 (18 @ 20:39)  HR: 66 (18 @ 05:00) (60 - 82)  BP: 105/70 (18 @ 05:00) (94/61 - 106/61)  RR: 18 (18 @ 05:00) (17 - 18)  SpO2: 97% (18 @ 05:00) (92% - 97%)  Wt(kg): --  I&O's Summary    25 Sep 2018 07:  -  26 Sep 2018 07:00  --------------------------------------------------------  IN: 780 mL / OUT: 550 mL / NET: 230 mL    26 Sep 2018 07:01  -  26 Sep 2018 09:32  --------------------------------------------------------  IN: 360 mL / OUT: 0 mL / NET: 360 mL        LABS:                        12.9   20.29 )-----------( 174      ( 25 Sep 2018 07:58 )             39.1         141  |  106  |  22  ----------------------------<  106<H>  4.4   |  28  |  0.91    Ca    8.5      26 Sep 2018 06:38  Phos  3.4       Mg     2.3           PT/INR - ( 26 Sep 2018 08:06 )   PT: 24.4 sec;   INR: 2.13 ratio           Urinalysis Basic - ( 25 Sep 2018 20:13 )    Color: Yellow / Appearance: Clear / S.044 / pH: x  Gluc: x / Ketone: Negative  / Bili: Negative / Urobili: 1.0 mg/dL   Blood: x / Protein: Negative mg/dL / Nitrite: Negative   Leuk Esterase: Negative / RBC: 2 /HPF / WBC 6 /HPF   Sq Epi: x / Non Sq Epi: 1 /HPF / Bacteria: Negative    Urinalysis Basic - ( 25 Sep 2018 20:13 )    Color: Yellow / Appearance: Clear / S.044 / pH: x  Gluc: x / Ketone: Negative  / Bili: Negative / Urobili: 1.0 mg/dL   Blood: x / Protein: Negative mg/dL / Nitrite: Negative   Leuk Esterase: Negative / RBC: 2 /HPF / WBC 6 /HPF   Sq Epi: x / Non Sq Epi: 1 /HPF / Bacteria: Negative      REVIEW OF SYSTEMS:  CONSTITUTIONAL: No fever, weight loss, or fatigue  EYES: No eye pain, visual disturbances, or discharge  ENMT:  No difficulty hearing, tinnitus, vertigo; No sinus or throat pain  NECK: No pain or stiffness  BREASTS: No pain, masses, or nipple discharge  RESPIRATORY:  cough, no wheezing, chills or hemoptysis; No shortness of breath  CARDIOVASCULAR: No chest pain, palpitations, dizziness, or leg swelling  GASTROINTESTINAL: No abdominal or epigastric pain. No nausea, vomiting, or hematemesis; No diarrhea or constipation. No melena or hematochezia.  GENITOURINARY: No dysuria, frequency, hematuria, or incontinence  NEUROLOGICAL: No headaches, memory loss, loss of strength, numbness, or tremors  SKIN: No itching, burning, rashes, or lesions   LYMPH NODES: No enlarged glands  ENDOCRINE: No heat or cold intolerance; No hair loss  MUSCULOSKELETAL: No joint pain or swelling; No muscle, back, or extremity pain  PSYCHIATRIC: No depression, anxiety, mood swings, or difficulty sleeping  HEME/LYMPH: No easy bruising, or bleeding gums  ALLERY AND IMMUNOLOGIC: No hives or eczema      Consultant(s) Notes Reviewed:  [x ] YES  [ ] NO    PHYSICAL EXAM:  GENERAL: NAD, well-groomed, well-developed  HEAD:  Atraumatic, Normocephalic  EYES: , conjunctiva and sclera clear  NECK: Supple, No JVD  NERVOUS SYSTEM:  Alert & Oriented X3, Good concentration; Motor Strength 5/5 B/L upper and lower extremities  CHEST/LUNG: Clear to auscultation bilaterally; No rales, rhonchi, wheezing, or rubs  HEART: Regular rate and rhythm; No murmurs, rubs, or gallops  ABDOMEN: Soft, Nontender, Nondistended; Bowel sounds present, neg HSM  EXTREMITIES:  1+ edema  LYMPH: No lymphadenopathy noted  SKIN: No rashes or lesions    Care Discussed with Consultants/Other Providers [X ] YES  [ ] NO    < from: CT Heart with Coronaries (18 @ 16:33) >  EXAM:  CT HEART WITH CORONARIES                            PROCEDURE DATE:  2018            INTERPRETATION:  Indication:  Coronary artery disease    History:  Ms. Delgado is a 73-year-old-woman with prothrombin gene   mutation, pulmonary embolus and history of breast cancer noted to have   premature ventricular contractions.    Acquisitions:  1.  Non-contrast prospectively-gated 320-multidtector volumetric computed   tomography heart  2.  Contrast-enhanced prospectively-gated 320-multidetector volumetric   computed tomography heart    Pre-medications:  1.  Metoprolol 2.5 mg intravenous  2.  Nitroglycerin 0.6 mg sublingual    Contrast:  65 mL Omnipaque 350     Resting heart rate (contrast-enhanced acquisition):  48 beats per minute    Quality:  Good    Post-processing:  Three-dimensional volume-rendered and multiplanar   reconstructions generated with Vit[employer] software.     FINDINGS:    Cardiovascular:    Coronary arteries:   Coronary artery calcium Agatston score:    Left main (LM)coronary artery:  0  Left anterior descending (LAD) coronary artery:  83  Left circumflex (LCX) coronary artery:  0  Right coronary artery (RCA):  0  Total:  83    Right-dominant coronary circulation.   The LM coronary artery is angiographically normal.   The LAD coronary artery has mild (30-50%) calcified and non-calcified   plaque in the proximal segment.  Small-caliber diagonal branches are   present.   The large-caliber ramus intermedius (RI) branch has minimal (<30%)   non-calcified plaque.  The LCX coronary artery has minimal (<30%) non-calcified plaque in the   proximal segment.  The LCX terminates as a left posterolateral (LPL)   branch.  The obtuse marginal (OM) branch is angiographically normal.   The RCA is angiographically normal. The visualized right posterior   descending artery (PDA) is angiographically normal.     Aorta:  No thoracic aortic dissection noted in the visualized thoracic aorta.      There is minimal non-calcified and calcified plaque in the visualized   thoracic aorta.    Pericardium:  There is no pericardial effusion.      Chambers:  The cardiac chambers are qualitatively normal in size.    There is a patent foramen ovale with evidence of a small volume of left   to right flow of contrast agent.    Valves:  Minimal mitral annular calcification noted.    Non-cardiac:  No hilar and/or mediastinal adenopathy is noted. Visualized lungs are   clear. Visualized osseous structures are within normal limits.    IMPRESSION:    1.  Non-obstructive coronary artery disease:  LM:  angiographically normal  LAD:  mild (30-50%) calcified and non-calcified plaque in the proximal   segment  RI:  minimal (<30%) non-calcified plaque  LCX:  minimal (<30%) non-calcified plaque in the proximal segment  RCA:  angiographically normal  2.  Mild coronary artery calcium (Agatston score 83) as delineated above.    The observed calcium score is at percentile 59 for individuals of the   same age, gender, and race/ethnicity who are free of clinical   cardiovascular disease and treated diabetes.                LROI MOBLEY M.D., ATTENDING CARDIOLOGIST  This document has been electronically signed.  PAULINE JACK M.D., ATTENDING RADIOLOGIST  This document has been electronically signed. Sep 25 2018  5:36PM              < end of copied text >

## 2018-09-28 LAB
BASOPHILS # BLD AUTO: 0.01 K/UL — SIGNIFICANT CHANGE UP (ref 0–0.2)
BASOPHILS NFR BLD AUTO: 0.1 % — SIGNIFICANT CHANGE UP (ref 0–2)
BLD GP AB SCN SERPL QL: NEGATIVE — SIGNIFICANT CHANGE UP
EOSINOPHIL # BLD AUTO: 0.12 K/UL — SIGNIFICANT CHANGE UP (ref 0–0.5)
EOSINOPHIL NFR BLD AUTO: 0.6 % — SIGNIFICANT CHANGE UP (ref 0–6)
HCT VFR BLD CALC: 39.4 % — SIGNIFICANT CHANGE UP (ref 34.5–45)
HGB BLD-MCNC: 12.7 G/DL — SIGNIFICANT CHANGE UP (ref 11.5–15.5)
IMM GRANULOCYTES NFR BLD AUTO: 0.4 % — SIGNIFICANT CHANGE UP (ref 0–1.5)
INR BLD: 2.83 RATIO — HIGH (ref 0.88–1.16)
LYMPHOCYTES # BLD AUTO: 12.95 K/UL — HIGH (ref 1–3.3)
LYMPHOCYTES # BLD AUTO: 67.9 % — HIGH (ref 13–44)
MCHC RBC-ENTMCNC: 30.1 PG — SIGNIFICANT CHANGE UP (ref 27–34)
MCHC RBC-ENTMCNC: 32.2 GM/DL — SIGNIFICANT CHANGE UP (ref 32–36)
MCV RBC AUTO: 93.4 FL — SIGNIFICANT CHANGE UP (ref 80–100)
MONOCYTES # BLD AUTO: 0.6 K/UL — SIGNIFICANT CHANGE UP (ref 0–0.9)
MONOCYTES NFR BLD AUTO: 3.1 % — SIGNIFICANT CHANGE UP (ref 2–14)
NEUTROPHILS # BLD AUTO: 5.3 K/UL — SIGNIFICANT CHANGE UP (ref 1.8–7.4)
NEUTROPHILS NFR BLD AUTO: 27.9 % — LOW (ref 43–77)
PLATELET # BLD AUTO: 183 K/UL — SIGNIFICANT CHANGE UP (ref 150–400)
PROTHROM AB SERPL-ACNC: 32.7 SEC — HIGH (ref 10–13.1)
RBC # BLD: 4.22 M/UL — SIGNIFICANT CHANGE UP (ref 3.8–5.2)
RBC # FLD: 13.9 % — SIGNIFICANT CHANGE UP (ref 10.3–14.5)
RH IG SCN BLD-IMP: NEGATIVE — SIGNIFICANT CHANGE UP
WBC # BLD: 19.06 K/UL — HIGH (ref 3.8–10.5)
WBC # FLD AUTO: 19.06 K/UL — HIGH (ref 3.8–10.5)

## 2018-09-28 PROCEDURE — 71045 X-RAY EXAM CHEST 1 VIEW: CPT | Mod: 26

## 2018-09-28 PROCEDURE — 93010 ELECTROCARDIOGRAM REPORT: CPT | Mod: 76

## 2018-09-28 PROCEDURE — 0387T: CPT | Mod: Q0

## 2018-09-28 RX ORDER — WARFARIN SODIUM 2.5 MG/1
2.5 TABLET ORAL ONCE
Qty: 0 | Refills: 0 | Status: COMPLETED | OUTPATIENT
Start: 2018-09-28 | End: 2018-09-28

## 2018-09-28 RX ORDER — VERAPAMIL HCL 240 MG
120 CAPSULE, EXTENDED RELEASE PELLETS 24 HR ORAL DAILY
Qty: 0 | Refills: 0 | Status: DISCONTINUED | OUTPATIENT
Start: 2018-09-28 | End: 2018-10-01

## 2018-09-28 RX ADMIN — PANTOPRAZOLE SODIUM 40 MILLIGRAM(S): 20 TABLET, DELAYED RELEASE ORAL at 05:34

## 2018-09-28 RX ADMIN — Medication 200 MILLIGRAM(S): at 22:10

## 2018-09-28 RX ADMIN — Medication 30 MILLIGRAM(S): at 05:34

## 2018-09-28 RX ADMIN — WARFARIN SODIUM 2.5 MILLIGRAM(S): 2.5 TABLET ORAL at 22:10

## 2018-09-28 RX ADMIN — Medication 200 MILLIGRAM(S): at 05:34

## 2018-09-28 RX ADMIN — BENZOCAINE AND MENTHOL 1 LOZENGE: 5; 1 LIQUID ORAL at 22:10

## 2018-09-28 RX ADMIN — ATORVASTATIN CALCIUM 10 MILLIGRAM(S): 80 TABLET, FILM COATED ORAL at 22:10

## 2018-09-28 RX ADMIN — Medication 200 MILLIGRAM(S): at 13:00

## 2018-09-28 RX ADMIN — Medication 120 MILLIGRAM(S): at 05:34

## 2018-09-28 NOTE — MEDICAL STUDENT PROGRESS NOTE(EDUCATION) - NS MD HP STUD ASPLAN ASSES FT
Patient is a 73 year old female with a history of hypertension, hyperlipidemia, prothrombin gene mutation, unprovoked DVT/PE (2013 and 2015, on warfarin), and CKD stage 3 admitted for treatment of acute pulmonary embolism (Lovenox to Coumadin bridge), complicated by episodes of sinus pauses > 3 sec and PAT seen on telemetry, concerning for tachybrady syndrome. Patient is a 73 year old female with a history of hypertension, hyperlipidemia, prothrombin gene mutation, unprovoked DVT/PE (2013 and 2015, on warfarin) admitted for treatment of acute pulmonary embolism (Lovenox to Coumadin bridge), complicated by episodes of sinus pauses > 3 sec and PAT seen on telemetry, concerning for tachybrady syndrome.

## 2018-09-28 NOTE — CONSULT NOTE ADULT - SUBJECTIVE AND OBJECTIVE BOX
Patient seen and evaluated at bedside    Chief Complaint:    HPI:  74 yo F w/ breast CA s/p removal, hx of prothrombin gene mutation, CKD III, prior hx of unprovoked DVT/PE ~ 4-6yr ago (on warfarin) presents with shortness of breath. Patient reports her symptoms started on Friday. About 1 -week prior she travelled by car to [place] No sick contacts but on Friday started developing shortness of breath, chest congestion, and coughing. The cough is nonproductive. She feels myalgia but no fever or chills. Her dyspnea became progressively worse. Initially shortness of breath was with exertion and now with slight activities becomes dyspneic. She has chronic leg edema but symmetrical in nature. She denies pleuritic chest pain, palpitation, tearing back pain, and denies syncope or near-syncope episode. She did miss her warfarin dose last week and her INR has been monitored once monthly. She otherwise reports adherence to warfarin and does not know exact INR range. (17 Sep 2018 21:18)    Hospital Course/EP history:  -Admitted for acute PE. Echo negative for right heart strain, not requiring O2. On Lovenox to Coumadin bridge. EP consulted for 4.0 sec and 2.5 sec pause seen on telemetry while patient in bathroom washing herself up urinating. No bowel movement. No dizziness, but does endorse lightheadedness which she described as more of blurry vision (seeing wavy lines). Denies LOC. Was on Verapamil for HTN. Telemetry at that time showed NSR< HR 50-80. Sinus Pause 4.0 sec and 2.5 seconds. At that time, Verapamil was hold.  -On 9/23, Gen Cardiology consult saw patient for 2.15s run of APCs, likely related to prednisone). Primary team noted 2.4s pause while sleeping that night.  -On 9/26 verapamil restarted for runs of SVT, which initially improved without artur, however on 9/28, had both 3.75s sinus pause and 2 short runs of VT      PMHx:   Prothrombin gene mutation  GERD (gastroesophageal reflux disease)  DVT (deep venous thrombosis), right  Herniated lumbar intervertebral disc  Lower back pain  PE (pulmonary embolism)  Hx of Breast Cancer  HTN - Hypertension      PSHx:   S/P tubal ligation  S/P arthroscopy of right knee  S/P Breast Lumpectomy  h/o Wrist Surgery  S/P Wrist Surgery      Allergies:  No Known Allergies      Home Meds:    Current Medications:   ARNUITY ELLIPTA 200MCG 1 Inhalation 1 Inhalation Oral daily  atorvastatin 10 milliGRAM(s) Oral at bedtime  benzocaine 15 mG/menthol 3.6 mG Lozenge 1 Lozenge Oral three times a day PRN  benzonatate 200 milliGRAM(s) Oral three times a day  guaiFENesin/dextromethorphan  Syrup 10 milliLiter(s) Oral four times a day  HYDROcodone/homatropine Syrup 5 milliLiter(s) Oral every 4 hours PRN  HYDROcodone/homatropine Syrup 5 milliLiter(s) Oral at bedtime  influenza   Vaccine 0.5 milliLiter(s) IntraMuscular once  pantoprazole    Tablet 40 milliGRAM(s) Oral before breakfast  predniSONE   Tablet 30 milliGRAM(s) Oral daily      FAMILY HISTORY:  Family history of prothrombin gene mutation (Mother): first cousin  Family history of heart disease (Father): father      Social History:  Smoking History:  Alcohol Use:  Drug Use:    REVIEW OF SYSTEMS:  CONSTITUTIONAL: No weakness, fevers or chills  EYES/ENT: No visual changes;  No dysphagia  NECK: No pain or stiffness  RESPIRATORY: No cough, wheezing, hemoptysis; No shortness of breath  CARDIOVASCULAR: No chest pain or palpitations; No lower extremity edema  GASTROINTESTINAL: No abdominal or epigastric pain. No nausea, vomiting, or hematemesis; No diarrhea or constipation. No melena or hematochezia.  BACK: No back pain  GENITOURINARY: No dysuria, frequency or hematuria  NEUROLOGICAL: No numbness or weakness  SKIN: No itching, burning, rashes, or lesions   All other review of systems is negative unless indicated above.    Physical Exam:  T(F): 97.9 (09-28), Max: 98.5 (09-27)  HR: 56 (09-28) (56 - 71)  BP: 130/84 (09-28) (94/58 - 130/84)  RR: 18 (09-28)  SpO2: 97% (09-28)  GENERAL: No acute distress, well-developed  HEAD:  Atraumatic, Normocephalic  ENT: EOMI, PERRLA, conjunctiva and sclera clear, Neck supple, No JVD, moist mucosa  CHEST/LUNG: Clear to auscultation bilaterally; No wheeze, equal breath sounds bilaterally   BACK: No spinal tenderness  HEART: Regular rate and rhythm; No murmurs, rubs, or gallops  ABDOMEN: Soft, Nontender, Nondistended; Bowel sounds present  EXTREMITIES:  No clubbing, cyanosis, or edema  PSYCH: Nl behavior, nl affect  NEUROLOGY: AAOx3, non-focal, cranial nerves intact  SKIN: Normal color, No rashes or lesions  LINES:    Cardiovascular Diagnostic Testing:    ECG: Personally reviewed:    Echo: Personally reviewed:    Stress Testing:    Cath:    Imaging:    CXR: Personally reviewed    Labs: Personally reviewed                        12.9   17.03 )-----------( 168      ( 27 Sep 2018 08:22 )             38.3     09-27    140  |  103  |  21  ----------------------------<  93  4.3   |  26  |  0.91    Ca    8.4      27 Sep 2018 06:13      PT/INR - ( 27 Sep 2018 08:47 )   PT: 30.2 sec;   INR: 2.62 ratio             CARDIAC MARKERS ( 24 Sep 2018 06:50 )  8 ng/L / x     / x     / 39 U/L / x     / 1.7 ng/mL Patient seen and evaluated at bedside    Chief Complaint:    HPI:  74 yo F w/ breast CA s/p removal, hx of prothrombin gene mutation, CKD III, prior hx of unprovoked DVT/PE ~ 4-6yr ago (on warfarin) presents with shortness of breath. Patient reports her symptoms started on Friday. About 1 -week prior she travelled by car to [place] No sick contacts but on Friday started developing shortness of breath, chest congestion, and coughing. The cough is nonproductive. She feels myalgia but no fever or chills. Her dyspnea became progressively worse. Initially shortness of breath was with exertion and now with slight activities becomes dyspneic. She has chronic leg edema but symmetrical in nature. She denies pleuritic chest pain, palpitation, tearing back pain, and denies syncope or near-syncope episode. She did miss her warfarin dose last week and her INR has been monitored once monthly. She otherwise reports adherence to warfarin and does not know exact INR range. (17 Sep 2018 21:18)    Hospital Course/EP history:  -Admitted for acute PE. Echo negative for right heart strain, not requiring O2. On Lovenox to Coumadin bridge. EP consulted for 4.0 sec and 2.5 sec pause seen on telemetry while patient in bathroom washing herself up urinating. No bowel movement. No dizziness, but does endorse lightheadedness which she described as more of blurry vision (seeing wavy lines). Denies LOC. Was on Verapamil for HTN. Telemetry at that time showed NSR< HR 50-80. Sinus Pause 4.0 sec and 2.5 seconds. At that time, Verapamil was hold.  -On 9/23, Gen Cardiology consult saw patient for 2.15s run of APCs, likely related to prednisone). Primary team noted 2.4s pause while sleeping that night.  -On 9/26 verapamil restarted for runs of SVT, which initially improved without artur, however on 9/28, had both 3.75s sinus pause and 2 short runs of VT      PMHx:   Prothrombin gene mutation  GERD (gastroesophageal reflux disease)  DVT (deep venous thrombosis), right  Herniated lumbar intervertebral disc  Lower back pain  PE (pulmonary embolism)  Hx of Breast Cancer  HTN - Hypertension      PSHx:   S/P tubal ligation  S/P arthroscopy of right knee  S/P Breast Lumpectomy  h/o Wrist Surgery  S/P Wrist Surgery      Allergies:  No Known Allergies      Home Meds:    Current Medications:   ARNUITY ELLIPTA 200MCG 1 Inhalation 1 Inhalation Oral daily  atorvastatin 10 milliGRAM(s) Oral at bedtime  benzocaine 15 mG/menthol 3.6 mG Lozenge 1 Lozenge Oral three times a day PRN  benzonatate 200 milliGRAM(s) Oral three times a day  guaiFENesin/dextromethorphan  Syrup 10 milliLiter(s) Oral four times a day  HYDROcodone/homatropine Syrup 5 milliLiter(s) Oral every 4 hours PRN  HYDROcodone/homatropine Syrup 5 milliLiter(s) Oral at bedtime  influenza   Vaccine 0.5 milliLiter(s) IntraMuscular once  pantoprazole    Tablet 40 milliGRAM(s) Oral before breakfast  predniSONE   Tablet 30 milliGRAM(s) Oral daily      FAMILY HISTORY:  Family history of prothrombin gene mutation (Mother): first cousin  Family history of heart disease (Father): father      Social History:  Smoking History:  Alcohol Use:  Drug Use:    REVIEW OF SYSTEMS:  CONSTITUTIONAL: No weakness, fevers or chills  EYES/ENT: No visual changes;  No dysphagia  NECK: No pain or stiffness  RESPIRATORY: No cough, wheezing, hemoptysis; No shortness of breath  CARDIOVASCULAR: No chest pain or palpitations; No lower extremity edema  GASTROINTESTINAL: No abdominal or epigastric pain. No nausea, vomiting, or hematemesis; No diarrhea or constipation. No melena or hematochezia.  BACK: No back pain  GENITOURINARY: No dysuria, frequency or hematuria  NEUROLOGICAL: No numbness or weakness  SKIN: No itching, burning, rashes, or lesions   All other review of systems is negative unless indicated above.    Physical Exam:  T(F): 97.9 (09-28), Max: 98.5 (09-27)  HR: 56 (09-28) (56 - 71)  BP: 130/84 (09-28) (94/58 - 130/84)  RR: 18 (09-28)  SpO2: 97% (09-28)  GENERAL: No acute distress, well-developed  HEAD:  Atraumatic, Normocephalic  ENT: EOMI, PERRLA, conjunctiva and sclera clear, Neck supple, No JVD, moist mucosa  CHEST/LUNG: Clear to auscultation bilaterally; No wheeze, equal breath sounds bilaterally   BACK: No spinal tenderness  HEART: Regular rate and rhythm; No murmurs, rubs, or gallops  ABDOMEN: Soft, Nontender, Nondistended; Bowel sounds present  EXTREMITIES:  No clubbing, cyanosis, or edema  PSYCH: Nl behavior, nl affect  NEUROLOGY: AAOx3, non-focal, cranial nerves intact  SKIN: Normal color, No rashes or lesions  LINES:    Cardiovascular Diagnostic Testing:    ECG: Personally reviewed:  Sinus Artur with PVCs    Echo: Personally reviewed:  Conclusions:  1. Mitral annular calcification, otherwise normal mitral  valve. Minimal mitral regurgitation.  2. Calcified trileafletaortic valve with normal opening.  Minimal aortic regurgitation.  3. Endocardium not well visualized; grossly normal left  ventricular systolic function.  4. Normal right ventricular size and function.  *** Compared with echocardiogram of 10/12/2013,results are  similar on today's study.  ------------------------------------------------------------------------  Confirmed on  9/21/2018 - 12:56:58 by Kathryn Díaz M.D.        CXR: Personally reviewed    Labs: Personally reviewed                        12.9   17.03 )-----------( 168      ( 27 Sep 2018 08:22 )             38.3     09-27    140  |  103  |  21  ----------------------------<  93  4.3   |  26  |  0.91    Ca    8.4      27 Sep 2018 06:13      PT/INR - ( 27 Sep 2018 08:47 )   PT: 30.2 sec;   INR: 2.62 ratio             CARDIAC MARKERS ( 24 Sep 2018 06:50 )  8 ng/L / x     / x     / 39 U/L / x     / 1.7 ng/mL    TELEMETRY:  Sinus pauses of 3.75s noted, Runs of SVT also noted (likely ATach

## 2018-09-28 NOTE — PROGRESS NOTE ADULT - SUBJECTIVE AND OBJECTIVE BOX
Patient is a 73y old  Female who presents with a chief complaint of shortness of breath (28 Sep 2018 08:34)      INTERVAL HPI/OVERNIGHT EVENTS:  Pt w/o complaints, pt w/ 3.75 sec pause this am and occassional PATs, asymptomatic.    T(C): 36.6 (09-28-18 @ 05:03), Max: 36.9 (09-27-18 @ 14:16)  HR: 56 (09-28-18 @ 05:04) (56 - 71)  BP: 130/84 (09-28-18 @ 05:03) (94/58 - 130/84)  RR: 18 (09-28-18 @ 05:03) (17 - 18)  SpO2: 97% (09-28-18 @ 05:03) (96% - 97%)  Wt(kg): --  I&O's Summary    27 Sep 2018 07:01  -  28 Sep 2018 07:00  --------------------------------------------------------  IN: 840 mL / OUT: 0 mL / NET: 840 mL        LABS:                        12.9   17.03 )-----------( 168      ( 27 Sep 2018 08:22 )             38.3     09-27    140  |  103  |  21  ----------------------------<  93  4.3   |  26  |  0.91    Ca    8.4      27 Sep 2018 06:13      PT/INR - ( 27 Sep 2018 08:47 )   PT: 30.2 sec;   INR: 2.62 ratio             CAPILLARY BLOOD GLUCOSE              REVIEW OF SYSTEMS:  CONSTITUTIONAL: No fever, weight loss, or fatigue  EYES: No eye pain, visual disturbances, or discharge  ENMT:  No difficulty hearing, tinnitus, vertigo; No sinus or throat pain  NECK: No pain or stiffness  BREASTS: No pain, masses, or nipple discharge  RESPIRATORY:  cough, no wheezing, chills or hemoptysis; No shortness of breath  CARDIOVASCULAR: No chest pain, palpitations, dizziness, or leg swelling  GASTROINTESTINAL: No abdominal or epigastric pain. No nausea, vomiting, or hematemesis; No diarrhea or constipation. No melena or hematochezia.  GENITOURINARY: No dysuria, frequency, hematuria, or incontinence  NEUROLOGICAL: No headaches, memory loss, loss of strength, numbness, or tremors  SKIN: No itching, burning, rashes, or lesions   LYMPH NODES: No enlarged glands  ENDOCRINE: No heat or cold intolerance; No hair loss  MUSCULOSKELETAL: No joint pain or swelling; No muscle, back, or extremity pain  PSYCHIATRIC: No depression, anxiety, mood swings, or difficulty sleeping  HEME/LYMPH: No easy bruising, or bleeding gums  ALLERY AND IMMUNOLOGIC: No hives or eczema    RA    Consultant(s) Notes Reviewed:  [x ] YES  [ ] NO    PHYSICAL EXAM:  GENERAL: NAD, well-groomed, well-developed  HEAD:  Atraumatic, Normocephalic  EYES:  conjunctiva and sclera clear  NECK: Supple, No JVD, Normal thyroid  NERVOUS SYSTEM:  Alert & Oriented X3, Good concentration; Motor Strength 5/5 B/L upper and lower extremities  CHEST/LUNG: Clear to auscultation bilaterally; No rales, rhonchi, wheezing, or rubs  HEART: Regular rate and rhythm; No murmurs, rubs, or gallops, occ VPCs  ABDOMEN: Soft, Nontender, Nondistended; Bowel sounds present, neg HSM  EXTREMITIES:  , No clubbing, cyanosis, or edema  LYMPH: No lymphadenopathy noted  SKIN: No rashes or lesions    Care Discussed with Consultants/Other Providers [X ] YES  [ ] NO

## 2018-09-28 NOTE — PROGRESS NOTE ADULT - ASSESSMENT
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     "pauses", bradycardia, PAT, PVCs on telemetry     h/o breast cancer s/p lumpectomy    hypertension    leukocytosis - persists off steroids    PLAN/RECOMMENDATIONS:    stable oxygenation on room air  prednisone taper ongoing  off nebs  Arnuity ellipta 200mcg 1 inhalation daily - patient given samples and instructed in the use of the device  robitussin DM/tessalon/hycodan as needed and at bedtime  coumadin for INR 2 - 3 - should not be considered a coumadin "failure" given the subtherapeutic INR when admitted  GI prophylaxis on A/C and steroids - protonix  no indication for IVC filter in the absence of lower extremity clot  cardiology evaluation noted - cardiac meds: lipitor - holding verapamil - EP reevaluation  ECHO unremarkable (as above)  coronary CTA without obstructive CAD  would hold off with a CT scan of the chest for now    Will follow with you. Plan of care discussed with the patient at bedside and Dr. Ricks.    Rd Lira MD, [facility] - [phone]  Pulmonary Medicine

## 2018-09-28 NOTE — PROGRESS NOTE ADULT - SUBJECTIVE AND OBJECTIVE BOX
Patient received 3rd daily dose of verapamil this AM.  At 7:40 AM had 3.75 second pause, sinus arrest.  Has had 2 short runs of SVT.        REVIEW OF SYSTEMS:  CARDIOVASCULAR: No chest pain, dyspnea or palpitations  All other review of systems is negative unless indicated above    Medications:  ARNUITY ELLIPTA 200MCG 1 Inhalation 1 Inhalation Oral daily  atorvastatin 10 milliGRAM(s) Oral at bedtime  benzocaine 15 mG/menthol 3.6 mG Lozenge 1 Lozenge Oral three times a day PRN  benzonatate 200 milliGRAM(s) Oral three times a day  guaiFENesin/dextromethorphan  Syrup 10 milliLiter(s) Oral four times a day  HYDROcodone/homatropine Syrup 5 milliLiter(s) Oral every 4 hours PRN  HYDROcodone/homatropine Syrup 5 milliLiter(s) Oral at bedtime  influenza   Vaccine 0.5 milliLiter(s) IntraMuscular once  pantoprazole    Tablet 40 milliGRAM(s) Oral before breakfast  predniSONE   Tablet 30 milliGRAM(s) Oral daily      Physical Exam:  Vitals:  T(C): 36.6 (18 @ 05:03), Max: 36.9 (18 @ 14:16)  HR: 56 (18 @ 05:04) (56 - 71)  BP: 130/84 (18 @ 05:03) (94/58 - 130/84)  BP(mean): --  RR: 18 (18 @ 05:03) (17 - 18)  SpO2: 97% (18 @ 05:03) (96% - 97%)  Wt(kg): --  Daily     Daily Weight in k.3 (28 Sep 2018 06:11)  I&O's Summary    27 Sep 2018 07:01  -  28 Sep 2018 07:00  --------------------------------------------------------  IN: 840 mL / OUT: 0 mL / NET: 840 mL        Appearance:  Normal, NAD  Eyes:  EOMI  Neck:  No JVD  Respiratory: Clear to auscultation bilaterally  Cardiovascular: Normal S1 and S2 with soft systolic murmur LSB.  No rubs or gallops  Abdomen:   BS normal, Soft,  Non-tender  Extremities: Without edema     pending        140  |  103  |  21  ----------------------------<  93  4.3   |  26  |  0.91    Ca    8.4      27 Sep 2018 06:13      PT/INR - ( 27 Sep 2018 08:47 )   PT: 30.2 sec;   INR: 2.62 ratio

## 2018-09-28 NOTE — PROGRESS NOTE ADULT - ASSESSMENT
Impression: Continued episodes of SVT and now 3.75 second pause on verapamil.  Electrolytes were normal yesterday.    Plan:  Will contact EPS for further evaluation and treatment.             D/C verapamil for now.

## 2018-09-28 NOTE — CHART NOTE - NSCHARTNOTEFT_GEN_A_CORE
This is a 73 year old woman with past medical history of hypertension, hyperlipidemia, prothrombin gene mutation, unprovoked DVT/PE ( and , on warfarin) who presented to [facility] on 18 with 3  dyspnea on exertion and non-productive cough, found to have Pulmonary Embolism of Bilateral Pulmonary Arteries in the setting of travel by car and subtherapeutic INRs. Bilateral lower extremity ultrasound was negative for DVT and echo was negative for right heart strain. Treated with Lovenox-to-Coumadin bridge. Course c/b 4.0 sec pauses seen on telemetry with subsequent d/c of verapamil. Patient developed multiple runs of PAT (ranging from 2-14 seconds) at HR of 120-140s. EP suspected tachybrady syndrome and on  patient received MicraPPM.    Problem: Sinus Node Dysfunction - Tachy Ted Syndrome  s/p micra PPM  Chest x-ray now r/o pneumo  Chest PA/Lat in am to eval micra PPM  Remove groin suture at 1:00am  Coumadin 2.5mg tonight  Restart Verapamil SR 120mg daily  EP following - plan discussed with Dr. Prasad    Problem PE Bilateral Pulm Arteries  Completed bridge to Coumadin  INR 2.8  Coumadin 2.5mg tonight    Problem: Rhinovirus  Continue Prednisone taper  Ellipta inhalation  Pulm note appreciated    Sourav Mesa, SHERLY    MEDICATIONS  (STANDING):  ARNUITY ELLIPTA 200MCG 1 Inhalation 1 Inhalation Oral daily  atorvastatin 10 milliGRAM(s) Oral at bedtime  benzonatate 200 milliGRAM(s) Oral three times a day  guaiFENesin/dextromethorphan  Syrup 10 milliLiter(s) Oral four times a day  HYDROcodone/homatropine Syrup 5 milliLiter(s) Oral at bedtime  influenza   Vaccine 0.5 milliLiter(s) IntraMuscular once  pantoprazole    Tablet 40 milliGRAM(s) Oral before breakfast  predniSONE   Tablet 30 milliGRAM(s) Oral daily  verapamil  milliGRAM(s) Oral daily  warfarin 2.5 milliGRAM(s) Oral once    MEDICATIONS  (PRN):  benzocaine 15 mG/menthol 3.6 mG Lozenge 1 Lozenge Oral three times a day PRN Sore Throat  HYDROcodone/homatropine Syrup 5 milliLiter(s) Oral every 4 hours PRN Cough      Objective:  Vital Signs Last 24 Hrs  T(C): 36.9 (28 Sep 2018 19:00), Max: 36.9 (28 Sep 2018 14:18)  T(F): 98.4 (28 Sep 2018 19:00), Max: 98.4 (28 Sep 2018 14:18)  HR: 59 (28 Sep 2018 19:00) (56 - 65)  BP: 134/90 (28 Sep 2018 19:00) (104/69 - 134/90)  BP(mean): 104 (28 Sep 2018 19:00) (104 - 104)  RR: 16 (28 Sep 2018 19:00) (16 - 18)  SpO2: 99% (28 Sep 2018 19:00) (96% - 99%)  ICU Vital Signs Last 24 Hrs  T(C): 36.9 (28 Sep 2018 19:00), Max: 36.9 (28 Sep 2018 14:18)  T(F): 98.4 (28 Sep 2018 19:00), Max: 98.4 (28 Sep 2018 14:18)  HR: 59 (28 Sep 2018 19:00) (56 - 65)  BP: 134/90 (28 Sep 2018 19:00) (104/69 - 134/90)  BP(mean): 104 (28 Sep 2018 19:00) (104 - 104)  ABP: --  ABP(mean): --  RR: 16 (28 Sep 2018 19:00) (16 - 18)  SpO2: 99% (28 Sep 2018 19:00) (96% - 99%)      Adult Advanced Hemodynamics Last 24 Hrs  CVP(mm Hg): --  CVP(cm H2O): --  CO: --  CI: --  PA: --  PA(mean): --  PCWP: --  SVR: --  SVRI: --  PVR: --  PVRI: --       @ 07: @ 07:00  --------------------------------------------------------  IN: 840 mL / OUT: 0 mL / NET: 840 mL     @ 07: @ 19:23  --------------------------------------------------------  IN: 480 mL / OUT: 0 mL / NET: 480 mL      Daily     Daily Weight in k.3 (28 Sep 2018 06:11)                            12.7   19.06 )-----------( 183      ( 28 Sep 2018 09:27 )             39.4     09-    140  |  103  |  21  ----------------------------<  93  4.3   |  26  |  0.91    Ca    8.4      27 Sep 2018 06:13        PT/INR - ( 28 Sep 2018 09:30 )   PT: 32.7 sec;   INR: 2.83 ratio               *BLOOD GAS/ARTERIAL/MIXED/VENOUS  *LACTATE      HEALTH ISSUES - PROBLEM Dx:  Paroxysmal atrial tachycardia: Paroxysmal atrial tachycardia  VPC's (ventricular premature complexes): VPC&#x27;s (ventricular premature complexes)  Bradycardia: Bradycardia  Metabolic acidosis: Metabolic acidosis  Other elevated white blood cell (WBC) count: Other elevated white blood cell (WBC) count  Other pulmonary embolism without acute cor pulmonale, unspecified chronicity: Other pulmonary embolism without acute cor pulmonale, unspecified chronicity  Viral upper respiratory tract infection: Viral upper respiratory tract infection  Stage 3 chronic kidney disease: Stage 3 chronic kidney disease  Essential hypertension: Essential hypertension  Other acute pulmonary embolism without acute cor pulmonale: Other acute pulmonary embolism without acute cor pulmonale        HEALTH ISSUES - R/O PROBLEM Dx:      MARLENA Loya NP   #

## 2018-09-28 NOTE — MEDICAL STUDENT PROGRESS NOTE(EDUCATION) - NS MD HP STUD ASPLAN PLAN FT
Sinus Pauses and PAT  - Concerning for Tachybrady Syndrome  - Hold Verapamil and AV sukumar blockers  - HCTZ or Norvasc for HTN if needed  - Continue on telemetry

## 2018-09-28 NOTE — MEDICAL STUDENT PROGRESS NOTE(EDUCATION) - SUBJECTIVE AND OBJECTIVE BOX
Patient is a 73 year old female with a history of hypertension, hyperlipidemia, prothrombin gene mutation, unprovoked DVT/PE (2013 and 2015, on warfarin), and CKD stage 3 who presented to [facility] on 9/17/18 with 3 days of progressing dyspnea on exertion, chest congestion, and non-productive cough, in the setting of travel by car to [place] a week prior and several missed warfarin doses with intermittently subtherapeutic INRs (recent INR < 2.0 in August). She also reports bilateral chronic leg edema (symmetrical in nature), but denied any pleuritic chest pain, palpitations, back pain and syncopal episodes at the time. Her home medications include verpamil 120 mg  and coumadin (4 mg Monday Thursday, 2.5 mg for remainder of the week).     Patient was admitted for treatment of acute pulmonary embolism. Bilateral lower extremity ultrasound was negative for DVT and echo was negative for right heart strain. She was treated  with Lovenox-to-Heparin bridge. Electrophysiology was previously consulted on 9/21/18 due to 4.0 sec and 2.5 sec pauses seen on telemetry, in the setting of having washed herself and finished urinating in the bathroom. A similar episode was noted at a later time while resting in her bed, which resolved with discontinuation of verpamil. However, multiple separate runs of PAT ranging from 2-12 seconds were noted on telemetry, with HR of 120-140s. Patient was subsequently placed back on verapamil, which resulted in sinus bradycardia/pauses noticed again on telemetry. On 9/28/18, cardiac electrophysiology was reconsulted due to concern over tachy-artur syndrome. Patient is a 73 year old female with a history of hypertension, hyperlipidemia, prothrombin gene mutation, unprovoked DVT/PE (2013 and 2015, on warfarin), and CKD stage 3 who presented to [facility] on 9/17/18 with 3 days of progressing dyspnea on exertion, chest congestion, and non-productive cough, in the setting of travel by car to [place] a week prior and several missed warfarin doses with intermittently subtherapeutic INRs (recent INR < 2.0 in August). She also reports bilateral chronic leg edema (symmetrical in nature) and infrequent episodes of lightheadedness (<once per month for last several years, self-resolves, but could last anywhere from a few seconds to 0.5 hr). However, she denied any pleuritic chest pain, palpitations, back pain and syncopal episodes at the time. Her home medications include verapamil 120 mg  and coumadin (4 mg Monday Thursday, 2.5 mg for remainder of the week).     Patient was admitted for treatment of acute pulmonary embolism. Bilateral lower extremity ultrasound was negative for DVT and echo was negative for right heart strain. She was treated  with Lovenox-to-Heparin bridge. Electrophysiology was previously consulted on 9/21/18 due to 4.0 sec and 2.5 sec pauses seen on telemetry, in the setting of having washed herself and finished urinating in the bathroom. A similar episode was noted at a later time while resting in her bed, which resolved with discontinuation of verpamil. However, multiple separate runs of PAT ranging from 2-12 seconds were noted on telemetry, with HR of 120-140s. Patient was subsequently placed back on verapamil, which resulted in sinus bradycardia/pauses noticed again on telemetry. She remained asymptomatic during these episodes and has no known history of CAD, CHF, or arrhythmias. On 9/28/18, cardiac electrophysiology was reconsulted due to concern over tachybrady syndrome.    Vital Signs Last 24 Hrs  T(C): 36.6 (28 Sep 2018 05:03), Max: 36.9 (27 Sep 2018 14:16)  T(F): 97.9 (28 Sep 2018 05:03), Max: 98.5 (27 Sep 2018 14:16)  HR: 56 (28 Sep 2018 05:04) (56 - 71)  BP: 130/84 (28 Sep 2018 05:03) (94/58 - 130/84)  BP(mean): --  RR: 18 (28 Sep 2018 05:03) (17 - 18)  SpO2: 97% (28 Sep 2018 05:03) (96% - 97%)                          12.9   17.03 )-----------( 168      ( 27 Sep 2018 08:22 )             38.3   09-27    140  |  103  |  21  ----------------------------<  93  4.3   |  26  |  0.91    Ca    8.4      27 Sep 2018 06:13    PT/INR - ( 27 Sep 2018 08:47 )   PT: 30.2 sec;   INR: 2.62 ratio    Troponin T, High Sensitivity (09.24.18 @ 06:50)    Troponin T, High Sensitivity Result: 8: Rapid upward or downward changes in high-sensitivity troponin levels  suggest acute myocardial injury. Renal impairment may cause sustained  troponin elevations.  Normal: <6 - 14 ng/L  Indeterminate: 15-51 ng/L  Elevated: > 51 ng/L  See [email or web address]/test/[email or web address] on the [facility] intranet for more  information ng/L      < from: 12 Lead ECG (09.24.18 @ 06:04) > Patient is a 73 year old female with a history of hypertension, hyperlipidemia, prothrombin gene mutation, unprovoked DVT/PE (2013 and 2015, on warfarin), and CKD stage 3 who presented to [facility] on 9/17/18 with 3 days of progressing dyspnea on exertion, chest congestion, and non-productive cough, in the setting of travel by car to [place] a week prior and several missed warfarin doses with intermittently subtherapeutic INRs (recent INR < 2.0 in 8/2018). She also reports bilateral chronic leg edema that is symmetrical in nature and infrequent episodes of lightheadedness, which she describes as more of blurry vision and seeing wavy lines (<once per month for last several years, self-resolves within a few seconds). However, she denied any pleuritic chest pain, palpitations, back pain and syncopal episodes. She also has no known history of CAD, CHF, or arrhythmias. Her home medications include verapamil 120 mg  and coumadin (4 mg Monday Thursday, 2.5 mg for remainder of the week).     Patient was admitted for treatment of acute pulmonary embolism. Bilateral lower extremity ultrasound was negative for DVT and echo was negative for right heart strain. She was treated with Lovenox-to-Coumadin bridge. Electrophysiology was previously consulted on 9/21/18 due to 4.0 sec and 2.5 sec pauses seen on telemetry, in the setting of having washed herself and finished urinating in the bathroom. On 9/22/18, she also had a 4.08 second followed by 2.6 second pause on telemetry while resting in her bed. These resolved with discontinuation of verapamil. Between 9/23/18 to 9/26/18, multiple runs of PAT (ranging from 2-14 seconds) were subsequently noted on telemetry, at HR of 120-140s. On 9/26/18, patient was placed back on verapamil due to the runs of SVT, which initially improved without bradycardia. On 9/28/18, telemetry noticed 3.75 second pause at 7:40 am. She however remains asymptomatic. Cardiac electrophysiology was thus reconsulted due to concern over tachybrady syndrome.    Vital Signs Last 24 Hrs  T(C): 36.6 (28 Sep 2018 05:03), Max: 36.9 (27 Sep 2018 14:16)  T(F): 97.9 (28 Sep 2018 05:03), Max: 98.5 (27 Sep 2018 14:16)  HR: 56 (28 Sep 2018 05:04) (56 - 71)  BP: 130/84 (28 Sep 2018 05:03) (94/58 - 130/84)  BP(mean): --  RR: 18 (28 Sep 2018 05:03) (17 - 18)  SpO2: 97% (28 Sep 2018 05:03) (96% - 97%)    General: NAD  HEENT: PERRLA, EOMI  Neurology: A&Ox3, nonfocal, MCGUIRE x 4  Respiratory: CTA B/L, normal respiratory effort, no wheezes, crackles, rales  CV: RRR, S1S2, no murmurs, rubs or gallops, no JVD  Extremities: No edema                       12.9   17.03 )-----------( 168      ( 27 Sep 2018 08:22 )             38.3   09-27    140  |  103  |  21  ----------------------------<  93  4.3   |  26  |  0.91    Ca    8.4      27 Sep 2018 06:13    PT/INR - ( 27 Sep 2018 08:47 )   PT: 30.2 sec;   INR: 2.62 ratio    Troponin T, High Sensitivity (09.24.18 @ 06:50)    Troponin T, High Sensitivity Result: 8: Rapid upward or downward changes in high-sensitivity troponin levels  suggest acute myocardial injury. Renal impairment may cause sustained  troponin elevations.  Normal: <6 - 14 ng/L  Indeterminate: 15-51 ng/L  Elevated: > 51 ng/L  See [email or web address]/test/[email or web address] on the [facility] intranet for more  information ng/L    < from: 12 Lead ECG (09.24.18 @ 06:04) > Patient is a 73 year old female with a history of hypertension, hyperlipidemia, prothrombin gene mutation, unprovoked DVT/PE (2013 and 2015, on warfarin), and CKD stage 3 who presented to [facility] on 9/17/18 with 3 days of progressing dyspnea on exertion, chest congestion, and non-productive cough, in the setting of travel by car to [place] a week prior and several missed warfarin doses with intermittently subtherapeutic INRs (recent INR < 2.0 in 8/2018). She also reports bilateral chronic leg edema that is symmetrical in nature and infrequent episodes of lightheadedness, which she describes as more of blurry vision and seeing wavy lines (<once per month for last several years, self-resolves within a few seconds). However, she denied any pleuritic chest pain, palpitations, back pain and syncopal episodes. She also has no known history of CAD, CHF, or arrhythmias. Her home medications include verapamil 120 mg  and coumadin (4 mg Monday Thursday, 2.5 mg for remainder of the week).     Patient was admitted for treatment of acute pulmonary embolism. Bilateral lower extremity ultrasound was negative for DVT and echo was negative for right heart strain. She was treated with Lovenox-to-Coumadin bridge. Electrophysiology was previously consulted on 9/21/18 due to 4.0 sec and 2.5 sec pauses seen on telemetry, in the setting of having washed herself and finished urinating in the bathroom. On 9/22/18, she also had a 4.08 second followed by 2.6 second pause on telemetry while resting in her bed. These resolved with discontinuation of verapamil. Between 9/23/18 to 9/26/18, multiple runs of PAT (ranging from 2-14 seconds) were subsequently noted on telemetry, at HR of 120-140s. On 9/26/18, patient was placed back on verapamil due to the runs of SVT, which initially improved without bradycardia. On 9/28/18, telemetry noticed 3.75 second pause at 7:40 am. She however remains asymptomatic. Cardiac electrophysiology was thus reconsulted due to concern over tachybrady syndrome.    Vital Signs Last 24 Hrs  T(C): 36.6 (28 Sep 2018 05:03), Max: 36.9 (27 Sep 2018 14:16)  T(F): 97.9 (28 Sep 2018 05:03), Max: 98.5 (27 Sep 2018 14:16)  HR: 56 (28 Sep 2018 05:04) (56 - 71)  BP: 130/84 (28 Sep 2018 05:03) (94/58 - 130/84)  BP(mean): --  RR: 18 (28 Sep 2018 05:03) (17 - 18)  SpO2: 97% (28 Sep 2018 05:03) (96% - 97%)    General: NAD  HEENT: PERRLA, EOMI  Neurology: A&Ox3, nonfocal, MCGUIRE x 4  Respiratory: CTA B/L, normal respiratory effort, no wheezes, crackles, rales  CV: RRR, S1S2, no murmurs, rubs or gallops, no JVD  Extremities: No edema                       12.9   17.03 )-----------( 168      ( 27 Sep 2018 08:22 )             38.3   09-27    140  |  103  |  21  ----------------------------<  93  4.3   |  26  |  0.91    Ca    8.4      27 Sep 2018 06:13    PT/INR - ( 27 Sep 2018 08:47 )   PT: 30.2 sec;   INR: 2.62 ratio    Troponin T, High Sensitivity (09.24.18 @ 06:50)    Troponin T, High Sensitivity Result: 8: Rapid upward or downward changes in high-sensitivity troponin levels  suggest acute myocardial injury. Renal impairment may cause sustained  troponin elevations.  Normal: <6 - 14 ng/L  Indeterminate: 15-51 ng/L  Elevated: > 51 ng/L    < from: 12 Lead ECG (09.24.18 @ 06:04) >  Sinus bradycardia at 55 bpm with PVC, [place] < 200ms, QRS < 120 ms Patient is a 73 year old female with a history of hypertension, hyperlipidemia, prothrombin gene mutation, unprovoked DVT/PE (2013 and 2015, on warfarin), and CKD stage 3 who presented to [facility] on 9/17/18 with 3 days of progressing dyspnea on exertion, chest congestion, and non-productive cough, in the setting of travel by car to [place] a week prior and several missed warfarin doses with intermittently subtherapeutic INRs (recent INR < 2.0 in 8/2018). She also reports bilateral chronic leg edema that is symmetrical in nature and infrequent episodes of lightheadedness, which she describes as more of blurry vision and seeing wavy lines (<once per month for last several years, self-resolves within a few seconds). However, she denied any pleuritic chest pain, palpitations, back pain and syncopal episodes. She also has no known history of CAD, CHF, or arrhythmias. Her home medications include verapamil 120 mg  and coumadin (4 mg Monday Thursday, 2.5 mg for remainder of the week).     CTA showed pulmonary embolus of left pulmonary artery. Bilateral lower extremity ultrasound was negative for DVT and echo was negative for right heart strain. Patient was admitted for treatment with Lovenox-to-Coumadin bridge. Electrophysiology was previously consulted on 9/21/18 due to 4.0 sec and 2.5 sec pauses seen on telemetry, in the setting of having washed herself and finished urinating in the bathroom. On 9/22/18, she also had a 4.08 second followed by 2.6 second pause on telemetry while resting in her bed. These resolved with discontinuation of verapamil. Between 9/23/18 to 9/26/18, multiple runs of PAT (ranging from 2-14 seconds) were subsequently noted on telemetry, at HR of 120-140s. On 9/26/18, patient was placed back on verapamil due to the runs of SVT, which initially improved without bradycardia. On 9/28/18, telemetry noticed 3.75 second pause at 7:40 am. She however remains asymptomatic. Cardiac electrophysiology was thus reconsulted due to concern over tachybrady syndrome.    Vital Signs Last 24 Hrs  T(C): 36.6 (28 Sep 2018 05:03), Max: 36.9 (27 Sep 2018 14:16)  T(F): 97.9 (28 Sep 2018 05:03), Max: 98.5 (27 Sep 2018 14:16)  HR: 56 (28 Sep 2018 05:04) (56 - 71)  BP: 130/84 (28 Sep 2018 05:03) (94/58 - 130/84)  BP(mean): --  RR: 18 (28 Sep 2018 05:03) (17 - 18)  SpO2: 97% (28 Sep 2018 05:03) (96% - 97%)    General: NAD  HEENT: PERRLA, EOMI  Neurology: A&Ox3, nonfocal, MCGUIRE x 4  Respiratory: CTA B/L, normal respiratory effort, no wheezes, crackles, rales  CV: RRR, S1S2, no murmurs, rubs or gallops, no JVD  Extremities: No edema                       12.9   17.03 )-----------( 168      ( 27 Sep 2018 08:22 )             38.3   09-27    140  |  103  |  21  ----------------------------<  93  4.3   |  26  |  0.91    Ca    8.4      27 Sep 2018 06:13    PT/INR - ( 27 Sep 2018 08:47 )   PT: 30.2 sec;   INR: 2.62 ratio    Troponin T, High Sensitivity (09.24.18 @ 06:50)    Troponin T, High Sensitivity Result: 8: Rapid upward or downward changes in high-sensitivity troponin levels  suggest acute myocardial injury. Renal impairment may cause sustained  troponin elevations.  Normal: <6 - 14 ng/L  Indeterminate: 15-51 ng/L  Elevated: > 51 ng/L    < from: 12 Lead ECG (09.24.18 @ 06:04) >  Sinus bradycardia at 55 bpm with PVC, [place] < 200ms, QRS < 120 ms Patient is a 73 year old female with a history of hypertension, hyperlipidemia, prothrombin gene mutation, unprovoked DVT/PE (2013 and 2015, on warfarin) who presented to [facility] on 9/17/18 with 3 days of progressing dyspnea on exertion and non-productive cough, in the setting of travel by car to [place] a week prior and several missed warfarin doses with intermittently subtherapeutic INRs (recent INR < 2.0 in 8/2018). She also reports bilateral chronic leg edema that is symmetrical in nature and infrequent episodes of lightheadedness, which she describes as more of blurry vision and seeing wavy lines (<once per month for last several years, self-resolves within a few seconds). However, she denied any pleuritic chest pain, palpitations, back pain and syncopal episodes. She also has no known history of CAD, CHF, or arrhythmias. Her home medications include verapamil 120 mg  and coumadin (4 mg Monday Thursday, 2.5 mg for remainder of the week).     CTA showed pulmonary embolus of left pulmonary artery. Bilateral lower extremity ultrasound was negative for DVT and echo was negative for right heart strain. Patient was admitted for treatment with Lovenox-to-Coumadin bridge. Electrophysiology was previously consulted on 9/21/18 due to 4.0 sec and 2.5 sec pauses seen on telemetry, in the setting of having washed herself and finished urinating in the bathroom. On 9/22/18, she also had a 4.08 second followed by 2.6 second pause on telemetry while resting in her bed. These resolved with discontinuation of verapamil. Between 9/23/18 to 9/26/18, multiple runs of PAT (ranging from 2-14 seconds) were subsequently noted on telemetry, at HR of 120-140s. On 9/26/18, patient was placed back on verapamil due to the runs of SVT, which initially improved without bradycardia. On 9/28/18, telemetry noticed 3.75 second pause at 7:40 am. She however remains asymptomatic. Cardiac electrophysiology was thus reconsulted due to concern over tachybrady syndrome.    Vital Signs Last 24 Hrs  T(C): 36.6 (28 Sep 2018 05:03), Max: 36.9 (27 Sep 2018 14:16)  T(F): 97.9 (28 Sep 2018 05:03), Max: 98.5 (27 Sep 2018 14:16)  HR: 56 (28 Sep 2018 05:04) (56 - 71)  BP: 130/84 (28 Sep 2018 05:03) (94/58 - 130/84)  BP(mean): --  RR: 18 (28 Sep 2018 05:03) (17 - 18)  SpO2: 97% (28 Sep 2018 05:03) (96% - 97%)    General: NAD  HEENT: PERRLA, EOMI  Neurology: A&Ox3, nonfocal, MCGUIRE x 4  Respiratory: CTA B/L, normal respiratory effort, no wheezes, crackles, rales  CV: RRR, S1S2, no murmurs, rubs or gallops, no JVD  Extremities: No edema                       12.9   17.03 )-----------( 168      ( 27 Sep 2018 08:22 )             38.3   09-27    140  |  103  |  21  ----------------------------<  93  4.3   |  26  |  0.91    Ca    8.4      27 Sep 2018 06:13    PT/INR - ( 27 Sep 2018 08:47 )   PT: 30.2 sec;   INR: 2.62 ratio    Troponin T, High Sensitivity (09.24.18 @ 06:50)    Troponin T, High Sensitivity Result: 8: Rapid upward or downward changes in high-sensitivity troponin levels  suggest acute myocardial injury. Renal impairment may cause sustained  troponin elevations.  Normal: <6 - 14 ng/L  Indeterminate: 15-51 ng/L  Elevated: > 51 ng/L    < from: 12 Lead ECG (09.24.18 @ 06:04) >  Sinus bradycardia at 55 bpm with PVC, [place] < 200ms, QRS < 120 ms Patient is a 73 year old female with a history of hypertension, hyperlipidemia, prothrombin gene mutation, unprovoked DVT/PE (2013 and 2015, on warfarin) who presented to [facility] on 9/17/18 with 3 days of progressing dyspnea on exertion and non-productive cough, in the setting of travel by car to [place] a week prior and several missed warfarin doses with intermittently subtherapeutic INRs (recent INR < 2.0 in 8/2018). She also reports bilateral chronic leg edema that is symmetrical in nature and infrequent episodes of lightheadedness, which she describes as more of blurry vision and seeing wavy lines (<once per month for last several years, self-resolves within a few seconds). However, she denied any pleuritic chest pain, palpitations, back pain and syncopal episodes. She also has no known history of CAD, CHF, or arrhythmias. Her home medications include verapamil 120 mg and coumadin (4 mg Monday Thursday, 2.5 mg for remainder of the week).     CTA showed pulmonary embolus of left pulmonary artery. Bilateral lower extremity ultrasound was negative for DVT and echo was negative for right heart strain. Patient was admitted and treated with Lovenox-to-Coumadin bridge. Electrophysiology was previously consulted on 9/21/18 due to 4.0 sec and 2.5 sec pauses seen on telemetry, in the setting of having washed herself and finished urinating in the bathroom. On 9/22/18, she also had a 4.08 second followed by 2.6 second pause on telemetry while resting in her bed. These resolved with discontinuation of verapamil. Between 9/23/18 to 9/26/18, multiple runs of PAT (ranging from 2-14 seconds) were subsequently noted on telemetry, at HR of 120-140s. On 9/26/18, patient was placed back on verapamil due to the runs of PAT, which initially improved without bradycardia. On 9/28/18, telemetry noticed 3.75 second pause at 7:40 am. She however remains asymptomatic. Cardiac electrophysiology was thus reconsulted due to concern over tachybrady syndrome.    Vital Signs Last 24 Hrs  T(C): 36.6 (28 Sep 2018 05:03), Max: 36.9 (27 Sep 2018 14:16)  T(F): 97.9 (28 Sep 2018 05:03), Max: 98.5 (27 Sep 2018 14:16)  HR: 56 (28 Sep 2018 05:04) (56 - 71)  BP: 130/84 (28 Sep 2018 05:03) (94/58 - 130/84)  BP(mean): --  RR: 18 (28 Sep 2018 05:03) (17 - 18)  SpO2: 97% (28 Sep 2018 05:03) (96% - 97%)    General: NAD  HEENT: PERRLA, EOMI  Neurology: A&Ox3, nonfocal, MCGUIRE x 4  Respiratory: CTA B/L, normal respiratory effort, no wheezes, crackles, rales  CV: RRR, S1S2, no murmurs, rubs or gallops, no JVD  Extremities: No edema                       12.9   17.03 )-----------( 168      ( 27 Sep 2018 08:22 )             38.3   09-27    140  |  103  |  21  ----------------------------<  93  4.3   |  26  |  0.91    Ca    8.4      27 Sep 2018 06:13    PT/INR - ( 27 Sep 2018 08:47 )   PT: 30.2 sec;   INR: 2.62 ratio    Troponin T, High Sensitivity (09.24.18 @ 06:50)    Troponin T, High Sensitivity Result: 8: Rapid upward or downward changes in high-sensitivity troponin levels  suggest acute myocardial injury. Renal impairment may cause sustained  troponin elevations.  Normal: <6 - 14 ng/L  Indeterminate: 15-51 ng/L  Elevated: > 51 ng/L    < from: 12 Lead ECG (09.24.18 @ 06:04) >  Sinus bradycardia at 55 bpm with PVC, [place] < 200ms, QRS < 120 ms    < from: CT Angio Chest w/ IV Cont (09.17.18 @ 19:29) >  EXAM:  CT ANGIO CHEST (W)AW IC                            PROCEDURE DATE:  09/17/2018      INTERPRETATION:  CLINICAL INFORMATION: Shortness of breath. INR   subtherapeutic.    COMPARISON: CTA chest 6/27/2014    PROCEDURE:   CT Angiography of the Chest.  90 ml of Omnipaque 350 was injected intravenously. 10 ml were discarded.  Sagittal and coronal reformats were performed as well as 3D (MIP)   reconstructions.      FINDINGS:    CHEST:     LUNGS AND LARGE AIRWAYS: Patent central airways.  Bibasilar dependent   atelectasis. Left lower lobe calcified granuloma  PLEURA: Filling defects visualized within the left pulmonary artery, left   upper and lower lobar branches as well as the distal segmental and   subsegmental branches. Emboli are also noted within the segmental and   subsegmental branches of the right lower lobe pulmonary arterial branches.  VESSELS: Within normal limits. The main pulmonary artery measures   approximately 2.8 cm.  HEART: Heart size is normal. No pericardial effusion. No evidence of   right heart strain. Coronary artery calcifications.  MEDIASTINUM AND NAKIA: No lymphadenopathy.  CHEST WALL AND LOWER NECK: Within normal limits.  VISUALIZED UPPER ABDOMEN: Within normal limits.  BONES: Degenerative changes of the spine.        IMPRESSION:   Pulmonary embolism involving the left pulmonary artery, left upper lobe   are branches, as well as the distal segmental subsegmental branches.   Emboli are also noted of the right lower lobe segmental and subsegmental   branches.  No evidence of right heart strain or pulmonary infarct.    Findings were discussed with BEN Stephenson by Dr. Nunez on   9/17/2018 at 7:42 PM with read back.    < from: [facility] Duplex Lower Ext Vein Scan, Bilat (09.18.18 @ 16:47) >  EXAM:  DUPLEX SCAN EXT VEINS LOWER BI                            PROCEDURE DATE:  09/18/2018            INTERPRETATION:  CLINICAL INFORMATION: A prior examination, dated   10/10/2013, showed, DVT affecting a right peroneal vein, currently short   of breath, rule out DVT.    TECHNIQUE: Duplex sonography of the BILATERAL LOWER extremities with   color and spectral Doppler, with and without compression.      FINDINGS:    There is normal compressibility of the bilateral common femoral, femoral   and popliteal veins. No calf vein thrombosis is detected.    Doppler examination shows normal spontaneous and phasic flow.    IMPRESSION:     No evidence of bilateral lower extremity deep venous thrombosis.    < end of copied text >  < end of copied text >    < from: Transthoracic Echocardiogram (09.21.18 @ 10:03) >  PROCEDURE: Transthoracic echocardiogram with 2-D, M-Mode  and complete spectral and color flow Doppler.  INDICATION: Supraventricular tachycardia (I47.1)  ------------------------------------------------------------------------  Dimensions:    Normal Values:  LA:     3.5    2.0 - 4.0 cm  Ao:     3.4    2.0 - 3.8 cm  SEPTUM: 0.8    0.6 - 1.2 cm  PWT:    0.7    0.6 - 1.1 cm  LVIDd:  4.7    3.0 - 5.6 cm  LVIDs:  3.4    1.8 - 4.0 cm  Derived variables:  LVMI: 70 g/m2  RWT: 0.29  Fractional short: 28 %  EF (Visual Estimate): 55-60 %  Doppler Peak Velocity (m/sec): AoV=1.4  ------------------------------------------------------------------------  Observations:  Mitral Valve: Mitral annular calcification, otherwise  normal mitral valve. Minimal mitral regurgitation.  Aortic Valve/Aorta: Calcified trileaflet aortic valve with  normal opening. Peak transaortic valve gradient equals 8 mm  Hg, mean transaortic valve gradient equals 5 mm Hg, aortic  valve velocity time integral equals 32 cm. Minimal aortic  regurgitation. Peak left ventricular outflow tract gradient  equals 4 mm Hg, mean gradient is equal to 2 mm Hg, LVOT  velocity time integral equals 21 cm.  Aortic Root: 3.4 cm.  LVOT diameter: 1.8 cm.  Left Atrium: Normal left atrium.  LA volume index = 27  cc/m2. Mobile interatrial septum.  Left Ventricle: Endocardium not well visualized; grossly  normal left ventricular systolic function. Normal left  ventricular internal dimensions and wall thicknesses.  Right Heart: Normal right atrium. Normal right ventricular  size and function. Normal tricuspid valve. Mild tricuspid  regurgitation. Pulmonic valve not well visualized. No  pulmonic regurgitation.  Pericardium/Pleura: Normal pericardium with no pericardial  effusion.  Hemodynamic: Estimated right ventricular systolic pressure  equals 21 mm Hg, assuming right atrial pressure equals 3 mm  Hg, consistent with normal pulmonary pressures.  ------------------------------------------------------------------------  Conclusions:  1. Mitral annular calcification, otherwise normal mitral  valve. Minimal mitral regurgitation.  2. Calcified trileafletaortic valve with normal opening.  Minimal aortic regurgitation.  3. Endocardium not well visualized; grossly normal left  ventricular systolic function.  4. Normal right ventricular size and function.  *** Compared with echocardiogram of 10/12/2013,results are  similar on today's study.    < end of copied text >

## 2018-09-28 NOTE — PROGRESS NOTE ADULT - ASSESSMENT
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 Pt now on Lovenox. Review of office chart shows that pt has been intermittently subtherapeutic over the last several months. INR in range only in august but pt claims to have missed several doses since then and most recent INR <2. Heme note appreciated and in lieu of ? lack of data treating pt's with prothrombin G mutations w/ Xa inhibitors, will continue with coumadin and pt urged to maintain compliance w/ dosing.  Lovenox d/zain as  INR is therapeutic. Cough persists but is improving and  pt w/o SOB . Pt also w/ 4 second pause and several episodes of PAT on monitor and on 9/21 and bradycardia. EPS note appreciated and suggestED  continued observation. Due to frequent PVCs, Pt had CT angio  which showed nonobstructive lesions in coronary vessels.  Pt restarted on verapamil as per cardiology and this am noted to have occassional PATs and a pause as noted above.

## 2018-09-28 NOTE — PROGRESS NOTE ADULT - SUBJECTIVE AND OBJECTIVE BOX
[facility] - [facility]     PROGRESS NOTE    CHIEF COMPLAINT:  pulmonary emboli; rhinoviral infection; bronchospasm; dyspnea; cough    INTERVAL HISTORY: has received third daily dose of verapamil - 3.75 second pause this AM (sinus arrest) - also with 2 short runs of SVT; CTA of the coronory arteries is without obstructive disease - unable to adequately assess the lung parenchyma; no chest pain/pressure or palpitations; cough continues to improve - slept well with bedtime hycodan; no shortness of breath, sputum production, chest congestion or wheeze;  no fevers, chills or sweats; walking without difficulty    REVIEW OF SYSTEMS:  Constitutional: As per interval history  HEENT: Within normal limits  CV: As per interval history  Resp: As per interval history  GI: Within normal limits   : Within normal limits  Musculoskeletal: Within normal limits  Skin: Within normal limits  Neurological: Within normal limits  Psychiatric: Within normal limits  Endocrine: Within normal limits  Hematologic/Lymphatic: Within normal limits  Allergic/Immunologic: Within normal limits    MEDICATIONS:     Pulmonary "  benzonatate 200 milliGRAM(s) Oral three times a day  guaiFENesin/dextromethorphan  Syrup 10 milliLiter(s) Oral four times a day  HYDROcodone/homatropine Syrup 5 milliLiter(s) Oral every 4 hours PRN  HYDROcodone/homatropine Syrup 5 milliLiter(s) Oral at bedtime      Anti-microbials:      Cardiovascular:      Other:  ARNUITY ELLIPTA 200MCG 1 Inhalation 1 Inhalation Oral daily  atorvastatin 10 milliGRAM(s) Oral at bedtime  benzocaine 15 mG/menthol 3.6 mG Lozenge 1 Lozenge Oral three times a day PRN  influenza   Vaccine 0.5 milliLiter(s) IntraMuscular once  pantoprazole    Tablet 40 milliGRAM(s) Oral before breakfast  predniSONE   Tablet 30 milliGRAM(s) Oral daily        OBJECTIVE:    I&O's Detail    27 Sep 2018 07:01  -  28 Sep 2018 07:00  --------------------------------------------------------  IN:    Oral Fluid: 840 mL  Total IN: 840 mL    OUT:  Total OUT: 0 mL    Total NET: 840 mL       Daily Weight in k.3 (28 Sep 2018 06:11)    PHYSICAL EXAM:       ICU Vital Signs Last 24 Hrs  T(C): 36.6 (28 Sep 2018 05:03), Max: 36.9 (27 Sep 2018 14:16)  T(F): 97.9 (28 Sep 2018 05:03), Max: 98.5 (27 Sep 2018 14:16)  HR: 56 (28 Sep 2018 05:04) (56 - 71)  BP: 130/84 (28 Sep 2018 05:03) (94/58 - 130/84)  BP(mean): --  ABP: --  ABP(mean): --  RR: 18 (28 Sep 2018 05:03) (17 - 18)  SpO2: 97% (28 Sep 2018 05:03) (96% - 97%) on room air     General: Awake. Alert. Cooperative. Decreased cough. Appears stated age 	  HEENT:   Atraumatic. Normocephalic. Anicteric. Normal oral mucosa. PERRL. EOMI.  Neck: Supple. Trachea midline. Thyroid without enlargement/tenderness/nodules. No carotid bruit. No JVD.	  Cardiovascular: Regular rate and rhythm. S1 S2 normal. No murmurs, rubs or gallops.  Respiratory: Respirations unlabored. Clear to auscultation and percussion. No curvature.  Abdomen: Soft. Non-tender. Non-distended. No organomegaly. No masses. Normal bowel sounds.  Extremities: Warm to touch. No clubbing or cyanosis. No lower extremity edema  Pulses: 2+ peripheral pulses all extremities.	  Skin: Normal skin color. No rashes or lesions. No ecchymoses. No cyanosis. Warm to touch.  Lymph Nodes: Cervical, supraclavicular and axillary nodes normal  Neurological: Motor and sensory examination equal and normal. A and O x 3  Psychiatry: Appropriate mood and affect.    LABS:                        12.9   17.03 )-----------( 168      ( 27 Sep 2018 08:22 )             38.3         140  |  103  |  21  ----------------------------<  93  4.3   |  26  |  0.91    -    141  |  106  |  22  ----------------------------<  106<H>  4.4   |  28  |  0.91    Ca      8.4          Ca      8.5          Phos    3.4         Phos    3.5           Mg       2.3         Mg       2.4         PT/INR - ( 28 Sep 2018 09:30 )   PT: 32.7 sec;   INR: 2.83 ratio      CARDIAC MARKERS ( 24 Sep 2018 06:50 )  x     / x     / 39 U/L / x     / 1.7 ng/mL    < from: Transthoracic Echocardiogram (18 @ 10:03) >    Patient name: JOHNSON DELGADO  YOB: 1945   Age: 73 (F)   MR#: [id number]  Study Date: 2018  Location: [address] [place], [place] [id number]: Fernando Espino RDCS  Study quality: Technically fair  Referring Physician: Arthur Ricks MD  Blood Pressure: 128/82 mmHg  Height: 150 cm  Weight: 66 kg  BSA: 1.6 m2  ------------------------------------------------------------------------  PROCEDURE: Transthoracic echocardiogram with 2-D, M-Mode  and complete spectral and color flow Doppler.  INDICATION: Supraventricular tachycardia (I47.1)  ------------------------------------------------------------------------  Dimensions:    Normal Values:  LA:     3.5    2.0 - 4.0 cm  Ao:     3.4    2.0 - 3.8 cm  SEPTUM: 0.8    0.6 - 1.2 cm  PWT:    0.7    0.6 - 1.1 cm  LVIDd:  4.7    3.0 - 5.6 cm  LVIDs:  3.4    1.8 - 4.0 cm  Derived variables:  LVMI: 70 g/m2  RWT: 0.29  Fractional short: 28 %  EF (Visual Estimate): 55-60 %  Doppler Peak Velocity (m/sec): AoV=1.4  ------------------------------------------------------------------------  Observations:  Mitral Valve: Mitral annular calcification, otherwise  normal mitral valve. Minimal mitral regurgitation.  Aortic Valve/Aorta: Calcified trileaflet aortic valve with  normal opening. Peak transaortic valve gradient equals 8 mm  Hg, mean transaortic valve gradient equals 5 mm Hg, aortic  valve velocity time integral equals 32 cm. Minimal aortic  regurgitation. Peak left ventricular outflow tract gradient  equals 4 mm Hg, mean gradient is equal to 2 mm Hg, LVOT  velocity time integral equals 21 cm.  Aortic Root: 3.4 cm.  LVOT diameter: 1.8 cm.  Left Atrium: Normal left atrium.  LA volume index = 27  cc/m2. Mobile interatrial septum.  Left Ventricle: Endocardium not well visualized; grossly  normal left ventricular systolic function. Normal left  ventricular internal dimensions and wall thicknesses.  Right Heart: Normal right atrium. Normal right ventricular  size and function. Normal tricuspid valve. Mild tricuspid  regurgitation. Pulmonic valve not well visualized. No  pulmonic regurgitation.  Pericardium/Pleura: Normal pericardium with no pericardial  effusion.  Hemodynamic: Estimated right ventricular systolic pressure  equals 21 mm Hg, assuming right atrial pressure equals 3 mm  Hg, consistent with normal pulmonary pressures.  ------------------------------------------------------------------------  Conclusions:  1. Mitral annular calcification, otherwise normal mitral  valve. Minimal mitral regurgitation.  2. Calcified trileaflet aortic valve with normal opening.  Minimal aortic regurgitation.  3. Endocardium not well visualized; grossly normal left  ventricular systolic function.  4. Normal right ventricular size and function.  *** Compared with echocardiogram of 10/12/2013,results are  similar on today's study.  ------------------------------------------------------------------------  Confirmed on  2018 - 12:56:58 by Kathryn Díaz M.D.  ------------------------------------------------------------------------    < end of copied text >  ---------------------------------------------------------------------------------------------------------------   MICROBIOLOGY:     Urinalysis Basic - ( 25 Sep 2018 20:13 )    Color: Yellow / Appearance: Clear / S.044 / pH: x  Gluc: x / Ketone: Negative  / Bili: Negative / Urobili: 1.0 mg/dL   Blood: x / Protein: Negative mg/dL / Nitrite: Negative   Leuk Esterase: Negative / RBC: 2 /HPF / WBC 6 /HPF   Sq Epi: x / Non Sq Epi: 1 /HPF / Bacteria: Negative    Culture - Urine (18 @ 20:29)    Specimen Source: .Urine Clean Catch (Midstream)    Culture Results:   No growth    Rapid Respiratory Viral Panel (18 @ 13:23)    Rapid RVP Result: Detected: The [employer] RVP Rapid uses polymerase chain reaction (PCR) and melt  curve analysis to screen for adenovirus; coronavirus HKU1, NL63, 229E,  OC43; human metapneumovirus (hMPV); human enterovirus/rhinovirus  (Entero/RV); influenza A; influenza A/H1;influenza A/H3; influenza  A/H1-2009; influenza B; parainfluenza viruses 1, 2, 3, 4; respiratory  syncytial virus; Bordetella pertussis; Mycoplasma pneumoniae; and  Chlamydophila pneumoniae.    Entero/Rhinovirus (RapRVP): Detected    RADIOLOGY:  [x] Chest radiographs reviewed and interpreted by me    < from: CT Heart with Coronaries (18 @ 16:33) >    EXAM:  CT HEART WITH CORONARIES                          PROCEDURE DATE:  2018      INTERPRETATION:  Indication:  Coronary artery disease    History:  Ms. Delgado is a 73-year-old-woman with prothrombin gene   mutation, pulmonary embolus and history of breast cancer noted to have   premature ventricular contractions.    Resting heart rate (contrast-enhanced acquisition):  48 beats per minute    Quality:  Good    Post-processing:  Three-dimensional volume-rendered and multiplanar   reconstructions generated with [employer] software.     FINDINGS:    Cardiovascular:    Coronary arteries:   Coronary artery calcium Agatston score:    Left main (LM)coronary artery:  0  Left anterior descending (LAD) coronary artery:  83  Left circumflex (LCX) coronary artery:  0  Right coronary artery (RCA):  0  Total:  83    Right-dominant coronary circulation.   The LM coronary artery is angiographically normal.   The LAD coronary artery has mild (30-50%) calcified and non-calcified   plaque in the proximal segment.  Small-caliber diagonal branches are   present.   The large-caliber ramus intermedius (RI) branch has minimal (<30%)   non-calcified plaque.  The LCX coronary artery has minimal (<30%) non-calcified plaque in the   proximal segment.  The LCX terminates as a left posterolateral (LPL)   branch.  The obtuse marginal (OM) branch is angiographically normal.   The RCA is angiographically normal. The visualized right posterior   descending artery (PDA) is angiographically normal.     Aorta:  No thoracic aortic dissection noted in the visualized thoracic aorta.      There is minimal non-calcified and calcified plaque in the visualized   thoracic aorta.    Pericardium:  There is no pericardial effusion.      Chambers:  The cardiac chambers are qualitatively normal in size.    There is a patent foramen ovale with evidence of a small volume of left   to right flow of contrast agent.    Valves:  Minimal mitral annular calcification noted.    Non-cardiac:  No hilar and/or mediastinal adenopathy is noted. Visualized lungs are   clear. Visualized osseous structures are within normal limits.    IMPRESSION:    1.  Non-obstructive coronary artery disease:  LM:  angiographically normal  LAD:  mild (30-50%) calcified and non-calcified plaque in the proximal   segment  RI:  minimal (<30%) non-calcified plaque  LCX:  minimal (<30%) non-calcified plaque in the proximal segment  RCA:  angiographically normal  2.  Mild coronary artery calcium (Agatston score 83) as delineated above.    The observed calcium score is at percentile 59 for individuals of the   same age, gender, and race/ethnicity who are free of clinical   cardiovascular disease and treated diabetes.    LORI MOBLEY M.D., ATTENDING CARDIOLOGIST  This document has been electronically signed.  PAULINE JACK M.D., ATTENDING RADIOLOGIST  This document has been electronically signed. Sep 25 2018  5:36PM    < end of copied text >  ---------------------------------------------------------------------------------------------------------------  < from: [facility] Duplex Lower Ext Vein Scan, Emeterio (18 @ 16:47) >    EXAM:  DUPLEX SCAN EXT VEINS LOWER BI                          PROCEDURE DATE:  2018      INTERPRETATION:  CLINICAL INFORMATION: A prior examination, dated   10/10/2013, showed, DVT affecting a right peroneal vein, currently short   of breath, rule out DVT.    TECHNIQUE: Duplex sonography of the BILATERAL LOWER extremities with   color and spectral Doppler, with and without compression.      FINDINGS:    There is normal compressibility of the bilateral common femoral, femoral   and popliteal veins. No calf vein thrombosis is detected.    Doppler examination shows normal spontaneous and phasic flow.    IMPRESSION:     No evidence of bilateral lower extremity deep venous thrombosis.    LUIS PORRAS M.D., ATTENDING RADIOLOGIST  This document has been electronically signed. Sep 18 2018  5:25PM    < end of copied text >  ---------------------------------------------------------------------------------------------------------------    < from: Xray Chest 1 View AP/PA (18 @ 12:55) >    EXAM:  XR CHEST AP OR PA 1V                          PROCEDURE DATE:  2018      INTERPRETATION:  A single chest x-ray was obtained on 2018.    Indication: Dyspnea.    Impression:    The heart is normal in size. The lungs are clear. Degenerative changes of   the thoracic spine. No pneumothorax.    MEKA BURGOS M.D., ATTENDING RADIOLOGIST  This document has been electronically signed. Sep 17 2018  1:47PM      < end of copied text >  ---------------------------------------------------------------------------------------------------------------    < from: CT Angio Chest w/ IV Cont (18 @ 19:29) >    EXAM:  CT ANGIO CHEST (W)AW IC                          PROCEDURE DATE:  2018      INTERPRETATION:  CLINICAL INFORMATION: Shortness of breath. INR   subtherapeutic.    COMPARISON: CTA chest 2014    PROCEDURE:   CT Angiography of the Chest.  90 ml of Omnipaque 350 was injected intravenously. 10 ml were discarded.  Sagittal and coronal reformats were performed as well as 3D (MIP)   reconstructions.      FINDINGS:    CHEST:     LUNGS AND LARGE AIRWAYS: Patent central airways.  Bibasilar dependent   atelectasis. Left lower lobe calcified granuloma  PLEURA: Filling defects visualized within the left pulmonary artery, left   upper and lower lobar branches as well as the distal segmental and   subsegmental branches. Emboli are also noted within the segmental and   subsegmental branches of the right lower lobe pulmonary arterial branches.  VESSELS: Within normal limits. The main pulmonary artery measures   approximately 2.8 cm.  HEART: Heart size is normal. No pericardial effusion. No evidence of   right heart strain. Coronary artery calcifications.  MEDIASTINUM AND NAKIA: No lymphadenopathy.  CHEST WALL AND LOWER NECK: Within normal limits.  VISUALIZED UPPER ABDOMEN: Within normal limits.  BONES: Degenerative changes of the spine.    IMPRESSION:   Pulmonary embolism involving the left pulmonary artery, left upper lobe   are branches, as well as the distal segmental subsegmental branches.   Emboli are also noted of the right lower lobe segmental and subsegmental   branches.  No evidence of right heart strain or pulmonary infarct.    Findings were discussed with BEN Stephenson by Dr. Sosa on   2018 at 7:42 PM with read back.    KIMBERLY SOSA M.D., RADIOLOGY RESIDENT  This document has been electronically signed.  MARTA COLLINS M.D., ATTENDING RADIOLOGIST  This document has been electronically signed. Sep 17 2018  8:26PM      < end of copied text >  ---------------------------------------------------------------------------------------------------------------

## 2018-09-28 NOTE — CONSULT NOTE ADULT - ASSESSMENT
73F with PMHx Breast Cancer s/p removal, Hx Prothrombin Gene Mutation, CKD3, Prior hx of unprovoked DVT/PE ~ 4-6yr ago on warfarin presents on 9/17 with shortness of breath., found to have acute PE due to subtherapeutic INR, now on Lovenox to Warfarin bridge. EP originally consulted for 4 second and 2.5 second pause. These pauses initially resolved on verapamil, followed by short runs of asymptomatic atrial tachycardia. Some shorter <3s pauses still occurred off verapamil. Due to increasing SVT burden, verapamil started at low dose again causing pauses of 3.75s    #Sinus pauses with parox Atrial Tachycardia  -Implantation of Micra pacemaker today.  -NPO  -May restart verapamil following pacemaker    Anil Montez MD  Cardiology Fellow - PGY-4  NS: [id number]  [id number] on weekends

## 2018-09-29 DIAGNOSIS — I26.99 OTHER PULMONARY EMBOLISM WITHOUT ACUTE COR PULMONALE: ICD-10-CM

## 2018-09-29 LAB
ANION GAP SERPL CALC-SCNC: 11 MMOL/L — SIGNIFICANT CHANGE UP (ref 5–17)
BUN SERPL-MCNC: 20 MG/DL — SIGNIFICANT CHANGE UP (ref 7–23)
CALCIUM SERPL-MCNC: 8.8 MG/DL — SIGNIFICANT CHANGE UP (ref 8.4–10.5)
CHLORIDE SERPL-SCNC: 106 MMOL/L — SIGNIFICANT CHANGE UP (ref 96–108)
CO2 SERPL-SCNC: 27 MMOL/L — SIGNIFICANT CHANGE UP (ref 22–31)
CREAT SERPL-MCNC: 1.08 MG/DL — SIGNIFICANT CHANGE UP (ref 0.5–1.3)
GLUCOSE SERPL-MCNC: 83 MG/DL — SIGNIFICANT CHANGE UP (ref 70–99)
HCT VFR BLD CALC: 42.6 % — SIGNIFICANT CHANGE UP (ref 34.5–45)
HCT VFR BLD CALC: 43.2 % — SIGNIFICANT CHANGE UP (ref 34.5–45)
HGB BLD-MCNC: 14.2 G/DL — SIGNIFICANT CHANGE UP (ref 11.5–15.5)
HGB BLD-MCNC: 14.4 G/DL — SIGNIFICANT CHANGE UP (ref 11.5–15.5)
INR BLD: 2.64 RATIO — HIGH (ref 0.88–1.16)
MAGNESIUM SERPL-MCNC: 2.3 MG/DL — SIGNIFICANT CHANGE UP (ref 1.6–2.6)
MCHC RBC-ENTMCNC: 31.1 PG — SIGNIFICANT CHANGE UP (ref 27–34)
MCHC RBC-ENTMCNC: 31.2 PG — SIGNIFICANT CHANGE UP (ref 27–34)
MCHC RBC-ENTMCNC: 33.2 GM/DL — SIGNIFICANT CHANGE UP (ref 32–36)
MCHC RBC-ENTMCNC: 33.2 GM/DL — SIGNIFICANT CHANGE UP (ref 32–36)
MCV RBC AUTO: 93.6 FL — SIGNIFICANT CHANGE UP (ref 80–100)
MCV RBC AUTO: 93.9 FL — SIGNIFICANT CHANGE UP (ref 80–100)
PHOSPHATE SERPL-MCNC: 4.6 MG/DL — HIGH (ref 2.5–4.5)
PLATELET # BLD AUTO: 203 K/UL — SIGNIFICANT CHANGE UP (ref 150–400)
PLATELET # BLD AUTO: 210 K/UL — SIGNIFICANT CHANGE UP (ref 150–400)
POTASSIUM SERPL-MCNC: 5.1 MMOL/L — SIGNIFICANT CHANGE UP (ref 3.5–5.3)
POTASSIUM SERPL-SCNC: 5.1 MMOL/L — SIGNIFICANT CHANGE UP (ref 3.5–5.3)
PROTHROM AB SERPL-ACNC: 29.4 SEC — HIGH (ref 9.8–12.7)
RBC # BLD: 4.54 M/UL — SIGNIFICANT CHANGE UP (ref 3.8–5.2)
RBC # BLD: 4.62 M/UL — SIGNIFICANT CHANGE UP (ref 3.8–5.2)
RBC # FLD: 12.4 % — SIGNIFICANT CHANGE UP (ref 10.3–14.5)
RBC # FLD: 12.9 % — SIGNIFICANT CHANGE UP (ref 10.3–14.5)
SODIUM SERPL-SCNC: 144 MMOL/L — SIGNIFICANT CHANGE UP (ref 135–145)
TSH SERPL-MCNC: 2.9 UIU/ML — SIGNIFICANT CHANGE UP (ref 0.27–4.2)
WBC # BLD: 20.9 K/UL — HIGH (ref 3.8–10.5)
WBC # BLD: 27.8 K/UL — HIGH (ref 3.8–10.5)
WBC # FLD AUTO: 20.9 K/UL — HIGH (ref 3.8–10.5)
WBC # FLD AUTO: 27.8 K/UL — HIGH (ref 3.8–10.5)

## 2018-09-29 PROCEDURE — 93010 ELECTROCARDIOGRAM REPORT: CPT

## 2018-09-29 PROCEDURE — 71046 X-RAY EXAM CHEST 2 VIEWS: CPT | Mod: 26

## 2018-09-29 RX ORDER — WARFARIN SODIUM 2.5 MG/1
5 TABLET ORAL ONCE
Qty: 0 | Refills: 0 | Status: COMPLETED | OUTPATIENT
Start: 2018-09-29 | End: 2018-09-29

## 2018-09-29 RX ADMIN — WARFARIN SODIUM 5 MILLIGRAM(S): 2.5 TABLET ORAL at 21:40

## 2018-09-29 RX ADMIN — Medication 200 MILLIGRAM(S): at 23:33

## 2018-09-29 RX ADMIN — ATORVASTATIN CALCIUM 10 MILLIGRAM(S): 80 TABLET, FILM COATED ORAL at 21:40

## 2018-09-29 RX ADMIN — Medication 200 MILLIGRAM(S): at 14:03

## 2018-09-29 RX ADMIN — Medication 120 MILLIGRAM(S): at 05:52

## 2018-09-29 RX ADMIN — PANTOPRAZOLE SODIUM 40 MILLIGRAM(S): 20 TABLET, DELAYED RELEASE ORAL at 08:27

## 2018-09-29 RX ADMIN — Medication 30 MILLIGRAM(S): at 05:53

## 2018-09-29 RX ADMIN — Medication 200 MILLIGRAM(S): at 05:52

## 2018-09-29 NOTE — CHART NOTE - NSCHARTNOTEFT_GEN_A_CORE
CCU/PICU TRANSFER NOTE     Admission date: 9/17/18  Chief complaint/ Diagnosis  HPI: 72 yo F w/ breast CA s/p removal, hx of prothrombin gene mutation, CKD III, prior hx of unprovoked DVT/PE ~ 4-6yr ago (on warfarin) presents with shortness of breath. Patient reports her symptoms started on Friday. About 1 -week prior she travelled by car to [place] No sick contacts but on Friday started developing shortness of breath, chest congestion, and coughing. The cough is nonproductive. She feels myalgia but no fever or chills. Her dyspnea became progressively worse. Initially shortness of breath was with exertion and now with slight activities becomes dyspneic. She has chronic leg edema but symmetrical in nature. She denies pleuritic chest pain, palpitation, tearing back pain, and denies syncope or near-syncope episode. She did miss her warfarin dose last week and her INR has been monitored once monthly. She otherwise reports adherence to warfarin and does not know exact INR range.     Interval history: Uneventful overnight    REVIEW OF SYSTEMS  Denies CP, Palpitation, SOB, Dyspnea [ x ] All other systems negative    MEDICATIONS  (STANDING)  ARNUITY ELLIPTA 200MCG 1 Inhalation 1 Inhalation Oral daily  atorvastatin 10 milliGRAM(s) Oral at bedtime  benzonatate 200 milliGRAM(s) Oral three times a day  guaiFENesin/dextromethorphan  Syrup 10 milliLiter(s) Oral four times a day  HYDROcodone/homatropine Syrup 5 milliLiter(s) Oral at bedtime  influenza   Vaccine 0.5 milliLiter(s) IntraMuscular once  pantoprazole    Tablet 40 milliGRAM(s) Oral before breakfast  verapamil  milliGRAM(s) Oral daily    MEDICATIONS  (PRN):  benzocaine 15 mG/menthol 3.6 mG Lozenge 1 Lozenge Oral three times a day PRN Sore Throat  HYDROcodone/homatropine Syrup 5 milliLiter(s) Oral every 4 hours PRN Cough    FAMILY HISTORY:  Family history of prothrombin gene mutation (Mother): first cousin  Family history of heart disease (Father): father    Allergy: No Known Allergies    ICU Vital Signs Last 24 Hrs  T(C): 36.6 (Max: 36.9 )  HR: 65  (51 - 75)  BP: 107/78 (91/66 - 134/90)  RR: 16  (12 - 18)  SpO2: 96%  (93% - 99%)    I&O's Summary  IN: 690 mL / OUT: 0 mL / NET: 690 mL    PHYSICAL EXAM  Appearance: Normal, NAD  HEAD:  Atraumatic, Normocephalic  EYES: PERRLA, conjunctiva and sclera clear  NECK: Supple, No JVD  CHEST/LUNG: Clear to auscultation bilaterally; No wheezing  HEART: Normal S1, S2. No murmurs, rubs, or gallops  ABDOMEN: + Bowel sounds, Soft, NT, ND   EXTREMITIES:  2+ Peripheral Pulses, No clubbing, cyanosis, or edema  NEUROLOGY: non-focal, aaox3  SKIN: Right groin no bleeding or hematoma     Interpretation of Telemetry: sinus artur                     14.4   27.8  )-----------( 210               43.2    144  |  106  |  20  -----------------------<  83  5.1   |  27  |  1.08    Ca    8.8     Phos  4.6     Mg     2.3          Assessment and Plan:   · Assessment	  72 y/o F w/ pmhx of breast cancer (s/p mastectomy) Prothrombin Gene Mutation, CKD3, previous hx of DVT/PE p/w SOB in setting of acute PE c/b tachybrady s/p micra PPM     Problem/Plan - 1:  ·  Problem: Paroxysmal atrial tachycardia.  Plan: S/P Micra PPM  site no bleeding or hematoma  s/p PA/LA xray  EKG showed SB 50-60's.      Problem/Plan - 2:  ·  Problem: Pulmonary embolism. INR today 2.64  Continue daily coumadin  Coumadin 5mg tonight(Home dose) as per Dr. Ricks    Problem/plan-3   Leukocytosis in setting of prednisone  19.0->27.8  Pt remains afebrile, no cough, no Urinary symptoms  Monitor WBC in am    Mis: Ambulate as tolerated     Plans of care discussed with Dr. Ricks. Pt will be transferred to tele floor and likely discharge tomorrow

## 2018-09-29 NOTE — PROGRESS NOTE ADULT - SUBJECTIVE AND OBJECTIVE BOX
S/P MICRA leadless PPM implant for Sinus pauses with paroxsymal  Atrial Tachycardia on 9/29    telemetry SR 90's currently PVC  EKG SR 68bpm  T(C): 36.9 (09-29-18 @ 11:00), Max: 36.9 (09-28-18 @ 14:18)  HR: 71 (09-29-18 @ 10:00) (51 - 78)  BP: 96/53 (09-29-18 @ 10:00) (91/56 - 134/90)  RR: 18 (09-29-18 @ 10:00) (12 - 18)  SpO2: 96% (09-29-18 @ 08:18) (93% - 99%)    Right groin site  flat, no bleeding hematoma mild ecchymosis suture has been removed     Post Device teaching done with patient   PPM card, booklet  given to patient  cxray PA lat with good micra placement no pneumothorax   Device paired  and interrogated by representative normal device function   dispo F/U in EP clinic in 7-10 days for wound check appointment, [phone] patient will need to call for an appointment for 1 -2 weeks   EP signing off   [phone]

## 2018-09-29 NOTE — PROGRESS NOTE ADULT - SUBJECTIVE AND OBJECTIVE BOX
CCU/PICU NOTE    Admission date:   Chief complaint/ Diagnosis  HPI: 72 yo F w/ breast CA s/p removal, hx of prothrombin gene mutation, CKD III, prior hx of unprovoked DVT/PE ~ 4-6yr ago (on warfarin) presents with shortness of breath. Patient reports her symptoms started on Friday. About 1 -week prior she travelled by car to [place] No sick contacts but on Friday started developing shortness of breath, chest congestion, and coughing. The cough is nonproductive. She feels myalgia but no fever or chills. Her dyspnea became progressively worse. Initially shortness of breath was with exertion and now with slight activities becomes dyspneic. She has chronic leg edema but symmetrical in nature. She denies pleuritic chest pain, palpitation, tearing back pain, and denies syncope or near-syncope episode. She did miss her warfarin dose last week and her INR has been monitored once monthly. She otherwise reports adherence to warfarin and does not know exact INR range.     Interval history: Uneventful overnight    REVIEW OF SYSTEMS  Denies CP, Palpitation, SOB, Dyspnea [ x ] All other systems negative    MEDICATIONS  (STANDING)  ARNUITY ELLIPTA 200MCG 1 Inhalation 1 Inhalation Oral daily  atorvastatin 10 milliGRAM(s) Oral at bedtime  benzonatate 200 milliGRAM(s) Oral three times a day  guaiFENesin/dextromethorphan  Syrup 10 milliLiter(s) Oral four times a day  HYDROcodone/homatropine Syrup 5 milliLiter(s) Oral at bedtime  influenza   Vaccine 0.5 milliLiter(s) IntraMuscular once  pantoprazole    Tablet 40 milliGRAM(s) Oral before breakfast  verapamil  milliGRAM(s) Oral daily    MEDICATIONS  (PRN):  benzocaine 15 mG/menthol 3.6 mG Lozenge 1 Lozenge Oral three times a day PRN Sore Throat  HYDROcodone/homatropine Syrup 5 milliLiter(s) Oral every 4 hours PRN Cough    FAMILY HISTORY:  Family history of prothrombin gene mutation (Mother): first cousin  Family history of heart disease (Father): father    Allergy: No Known Allergies    ICU Vital Signs Last 24 Hrs  T(C): 36.6 (Max: 36.9 )  HR: 65  (51 - 75)  BP: 107/78 (91/66 - 134/90)  RR: 16  (12 - 18)  SpO2: 96%  (93% - 99%)    I&O's Summary  IN: 690 mL / OUT: 0 mL / NET: 690 mL    PHYSICAL EXAM  Appearance: Normal, NAD  HEAD:  Atraumatic, Normocephalic  EYES: PERRLA, conjunctiva and sclera clear  NECK: Supple, No JVD  CHEST/LUNG: Clear to auscultation bilaterally; No wheezing  HEART: Normal S1, S2. No murmurs, rubs, or gallops  ABDOMEN: + Bowel sounds, Soft, NT, ND   EXTREMITIES:  2+ Peripheral Pulses, No clubbing, cyanosis, or edema  NEUROLOGY: non-focal, aaox3  SKIN: Right groin no bleeding or hematoma     Interpretation of Telemetry: sinus artur                     14.4   27.8  )-----------( 210               43.2    144  |  106  |  20  -----------------------<  83  5.1   |  27  |  1.08    Ca    8.8     Phos  4.6     Mg     2.3

## 2018-09-29 NOTE — PROGRESS NOTE ADULT - ASSESSMENT
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     "pauses", bradycardia, PAT, PVCs on telemetry     h/o breast cancer s/p lumpectomy    hypertension    leukocytosis - persists off steroids    PLAN/RECOMMENDATIONS:    stable oxygenation on room air  off prednisone  off nebs  Arnuity ellipta 200mcg 1 inhalation daily -  robitussin DM/tessalon/hycodan as needed and at bedtime  coumadin for INR 2 - 3 - Heme f/u  GI prophylaxis on A/C and steroids - protonix  no indication for IVC filter in the absence of lower extremity clot  cardiology evaluation noted - cardiac meds: lipitor - holding verapamil - EP reevaluation      Claus Reynoso MD  Pulmonary Medicine  [phone]

## 2018-09-29 NOTE — PROGRESS NOTE ADULT - ATTENDING COMMENTS
72 yo woman s/p micraPPm placement.  Doing well.  Groin without hematoma.      Follow up PA/Lat Xray

## 2018-09-29 NOTE — PROGRESS NOTE ADULT - SUBJECTIVE AND OBJECTIVE BOX
Patient is a 73y old  Female who presents with a chief complaint of shortness of breath (29 Sep 2018 09:59)      INTERVAL HPI/OVERNIGHT EVENTS:  Pt s/p pacemaker insertion, no complaints.     T(C): 36.6 (09-29-18 @ 05:00), Max: 36.9 (09-28-18 @ 14:18)  HR: 71 (09-29-18 @ 10:00) (51 - 78)  BP: 96/53 (09-29-18 @ 10:00) (91/56 - 134/90)  RR: 18 (09-29-18 @ 10:00) (12 - 18)  SpO2: 96% (09-29-18 @ 08:18) (93% - 99%)  Wt(kg): --  I&O's Summary    28 Sep 2018 07:01  -  29 Sep 2018 07:00  --------------------------------------------------------  IN: 690 mL / OUT: 0 mL / NET: 690 mL    29 Sep 2018 07:01  -  29 Sep 2018 10:41  --------------------------------------------------------  IN: 240 mL / OUT: 250 mL / NET: -10 mL        LABS:                        14.4   27.8  )-----------( 210      ( 29 Sep 2018 02:53 )             43.2     09-29    144  |  106  |  20  ----------------------------<  83  5.1   |  27  |  1.08    Ca    8.8      29 Sep 2018 02:30  Phos  4.6     09-29  Mg     2.3     09-29      PT/INR - ( 29 Sep 2018 02:53 )   PT: 29.4 sec;   INR: 2.64 ratio         REVIEW OF SYSTEMS:  CONSTITUTIONAL: No fever, weight loss, or fatigue  EYES: No eye pain, visual disturbances, or discharge  ENMT:  No difficulty hearing, tinnitus, vertigo; No sinus or throat pain  NECK: No pain or stiffness  BREASTS: No pain, masses, or nipple discharge  RESPIRATORY:min cough, no wheezing, chills or hemoptysis; No shortness of breath  CARDIOVASCULAR: No chest pain, palpitations, dizziness, or leg swelling  GASTROINTESTINAL: No abdominal or epigastric pain. No nausea, vomiting, or hematemesis; No diarrhea or constipation. No melena or hematochezia.  GENITOURINARY: No dysuria, frequency, hematuria, or incontinence  NEUROLOGICAL: No headaches, memory loss, loss of strength, numbness, or tremors  SKIN: No itching, burning, rashes, or lesions   LYMPH NODES: No enlarged glands  ENDOCRINE: No heat or cold intolerance; No hair loss  MUSCULOSKELETAL: No joint pain or swelling; No muscle, back, or extremity pain  PSYCHIATRIC: No depression, anxiety, mood swings, or difficulty sleeping  HEME/LYMPH: No easy bruising, or bleeding gums  ALLERY AND IMMUNOLOGIC: No hives or eczema      Consultant(s) Notes Reviewed:  [x ] YES  [ ] NO    PHYSICAL EXAM:  GENERAL: NAD, well-groomed, well-developed  HEAD:  Atraumatic, Normocephalic  EYES:  conjunctiva and sclera clear  ENMT: No tonsillar erythema, exudates, or enlargement; Moist mucous membranes, Good dentition, No lesions  NECK: Supple, No JVD  NERVOUS SYSTEM:  Alert & Oriented X3, Good concentration; Motor Strength 5/5 B/L upper and lower extremities  CHEST/LUNG: Clear to auscultation bilaterally; No rales, rhonchi, wheezing, or rubs  HEART: Regular rate and rhythm; No murmurs, rubs, or gallops, occassional VPC  ABDOMEN: Soft, Nontender, Nondistended; Bowel sounds present, neg HSM  EXTREMITIES:  No clubbing, cyanosis, or edema  LYMPH: No lymphadenopathy noted  SKIN: ECCHYmotic areas on abdomen    Care Discussed with Consultants/Other Providers [x ] YES  [ ] NO

## 2018-09-29 NOTE — PROGRESS NOTE ADULT - ASSESSMENT
74 y/o F w/ pmhx of breast cancer (s/p mastectomy) Prothrombin Gene Mutation, CKD3, previous hx of DVT/PE p/w SOB in setting of acute PE c/b tachybrady s/p micra PPM

## 2018-09-29 NOTE — PROGRESS NOTE ADULT - ASSESSMENT
Assessment  1. S/p micrapacemaker  2. HTN  3. SVT  4. H/o PE      Plan  1. Continue verapamil  2. Aspirin and statins  3. Anticoagulation as per medicine team  4. Stable from a cardiac standpoint

## 2018-09-29 NOTE — PROGRESS NOTE ADULT - SUBJECTIVE AND OBJECTIVE BOX
Follow-up Pulm Progress Note    The patient was seen and examined. Notes reviewed and discussed with staff/team as applicable      No new respiratory events overnight. Coughing remains     Denies: SOB, Chest pain, increased cough, colored phlegm, hemoptysis, N/V/D, neck stiffness, dysuria      Vital Signs Last 24 Hrs  T(C): 36.9 (29 Sep 2018 11:00), Max: 36.9 (28 Sep 2018 14:18)  T(F): 98.4 (29 Sep 2018 11:00), Max: 98.4 (28 Sep 2018 14:18)  HR: 71 (29 Sep 2018 10:00) (51 - 78)  BP: 96/53 (29 Sep 2018 10:00) (91/56 - 134/90)  BP(mean): 65 (29 Sep 2018 10:00) (65 - 104)  RR: 18 (29 Sep 2018 10:00) (12 - 18)  SpO2: 96% (29 Sep 2018 08:18) (93% - 99%)          09-28 @ 07:01  -  09-29 @ 07:00  --------------------------------------------------------  IN: 690 mL / OUT: 0 mL / NET: 690 mL          Medications:  MEDICATIONS  (STANDING):  ARNUITY ELLIPTA 200MCG 1 Inhalation 1 Inhalation Oral daily  atorvastatin 10 milliGRAM(s) Oral at bedtime  benzonatate 200 milliGRAM(s) Oral three times a day  guaiFENesin/dextromethorphan  Syrup 10 milliLiter(s) Oral four times a day  HYDROcodone/homatropine Syrup 5 milliLiter(s) Oral at bedtime  influenza   Vaccine 0.5 milliLiter(s) IntraMuscular once  pantoprazole    Tablet 40 milliGRAM(s) Oral before breakfast  verapamil  milliGRAM(s) Oral daily  warfarin 5 milliGRAM(s) Oral once    MEDICATIONS  (PRN):  benzocaine 15 mG/menthol 3.6 mG Lozenge 1 Lozenge Oral three times a day PRN Sore Throat  HYDROcodone/homatropine Syrup 5 milliLiter(s) Oral every 4 hours PRN Cough      Allergies    No Known Allergies        Physical Examination:    Pleasant female NAD  Neck: no JVD, LAD, accessory muscle use  PULM: Clear to auscultation bilaterally, no wheezes, rales, rhonchi  CVS: Regular rate and rhythm, S1S2, no murmurs, rubs, or gallops  Abdomen: soft,   Extremities: no edema LE  Neuro: non focal      LABS:                        14.4   27.8  )-----------( 210      ( 29 Sep 2018 02:53 )             43.2     09-29    144  |  106  |  20  ----------------------------<  83  5.1   |  27  |  1.08    Ca    8.8      29 Sep 2018 02:30  Phos  4.6     09-29  Mg     2.3     09-29            CAPILLARY BLOOD GLUCOSE        PT/INR - ( 29 Sep 2018 02:53 )   PT: 29.4 sec;   INR: 2.64 ratio                   CULTURES:  Culture Results:   No growth (09-25 @ 20:29)    Most recent blood culture -- 09-25 @ 20:29   -- -- .Urine Clean Catch (Midstream) 09-25 @ 20:29      RADIOLOGY REVIEWED    CXR:    < from: Xray Chest 2 Views PA/Lat (09.29.18 @ 09:08) >  EXAM:  XR CHEST PA LAT 2V                            PROCEDURE DATE:  09/29/2018            INTERPRETATION:  CLINICAL INFORMATION: Evaluate micra pacemaker.    TECHNIQUE: Single frontal view of the chest.    COMPARISON: Chest radiograph from 9/28/2018.    FINDINGS:   Micra pacemaker overlies the region of the right ventricle.  The lungs are clear. No pneumothorax.  The heart size is normal.   Degenerative osseous changes.    IMPRESSION:     Redemonstrated micra pacemaker overlying the region ofthe right   ventricle.

## 2018-09-29 NOTE — PROGRESS NOTE ADULT - ASSESSMENT
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 Pt now on Lovenox. Review of office chart shows that pt has been intermittently subtherapeutic over the last several months. INR in range only in august but pt claims to have missed several doses since then and most recent INR <2. Heme note appreciated and in lieu of ? lack of data treating pt's with prothrombin G mutations w/ Xa inhibitors, will continue with coumadin and pt urged to maintain compliance w/ dosing.  Lovenox d/zain as  INR is therapeutic. Cough persists but is improving and  pt w/o SOB . Pt also w/ 4 second pause and several episodes of PAT on monitor and on 9/21 and bradycardia. EPS note appreciated and suggestED  continued observation. Due to frequent PVCs, Pt had CT angio  which showed nonobstructive lesions in coronary vessels.  Pt restarted on verapamil as per cardiology and  noted to have occassional PATs and a pause. Pt is now s/p pacemaker insertion and is w/o complaints. Of note her wbc is increased but pt is afebrile and w/o any somatic complaints. Increase most likely reactive from procedure and would follow closely, if any fevers would panculture.

## 2018-09-29 NOTE — PROGRESS NOTE ADULT - SUBJECTIVE AND OBJECTIVE BOX
CHIEF COMPLAINT: denies any complaints    HISTORY OF PRESENT ILLNESS:    PAST MEDICAL & SURGICAL HISTORY:  Prothrombin gene mutation  GERD (gastroesophageal reflux disease)  DVT (deep venous thrombosis), right: october 2013  Herniated lumbar intervertebral disc  Lower back pain  PE (pulmonary embolism): 3 yrs ago, was on Lovenox and coumadin  Hx of Breast Cancer  HTN - Hypertension  S/P tubal ligation  S/P arthroscopy of right knee: 2012  S/P Breast Lumpectomy: cyst removed from left breast.  h/o Wrist Surgery          MEDICATIONS:  verapamil  milliGRAM(s) Oral daily  warfarin 5 milliGRAM(s) Oral once      benzonatate 200 milliGRAM(s) Oral three times a day  guaiFENesin/dextromethorphan  Syrup 10 milliLiter(s) Oral four times a day  HYDROcodone/homatropine Syrup 5 milliLiter(s) Oral every 4 hours PRN  HYDROcodone/homatropine Syrup 5 milliLiter(s) Oral at bedtime      pantoprazole    Tablet 40 milliGRAM(s) Oral before breakfast    atorvastatin 10 milliGRAM(s) Oral at bedtime    benzocaine 15 mG/menthol 3.6 mG Lozenge 1 Lozenge Oral three times a day PRN  influenza   Vaccine 0.5 milliLiter(s) IntraMuscular once              PHYSICAL EXAM:  T(C): 36.9 (09-29-18 @ 11:00), Max: 36.9 (09-28-18 @ 14:18)  HR: 73 (09-29-18 @ 13:00) (51 - 78)  BP: 94/65 (09-29-18 @ 13:00) (91/56 - 134/90)  RR: 16 (09-29-18 @ 13:00) (12 - 18)  SpO2: 96% (09-29-18 @ 08:18) (93% - 99%)  Wt(kg): --  I&O's Summary    28 Sep 2018 07:01  -  29 Sep 2018 07:00  --------------------------------------------------------  IN: 690 mL / OUT: 0 mL / NET: 690 mL    29 Sep 2018 07:01  -  29 Sep 2018 13:53  --------------------------------------------------------  IN: 240 mL / OUT: 250 mL / NET: -10 mL        Appearance: Normal	  HEENT:   Normal oral mucosa, PERRL, EOMI	  Cardiovascular: Normal S1 S2, No JVD, No murmurs, No edema  Respiratory: Lungs clear to auscultation	  Psychiatry: A & O x 3, Mood & affect appropriate  Gastrointestinal:  Soft, Non-tender, + BS	  Skin: No rashes, No ecchymoses, No cyanosis	  Neurologic: Non-focal  Extremities: Normal range of motion, No clubbing, cyanosis or edema  Vascular: Peripheral pulses palpable 2+ bilaterally    	  	  LABS:	 	    CARDIAC MARKERS:                                  14.2   20.9  )-----------( 203      ( 29 Sep 2018 12:51 )             42.6     09-29    144  |  106  |  20  ----------------------------<  83  5.1   |  27  |  1.08    Ca    8.8      29 Sep 2018 02:30  Phos  4.6     09-29  Mg     2.3     09-29      proBNP:   Lipid Profile:   HgA1c:   TSH: Thyroid Stimulating Hormone, Serum: 2.90 uIU/mL (09-29 @ 05:19)

## 2018-09-30 ENCOUNTER — TRANSCRIPTION ENCOUNTER (OUTPATIENT)
Age: 73
End: 2018-09-30

## 2018-09-30 LAB
ANION GAP SERPL CALC-SCNC: 7 MMOL/L — SIGNIFICANT CHANGE UP (ref 5–17)
APTT BLD: 32.2 SEC — SIGNIFICANT CHANGE UP (ref 27.5–37.4)
BUN SERPL-MCNC: 18 MG/DL — SIGNIFICANT CHANGE UP (ref 7–23)
CALCIUM SERPL-MCNC: 8.8 MG/DL — SIGNIFICANT CHANGE UP (ref 8.4–10.5)
CHLORIDE SERPL-SCNC: 106 MMOL/L — SIGNIFICANT CHANGE UP (ref 96–108)
CO2 SERPL-SCNC: 28 MMOL/L — SIGNIFICANT CHANGE UP (ref 22–31)
CREAT SERPL-MCNC: 1.06 MG/DL — SIGNIFICANT CHANGE UP (ref 0.5–1.3)
GLUCOSE SERPL-MCNC: 93 MG/DL — SIGNIFICANT CHANGE UP (ref 70–99)
HCT VFR BLD CALC: 39.1 % — SIGNIFICANT CHANGE UP (ref 34.5–45)
HGB BLD-MCNC: 13 G/DL — SIGNIFICANT CHANGE UP (ref 11.5–15.5)
INR BLD: 3.13 RATIO — HIGH (ref 0.88–1.16)
MAGNESIUM SERPL-MCNC: 2.2 MG/DL — SIGNIFICANT CHANGE UP (ref 1.6–2.6)
MCHC RBC-ENTMCNC: 31 PG — SIGNIFICANT CHANGE UP (ref 27–34)
MCHC RBC-ENTMCNC: 33.2 GM/DL — SIGNIFICANT CHANGE UP (ref 32–36)
MCV RBC AUTO: 93.3 FL — SIGNIFICANT CHANGE UP (ref 80–100)
PHOSPHATE SERPL-MCNC: 3.3 MG/DL — SIGNIFICANT CHANGE UP (ref 2.5–4.5)
PLATELET # BLD AUTO: 187 K/UL — SIGNIFICANT CHANGE UP (ref 150–400)
POTASSIUM SERPL-MCNC: 4.4 MMOL/L — SIGNIFICANT CHANGE UP (ref 3.5–5.3)
POTASSIUM SERPL-SCNC: 4.4 MMOL/L — SIGNIFICANT CHANGE UP (ref 3.5–5.3)
PROTHROM AB SERPL-ACNC: 36.2 SEC — HIGH (ref 10–13.1)
RBC # BLD: 4.19 M/UL — SIGNIFICANT CHANGE UP (ref 3.8–5.2)
RBC # FLD: 13.8 % — SIGNIFICANT CHANGE UP (ref 10.3–14.5)
SODIUM SERPL-SCNC: 141 MMOL/L — SIGNIFICANT CHANGE UP (ref 135–145)
WBC # BLD: 21 K/UL — HIGH (ref 3.8–10.5)
WBC # FLD AUTO: 21 K/UL — HIGH (ref 3.8–10.5)

## 2018-09-30 PROCEDURE — 93010 ELECTROCARDIOGRAM REPORT: CPT

## 2018-09-30 RX ADMIN — Medication 120 MILLIGRAM(S): at 05:19

## 2018-09-30 RX ADMIN — PANTOPRAZOLE SODIUM 40 MILLIGRAM(S): 20 TABLET, DELAYED RELEASE ORAL at 05:19

## 2018-09-30 RX ADMIN — Medication 20 MILLIGRAM(S): at 05:19

## 2018-09-30 RX ADMIN — Medication 200 MILLIGRAM(S): at 22:25

## 2018-09-30 RX ADMIN — Medication 200 MILLIGRAM(S): at 05:19

## 2018-09-30 RX ADMIN — ATORVASTATIN CALCIUM 10 MILLIGRAM(S): 80 TABLET, FILM COATED ORAL at 22:25

## 2018-09-30 RX ADMIN — Medication 200 MILLIGRAM(S): at 12:48

## 2018-09-30 NOTE — DISCHARGE NOTE ADULT - MEDICATION SUMMARY - MEDICATIONS TO TAKE
I will START or STAY ON the medications listed below when I get home from the hospital:    predniSONE 10 mg oral tablet  -- Taper - 20mg (2 tabs) on 10/2  THEN 10mg (1 tab) x 3 days - 10/3, 4, 5  -- Indication: For Viral upper respiratory tract infection    verapamil 120 mg oral tablet  -- 1 tab(s) by mouth once a day  -- Indication: For Heart rhythm control    Coumadin  -- 4 milligram(s) by mouth once a day (at bedtime)  PRESCRIPTION SENT TO PHARMACY  -- Indication: For Pulmonary embolism    Lipitor 10 mg oral tablet  -- 1 tab(s) by mouth once a day (at bedtime)  -- Indication: For Cholesterol control    benzonatate 100 mg oral capsule  -- 2 cap(s) by mouth 3 times a day x 5 days  -- Indication: For Cough    omeprazole 40 mg oral delayed release capsule  -- 1 cap(s) by mouth once a day  -- Indication: For heart burn    Arnuity Ellipta 200 mcg inhalation powder  -- 1 puff(s) inhaled every 24 hours  -- Indication: For Shortness of breath    guaifenesin-dextromethorphan 100 mg-10 mg/5 mL oral liquid  -- 10 milliliter(s) by mouth 4 times a day  -- Indication: For Cough    HYDROcodone-homatropine 5 mg-1.5 mg/5 mL oral syrup  -- 5 milliliter(s) by mouth once a day (at bedtime) MDD:5ml  -- Indication: For cough I will START or STAY ON the medications listed below when I get home from the hospital:    predniSONE 10 mg oral tablet  -- Taper - 20mg (2 tabs) on 10/2  THEN 10mg (1 tab) x 3 days - 10/3, 4, 5  -- Indication: For Viral upper respiratory tract infection    Aspirin Enteric Coated 81 mg oral delayed release tablet  -- 1 tab(s) by mouth once a day   -- Swallow whole.  Do not crush.  Take with food or milk.    -- Indication: For cardiac protection and stroke prevention    verapamil 120 mg oral tablet  -- 1 tab(s) by mouth once a day  -- Indication: For Heart rhythm control    Coumadin  -- 4 milligram(s) by mouth once a day (at bedtime)  PRESCRIPTION SENT TO PHARMACY  -- Indication: For Pulmonary embolism    Lipitor 10 mg oral tablet  -- 1 tab(s) by mouth once a day (at bedtime)  -- Indication: For Cholesterol control    benzonatate 100 mg oral capsule  -- 2 cap(s) by mouth 3 times a day x 5 days  -- Indication: For Cough    omeprazole 40 mg oral delayed release capsule  -- 1 cap(s) by mouth once a day  -- Indication: For heart burn    Arnuity Ellipta 200 mcg inhalation powder  -- 1 puff(s) inhaled every 24 hours  -- Indication: For Shortness of breath    guaifenesin-dextromethorphan 100 mg-10 mg/5 mL oral liquid  -- 10 milliliter(s) by mouth 4 times a day  -- Indication: For Cough    HYDROcodone-homatropine 5 mg-1.5 mg/5 mL oral syrup  -- 5 milliliter(s) by mouth once a day (at bedtime) MDD:5ml  -- Indication: For cough

## 2018-09-30 NOTE — DISCHARGE NOTE ADULT - SECONDARY DIAGNOSIS.
Arrhythmia Pacemaker Viral upper respiratory tract infection Bradycardia Other elevated white blood cell (WBC) count

## 2018-09-30 NOTE — PROGRESS NOTE ADULT - SUBJECTIVE AND OBJECTIVE BOX
Patient is a 73y old  Female who presents with a chief complaint of shortness of breath (29 Sep 2018 13:53)      INTERVAL HPI/OVERNIGHT EVENTS:    Pt s/p pacemaker, only complaint is persistence of cough after talking for long periods.     T(C): 36.5 (09-30-18 @ 03:51), Max: 36.9 (09-29-18 @ 11:00)  HR: 76 (09-30-18 @ 03:51) (61 - 83)  BP: 119/78 (09-30-18 @ 03:51) (93/67 - 119/78)  RR: 18 (09-30-18 @ 03:51) (16 - 18)  SpO2: 96% (09-30-18 @ 03:51) (95% - 97%)  Wt(kg): --  I&O's Summary    29 Sep 2018 07:01  -  30 Sep 2018 07:00  --------------------------------------------------------  IN: 240 mL / OUT: 250 mL / NET: -10 mL    30 Sep 2018 07:01  -  30 Sep 2018 10:30  --------------------------------------------------------  IN: 240 mL / OUT: 0 mL / NET: 240 mL        LABS:                        13.0   21.00 )-----------( 187      ( 30 Sep 2018 06:47 )             39.1     09-30    141  |  106  |  18  ----------------------------<  93  4.4   |  28  |  1.06    Ca    8.8      30 Sep 2018 06:04  Phos  3.3     09-30  Mg     2.2     09-30      PT/INR - ( 30 Sep 2018 06:50 )   PT: 36.2 sec;   INR: 3.13 ratio         PTT - ( 30 Sep 2018 06:50 )  PTT:32.2 sec        REVIEW OF SYSTEMS:  CONSTITUTIONAL: No fever, weight loss, or fatigue  EYES: No eye pain, visual disturbances, or discharge  ENMT:  No difficulty hearing, tinnitus, vertigo; No sinus or throat pain  NECK: No pain or stiffness  BREASTS: No pain, masses, or nipple discharge  RESPIRATORY:  cough as above, no wheezing, chills or hemoptysis; No shortness of breath  CARDIOVASCULAR: No chest pain, palpitations, dizziness, or leg swelling  GASTROINTESTINAL: No abdominal or epigastric pain. No nausea, vomiting, or hematemesis; No diarrhea or constipation. No melena or hematochezia.  GENITOURINARY: No dysuria, frequency, hematuria, or incontinence  NEUROLOGICAL: No headaches, memory loss, loss of strength, numbness, or tremors  SKIN: No itching, burning, rashes, or lesions   LYMPH NODES: No enlarged glands  ENDOCRINE: No heat or cold intolerance; No hair loss  MUSCULOSKELETAL: No joint pain or swelling; No muscle, back, or extremity pain  PSYCHIATRIC: No depression, anxiety, mood swings, or difficulty sleeping  HEME/LYMPH: No easy bruising, or bleeding gums  ALLERY AND IMMUNOLOGIC: No hives or eczema    Consultant(s) Notes Reviewed:  [x ] YES  [ ] NO    PHYSICAL EXAM:  GENERAL: NAD, well-groomed, well-developed  HEAD:  Atraumatic, Normocephalic  EYES:conjunctiva and sclera clear  ENMT: No tonsillar erythema, exudates, or enlargement; Moist mucous membranes  NECK: Supple, No JVD  NERVOUS SYSTEM:  Alert & Oriented X3, Good concentration; Motor Strength 5/5 B/L upper and lower extremities  CHEST/LUNG: Clear to auscultation bilaterally; min bibasilar rales, no rhonchi, wheezing, or rubs  HEART: Regular rate and rhythm; No murmurs, rubs, or gallops, occassional extrasystoles  ABDOMEN: Soft, Nontender, Nondistended; Bowel sounds present  EXTREMITIES: , No clubbing, cyanosis, or edema  LYMPH: No lymphadenopathy noted  SKIN: ecchymotic areas on abdomen

## 2018-09-30 NOTE — DISCHARGE NOTE ADULT - CARE PROVIDERS DIRECT ADDRESSES
,[email or web address]

## 2018-09-30 NOTE — PROGRESS NOTE ADULT - PROBLEM SELECTOR PLAN 2
improving cough   on prednisone taper  robitussin prn cough  inhalers as per pulm  incentive spirometer

## 2018-09-30 NOTE — DISCHARGE NOTE ADULT - ADDITIONAL INSTRUCTIONS
Make appointments to follow up with your physicians  Bring all discharge paperwork including discharge medication list to follow up appointments Follow up with your health care provider - Dr. Ricks on Thursday - 10/4 Call for appointment.  Follow up with EP clinic in 7 - 10 days for PPM check - call for appointment  Follow up with cardiologist - Dr. Beckford in 2 weeks - call for appoinmtent  Bring all discharge paperwork including discharge medication list to follow up appointments Follow up with your health care provider - Dr. Ricks on Thursday - 10/4 Call for appointment.  INR check on 10/4  Follow up with EP clinic - Dr. Prasad in 7 - 10 days for PPM check - call for appointment   Follow up with cardiologist - Dr. Beckford in 2 weeks - call for appointment  Bring all discharge paperwork including discharge medication list to follow up appointments

## 2018-09-30 NOTE — DISCHARGE NOTE ADULT - MEDICATION SUMMARY - MEDICATIONS TO CHANGE
I will SWITCH the dose or number of times a day I take the medications listed below when I get home from the hospital:    Coumadin  -- 4mg on Monday and Thursday and 2.5mg rest of days

## 2018-09-30 NOTE — DISCHARGE NOTE ADULT - PATIENT PORTAL LINK FT
You can access the [facility], offered by [facility], by registering with the following website: [email or web address]

## 2018-09-30 NOTE — DISCHARGE NOTE ADULT - HOSPITAL COURSE
72 yo F w/ breast CA s/p removal, hx of prothrombin gene mutation, CKD III, prior hx of unprovoked DVT/PE ~ 4-6yr ago (on warfarin) presented with shortness of breath 2/2 to pulmonary embolism c/b viral URI. . Review of PMD's office chart shows that pt has been intermittently subtherapeutic over the last several months. INR in range only in august but pt claims to have missed several doses since then and most recent INR <2.    Pt also w/ 4 second pause and several episodes of PAT on monitor on 9/21 and bradycardia. Due to frequent PVCs, Pt had CT angio  which showed nonobstructive lesions in coronary vessels.  Pt restarted on verapamil as per cardiology and  noted to have occasional PATs and a pause. 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 Found to have pulmonary embolism c/b viral URI.   Review of PMD's office chart shows that pt has been intermittently subtherapeutic over the last several months. INR in range only in august but pt claims to have missed several doses since then and most recent INR <2.    Pt also w/ 4 second pause and several episodes of PAT on monitor on 9/21 and bradycardia. Due to frequent PVCs, Pt had CT angio  which showed nonobstructive lesions in coronary vessels.  Pt restarted on verapamil as per cardiology and  noted to have occasional PATs and a pause. EP consulted and now s/p pacemaker insertion (Micra PPM placed on 9/29/18)  Noted with elevated WBC to 29 but pt is afebrile and w/o any somatic complaints. Increase most likely reactive from procedure and has decreased to 20 today. Pt is still on steroids but feel wbc is still too high to explained by steroids. Pt does not appear to be infected and clinically looks well. 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 Found to have  recurrent pulmonary embolism c/b viral URI.   Review of PMD's office chart shows that pt has been intermittently subtherapeutic over the last several months. INR in range only in august but pt claims to have missed several doses since then and most recent INR <2. Therefore felt pt was not a coumadin failure and would be restarted on appropriate dose.   Pt also w/ 4 second pause  and several episodes of PAT on monitor on 9/21 and bradycardia. EPS was consulted and thought findings were reflected of a vasovagal incident (although doubted) and recommended observation. Sx thought to be due to verapamil and it was d/zain.  Due to frequent PVCs, Pt had CT angio  which showed nonobstructive lesions in coronary vessels.  Pt  was restarted on verapamil as per cardiology  due to increased frequency of PATs and than was  noted to have persistent occasional PATs and a pause. EP consulted was reconsulted and a pacemaker was placedon (Micra PPM placed on 9/29/18) Pt tolerated procedure but was noted to have an elevated WBC to 29 but pt was afebrile and w/o any somatic complaints. Increase most likely reactive from procedure and steroids. WBC was followed and  decreased to 19. . Pt does not appear to be infected and clinically looks well and pt was felt to be stable for d/c and was advised to f/u in 3 days. pt was to continue on steroid taper, inhalers, coumadin and baby asa.

## 2018-09-30 NOTE — PROGRESS NOTE ADULT - ASSESSMENT
Assessment  1. S/p micrapacemaker  2. HTN  3. SVT  4. H/o PE      Plan  1. Short episodes of asymptomatic PAT overnight. Continue verapamil  2. Aspirin and statins  3. Anticoagulation as per medicine team  4. Stable from a cardiac standpoint

## 2018-09-30 NOTE — PROGRESS NOTE ADULT - SUBJECTIVE AND OBJECTIVE BOX
CHIEF COMPLAINT: comfortable              MEDICATIONS:  verapamil  milliGRAM(s) Oral daily      benzonatate 200 milliGRAM(s) Oral three times a day  guaiFENesin/dextromethorphan  Syrup 10 milliLiter(s) Oral four times a day  HYDROcodone/homatropine Syrup 5 milliLiter(s) Oral every 4 hours PRN  HYDROcodone/homatropine Syrup 5 milliLiter(s) Oral at bedtime      pantoprazole    Tablet 40 milliGRAM(s) Oral before breakfast    atorvastatin 10 milliGRAM(s) Oral at bedtime  predniSONE   Tablet 20 milliGRAM(s) Oral daily    benzocaine 15 mG/menthol 3.6 mG Lozenge 1 Lozenge Oral three times a day PRN  influenza   Vaccine 0.5 milliLiter(s) IntraMuscular once        PHYSICAL EXAM:  T(C): 36.7 (09-30-18 @ 11:15), Max: 36.7 (09-29-18 @ 21:04)  HR: 81 (09-30-18 @ 11:15) (65 - 83)  BP: 100/67 (09-30-18 @ 11:15) (99/55 - 119/78)  RR: 18 (09-30-18 @ 11:15) (17 - 18)  SpO2: 95% (09-30-18 @ 11:15) (95% - 97%)  Wt(kg): --  I&O's Summary    29 Sep 2018 07:01  -  30 Sep 2018 07:00  --------------------------------------------------------  IN: 240 mL / OUT: 250 mL / NET: -10 mL    30 Sep 2018 07:01  -  30 Sep 2018 13:21  --------------------------------------------------------  IN: 240 mL / OUT: 0 mL / NET: 240 mL        Appearance: Normal	  Cardiovascular: Normal S1 S2, No JVD, No murmurs, No edema  Respiratory: Lungs clear to auscultation	  Psychiatry: A & O x 3, Mood & affect appropriate  Gastrointestinal:  Soft, Non-tender, + BS	  Skin: No rashes, No ecchymoses, No cyanosis	  Neurologic: Non-focal  Extremities: Normal range of motion, No clubbing, cyanosis or edema      TELEMETRY: PAT (4 seconds)	                                      13.0   21.00 )-----------( 187      ( 30 Sep 2018 06:47 )             39.1     09-30    141  |  106  |  18  ----------------------------<  93  4.4   |  28  |  1.06    Ca    8.8      30 Sep 2018 06:04  Phos  3.3     09-30  Mg     2.2     09-30

## 2018-09-30 NOTE — DISCHARGE NOTE ADULT - CARE PROVIDER_API CALL
Arthur Ricks), Internal Medicine; Medical Oncology  [address] [facility]  [address]  [place] [address]  Phone: [phone]  Fax: [phone] Arthur Ricks), Internal Medicine; Medical Oncology  [address] [facility]  [address]  [place] [address]  Phone: [phone]  Fax: [phone]    Daniel Prasad), Cardiac Electrophysiology; Cardiology; Internal Medicine  [address] [place] [address]  Phone: [phone]    Hguo Beckford), Cardiovascular Disease; Internal Medicine  [address] [facility]  [address]  [place] [address]  Phone: [phone]  Fax: [phone]

## 2018-09-30 NOTE — DISCHARGE NOTE ADULT - CARE PLAN
Principal Discharge DX:	Pulmonary embolism  Goal:	no sequela  Assessment and plan of treatment:	Take your anticoagulation (lovenox, coumadin) as directed.  Follow up with your health care provider within one week. Call for appointment.  If you develop shortness of breath or if your shortness of breath worsens call your Health Care Provider or go to the Emergency Department.  Secondary Diagnosis:	Arrhythmia  Goal:	resolved  Assessment and plan of treatment:	Micra pacemaker placed  take medications as prescribed  Follow up with your cardiologist  Secondary Diagnosis:	Pacemaker  Goal:	appropriate functioning  Assessment and plan of treatment:	your pacemaker can send an electrical signal to your heart when it is necessary  call your doctor if you feel dizzy, lightheaded, shortness of breath, confused, more tired, faint  you will follow up with your pacemaker doctor regularly to check your pacemaker  your pacemaker battery usually will last 5 - 8 years  avoid certain electric or magnetic equipment  if you cannot walk through metal detector, ask for hand security search  most of the time you will not be able to have MRI  follow directions  that you received about arm use and mobility, driving, sex & sling use  you make want to get a emergency medical bracelet telling peoply you have a pacemaker  tell any health care provider that you have a pacemaker  Secondary Diagnosis:	Viral upper respiratory tract infection  Goal:	resolved  Assessment and plan of treatment:	follow up with your physicians Principal Discharge DX:	Pulmonary embolism  Goal:	no sequela  Assessment and plan of treatment:	Take your Blood thinner (coumadin) as directed.  Follow up with your health care provider on Thursaday - 10/4 Call for appointment.  If you develop shortness of breath or if your shortness of breath worsens call your Health Care Provider or go to the Emergency Department.  Secondary Diagnosis:	Arrhythmia  Goal:	resolved  Assessment and plan of treatment:	Micra pacemaker placed  take medications as prescribed  Follow up with your cardiologist  Secondary Diagnosis:	Pacemaker  Goal:	appropriate functioning  Assessment and plan of treatment:	your pacemaker can send an electrical signal to your heart when it is necessary  call your doctor if you feel dizzy, lightheaded, shortness of breath, confused, more tired, faint  you will follow up with your pacemaker doctor regularly to check your pacemaker  your pacemaker battery usually will last 5 - 8 years  avoid certain electric or magnetic equipment  if you cannot walk through metal detector, ask for hand security search  most of the time you will not be able to have MRI  follow directions  that you received about arm use and mobility, driving, sex & sling use  you make want to get a emergency medical bracelet telling peoply you have a pacemaker  tell any health care provider that you have a pacemaker  Secondary Diagnosis:	Viral upper respiratory tract infection  Goal:	resolved  Assessment and plan of treatment:	follow up with your physicians Principal Discharge DX:	Pulmonary embolism  Goal:	no sequela  Assessment and plan of treatment:	Take your Blood thinner (coumadin) as directed.  Follow up with your health care provider on Thursday - 10/4 Call for appointment. INR check on 10/4  If you develop shortness of breath or if your shortness of breath worsens call your Health Care Provider or go to the Emergency Department.  Secondary Diagnosis:	Bradycardia  Goal:	resolved  Assessment and plan of treatment:	Micra pacemaker placed  take medications as prescribed  Follow up with EP clinic in 7 - 10 days for PPM check - call for appointment  Follow up with cardiologist - Dr. Beckford in 2 weeks - call for appointment  Secondary Diagnosis:	Pacemaker  Goal:	appropriate functioning  Assessment and plan of treatment:	Follow up with EP clinic - Dr. Prasad in 7 - 10 days for PPM check - call for appointment   your pacemaker can send an electrical signal to your heart when it is necessary  call your doctor if you feel dizzy, lightheaded, shortness of breath, confused, more tired, faint  you will follow up with your pacemaker doctor regularly to check your pacemaker  avoid certain electric or magnetic equipment  if you cannot walk through metal detector, ask for hand security search  most of the time you will not be able to have MRI  follow directions  that you received about arm use and mobility, driving, sex & sling use  you make want to get a emergency medical bracelet telling people you have a pacemaker  tell any health care provider that you have a pacemaker  Secondary Diagnosis:	Viral upper respiratory tract infection  Goal:	resolved  Assessment and plan of treatment:	follow up with your physicians  Complete prednisone as ordered, continue inhalers and cough medications  Secondary Diagnosis:	Other elevated white blood cell (WBC) count  Assessment and plan of treatment:	Follow up with your health care provider - Dr. Ricks on Thursday - 10/4 Call for appointment.  Follow up blood work on thursday - 10/4  Seek medical help for fever, chills, nausea, vomiting, increase in cough, urinary infection and painful urination

## 2018-09-30 NOTE — PROGRESS NOTE ADULT - ASSESSMENT
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 Pt now on Lovenox. Review of office chart shows that pt has been intermittently subtherapeutic over the last several months. INR in range only in august but pt claims to have missed several doses since then and most recent INR <2. Heme note appreciated and in lieu of ? lack of data treating pt's with prothrombin G mutations w/ Xa inhibitors, will continue with coumadin and pt urged to maintain compliance w/ dosing.  Lovenox d/zain as  INR is therapeutic. Cough persists but is improving and  pt w/o SOB . Pt also w/ 4 second pause and several episodes of PAT on monitor and on 9/21 and bradycardia. EPS note appreciated and suggested  continued observation. Due to frequent PVCs, Pt had CT angio  which showed nonobstructive lesions in coronary vessels.  Pt restarted on verapamil as per cardiology and  noted to have occassional PATs and a pause. Pt is now s/p pacemaker insertion and is w/o complaints. Of note her wbc  increased to 29 but pt is afebrile and w/o any somatic complaints. Increase most likely reactive from procedure and has decreased to 20 today. Pt is still on steroids but feel wbc is still too high to explained by steroids. Pt does not appear to be infected and clinically looks well. Will continue to  follow closely, if any fevers or change in status will evaluate.

## 2018-09-30 NOTE — DISCHARGE NOTE ADULT - PLAN OF CARE
no sequela Take your anticoagulation (lovenox, coumadin) as directed.  Follow up with your health care provider within one week. Call for appointment.  If you develop shortness of breath or if your shortness of breath worsens call your Health Care Provider or go to the Emergency Department. resolved Micra pacemaker placed  take medications as prescribed  Follow up with your cardiologist appropriate functioning your pacemaker can send an electrical signal to your heart when it is necessary  call your doctor if you feel dizzy, lightheaded, shortness of breath, confused, more tired, faint  you will follow up with your pacemaker doctor regularly to check your pacemaker  your pacemaker battery usually will last 5 - 8 years  avoid certain electric or magnetic equipment  if you cannot walk through metal detector, ask for hand security search  most of the time you will not be able to have MRI  follow directions  that you received about arm use and mobility, driving, sex & sling use  you make want to get a emergency medical bracelet telling peoply you have a pacemaker  tell any health care provider that you have a pacemaker follow up with your physicians Take your Blood thinner (coumadin) as directed.  Follow up with your health care provider on Thursaday - 10/4 Call for appointment.  If you develop shortness of breath or if your shortness of breath worsens call your Health Care Provider or go to the Emergency Department. Take your Blood thinner (coumadin) as directed.  Follow up with your health care provider on Thursday - 10/4 Call for appointment. INR check on 10/4  If you develop shortness of breath or if your shortness of breath worsens call your Health Care Provider or go to the Emergency Department. Micra pacemaker placed  take medications as prescribed  Follow up with EP clinic in 7 - 10 days for PPM check - call for appointment  Follow up with cardiologist - Dr. Beckford in 2 weeks - call for appointment Follow up with EP clinic - Dr. Prasad in 7 - 10 days for PPM check - call for appointment   your pacemaker can send an electrical signal to your heart when it is necessary  call your doctor if you feel dizzy, lightheaded, shortness of breath, confused, more tired, faint  you will follow up with your pacemaker doctor regularly to check your pacemaker  avoid certain electric or magnetic equipment  if you cannot walk through metal detector, ask for hand security search  most of the time you will not be able to have MRI  follow directions  that you received about arm use and mobility, driving, sex & sling use  you make want to get a emergency medical bracelet telling people you have a pacemaker  tell any health care provider that you have a pacemaker follow up with your physicians  Complete prednisone as ordered, continue inhalers and cough medications Follow up with your health care provider - Dr. Ricks on Thursday - 10/4 Call for appointment.  Follow up blood work on thursday - 10/4  Seek medical help for fever, chills, nausea, vomiting, increase in cough, urinary infection and painful urination

## 2018-10-01 VITALS — DIASTOLIC BLOOD PRESSURE: 60 MMHG | SYSTOLIC BLOOD PRESSURE: 108 MMHG

## 2018-10-01 LAB
ANION GAP SERPL CALC-SCNC: 9 MMOL/L — SIGNIFICANT CHANGE UP (ref 5–17)
BASOPHILS # BLD AUTO: 0 K/UL — SIGNIFICANT CHANGE UP (ref 0–0.2)
BASOPHILS NFR BLD AUTO: 0 % — SIGNIFICANT CHANGE UP (ref 0–2)
BUN SERPL-MCNC: 20 MG/DL — SIGNIFICANT CHANGE UP (ref 7–23)
CALCIUM SERPL-MCNC: 8.8 MG/DL — SIGNIFICANT CHANGE UP (ref 8.4–10.5)
CHLORIDE SERPL-SCNC: 104 MMOL/L — SIGNIFICANT CHANGE UP (ref 96–108)
CO2 SERPL-SCNC: 28 MMOL/L — SIGNIFICANT CHANGE UP (ref 22–31)
CREAT SERPL-MCNC: 1.04 MG/DL — SIGNIFICANT CHANGE UP (ref 0.5–1.3)
EOSINOPHIL # BLD AUTO: 0.19 K/UL — SIGNIFICANT CHANGE UP (ref 0–0.5)
EOSINOPHIL NFR BLD AUTO: 1 % — SIGNIFICANT CHANGE UP (ref 0–6)
GLUCOSE SERPL-MCNC: 93 MG/DL — SIGNIFICANT CHANGE UP (ref 70–99)
HCT VFR BLD CALC: 39.3 % — SIGNIFICANT CHANGE UP (ref 34.5–45)
HGB BLD-MCNC: 12.6 G/DL — SIGNIFICANT CHANGE UP (ref 11.5–15.5)
INR BLD: 2.79 RATIO — HIGH (ref 0.88–1.16)
LYMPHOCYTES # BLD AUTO: 13.64 K/UL — HIGH (ref 1–3.3)
LYMPHOCYTES # BLD AUTO: 70 % — HIGH (ref 13–44)
MANUAL SMEAR VERIFICATION: SIGNIFICANT CHANGE UP
MCHC RBC-ENTMCNC: 29.9 PG — SIGNIFICANT CHANGE UP (ref 27–34)
MCHC RBC-ENTMCNC: 32.1 GM/DL — SIGNIFICANT CHANGE UP (ref 32–36)
MCV RBC AUTO: 93.3 FL — SIGNIFICANT CHANGE UP (ref 80–100)
MONOCYTES # BLD AUTO: 0.19 K/UL — SIGNIFICANT CHANGE UP (ref 0–0.9)
MONOCYTES NFR BLD AUTO: 1 % — LOW (ref 2–14)
NEUTROPHILS # BLD AUTO: 5.45 K/UL — SIGNIFICANT CHANGE UP (ref 1.8–7.4)
NEUTROPHILS NFR BLD AUTO: 28 % — LOW (ref 43–77)
NRBC # BLD: 0 /100 — SIGNIFICANT CHANGE UP (ref 0–0)
PLAT MORPH BLD: NORMAL — SIGNIFICANT CHANGE UP
PLATELET # BLD AUTO: 175 K/UL — SIGNIFICANT CHANGE UP (ref 150–400)
POTASSIUM SERPL-MCNC: 4.7 MMOL/L — SIGNIFICANT CHANGE UP (ref 3.5–5.3)
POTASSIUM SERPL-SCNC: 4.7 MMOL/L — SIGNIFICANT CHANGE UP (ref 3.5–5.3)
PROTHROM AB SERPL-ACNC: 32.2 SEC — HIGH (ref 10–13.1)
RBC # BLD: 4.21 M/UL — SIGNIFICANT CHANGE UP (ref 3.8–5.2)
RBC # FLD: 14.1 % — SIGNIFICANT CHANGE UP (ref 10.3–14.5)
RBC BLD AUTO: NORMAL — SIGNIFICANT CHANGE UP
SMUDGE CELLS # BLD: PRESENT — SIGNIFICANT CHANGE UP
SODIUM SERPL-SCNC: 141 MMOL/L — SIGNIFICANT CHANGE UP (ref 135–145)
WBC # BLD: 19.48 K/UL — HIGH (ref 3.8–10.5)
WBC # FLD AUTO: 19.48 K/UL — HIGH (ref 3.8–10.5)

## 2018-10-01 RX ORDER — GUAIFENESIN/DEXTROMETHORPHAN 600MG-30MG
10 TABLET, EXTENDED RELEASE 12 HR ORAL
Qty: 0 | Refills: 0 | COMMUNITY
Start: 2018-10-01

## 2018-10-01 RX ORDER — WARFARIN SODIUM 2.5 MG/1
4 TABLET ORAL
Qty: 0 | Refills: 0 | COMMUNITY

## 2018-10-01 RX ORDER — WARFARIN SODIUM 2.5 MG/1
0 TABLET ORAL
Qty: 0 | Refills: 0 | COMMUNITY

## 2018-10-01 RX ORDER — ASPIRIN/CALCIUM CARB/MAGNESIUM 324 MG
1 TABLET ORAL
Qty: 30 | Refills: 0
Start: 2018-10-01 | End: 2018-10-30

## 2018-10-01 RX ORDER — ATORVASTATIN CALCIUM 80 MG/1
1 TABLET, FILM COATED ORAL
Qty: 0 | Refills: 0 | COMMUNITY

## 2018-10-01 RX ORDER — GUAIFENESIN/DEXTROMETHORPHAN 600MG-30MG
10 TABLET, EXTENDED RELEASE 12 HR ORAL
Qty: 100 | Refills: 0 | OUTPATIENT
Start: 2018-10-01 | End: 2018-10-10

## 2018-10-01 RX ORDER — WARFARIN SODIUM 2.5 MG/1
1 TABLET ORAL
Qty: 30 | Refills: 0 | OUTPATIENT
Start: 2018-10-01 | End: 2018-10-30

## 2018-10-01 RX ADMIN — Medication 20 MILLIGRAM(S): at 06:04

## 2018-10-01 RX ADMIN — INFLUENZA VIRUS VACCINE 0.5 MILLILITER(S): 15; 15; 15; 15 SUSPENSION INTRAMUSCULAR at 11:55

## 2018-10-01 RX ADMIN — Medication 200 MILLIGRAM(S): at 06:04

## 2018-10-01 RX ADMIN — PANTOPRAZOLE SODIUM 40 MILLIGRAM(S): 20 TABLET, DELAYED RELEASE ORAL at 06:04

## 2018-10-01 RX ADMIN — Medication 120 MILLIGRAM(S): at 06:04

## 2018-10-01 NOTE — CHART NOTE - NSCHARTNOTEFT_GEN_A_CORE
Request from Dr. Ricks to facilitate patient discharge after INR results. INR 2.79, discussed with Dr. Ricks and started pt on coumadin 4 mg daily with follow up at his office on 10/4 for INR check and dosing of coumadin and to stat aspirin 81mg daily and all other medication reconciliation reviewed, revised, and resolved with Dr. Ricks who had medically cleared patient for discharge with follow-up as advised. Please refer to discharge note for detailed hospital course. Patient is currently stable for discharge to home at this time.    Contact # [phone]

## 2018-10-01 NOTE — PROGRESS NOTE ADULT - ASSESSMENT
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 Pt now on Lovenox. Review of office chart shows that pt has been intermittently subtherapeutic over the last several months. INR in range only in august but pt claims to have missed several doses since then and most recent INR <2. Heme note appreciated and in lieu of ? lack of data treating pt's with prothrombin G mutations w/ Xa inhibitors, will continue with coumadin and pt urged to maintain compliance w/ dosing.  Lovenox d/zain as  INR is therapeutic. Cough persists but is improving and  pt w/o SOB . Pt also w/ 4 second pause and several episodes of PAT on monitor and on 9/21 and bradycardia. EPS note appreciated and suggested  continued observation. Due to frequent PVCs, Pt had CT angio  which showed nonobstructive lesions in coronary vessels.  Pt restarted on verapamil as per cardiology and  noted to have occassional PATs and a pause. Pt is now s/p pacemaker insertion and is w/o complaints. Of note her wbc  increased to 29 but pt is afebrile and w/o any somatic complaints. Increase most likely reactive from procedure and has decreased slighty  today. Pt does not appear to be infected and clinically looks well,  will therefore d/c to home and follow as outpt.  if any fevers or change in status pt to call immediately.

## 2018-10-01 NOTE — PROGRESS NOTE ADULT - PROBLEM SELECTOR PROBLEM 6
VPC's (ventricular premature complexes)
Paroxysmal atrial tachycardia
VPC's (ventricular premature complexes)
Bradycardia
Paroxysmal atrial tachycardia
VPC's (ventricular premature complexes)

## 2018-10-01 NOTE — PROGRESS NOTE ADULT - SUBJECTIVE AND OBJECTIVE BOX
No c/o.  Tele revealed sinus rhythm or sinus artur.  No demand ventricular paced beats.    EKG from yesterday officially interpreted as ventricular undersensing howevere this is misinterpretation of "spike" signifying change of EKG lead and is not noted contemporaneous rhythm strip.      REVIEW OF SYSTEMS:  CARDIOVASCULAR: No chest pain, dyspnea or palpitations  All other review of systems is negative unless indicated above    Medications:  ARNUITY ELLIPTA 200MCG 1 Inhalation 1 Inhalation Oral daily  atorvastatin 10 milliGRAM(s) Oral at bedtime  benzocaine 15 mG/menthol 3.6 mG Lozenge 1 Lozenge Oral three times a day PRN  benzonatate 200 milliGRAM(s) Oral three times a day  guaiFENesin/dextromethorphan  Syrup 10 milliLiter(s) Oral four times a day  HYDROcodone/homatropine Syrup 5 milliLiter(s) Oral every 4 hours PRN  HYDROcodone/homatropine Syrup 5 milliLiter(s) Oral at bedtime  influenza   Vaccine 0.5 milliLiter(s) IntraMuscular once  pantoprazole    Tablet 40 milliGRAM(s) Oral before breakfast  predniSONE   Tablet 20 milliGRAM(s) Oral daily  verapamil  milliGRAM(s) Oral daily      Physical Exam:  Vitals:  T(C): 36.6 (10-01-18 @ 04:24), Max: 36.7 (18 @ 11:15)  HR: 52 (10-01-18 @ 04:24) (52 - 81)  BP: 125/73 (10-01-18 @ 04:24) (95/58 - 125/73)  BP(mean): --  RR: 18 (10-01-18 @ 04:24) (18 - 18)  SpO2: 94% (10-01-18 @ 04:24) (94% - 97%)  Wt(kg): --  Daily     Daily Weight in k.1 (01 Oct 2018 07:25)  I&O's Summary    30 Sep 2018 07:01  -  01 Oct 2018 07:00  --------------------------------------------------------  IN: 530 mL / OUT: 0 mL / NET: 530 mL        Appearance:  Normal, NAD  Eyes:  EOMI  Neck:  No JVD  Respiratory: Clear to auscultation bilaterally  Cardiovascular: Normal S1 and S2 with soft systolic murmur LSB.  No rubs or gallops  Abdomen:   BS normal, Soft,  Non-tender  Extremities: Without edema        10-01    Complete Blood Count + Automated Diff in AM (10.01.18 @ 07:44)    WBC Count: 19.48 K/uL    RBC Count: 4.21 M/uL    Hemoglobin: 12.6 g/dL    Hematocrit: 39.3 %    Mean Cell Volume: 93.3 fl    Mean Cell Hemoglobin: 29.9 pg    Mean Cell Hemoglobin Conc: 32.1 gm/dL    Red Cell Distrib Width: 14.1 %    Platelet Count - Automated: 175 K/uL        141  |  104  |  20  ----------------------------<  93  4.7   |  28  |  1.04    Ca    8.8      01 Oct 2018 05:55  Phos  3.3     09-30  Mg     2.2     09-30      PT/INR - ( 30 Sep 2018 06:50 )   PT: 36.2 sec;   INR: 3.13 ratio         PTT - ( 30 Sep 2018 06:50 )  PTT:32.2 sec No c/o.  Tele revealed sinus rhythm or sinus artur.  No demand ventricular paced beats.  No SVT.    EKG from yesterday officially interpreted as ventricular undersensing howevere this is misinterpretation of "spike" signifying change of EKG lead and is not noted contemporaneous rhythm strip.      REVIEW OF SYSTEMS:  CARDIOVASCULAR: No chest pain, dyspnea or palpitations  All other review of systems is negative unless indicated above    Medications:  ARNUITY ELLIPTA 200MCG 1 Inhalation 1 Inhalation Oral daily  atorvastatin 10 milliGRAM(s) Oral at bedtime  benzocaine 15 mG/menthol 3.6 mG Lozenge 1 Lozenge Oral three times a day PRN  benzonatate 200 milliGRAM(s) Oral three times a day  guaiFENesin/dextromethorphan  Syrup 10 milliLiter(s) Oral four times a day  HYDROcodone/homatropine Syrup 5 milliLiter(s) Oral every 4 hours PRN  HYDROcodone/homatropine Syrup 5 milliLiter(s) Oral at bedtime  influenza   Vaccine 0.5 milliLiter(s) IntraMuscular once  pantoprazole    Tablet 40 milliGRAM(s) Oral before breakfast  predniSONE   Tablet 20 milliGRAM(s) Oral daily  verapamil  milliGRAM(s) Oral daily      Physical Exam:  Vitals:  T(C): 36.6 (10-01-18 @ 04:24), Max: 36.7 (18 @ 11:15)  HR: 52 (10-01-18 @ 04:24) (52 - 81)  BP: 125/73 (10-01-18 @ 04:24) (95/58 - 125/73)  BP(mean): --  RR: 18 (10-01-18 @ 04:24) (18 - 18)  SpO2: 94% (10-01-18 @ 04:24) (94% - 97%)  Wt(kg): --  Daily     Daily Weight in k.1 (01 Oct 2018 07:25)  I&O's Summary    30 Sep 2018 07:  -  01 Oct 2018 07:00  --------------------------------------------------------  IN: 530 mL / OUT: 0 mL / NET: 530 mL        Appearance:  Normal, NAD  Eyes:  EOMI  Neck:  No JVD  Respiratory: Clear to auscultation bilaterally  Cardiovascular: Normal S1 and S2 with soft systolic murmur LSB.  No rubs or gallops  Abdomen:   BS normal, Soft,  Non-tender  Extremities: Without edema        10-01    Complete Blood Count + Automated Diff in AM (10.01.18 @ 07:44)    WBC Count: 19.48 K/uL    RBC Count: 4.21 M/uL    Hemoglobin: 12.6 g/dL    Hematocrit: 39.3 %    Mean Cell Volume: 93.3 fl    Mean Cell Hemoglobin: 29.9 pg    Mean Cell Hemoglobin Conc: 32.1 gm/dL    Red Cell Distrib Width: 14.1 %    Platelet Count - Automated: 175 K/uL        141  |  104  |  20  ----------------------------<  93  4.7   |  28  |  1.04    Ca    8.8      01 Oct 2018 05:55  Phos  3.3     09-30  Mg     2.2     09-30      PT/INR - ( 30 Sep 2018 06:50 )   PT: 36.2 sec;   INR: 3.13 ratio         PTT - ( 30 Sep 2018 06:50 )  PTT:32.2 sec

## 2018-10-01 NOTE — PROGRESS NOTE ADULT - PROBLEM SELECTOR PROBLEM 5
Bradycardia
Essential hypertension
VPC's (ventricular premature complexes)
Bradycardia
Essential hypertension
VPC's (ventricular premature complexes)
Bradycardia

## 2018-10-01 NOTE — PROGRESS NOTE ADULT - PROBLEM SELECTOR PLAN 1
lovenox d/zain   coumadin 5mg tonight  -Monitor H&H.  follow INR
S/P Micra PPM  site no bleeding or hematoma  s/p PA/LA xray  EKG showed SB 50-60's
c/w lovenox therapy for pulmonary embolism.  dose coumadin  -Monitor H&H.  -c/w nasal canula oxygen therapy for goal O2 >90%  follow INR
hold coumadin today INR 3.13  follow INR
lovenox d/zain   continue coumadin-Monitor H&H.  follow INR
lovenox d/zain   coumadin 5mg tonight  -Monitor H&H.  follow INR
c/w lovenox therapy for pulmonary embolism until INR therapeutic  dose coumadin  -Monitor H&H.  follow INR
c/w lovenox therapy for pulmonary embolism until INR therapeutic  dose coumadin  -Monitor H&H.  follow INR  if remains therapuetic in am d/c lovenox
c/w lovenox therapy for pulmonary embolism.  -Monitor H&H.  -c/w nasal canula oxygen therapy for goal O2 >90%  ? if warfarin failure will request heme eval  -No headaches or suggestive findings for neuroimaging prior to anticoagulation   -At this point no indication for TTE.
coumadin 4mg daily  follow INR
lovenox d/zain   continue coumadin-Monitor H&H.  follow INR
c/w lovenox therapy for pulmonary embolism until INR therapeutic  hold  coumadin tonight  -Monitor H&H.  follow INR  d/c lovenox

## 2018-10-01 NOTE — PROGRESS NOTE ADULT - PROVIDER SPECIALTY LIST ADULT
CCU
Cardiology
Electrophysiology
Internal Medicine
Pulmonology
Electrophysiology

## 2018-10-01 NOTE — PROGRESS NOTE ADULT - PROBLEM SELECTOR PLAN 5
pt w/ 2 sec  pauses off  verapamil  cardiac monitoring  cardiology and EPS following
? due to PE or cough   CT angio w/o significant dx requiring intervention  cardiac note appreciated start statin and baby asa when stable
? due to PE or cough   CT angio w/o significant vessel dx requiring intervention  cardiac note appreciated start statin and baby asa when stable
continue verapamil
pt w/ 2 sec  pauses off  verapamil  cardiac monitoring
pt w/ 2 sec  pauses off  verapamil  cardiac monitoring  cardiology and EPS following
? due to PE or cough   CT angio w/o significant vessel dx requiring intervention  cardiac note appreciated start statin and baby asa when stable
continue verapamil
continue verapamil
monitor off verapamil  add norvasc if needed
pt w/ 2 sec  pauses off  verapamil  cardiac monitoring
pt w/ pauses, hold verapamil  cardiac monitoring  cardiology and EPS following

## 2018-10-01 NOTE — PROGRESS NOTE ADULT - PROBLEM SELECTOR PROBLEM 2
Viral upper respiratory tract infection
Pulmonary embolism
Viral upper respiratory tract infection

## 2018-10-01 NOTE — PROGRESS NOTE ADULT - PROBLEM SELECTOR PLAN 4
monitor off verapamil  add norvasc if needed
continue cardiac monitoring
continue cardiac monitoring
continue cardiac monitoring  s/p PPM
continue cardiac monitoring  s/p PPM
monitor off verapamil  add norvasc if needed
monitor off verapamil  add norvasc if needed
repeat bicarb nl, may have  been  a facitious reading
continue cardiac monitoring  s/p PPM
may be a facitious reading, will repeat stat. further eval pending results
monitor off verapamil  add norvasc if needed
repeat bicarb nl, may have  been  a facitious reading
repeat bicarb nl, may have  been  a facitious reading
monitor off verapamil  add norvasc if needed

## 2018-10-01 NOTE — PROGRESS NOTE ADULT - PROBLEM SELECTOR PLAN 3
may be related to steroid or viral infection  follow wbc  wbc stable
decreased, may be related to steroids  follow wbc
decreased, may be related to steroids  follow wbc
may be reactive  follow wbc
may be reactive and from steriods  follow wbc  monitor for any change in clinical status  if increase persists consider ID eval
may be related to steroid but increasing, need to r/o infection  ck CT of chest  follow wbc
may be related to steroid or viral infection  follow wbc
may be related to steroid or viral infection  follow wbc  wbc stable
may be reactive and from steriods  follow wbc  monitor for any change in clinical status  stable for d/c today
may be related to steroid but increasing, need to r/o infection  ck CT of chest  follow wbc
may be related to steroid or viral infection  follow wbc
may be related to steroid or viral infection  follow wbc
may be related to steroid or viral infection  follow wbc  improved today
may be related to steroid or viral infection  follow wbc  wbc stable

## 2018-10-01 NOTE — PROGRESS NOTE ADULT - PROBLEM SELECTOR PROBLEM 1
Other pulmonary embolism without acute cor pulmonale, unspecified chronicity
Paroxysmal atrial tachycardia
Other pulmonary embolism without acute cor pulmonale, unspecified chronicity

## 2018-10-01 NOTE — PROGRESS NOTE ADULT - ASSESSMENT
Impression:  Normal pacemaker function.    Plan:  Patient should be on ASA 81 mg daily regarding coronary artery disease noted on CTCA.              No concern re: PVCs              Office f/u 2-4 weeks

## 2018-10-01 NOTE — PROGRESS NOTE ADULT - ASSESSMENT
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     "pauses", bradycardia, PAT, PVCs on telemetry     h/o breast cancer s/p lumpectomy    hypertension    leukocytosis - persists off steroids    PLAN/RECOMMENDATIONS:    stable oxygenation on room air  prednisone taper ongoing  off nebs  Arnuity ellipta 200mcg 1 inhalation daily - patient given samples and instructed in the use of the device  robitussin DM/tessalon/hycodan as needed and at bedtime  coumadin for INR 2 - 3 - should not be considered a coumadin "failure" given the subtherapeutic INR when admitted  GI prophylaxis on A/C and steroids - protonix  no indication for IVC filter in the absence of lower extremity clot  cardiology evaluation noted - cardiac meds: lipitor - restarted on verapamil - add ASA - normal pacemaker function.  ECHO unremarkable (as above)  coronary CTA without obstructive CAD  would hold off with a CT scan of the chest for now    Will follow with you. Plan of care discussed with the patient at bedside. No pulmonary objection to discharge    Rd Lira MD, [facility] - [phone]  Pulmonary Medicine

## 2018-10-01 NOTE — PROGRESS NOTE ADULT - SUBJECTIVE AND OBJECTIVE BOX
[facility] - [facility]     PROGRESS NOTE    CHIEF COMPLAINT:  pulmonary emboli; rhinoviral infection; bronchospasm; dyspnea; cough    INTERVAL HISTORY: no demand pacemaker beats following placement of MICRA pacemaker - remains in NSR and sinus bradycardia - no SVT; CTA of the coronory arteries is without obstructive disease - unable to adequately assess the lung parenchyma; no chest pain/pressure or palpitations; cough continues to improve - slept well with bedtime hycodan; no shortness of breath, sputum production, chest congestion or wheeze;  no fevers, chills or sweats; walking without difficulty    REVIEW OF SYSTEMS:  Constitutional: As per interval history  HEENT: Within normal limits  CV: As per interval history  Resp: As per interval history  GI: Within normal limits   : Within normal limits  Musculoskeletal: Within normal limits  Skin: Within normal limits  Neurological: Within normal limits  Psychiatric: Within normal limits  Endocrine: Within normal limits  Hematologic/Lymphatic: Within normal limits  Allergic/Immunologic: Within normal limits    MEDICATIONS:     Pulmonary "  benzonatate 200 milliGRAM(s) Oral three times a day  guaiFENesin/dextromethorphan  Syrup 10 milliLiter(s) Oral four times a day  HYDROcodone/homatropine Syrup 5 milliLiter(s) Oral every 4 hours PRN  HYDROcodone/homatropine Syrup 5 milliLiter(s) Oral at bedtime      Anti-microbials:      Cardiovascular:  verapamil  milliGRAM(s) Oral daily      Other:  ARNUITY ELLIPTA 200MCG 1 Inhalation 1 Inhalation Oral daily  atorvastatin 10 milliGRAM(s) Oral at bedtime  benzocaine 15 mG/menthol 3.6 mG Lozenge 1 Lozenge Oral three times a day PRN  pantoprazole    Tablet 40 milliGRAM(s) Oral before breakfast  predniSONE   Tablet 20 milliGRAM(s) Oral daily        OBJECTIVE:    I&O's Detail    30 Sep 2018 07:  -  01 Oct 2018 07:00  --------------------------------------------------------  IN:    Oral Fluid: 530 mL  Total IN: 530 mL    OUT:  Total OUT: 0 mL    Total NET: 530 mL      01 Oct 2018 07:  -  01 Oct 2018 13:53  --------------------------------------------------------  IN:    Oral Fluid: 300 mL  Total IN: 300 mL    OUT:  Total OUT: 0 mL    Total NET: 300 mL    Daily Weight in k.1 (01 Oct 2018 07:25)    PHYSICAL EXAM:       ICU Vital Signs Last 24 Hrs  T(C): 36.9 (01 Oct 2018 11:37), Max: 36.9 (01 Oct 2018 11:37)  T(F): 98.5 (01 Oct 2018 11:37), Max: 98.5 (01 Oct 2018 11:37)  HR: 79 (01 Oct 2018 11:37) (52 - 79)  BP: 108/60 (01 Oct 2018 12:00) (95/58 - 125/73)  BP(mean): --  ABP: --  ABP(mean): --  RR: 16 (01 Oct 2018 11:37) (16 - 18)  SpO2: 95% (01 Oct 2018 11:37) (94% - 97%) on room air    General: Awake. Alert. Cooperative. Decreased cough. Appears stated age 	  HEENT:   Atraumatic. Normocephalic. Anicteric. Normal oral mucosa. PERRL. EOMI.  Neck: Supple. Trachea midline. Thyroid without enlargement/tenderness/nodules. No carotid bruit. No JVD.	  Cardiovascular: Regular rate and rhythm. S1 S2 normal. No murmurs, rubs or gallops.  Respiratory: Respirations unlabored. Clear to auscultation and percussion. No curvature.  Abdomen: Soft. Non-tender. Non-distended. No organomegaly. No masses. Normal bowel sounds.  Extremities: Warm to touch. No clubbing or cyanosis. No lower extremity edema  Pulses: 2+ peripheral pulses all extremities.	  Skin: Normal skin color. No rashes or lesions. No ecchymoses. No cyanosis. Warm to touch.  Lymph Nodes: Cervical, supraclavicular and axillary nodes normal  Neurological: Motor and sensory examination equal and normal. A and O x 3  Psychiatry: Appropriate mood and affect.    LABS:                        12.6   19.48 )-----------( 175      ( 01 Oct 2018 07:44 )             39.3                         13.0   21.00 )-----------( 187      ( 30 Sep 2018 06:47 )             39.1     10-    141  |  104  |  20  ----------------------------<  93  4.7   |  28  |  1.04        141  |  106  |  18  ----------------------------<  93  4.4   |  28  |  1.06    Ca      8.8      10-    Ca      8.8      -30    Phos    3.3     -    Phos    4.6     -      Mg       2.2     -30    Mg       2.3     -29      PT/INR - ( 01 Oct 2018 09:33 )   PT: 32.2 sec;   INR: 2.79 ratio       PTT - ( 30 Sep 2018 06:50 )  PTT:32.2 sec    < from: Transthoracic Echocardiogram (18 @ 10:03) >    Patient name: JOHNSON DELGADO  YOB: 1945   Age: 73 (F)   MR#: [id number]  Study Date: 2018  Location: [address] [place], [place] [id number]: Fernando Espino RDCS  Study quality: Technically fair  Referring Physician: Arthur Ricks MD  Blood Pressure: 128/82 mmHg  Height: 150 cm  Weight: 66 kg  BSA: 1.6 m2  ------------------------------------------------------------------------  PROCEDURE: Transthoracic echocardiogram with 2-D, M-Mode  and complete spectral and color flow Doppler.  INDICATION: Supraventricular tachycardia (I47.1)  ------------------------------------------------------------------------  Dimensions:    Normal Values:  LA:     3.5    2.0 - 4.0 cm  Ao:     3.4    2.0 - 3.8 cm  SEPTUM: 0.8    0.6 - 1.2 cm  PWT:    0.7    0.6 - 1.1 cm  LVIDd:  4.7    3.0 - 5.6 cm  LVIDs:  3.4    1.8 - 4.0 cm  Derived variables:  LVMI: 70 g/m2  RWT: 0.29  Fractional short: 28 %  EF (Visual Estimate): 55-60 %  Doppler Peak Velocity (m/sec): AoV=1.4  ------------------------------------------------------------------------  Observations:  Mitral Valve: Mitral annular calcification, otherwise  normal mitral valve. Minimal mitral regurgitation.  Aortic Valve/Aorta: Calcified trileaflet aortic valve with  normal opening. Peak transaortic valve gradient equals 8 mm  Hg, mean transaortic valve gradient equals 5 mm Hg, aortic  valve velocity time integral equals 32 cm. Minimal aortic  regurgitation. Peak left ventricular outflow tract gradient  equals 4 mm Hg, mean gradient is equal to 2 mm Hg, LVOT  velocity time integral equals 21 cm.  Aortic Root: 3.4 cm.  LVOT diameter: 1.8 cm.  Left Atrium: Normal left atrium.  LA volume index = 27  cc/m2. Mobile interatrial septum.  Left Ventricle: Endocardium not well visualized; grossly  normal left ventricular systolic function. Normal left  ventricular internal dimensions and wall thicknesses.  Right Heart: Normal right atrium. Normal right ventricular  size and function. Normal tricuspid valve. Mild tricuspid  regurgitation. Pulmonic valve not well visualized. No  pulmonic regurgitation.  Pericardium/Pleura: Normal pericardium with no pericardial  effusion.  Hemodynamic: Estimated right ventricular systolic pressure  equals 21 mm Hg, assuming right atrial pressure equals 3 mm  Hg, consistent with normal pulmonary pressures.  ------------------------------------------------------------------------  Conclusions:  1. Mitral annular calcification, otherwise normal mitral  valve. Minimal mitral regurgitation.  2. Calcified trileaflet aortic valve with normal opening.  Minimal aortic regurgitation.  3. Endocardium not well visualized; grossly normal left  ventricular systolic function.  4. Normal right ventricular size and function.  *** Compared with echocardiogram of 10/12/2013,results are  similar on today's study.  ------------------------------------------------------------------------  Confirmed on  2018 - 12:56:58 by Kathryn Díaz M.D.  ------------------------------------------------------------------------    < end of copied text >  ---------------------------------------------------------------------------------------------------------------   MICROBIOLOGY:     Urinalysis Basic - ( 25 Sep 2018 20:13 )    Color: Yellow / Appearance: Clear / S.044 / pH: x  Gluc: x / Ketone: Negative  / Bili: Negative / Urobili: 1.0 mg/dL   Blood: x / Protein: Negative mg/dL / Nitrite: Negative   Leuk Esterase: Negative / RBC: 2 /HPF / WBC 6 /HPF   Sq Epi: x / Non Sq Epi: 1 /HPF / Bacteria: Negative    Culture - Urine (18 @ 20:29)    Specimen Source: .Urine Clean Catch (Midstream)    Culture Results:   No growth    Rapid Respiratory Viral Panel (18 @ 13:23)    Rapid RVP Result: Detected: The [employer] RVP Rapid uses polymerase chain reaction (PCR) and melt  curve analysis to screen for adenovirus; coronavirus HKU1, NL63, 229E,  OC43; human metapneumovirus (hMPV); human enterovirus/rhinovirus  (Entero/RV); influenza A; influenza A/H1;influenza A/H3; influenza  A/H1-2009; influenza B; parainfluenza viruses 1, 2, 3, 4; respiratory  syncytial virus; Bordetella pertussis; Mycoplasma pneumoniae; and  Chlamydophila pneumoniae.    Entero/Rhinovirus (RapRVP): Detected    RADIOLOGY:  [x] Chest radiographs reviewed and interpreted by me    < from: CT Heart with Coronaries (18 @ 16:33) >    EXAM:  CT HEART WITH CORONARIES                          PROCEDURE DATE:  2018      INTERPRETATION:  Indication:  Coronary artery disease    History:  Ms. Delgado is a 73-year-old-woman with prothrombin gene   mutation, pulmonary embolus and history of breast cancer noted to have   premature ventricular contractions.    Resting heart rate (contrast-enhanced acquisition):  48 beats per minute    Quality:  Good    Post-processing:  Three-dimensional volume-rendered and multiplanar   reconstructions generated with Vitrea software.     FINDINGS:    Cardiovascular:    Coronary arteries:   Coronary artery calcium Agatston score:    Left main (LM)coronary artery:  0  Left anterior descending (LAD) coronary artery:  83  Left circumflex (LCX) coronary artery:  0  Right coronary artery (RCA):  0  Total:  83    Right-dominant coronary circulation.   The LM coronary artery is angiographically normal.   The LAD coronary artery has mild (30-50%) calcified and non-calcified   plaque in the proximal segment.  Small-caliber diagonal branches are   present.   The large-caliber ramus intermedius (RI) branch has minimal (<30%)   non-calcified plaque.  The LCX coronary artery has minimal (<30%) non-calcified plaque in the   proximal segment.  The LCX terminates as a left posterolateral (LPL)   branch.  The obtuse marginal (OM) branch is angiographically normal.   The RCA is angiographically normal. The visualized right posterior   descending artery (PDA) is angiographically normal.     Aorta:  No thoracic aortic dissection noted in the visualized thoracic aorta.      There is minimal non-calcified and calcified plaque in the visualized   thoracic aorta.    Pericardium:  There is no pericardial effusion.      Chambers:  The cardiac chambers are qualitatively normal in size.    There is a patent foramen ovale with evidence of a small volume of left   to right flow of contrast agent.    Valves:  Minimal mitral annular calcification noted.    Non-cardiac:  No hilar and/or mediastinal adenopathy is noted. Visualized lungs are   clear. Visualized osseous structures are within normal limits.    IMPRESSION:    1.  Non-obstructive coronary artery disease:  LM:  angiographically normal  LAD:  mild (30-50%) calcified and non-calcified plaque in the proximal   segment  RI:  minimal (<30%) non-calcified plaque  LCX:  minimal (<30%) non-calcified plaque in the proximal segment  RCA:  angiographically normal  2.  Mild coronary artery calcium (Agatston score 83) as delineated above.    The observed calcium score is at percentile 59 for individuals of the   same age, gender, and race/ethnicity who are free of clinical   cardiovascular disease and treated diabetes.    LORI MOBLEY M.D., ATTENDING CARDIOLOGIST  This document has been electronically signed.  PAULINE JACK M.D., ATTENDING RADIOLOGIST  This document has been electronically signed. Sep 25 2018  5:36PM    < end of copied text >  ---------------------------------------------------------------------------------------------------------------  < from: [facility] Duplex Lower Ext Vein Scan, Bilat (18 @ 16:47) >    EXAM:  DUPLEX SCAN EXT VEINS LOWER BI                          PROCEDURE DATE:  2018      INTERPRETATION:  CLINICAL INFORMATION: A prior examination, dated   10/10/2013, showed, DVT affecting a right peroneal vein, currently short   of breath, rule out DVT.    TECHNIQUE: Duplex sonography of the BILATERAL LOWER extremities with   color and spectral Doppler, with and without compression.      FINDINGS:    There is normal compressibility of the bilateral common femoral, femoral   and popliteal veins. No calf vein thrombosis is detected.    Doppler examination shows normal spontaneous and phasic flow.    IMPRESSION:     No evidence of bilateral lower extremity deep venous thrombosis.    LUIS PORRAS M.D., ATTENDING RADIOLOGIST  This document has been electronically signed. Sep 18 2018  5:25PM    < end of copied text >  ---------------------------------------------------------------------------------------------------------------    < from: Xray Chest 1 View AP/PA (18 @ 12:55) >    EXAM:  XR CHEST AP OR PA 1V                          PROCEDURE DATE:  2018      INTERPRETATION:  A single chest x-ray was obtained on 2018.    Indication: Dyspnea.    Impression:    The heart is normal in size. The lungs are clear. Degenerative changes of   the thoracic spine. No pneumothorax.    MEKA BURGOS M.D., ATTENDING RADIOLOGIST  This document has been electronically signed. Sep 17 2018  1:47PM      < end of copied text >  ---------------------------------------------------------------------------------------------------------------    < from: CT Angio Chest w/ IV Cont (18 @ 19:29) >    EXAM:  CT ANGIO CHEST (W)AW IC                          PROCEDURE DATE:  2018      INTERPRETATION:  CLINICAL INFORMATION: Shortness of breath. INR   subtherapeutic.    COMPARISON: CTA chest 2014    PROCEDURE:   CT Angiography of the Chest.  90 ml of Omnipaque 350 was injected intravenously. 10 ml were discarded.  Sagittal and coronal reformats were performed as well as 3D (MIP)   reconstructions.      FINDINGS:    CHEST:     LUNGS AND LARGE AIRWAYS: Patent central airways.  Bibasilar dependent   atelectasis. Left lower lobe calcified granuloma  PLEURA: Filling defects visualized within the left pulmonary artery, left   upper and lower lobar branches as well as the distal segmental and   subsegmental branches. Emboli are also noted within the segmental and   subsegmental branches of the right lower lobe pulmonary arterial branches.  VESSELS: Within normal limits. The main pulmonary artery measures   approximately 2.8 cm.  HEART: Heart size is normal. No pericardial effusion. No evidence of   right heart strain. Coronary artery calcifications.  MEDIASTINUM AND NAKIA: No lymphadenopathy.  CHEST WALL AND LOWER NECK: Within normal limits.  VISUALIZED UPPER ABDOMEN: Within normal limits.  BONES: Degenerative changes of the spine.    IMPRESSION:   Pulmonary embolism involving the left pulmonary artery, left upper lobe   are branches, as well as the distal segmental subsegmental branches.   Emboli are also noted of the right lower lobe segmental and subsegmental   branches.  No evidence of right heart strain or pulmonary infarct.    Findings were discussed with BEN Stephenson by Dr. Sosa on   2018 at 7:42 PM with read back.    KIMBERLY SOSA M.D., RADIOLOGY RESIDENT  This document has been electronically signed.  MARTA COLLINS M.D., ATTENDING RADIOLOGIST  This document has been electronically signed. Sep 17 2018  8:26PM      < end of copied text >  ---------------------------------------------------------------------------------------------------------------

## 2018-10-01 NOTE — PROGRESS NOTE ADULT - REASON FOR ADMISSION

## 2018-10-01 NOTE — PROGRESS NOTE ADULT - PROBLEM SELECTOR PROBLEM 4
Essential hypertension
Bradycardia
Essential hypertension
Essential hypertension
Metabolic acidosis
Bradycardia
Essential hypertension
Metabolic acidosis
Essential hypertension

## 2018-10-01 NOTE — PROGRESS NOTE ADULT - PROBLEM SELECTOR PLAN 6
? due to PE or cough but ? need for ETT. will d/w cardiology
? due to PE or cough   CT angio w/o significant dx requiring intervention  cardiac note appreciated start statin and baby asa when stable
cardiology requested reeval by EPS.
cardiology requested reeval by EPS.
continue verapamil
no recurrence after restarting verapamil
? due to PE or cough   CT angio w/o significant dx requiring intervention  cardiac note appreciated start statin and baby asa when stable
continue verapamil  s/p PPM
pt w/ pauses, hold verapamil  cardiac monitoring  cardiology and EPS following

## 2018-10-04 ENCOUNTER — INPATIENT (INPATIENT)
Facility: HOSPITAL | Age: 73
LOS: 3 days | Discharge: HOME | DRG: 948 | End: 2018-10-08
Attending: INTERNAL MEDICINE | Admitting: INTERNAL MEDICINE
Payer: MEDICARE

## 2018-10-04 VITALS
TEMPERATURE: 98 F | HEIGHT: 58 IN | RESPIRATION RATE: 30 BRPM | WEIGHT: 127.87 LBS | SYSTOLIC BLOOD PRESSURE: 140 MMHG | DIASTOLIC BLOOD PRESSURE: 84 MMHG | HEART RATE: 64 BPM | OXYGEN SATURATION: 99 %

## 2018-10-04 DIAGNOSIS — D72.829 ELEVATED WHITE BLOOD CELL COUNT, UNSPECIFIED: ICD-10-CM

## 2018-10-04 DIAGNOSIS — B34.9 VIRAL INFECTION, UNSPECIFIED: ICD-10-CM

## 2018-10-04 DIAGNOSIS — I26.99 OTHER PULMONARY EMBOLISM WITHOUT ACUTE COR PULMONALE: ICD-10-CM

## 2018-10-04 DIAGNOSIS — Z98.89 OTHER SPECIFIED POSTPROCEDURAL STATES: Chronic | ICD-10-CM

## 2018-10-04 DIAGNOSIS — I49.5 SICK SINUS SYNDROME: ICD-10-CM

## 2018-10-04 DIAGNOSIS — Z98.51 TUBAL LIGATION STATUS: Chronic | ICD-10-CM

## 2018-10-04 DIAGNOSIS — R07.9 CHEST PAIN, UNSPECIFIED: ICD-10-CM

## 2018-10-04 DIAGNOSIS — Z95.0 PRESENCE OF CARDIAC PACEMAKER: ICD-10-CM

## 2018-10-04 DIAGNOSIS — Z85.3 PERSONAL HISTORY OF MALIGNANT NEOPLASM OF BREAST: ICD-10-CM

## 2018-10-04 DIAGNOSIS — E78.49 OTHER HYPERLIPIDEMIA: ICD-10-CM

## 2018-10-04 LAB
ALBUMIN SERPL ELPH-MCNC: 3.9 G/DL — SIGNIFICANT CHANGE UP (ref 3.3–5)
ALP SERPL-CCNC: 51 U/L — SIGNIFICANT CHANGE UP (ref 40–120)
ALT FLD-CCNC: 19 U/L — SIGNIFICANT CHANGE UP (ref 10–45)
ANION GAP SERPL CALC-SCNC: 14 MMOL/L — SIGNIFICANT CHANGE UP (ref 5–17)
APPEARANCE UR: CLEAR — SIGNIFICANT CHANGE UP
APTT BLD: 30.4 SEC — SIGNIFICANT CHANGE UP (ref 27.5–37.4)
AST SERPL-CCNC: 15 U/L — SIGNIFICANT CHANGE UP (ref 10–40)
BASE EXCESS BLDV CALC-SCNC: 1.8 MMOL/L — SIGNIFICANT CHANGE UP (ref -2–2)
BILIRUB SERPL-MCNC: 0.5 MG/DL — SIGNIFICANT CHANGE UP (ref 0.2–1.2)
BILIRUB UR-MCNC: NEGATIVE — SIGNIFICANT CHANGE UP
BUN SERPL-MCNC: 18 MG/DL — SIGNIFICANT CHANGE UP (ref 7–23)
CA-I SERPL-SCNC: 1.2 MMOL/L — SIGNIFICANT CHANGE UP (ref 1.12–1.3)
CALCIUM SERPL-MCNC: 9.1 MG/DL — SIGNIFICANT CHANGE UP (ref 8.4–10.5)
CHLORIDE BLDV-SCNC: 109 MMOL/L — HIGH (ref 96–108)
CHLORIDE SERPL-SCNC: 104 MMOL/L — SIGNIFICANT CHANGE UP (ref 96–108)
CO2 BLDV-SCNC: 26 MMOL/L — SIGNIFICANT CHANGE UP (ref 22–30)
CO2 SERPL-SCNC: 21 MMOL/L — LOW (ref 22–31)
COLOR SPEC: SIGNIFICANT CHANGE UP
CREAT SERPL-MCNC: 0.88 MG/DL — SIGNIFICANT CHANGE UP (ref 0.5–1.3)
DIFF PNL FLD: NEGATIVE — SIGNIFICANT CHANGE UP
GAS PNL BLDV: 135 MMOL/L — LOW (ref 136–145)
GAS PNL BLDV: SIGNIFICANT CHANGE UP
GLUCOSE BLDV-MCNC: 82 MG/DL — SIGNIFICANT CHANGE UP (ref 70–99)
GLUCOSE SERPL-MCNC: 85 MG/DL — SIGNIFICANT CHANGE UP (ref 70–99)
GLUCOSE UR QL: NEGATIVE — SIGNIFICANT CHANGE UP
HCO3 BLDV-SCNC: 25 MMOL/L — SIGNIFICANT CHANGE UP (ref 21–29)
HCT VFR BLD CALC: 42.6 % — SIGNIFICANT CHANGE UP (ref 34.5–45)
HCT VFR BLDA CALC: 45 % — SIGNIFICANT CHANGE UP (ref 39–50)
HGB BLD CALC-MCNC: 14.7 G/DL — SIGNIFICANT CHANGE UP (ref 11.5–15.5)
HGB BLD-MCNC: 14.6 G/DL — SIGNIFICANT CHANGE UP (ref 11.5–15.5)
INR BLD: 2.55 RATIO — HIGH (ref 0.88–1.16)
KETONES UR-MCNC: NEGATIVE — SIGNIFICANT CHANGE UP
LACTATE BLDV-MCNC: 2.3 MMOL/L — HIGH (ref 0.7–2)
LEUKOCYTE ESTERASE UR-ACNC: NEGATIVE — SIGNIFICANT CHANGE UP
LYMPHOCYTES # BLD AUTO: 13.2 K/UL — HIGH (ref 1–3.3)
LYMPHOCYTES # BLD AUTO: 61 % — HIGH (ref 13–44)
MCHC RBC-ENTMCNC: 31.7 PG — SIGNIFICANT CHANGE UP (ref 27–34)
MCHC RBC-ENTMCNC: 34.3 GM/DL — SIGNIFICANT CHANGE UP (ref 32–36)
MCV RBC AUTO: 92.6 FL — SIGNIFICANT CHANGE UP (ref 80–100)
MONOCYTES # BLD AUTO: 0.9 K/UL — SIGNIFICANT CHANGE UP (ref 0–0.9)
MONOCYTES NFR BLD AUTO: 6 % — SIGNIFICANT CHANGE UP (ref 2–14)
NEUTROPHILS # BLD AUTO: 8 K/UL — HIGH (ref 1.8–7.4)
NEUTROPHILS NFR BLD AUTO: 32 % — LOW (ref 43–77)
NITRITE UR-MCNC: NEGATIVE — SIGNIFICANT CHANGE UP
NT-PROBNP SERPL-SCNC: 636 PG/ML — HIGH (ref 0–300)
OTHER CELLS CSF MANUAL: 13 ML/DL — LOW (ref 18–22)
PCO2 BLDV: 35 MMHG — SIGNIFICANT CHANGE UP (ref 35–50)
PH BLDV: 7.47 — HIGH (ref 7.35–7.45)
PH UR: 6 — SIGNIFICANT CHANGE UP (ref 5–8)
PLAT MORPH BLD: NORMAL — SIGNIFICANT CHANGE UP
PLATELET # BLD AUTO: 173 K/UL — SIGNIFICANT CHANGE UP (ref 150–400)
PO2 BLDV: 35 MMHG — SIGNIFICANT CHANGE UP (ref 25–45)
POTASSIUM BLDV-SCNC: 3.7 MMOL/L — SIGNIFICANT CHANGE UP (ref 3.5–5.3)
POTASSIUM SERPL-MCNC: 4.2 MMOL/L — SIGNIFICANT CHANGE UP (ref 3.5–5.3)
POTASSIUM SERPL-SCNC: 4.2 MMOL/L — SIGNIFICANT CHANGE UP (ref 3.5–5.3)
PROT SERPL-MCNC: 6.8 G/DL — SIGNIFICANT CHANGE UP (ref 6–8.3)
PROT UR-MCNC: NEGATIVE — SIGNIFICANT CHANGE UP
PROTHROM AB SERPL-ACNC: 28.3 SEC — HIGH (ref 9.8–12.7)
RAPID RVP RESULT: SIGNIFICANT CHANGE UP
RBC # BLD: 4.6 M/UL — SIGNIFICANT CHANGE UP (ref 3.8–5.2)
RBC # FLD: 12.7 % — SIGNIFICANT CHANGE UP (ref 10.3–14.5)
RBC BLD AUTO: SIGNIFICANT CHANGE UP
SAO2 % BLDV: 65 % — LOW (ref 67–88)
SODIUM SERPL-SCNC: 139 MMOL/L — SIGNIFICANT CHANGE UP (ref 135–145)
SP GR SPEC: 1.01 — SIGNIFICANT CHANGE UP (ref 1.01–1.02)
TROPONIN T, HIGH SENSITIVITY RESULT: 21 NG/L — SIGNIFICANT CHANGE UP (ref 0–51)
UROBILINOGEN FLD QL: NEGATIVE — SIGNIFICANT CHANGE UP
VARIANT LYMPHS # BLD: 1 % — SIGNIFICANT CHANGE UP (ref 0–6)
WBC # BLD: 22.5 K/UL — HIGH (ref 3.8–10.5)
WBC # FLD AUTO: 22.5 K/UL — HIGH (ref 3.8–10.5)

## 2018-10-04 PROCEDURE — 71046 X-RAY EXAM CHEST 2 VIEWS: CPT | Mod: 26

## 2018-10-04 PROCEDURE — 99285 EMERGENCY DEPT VISIT HI MDM: CPT | Mod: 25

## 2018-10-04 PROCEDURE — 85060 BLOOD SMEAR INTERPRETATION: CPT

## 2018-10-04 PROCEDURE — 93010 ELECTROCARDIOGRAM REPORT: CPT

## 2018-10-04 PROCEDURE — 99223 1ST HOSP IP/OBS HIGH 75: CPT

## 2018-10-04 RX ORDER — VANCOMYCIN HCL 1 G
1000 VIAL (EA) INTRAVENOUS ONCE
Qty: 0 | Refills: 0 | Status: COMPLETED | OUTPATIENT
Start: 2018-10-04 | End: 2018-10-04

## 2018-10-04 RX ORDER — ATORVASTATIN CALCIUM 80 MG/1
10 TABLET, FILM COATED ORAL AT BEDTIME
Qty: 0 | Refills: 0 | Status: DISCONTINUED | OUTPATIENT
Start: 2018-10-04 | End: 2018-10-08

## 2018-10-04 RX ORDER — VERAPAMIL HCL 240 MG
120 CAPSULE, EXTENDED RELEASE PELLETS 24 HR ORAL DAILY
Qty: 0 | Refills: 0 | Status: DISCONTINUED | OUTPATIENT
Start: 2018-10-04 | End: 2018-10-08

## 2018-10-04 RX ORDER — WARFARIN SODIUM 2.5 MG/1
4 TABLET ORAL ONCE
Qty: 0 | Refills: 0 | Status: COMPLETED | OUTPATIENT
Start: 2018-10-04 | End: 2018-10-04

## 2018-10-04 RX ORDER — CEFEPIME 1 G/1
1000 INJECTION, POWDER, FOR SOLUTION INTRAMUSCULAR; INTRAVENOUS EVERY 8 HOURS
Qty: 0 | Refills: 0 | Status: DISCONTINUED | OUTPATIENT
Start: 2018-10-04 | End: 2018-10-07

## 2018-10-04 RX ORDER — VANCOMYCIN HCL 1 G
1000 VIAL (EA) INTRAVENOUS EVERY 12 HOURS
Qty: 0 | Refills: 0 | Status: DISCONTINUED | OUTPATIENT
Start: 2018-10-04 | End: 2018-10-07

## 2018-10-04 RX ORDER — ASPIRIN/CALCIUM CARB/MAGNESIUM 324 MG
81 TABLET ORAL DAILY
Qty: 0 | Refills: 0 | Status: DISCONTINUED | OUTPATIENT
Start: 2018-10-04 | End: 2018-10-08

## 2018-10-04 RX ORDER — SODIUM CHLORIDE 9 MG/ML
1000 INJECTION INTRAMUSCULAR; INTRAVENOUS; SUBCUTANEOUS ONCE
Qty: 0 | Refills: 0 | Status: COMPLETED | OUTPATIENT
Start: 2018-10-04 | End: 2018-10-04

## 2018-10-04 RX ORDER — PANTOPRAZOLE SODIUM 20 MG/1
40 TABLET, DELAYED RELEASE ORAL
Qty: 0 | Refills: 0 | Status: DISCONTINUED | OUTPATIENT
Start: 2018-10-04 | End: 2018-10-05

## 2018-10-04 RX ORDER — PIPERACILLIN AND TAZOBACTAM 4; .5 G/20ML; G/20ML
3.38 INJECTION, POWDER, LYOPHILIZED, FOR SOLUTION INTRAVENOUS ONCE
Qty: 0 | Refills: 0 | Status: COMPLETED | OUTPATIENT
Start: 2018-10-04 | End: 2018-10-04

## 2018-10-04 RX ADMIN — CEFEPIME 100 MILLIGRAM(S): 1 INJECTION, POWDER, FOR SOLUTION INTRAMUSCULAR; INTRAVENOUS at 21:11

## 2018-10-04 RX ADMIN — Medication 250 MILLIGRAM(S): at 15:12

## 2018-10-04 RX ADMIN — WARFARIN SODIUM 4 MILLIGRAM(S): 2.5 TABLET ORAL at 21:11

## 2018-10-04 RX ADMIN — PANTOPRAZOLE SODIUM 40 MILLIGRAM(S): 20 TABLET, DELAYED RELEASE ORAL at 21:11

## 2018-10-04 RX ADMIN — PIPERACILLIN AND TAZOBACTAM 200 GRAM(S): 4; .5 INJECTION, POWDER, LYOPHILIZED, FOR SOLUTION INTRAVENOUS at 14:14

## 2018-10-04 RX ADMIN — ATORVASTATIN CALCIUM 10 MILLIGRAM(S): 80 TABLET, FILM COATED ORAL at 21:11

## 2018-10-04 RX ADMIN — SODIUM CHLORIDE 1000 MILLILITER(S): 9 INJECTION INTRAMUSCULAR; INTRAVENOUS; SUBCUTANEOUS at 14:14

## 2018-10-04 NOTE — ED PROVIDER NOTE - MEDICAL DECISION MAKING DETAILS
[facility] PGY2: 73F hx of breast ca s/p recent admission for rhino entero c/b tachy/artur syndrome and b/l PE s/p pacer started on coumadin on 9.29, was seen at medicine clinic sent to ED for worsening cough, fatigue and SOB and L sided chest pain, went to PMD office today, instructed to present to ED w/ c/f worsening sx in setting of recent dc and pacer. +non productive cough. exam vss well appearing non toxic exam non focal discussed with rashid will admit labs cxr cultures urine ivf abx

## 2018-10-04 NOTE — CONSULT NOTE ADULT - SUBJECTIVE AND OBJECTIVE BOX
HPI:   Patient is a 73y female with a past history of a hypercoagulable state managed with coumadin,prior PE,  admitted  with dyspnea, diagnosed with new PE, found to have subtherapeutic INR,, placed on prednisone for bronchospasm,anticoagulated with coumadin,found to have a positive RVP for RV/EV, s/p micra PPM insertion for tachy-artur syndrome, d/c on 10/1 on a prednisone taper, who has not felt well since discharge.She has a dry cough, fatigue, and c/o dyspnea on exertion.No sputum production, chest pain,GI or  symptoms.No fever or chills.Her dopplers last admission did not show a DVT.She denies any chest congestion, runny nose, or sore throat.She has a prothrombin gene mutation.She has been started on vanco and cefepime in ER.    REVIEW OF SYSTEMS:  All other review of systems negative (Comprehensive ROS)    PAST MEDICAL & SURGICAL HISTORY:  Prothrombin gene mutation  GERD (gastroesophageal reflux disease)  DVT (deep venous thrombosis), right: 2013  Herniated lumbar intervertebral disc  Lower back pain  PE (pulmonary embolism): 3 yrs ago, was on Lovenox and coumadin  Hx of Breast Cancer  HTN - Hypertension  S/P tubal ligation  S/P arthroscopy of right knee:   S/P Breast Lumpectomy: cyst removed from left breast.  h/o Wrist Surgery      Allergies    No Known Allergies    Intolerances        Antimicrobials Day #  :day 1  cefepime   IVPB 1000 milliGRAM(s) IV Intermittent every 8 hours  vancomycin  IVPB 1000 milliGRAM(s) IV Intermittent every 12 hours    Other Medications:  aspirin enteric coated 81 milliGRAM(s) Oral daily  atorvastatin 10 milliGRAM(s) Oral at bedtime  guaiFENesin/dextromethorphan  Syrup 10 milliLiter(s) Oral four times a day PRN  pantoprazole    Tablet 40 milliGRAM(s) Oral before breakfast  verapamil 120 milliGRAM(s) Oral daily  warfarin 4 milliGRAM(s) Oral once      FAMILY HISTORY:  Family history of prothrombin gene mutation: first cousin  Family history of heart disease (Father): father      SOCIAL HISTORY:  Smoking: no    ETOH: no    Drug Use: no      second hand smoke exposure at home.    T(F): 99.6 (10-04-18 @ 14:51), Max: 99.6 (10-04-18 @ 14:51)  HR: 78 (10-04-18 @ 14:51)  BP: 111/77 (10-04-18 @ 14:51)  RR: 20 (10-04-18 @ 14:51)  SpO2: 99% (10-04-18 @ 14:51)  Wt(kg): --    PHYSICAL EXAM:  General: alert, no acute distress  Eyes:  anicteric, no conjunctival injection, no discharge  Oropharynx: no lesions or injection 	  Neck: supple, without adenopathy  Lungs: clear to auscultation, distant breath sounds  Heart: regular rate and rhythm; no murmur, rubs or gallops  Abdomen: soft, nondistended, nontender, without mass or organomegaly  Skin: no lesions  Extremities: no clubbing, cyanosis, + edema  Neurologic: alert, oriented, moves all extremities    LAB RESULTS:                        14.6   22.5  )-----------( 173      ( 04 Oct 2018 14:41 )             42.6     10-    139  |  104  |  18  ----------------------------<  85  4.2   |  21<L>  |  0.88    Ca    9.1      04 Oct 2018 14:41    TPro  6.8  /  Alb  3.9  /  TBili  0.5  /  DBili  x   /  AST  15  /  ALT  19  /  AlkPhos  51  10-04    LIVER FUNCTIONS - ( 04 Oct 2018 14:41 )  Alb: 3.9 g/dL / Pro: 6.8 g/dL / ALK PHOS: 51 U/L / ALT: 19 U/L / AST: 15 U/L / GGT: x           Urinalysis Basic - ( 04 Oct 2018 15:18 )    Color: Light Yellow / Appearance: Clear / S.015 / pH: x  Gluc: x / Ketone: Negative  / Bili: Negative / Urobili: Negative   Blood: x / Protein: Negative / Nitrite: Negative   Leuk Esterase: Negative / RBC: x / WBC x   Sq Epi: x / Non Sq Epi: x / Bacteria: x        MICROBIOLOGY:  RECENT CULTURES:    RVP negative    RADIOLOGY REVIEWED:  < from: Xray Chest 2 Views PA/Lat (10.04.18 @ 14:36) >  IMPRESSION:   Clear lungs.    < from: CT Angio Chest w/ IV Cont (18 @ 19:29) >  IMPRESSION:   Pulmonary embolism involving the left pulmonary artery, left upper lobe   are branches, as well as the distal segmental subsegmental branches.   Emboli are also noted of the right lower lobe segmental and subsegmental   branches.  No evidence of right heart strain or pulmonary infarct.    Findings were discussed with BEN Stephenson by Dr. Nunez on   2018 at 7:42 PM with read back.    < end of copied text >

## 2018-10-04 NOTE — H&P ADULT - NSHPREVIEWOFSYSTEMS_GEN_ALL_CORE
GENERAL: No fevers, no chills.  EYES: No blurry vision,  No photophobia  ENT: No sore throat.  No dysphagia  Cardiovascular: No chest pain, palpitations, orthopnea  Pulmonary: + cough, no wheezing. No shortness of breath  Gastrointestinal: No abdominal pain, no diarrhea, no constipation.  No melena.  No hematochezia  : No dysuria.  No hematuria  Musculoskeletal: + weakness.  No myalgias.  Dermatology:  No rashes.  Neuro: No Headache.  No vertigo.  No dizziness.

## 2018-10-04 NOTE — H&P ADULT - HISTORY OF PRESENT ILLNESS
Patient is a 73 year old female hypertensin, history of breast cancer s/p lumpectomy in remission, recent discharged from [facility] on 9/30 with complicated hospitalization- patient with pauses and symptomatic bradycardia required pacemaker placement, rhinoviral infection and recurrent bilateral pulmonary embolism (patient with prothrombin gene mutation) presents to the ED with complaints of continued weakness and dry cough. Patient had these symptoms at time of discharge however they have not improved. She denies sputum production, subjective fevers, chills, dysuria, diarrhea, rashes, myalgias. Her only complaints are persistent fatigue and dry cough.    In the ED, blood cultures drawn, RVP collected, CXR unremarkable, urine culture collected. Given vancomycin and zosyn.      at bedside.

## 2018-10-04 NOTE — CONSULT NOTE ADULT - ASSESSMENT
72 yo female with hypercoagulable state who is being admitted with cough, shortness of breath, and fatigue.  History of recurrent PE, s/p recent micra PPM for tachy-artur syndrome.  Her leukocytosis is secondary to a lymphocytosis which is atypical for bacterial infections.  Her CXR is without obvious infiltrates.  I do not have a high level of suspicion for infection.  RV/EV is not likely to cause symptoms at this point.The repeat RVP is negative.  No symptoms of reflux, I do not hear wheezes, ? if she could still have bronchospasm.  Suggest:  1.blood cultures x 2 sets  2.Favor holding antibiotics  3.? if lymphocytosis needs additional w/u.EBV/CMV usually produce transaminitis and fever of a high nature, not present here.  4.pulmonary f/u 72 yo female with hypercoagulable state who is being admitted with cough, shortness of breath, and fatigue.  History of recurrent PE, s/p recent micra PPM for tachy-artur syndrome.  Her leukocytosis is secondary to a lymphocytosis which is atypical for bacterial infections.  Her CXR is without obvious infiltrates.  I do not have a high level of suspicion for infection.  RV/EV is not likely to cause symptoms at this point.The repeat RVP is negative.  No symptoms of reflux, I do not hear wheezes, ? if she could still have bronchospasm.  Suggest:  1.blood cultures x 2 sets  2.Favor holding antibiotics  3.? if lymphocytosis needs additional w/u.EBV/CMV usually produce transaminitis and fever of a high nature, not present here.  4.pulmonary f/u, ? if repeat CTA indicated to exclude new PE

## 2018-10-04 NOTE — H&P ADULT - ASSESSMENT
Patient is a 73 year old female hypertensin, history of breast cancer s/p lumpectomy in remission, recent discharged from [facility] on 9/30 with complicated hospitalization- patient with pauses and symptomatic bradycardia required pacemaker placement, rhinoviral infection and recurrent bilateral pulmonary embolism (patient with prothrombin gene mutation) presents to the ED with complaints of continued weakness and dry cough admitted likely for a viral illness, however given recent pacemaker placement will infectious workup.

## 2018-10-04 NOTE — ED ADULT NURSE REASSESSMENT NOTE - NS ED NURSE REASSESS COMMENT FT1
pt ambulated to bathroom without assistance. currently afebrile, awaiting results. SR with PVCs on monitor.

## 2018-10-04 NOTE — ED PROVIDER NOTE - PHYSICAL EXAMINATION
GEN APPEARANCE: WDWN, alert and cooperative, non-toxic appearing and in NAD  HEAD: Atraumatic, normocephalic   EYES: PERRLa, EOMI, vision grossly intact.   EARS: Gross hearing intact.   NOSE: No nasal discharge, no external evidence of epistaxis.   THROAT: MMM. Oral cavity and pharynx normal. No inflammation, no swelling, no exudate, no oral lesions.  NECK: Supple  CV: RRR, S1S2, no c/r/m/g. No cyanosis or pallor. Extremities warm, well perfused. Cap refill <2 seconds. No bruits.   LUNGS: CTAB. No wheezing. No rales. No rhonchi. No diminished breath sounds.   ABDOMEN: Soft, NTND. No guarding or rebound. No masses.   MSK: Spine appears normal, no spine point tenderness. No CVA ttp. No joint erythema or tenderness. Normal muscular development. Pelvis stable.  EXTREMITIES: +2 LE edema non pitting, . No obvious joint or bony deformity.  NEURO: Alert, follows commandsl. Speech normal. Sensation and motor normal x4 extremities.   SKIN: Normal color for race, warm, dry and intact. Intermittent ecchymosis.   PSYCH: Normal mood and affect.

## 2018-10-04 NOTE — ED ADULT NURSE NOTE - OBJECTIVE STATEMENT
Pt is a 72 yo F who came to the Ed from pmd's office c/o  worsening cough, fatigue, SOB and L sided chest pain. Pt sap pmd today for follow visit s/p hospitalization for SOB/ saray PE/pacemaker insertion. After pacemaker insertion, pt had high WBC which was to br followed up at pmd's. Pt had lab work done at doctor's office today and WBC remaines high. Pt started on coumadin on 9/29 has multiple ecchymosis to body. Pt is a 74 yo F who came to the ED from pmd's office c/o worsening cough, fatigue, SOB and L sided chest pain. Pt saw pmd today for follow visit s/p hospitalization for SOB/ saray PE/pacemaker insertion. After pacemaker insertion, pt had high WBC which was to be followed up by pmd. Pt had lab work done at doctor's office today and WBC remains high. Pt started on coumadin on 9/29, has multiple ecchymosis to body. A/O x3, +cough/sob, resp regular and slightly labored, lungs clear, color good, skin warm, dry + pulses +2 LE edema.

## 2018-10-04 NOTE — ED PROVIDER NOTE - OBJECTIVE STATEMENT
73F 73F hx of breast ca s/p recent admission for rhino entero c/b tachy/artur syndrome b/l PE s/p pacer on 9.29, was seen at medicine clinic sent to ED for worsening cough, fatigue and SOB and L sided chest pain, 73F hx of breast ca s/p recent admission for rhino entero c/b tachy/artur syndrome and b/l PE s/p pacer started on coumadin on 9.29, was seen at medicine clinic sent to ED for worsening cough, fatigue and SOB and L sided chest pain 73F hx of breast ca s/p recent admission for rhino entero c/b tachy/artur syndrome and b/l PE s/p pacer started on coumadin on 9.29, was seen at medicine clinic sent to ED for worsening cough, fatigue and SOB and L sided chest pain, went to PMD office today, instructed to present to ED w/ c/f worsening sx in setting of recent dc and pacer. +non productive cough. Denies n/v/f/c/. Denies headache, syncope, lightheadedness, dizziness. Denies chest palpitations, abdominal pain. Denies dysuria, hematuria, hematochezia, BRBPR, tarry stools, diarrhea, constipation.

## 2018-10-04 NOTE — H&P ADULT - PROBLEM SELECTOR PLAN 1
- patient with recent rhinoviral infection, now presenting with persistent generalized weakness and dry  - repeat RVP (collected, results pending  - CXR unremarkable for pneumonia  - UA negative, urinen culture sent  - lactic acid 2.3  - s/p vancomycin and zosyn in the ED, given recent pacemaker placement will c/w vancomycin 1gm q12h and start cefepime 1gm q8h for now  - ID and pulm consults pending- Dr. Lira and Dr. Trotter aware

## 2018-10-04 NOTE — H&P ADULT - PROBLEM SELECTOR PLAN 2
- possibly related to underlying infection however patient on steroids, will order cbc with differential   - patient to complete prednisone taper tomorrow (prednisone 10 mg once to complete on 10/5)  -

## 2018-10-04 NOTE — ED ADULT NURSE NOTE - NSIMPLEMENTINTERV_GEN_ALL_ED
Implemented All Universal Safety Interventions:  [place] to call system. Call bell, personal items and telephone within reach. Instruct patient to call for assistance. Room bathroom lighting operational. Non-slip footwear when patient is off stretcher. Physically safe environment: no spills, clutter or unnecessary equipment. Stretcher in lowest position, wheels locked, appropriate side rails in place.

## 2018-10-05 DIAGNOSIS — R05 COUGH: ICD-10-CM

## 2018-10-05 DIAGNOSIS — R53.1 WEAKNESS: ICD-10-CM

## 2018-10-05 LAB
ALBUMIN SERPL ELPH-MCNC: 3.3 G/DL — SIGNIFICANT CHANGE UP (ref 3.3–5)
ALP SERPL-CCNC: 42 U/L — SIGNIFICANT CHANGE UP (ref 40–120)
ALT FLD-CCNC: 16 U/L — SIGNIFICANT CHANGE UP (ref 10–45)
ANION GAP SERPL CALC-SCNC: 9 MMOL/L — SIGNIFICANT CHANGE UP (ref 5–17)
APTT BLD: 29.9 SEC — SIGNIFICANT CHANGE UP (ref 27.5–37.4)
AST SERPL-CCNC: 10 U/L — SIGNIFICANT CHANGE UP (ref 10–40)
BASOPHILS # BLD AUTO: 0.01 K/UL — SIGNIFICANT CHANGE UP (ref 0–0.2)
BASOPHILS # BLD AUTO: 0.4 K/UL — HIGH (ref 0–0.2)
BASOPHILS NFR BLD AUTO: 0.1 % — SIGNIFICANT CHANGE UP (ref 0–2)
BASOPHILS NFR BLD AUTO: 1.9 % — SIGNIFICANT CHANGE UP (ref 0–2)
BILIRUB SERPL-MCNC: 0.4 MG/DL — SIGNIFICANT CHANGE UP (ref 0.2–1.2)
BUN SERPL-MCNC: 16 MG/DL — SIGNIFICANT CHANGE UP (ref 7–23)
CALCIUM SERPL-MCNC: 8.4 MG/DL — SIGNIFICANT CHANGE UP (ref 8.4–10.5)
CHLORIDE SERPL-SCNC: 109 MMOL/L — HIGH (ref 96–108)
CO2 SERPL-SCNC: 24 MMOL/L — SIGNIFICANT CHANGE UP (ref 22–31)
CREAT SERPL-MCNC: 0.98 MG/DL — SIGNIFICANT CHANGE UP (ref 0.5–1.3)
CULTURE RESULTS: SIGNIFICANT CHANGE UP
EOSINOPHIL # BLD AUTO: 0.1 K/UL — SIGNIFICANT CHANGE UP (ref 0–0.5)
EOSINOPHIL # BLD AUTO: 0.13 K/UL — SIGNIFICANT CHANGE UP (ref 0–0.5)
EOSINOPHIL NFR BLD AUTO: 0.2 % — SIGNIFICANT CHANGE UP (ref 0–6)
EOSINOPHIL NFR BLD AUTO: 0.9 % — SIGNIFICANT CHANGE UP (ref 0–6)
GLUCOSE SERPL-MCNC: 112 MG/DL — HIGH (ref 70–99)
HCT VFR BLD CALC: 38.1 % — SIGNIFICANT CHANGE UP (ref 34.5–45)
HGB BLD-MCNC: 12.5 G/DL — SIGNIFICANT CHANGE UP (ref 11.5–15.5)
IMM GRANULOCYTES NFR BLD AUTO: 0.2 % — SIGNIFICANT CHANGE UP (ref 0–1.5)
INR BLD: 2.15 RATIO — HIGH (ref 0.88–1.16)
LACTATE SERPL-SCNC: 1.5 MMOL/L — SIGNIFICANT CHANGE UP (ref 0.7–2)
LYMPHOCYTES # BLD AUTO: 63.9 % — HIGH (ref 13–44)
LYMPHOCYTES # BLD AUTO: 8.89 K/UL — HIGH (ref 1–3.3)
MANUAL DIF COMMENT BLD-IMP: SIGNIFICANT CHANGE UP
MCHC RBC-ENTMCNC: 30.9 PG — SIGNIFICANT CHANGE UP (ref 27–34)
MCHC RBC-ENTMCNC: 32.8 GM/DL — SIGNIFICANT CHANGE UP (ref 32–36)
MCV RBC AUTO: 94.1 FL — SIGNIFICANT CHANGE UP (ref 80–100)
MONOCYTES # BLD AUTO: 0.41 K/UL — SIGNIFICANT CHANGE UP (ref 0–0.9)
MONOCYTES NFR BLD AUTO: 2.9 % — SIGNIFICANT CHANGE UP (ref 2–14)
NEUTROPHILS # BLD AUTO: 4.44 K/UL — SIGNIFICANT CHANGE UP (ref 1.8–7.4)
NEUTROPHILS NFR BLD AUTO: 32 % — LOW (ref 43–77)
PLATELET # BLD AUTO: 151 K/UL — SIGNIFICANT CHANGE UP (ref 150–400)
POTASSIUM SERPL-MCNC: 4.1 MMOL/L — SIGNIFICANT CHANGE UP (ref 3.5–5.3)
POTASSIUM SERPL-SCNC: 4.1 MMOL/L — SIGNIFICANT CHANGE UP (ref 3.5–5.3)
PROT SERPL-MCNC: 5.5 G/DL — LOW (ref 6–8.3)
PROTHROM AB SERPL-ACNC: 24.7 SEC — HIGH (ref 10–13.1)
RBC # BLD: 4.05 M/UL — SIGNIFICANT CHANGE UP (ref 3.8–5.2)
RBC # FLD: 14.3 % — SIGNIFICANT CHANGE UP (ref 10.3–14.5)
SODIUM SERPL-SCNC: 142 MMOL/L — SIGNIFICANT CHANGE UP (ref 135–145)
SPECIMEN SOURCE: SIGNIFICANT CHANGE UP
WBC # BLD: 13.91 K/UL — HIGH (ref 3.8–10.5)
WBC # FLD AUTO: 13.91 K/UL — HIGH (ref 3.8–10.5)

## 2018-10-05 RX ORDER — PANTOPRAZOLE SODIUM 20 MG/1
40 TABLET, DELAYED RELEASE ORAL
Qty: 0 | Refills: 0 | Status: DISCONTINUED | OUTPATIENT
Start: 2018-10-05 | End: 2018-10-08

## 2018-10-05 RX ORDER — IPRATROPIUM/ALBUTEROL SULFATE 18-103MCG
3 AEROSOL WITH ADAPTER (GRAM) INHALATION EVERY 6 HOURS
Qty: 0 | Refills: 0 | Status: DISCONTINUED | OUTPATIENT
Start: 2018-10-05 | End: 2018-10-08

## 2018-10-05 RX ORDER — WARFARIN SODIUM 2.5 MG/1
4 TABLET ORAL ONCE
Qty: 0 | Refills: 0 | Status: COMPLETED | OUTPATIENT
Start: 2018-10-05 | End: 2018-10-05

## 2018-10-05 RX ORDER — FAMOTIDINE 10 MG/ML
20 INJECTION INTRAVENOUS
Qty: 0 | Refills: 0 | Status: DISCONTINUED | OUTPATIENT
Start: 2018-10-05 | End: 2018-10-08

## 2018-10-05 RX ORDER — BUDESONIDE, MICRONIZED 100 %
0.5 POWDER (GRAM) MISCELLANEOUS EVERY 12 HOURS
Qty: 0 | Refills: 0 | Status: DISCONTINUED | OUTPATIENT
Start: 2018-10-05 | End: 2018-10-08

## 2018-10-05 RX ADMIN — Medication 250 MILLIGRAM(S): at 03:59

## 2018-10-05 RX ADMIN — WARFARIN SODIUM 4 MILLIGRAM(S): 2.5 TABLET ORAL at 21:31

## 2018-10-05 RX ADMIN — Medication 10 MILLIGRAM(S): at 07:21

## 2018-10-05 RX ADMIN — Medication 250 MILLIGRAM(S): at 14:01

## 2018-10-05 RX ADMIN — Medication 50 MILLIGRAM(S): at 21:31

## 2018-10-05 RX ADMIN — PANTOPRAZOLE SODIUM 40 MILLIGRAM(S): 20 TABLET, DELAYED RELEASE ORAL at 04:00

## 2018-10-05 RX ADMIN — Medication 120 MILLIGRAM(S): at 05:57

## 2018-10-05 RX ADMIN — CEFEPIME 100 MILLIGRAM(S): 1 INJECTION, POWDER, FOR SOLUTION INTRAMUSCULAR; INTRAVENOUS at 05:57

## 2018-10-05 RX ADMIN — ATORVASTATIN CALCIUM 10 MILLIGRAM(S): 80 TABLET, FILM COATED ORAL at 21:31

## 2018-10-05 RX ADMIN — Medication 81 MILLIGRAM(S): at 13:03

## 2018-10-05 RX ADMIN — CEFEPIME 100 MILLIGRAM(S): 1 INJECTION, POWDER, FOR SOLUTION INTRAMUSCULAR; INTRAVENOUS at 21:32

## 2018-10-05 RX ADMIN — Medication 200 MILLIGRAM(S): at 21:31

## 2018-10-05 RX ADMIN — CEFEPIME 100 MILLIGRAM(S): 1 INJECTION, POWDER, FOR SOLUTION INTRAMUSCULAR; INTRAVENOUS at 13:03

## 2018-10-05 NOTE — CONSULT NOTE ADULT - SUBJECTIVE AND OBJECTIVE BOX
[facility] - [facility]  CONSULT NOTE    HPI: 73 year old gentlewoman, lifelong non-smoker, with personal and family history of prothrombin gene mutation complicated by DVT/PE ~ 5 years ago now maintained on lifelong warfarin. She recently was admitted following prolonged car travel with bilateral pulmonary emboli without lower extremity DVTs in the setting of a subtherapeutic INR. She was felt not to be a coumadin "failure" and restarted on adjusted dose coumadin. She also had rhinoviral induced bronchospasm punctuated by a prolonged cough treated with IV -> po steroids, inhaled steroids, bronchodilators and anti tussive medications. Although her bronchospasm resolved she left the with an ongoing cough. 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 CTA of the coronary arteries was without obstructive disease. She was discharged on 10/1 and returns with a dry cough. fatigue and shortness of breath with exertion. She has no shortness of breath, chest congestion or wheeze. No fevers, chills or sweats. No chest pain/pressure or palpitations. She was started on vancomycin and cefepime in the ER. Asked to evaluate.    PMHX:  Prothrombin gene mutation  DVT (deep venous thrombosis), right  PE (pulmonary embolism)  GERD (gastroesophageal reflux disease)  Herniated lumbar intervertebral disc  Lower back pain  Breast Cancer  Hypertension    PSHX:  tubal ligation  arthroscopy of right knee  lumpectomy  wrist Surgery    FAMILY HISTORY:  mother - prothrombin gene mutation   cousin -  prothrombin gene mutation   father - CAD    SOCIAL HISTORY:  ; lifelong non-smoker but exposed to second hand smoke (/both sons)    Pulmonary Medications:   guaiFENesin/dextromethorphan  Syrup 10 milliLiter(s) Oral four times a day PRN      Antimicrobials:  cefepime   IVPB 1000 milliGRAM(s) IV Intermittent every 8 hours  vancomycin  IVPB 1000 milliGRAM(s) IV Intermittent every 12 hours      Cardiology:  verapamil 120 milliGRAM(s) Oral daily      Other:  aspirin enteric coated 81 milliGRAM(s) Oral daily  atorvastatin 10 milliGRAM(s) Oral at bedtime  pantoprazole    Tablet 40 milliGRAM(s) Oral before breakfast      Allergies    No Known Allergies    HOME MEDICATIONS: see  H & P    REVIEW OF SYSTEMS:  Constitutional: As per HPI  HEENT: Within normal limits  CV: As per HPI  Resp: As per HPI  GI: Within normal limits   : Within normal limits  Musculoskeletal: Within normal limits  Skin: Within normal limits  Neurological: Within normal limits  Psychiatric: Within normal limits  Endocrine: Within normal limits  Hematologic/Lymphatic: Within normal limits  Allergic/Immunologic: Within normal limits    OBJECTIVE:    I&O's Detail    04 Oct 2018 07:  -  05 Oct 2018 07:00  --------------------------------------------------------  IN:    Oral Fluid: 500 mL  Total IN: 500 mL    OUT:  Total OUT: 0 mL    Total NET: 500 mL      05 Oct 2018 07:01  -  05 Oct 2018 14:58  --------------------------------------------------------  IN:    Oral Fluid: 480 mL  Total IN: 480 mL    OUT:  Total OUT: 0 mL    Total NET: 480 mL    Daily Height in cm: 147.32 (04 Oct 2018 19:00)      PHYSICAL EXAM:  ICU Vital Signs Last 24 Hrs  T(C): 36.7 (05 Oct 2018 11:42), Max: 36.7 (04 Oct 2018 18:26)  T(F): 98 (05 Oct 2018 11:42), Max: 98.1 (04 Oct 2018 18:26)  HR: 64 (05 Oct 2018 11:42) (52 - 64)  BP: 92/57 (05 Oct 2018 11:42) (92/57 - 115/74)  BP(mean): --  ABP: --  ABP(mean): --  RR: 18 (05 Oct 2018 11:42) (17 - 18)  SpO2: 98% (05 Oct 2018 11:42) (96% - 98%)    General: Awake. Alert. Cooperative. No distress. Appears stated age 	  HEENT:   Atraumatic. Normocephalic. Anicteric. Normal oral mucosa. PERRL. EOMI.  Neck: Supple. Trachea midline. Thyroid without enlargement/tenderness/nodules. No carotid bruit. No JVD.	  Cardiovascular: Regular rate and rhythm. S1 S2 normal. No murmurs, rubs or gallops.  Respiratory: Respirations unlabored. Clear to auscultation and percussion bilaterally. No curvature.  Abdomen: Soft. Non-tender. Non-distended. No organomegaly. No masses. Normal bowel sounds.  Extremities: Warm to touch. No clubbing or cyanosis. No pedal edema.  Pulses: 2+ peripheral pulses all extremities.	  Skin: Normal skin color. No rashes or lesions. No ecchymoses. No cyanosis. Warm to touch.  Lymph Nodes: Cervical, supraclavicular and axillary nodes normal  Neurological: Motor and sensory examination equal and normal. A and O x 3  Psychiatry: Appropriate mood and affect.      LABS:                          12.5   13.91 )-----------( 151      ( 05 Oct 2018 08:13 )             38.1                         14.6   22.5  )-----------( 173      ( 04 Oct 2018 14:41 )             42.6     10    142  |  109<H>  |  16  ----------------------------<  112<H>  4.1   |  24  |  0.98    10-04    139  |  104  |  18  ----------------------------<  85  4.2   |  21<L>  |  0.88    Ca      8.4      10-    Ca      9.1      10      TPro  5.5<L>  /  Alb  3.3  /  TBili  0.4  /  DBili  x   /  AST  10  /  ALT  16  /  AlkPhos  42  10    TPro  6.8  /  Alb  3.9  /  TBili  0.5  /  DBili  x   /  AST  15  /  ALT  19  /  AlkPhos  51  10-    PT/INR - ( 05 Oct 2018 08:16 )   PT: 24.7 sec;   INR: 2.15 ratio       PTT - ( 05 Oct 2018 08:16 )  PTT:29.9 sec    Venous Blood Gas:  10-04 @ 14:48  7.47/35/35/25/65  VBG Lactate: 2.3    Serum Pro-Brain Natriuretic Peptide: 636 pg/mL (10-04 @ 14:41)    < from: Transthoracic Echocardiogram (18 @ 10:03) >    Patient name: JOHNSON BIANCHI  YOB: 1945   Age: 73 (F)   MR#: [id number]  Study Date: 2018  [id number] Fernando Espino RDCS  Study quality: Technically fair  Referring Physician: Arthur Ricks MD  Blood Pressure: 128/82 mmHg  Height: 150 cm  Weight: 66 kg  BSA: 1.6 m2  ------------------------------------------------------------------------  PROCEDURE: Transthoracic echocardiogram with 2-D, M-Mode  and complete spectral and color flow Doppler.  INDICATION: Supraventricular tachycardia (I47.1)  ------------------------------------------------------------------------  Dimensions:    Normal Values:  LA:     3.5    2.0 - 4.0 cm  Ao:     3.4    2.0 - 3.8 cm  SEPTUM: 0.8    0.6 - 1.2 cm  PWT:    0.7    0.6 - 1.1 cm  LVIDd:  4.7    3.0 - 5.6 cm  LVIDs:  3.4    1.8 - 4.0 cm  Derived variables:  LVMI: 70 g/m2  RWT: 0.29  Fractional short: 28 %  EF (Visual Estimate): 55-60 %  Doppler Peak Velocity (m/sec): AoV=1.4  ------------------------------------------------------------------------  Observations:  Mitral Valve: Mitral annular calcification, otherwise  normal mitral valve. Minimal mitral regurgitation.  Aortic Valve/Aorta: Calcified trileaflet aortic valve with  normal opening. Peak transaortic valve gradient equals 8 mm  Hg, mean transaortic valve gradient equals 5 mm Hg, aortic  valve velocity time integral equals 32 cm. Minimal aortic  regurgitation. Peak left ventricular outflow tract gradient  equals 4 mm Hg, mean gradient is equal to 2 mm Hg, LVOT  velocity time integral equals 21 cm.  Aortic Root: 3.4 cm.  LVOT diameter: 1.8 cm.  Left Atrium: Normal left atrium.  LA volume index = 27  cc/m2. Mobile interatrial septum.  Left Ventricle: Endocardium not well visualized; grossly  normal left ventricular systolic function. Normal left  ventricular internal dimensions and wall thicknesses.  Right Heart: Normal right atrium. Normal right ventricular  size and function. Normal tricuspid valve. Mild tricuspid  regurgitation. Pulmonic valve not well visualized. No  pulmonic regurgitation.  Pericardium/Pleura: Normal pericardium with no pericardial  effusion.  Hemodynamic: Estimated right ventricular systolic pressure  equals 21 mm Hg, assuming right atrial pressure equals 3 mm  Hg, consistent with normal pulmonary pressures.  ------------------------------------------------------------------------  Conclusions:  1. Mitral annular calcification, otherwise normal mitral  valve. Minimal mitral regurgitation.  2. Calcified trileaflet aortic valve with normal opening.  Minimal aortic regurgitation.  3. Endocardium not well visualized; grossly normal left  ventricular systolic function.  4. Normal right ventricular size and function.  *** Compared with echocardiogram of 10/12/2013,results are  similar on today's study.  ------------------------------------------------------------------------  Confirmed on  2018 - 12:56:58 by Kathryn Díaz M.D.  ------------------------------------------------------------------------    < end of copied text >  ---------------------------------------------------------------------------------------------------------------     MICROBIOLOGY:     Rapid Respiratory Viral Panel (10.04.18 @ 14:41)    Rapid RVP Result: [employer]: The FilmArray RVP Rapid uses polymerase chain reaction (PCR) and melt  curve analysis to screen for adenovirus; coronavirus HKU1, NL63, 229E,  OC43; human metapneumovirus (hMPV); human enterovirus/rhinovirus  (Entero/RV); influenza A; influenza A/H1;influenza A/H3; influenza  A/H1-2009; influenza B; parainfluenza viruses 1, 2, 3, 4; respiratory  syncytial virus; Bordetella pertussis; Mycoplasma pneumoniae; and  Chlamydophila pneumoniae.    Urinalysis Basic - ( 04 Oct 2018 15:18 )    Color: Light Yellow / Appearance: Clear / S.015 / pH: x  Gluc: x / Ketone: Negative  / Bili: Negative / Urobili: Negative   Blood: x / Protein: Negative / Nitrite: Negative   Leuk Esterase: Negative / RBC: x / WBC x   Sq Epi: x / Non Sq Epi: x / Bacteria: x    RADIOLOGY:  [x ] Chest radiographs reviewed and interpreted by me    < from: Xray Chest 2 Views PA/Lat (10.04.18 @ 14:36) >    EXAM:  XR CHEST PA LAT 2V                            PROCEDURE DATE:  10/04/2018            INTERPRETATION:  CLINICAL INFORMATION: Shortness of breath.    EXAM: PA and lateral chest radiographs    COMPARISON: Chest radiograph from 2018.    FINDINGS:  Redemonstrated micra pacemaker.  No focal lung consolidations, pleural effusions, or pneumothorax.  The cardiac silhouette is within normal limits.  Degenerative osseous changes without acute abnormalities.    IMPRESSION:   Clear lungs.    BUBBA DOSHI M.D., RADIOLOGY RESIDENT  This document has been electronically signed.  MEKA BURGOS M.D., ATTENDING RADIOLOGIST  This document has been electronically signed. Oct  4 2018  3:15PM    < end of copied text >  ---------------------------------------------------------------------------------------------------------------  < from: CT Angio Chest w/ IV Cont (18 @ 19:29) >    EXAM:  CT ANGIO CHEST (W)AW IC                          PROCEDURE DATE:  2018      INTERPRETATION:  CLINICAL INFORMATION: Shortness of breath. INR   subtherapeutic.    COMPARISON: CTA chest 2014    PROCEDURE:   CT Angiography of the Chest.  90 ml of Omnipaque 350 was injected intravenously. 10 ml were discarded.  Sagittal and coronal reformats were performed as well as 3D (MIP)   reconstructions.      FINDINGS:    CHEST:     LUNGS AND LARGE AIRWAYS: Patent central airways.  Bibasilar dependent   atelectasis. Left lower lobe calcified granuloma  PLEURA: Filling defects visualized within the left pulmonary artery, left   upper and lower lobar branches as well as the distal segmental and   subsegmental branches. Emboli are also noted within the segmental and   subsegmental branches of the right lower lobe pulmonary arterial branches.  VESSELS: Within normal limits. The main pulmonary artery measures   approximately 2.8 cm.  HEART: Heart size is normal. No pericardial effusion. No evidence of   right heart strain. Coronary artery calcifications.  MEDIASTINUM AND NAKIA: No lymphadenopathy.  CHEST WALL AND LOWER NECK: Within normal limits.  VISUALIZED UPPER ABDOMEN: Within normal limits.  BONES: Degenerative changes of the spine.    IMPRESSION:   Pulmonary embolism involving the left pulmonary artery, left upper lobe   are branches, as well as the distal segmental subsegmental branches.   Emboli are also noted of the right lower lobe segmental and subsegmental   branches.  No evidence of right heart strain or pulmonary infarct.    Findings were discussed with BEN Stephenson by Dr. oSsa on   2018 at 7:42 PM with read back.    KIMBERLY SOSA M.D., RADIOLOGY RESIDENT  This document has been electronically signed.  MARTA COLLINS M.D., ATTENDING RADIOLOGIST  This document has been electronically signed. Sep 17 2018  8:26PM      < end of copied text >  --------------------------------------------------------------------------------------------------------------- [facility] - [facility]  CONSULT NOTE    HPI: 73 year old gentlewoman, lifelong non-smoker, with personal and family history of prothrombin gene mutation complicated by DVT/PE ~ 5 years ago now maintained on lifelong warfarin. She also has a history of breast cancer s/p lumpectomy, GERD and HTN. She recently was admitted following prolonged car travel with bilateral pulmonary emboli without lower extremity DVTs in the setting of a subtherapeutic INR. She was felt not to be a coumadin "failure" and restarted on adjusted dose coumadin. She also had rhinoviral induced bronchospasm punctuated by a prolonged cough treated with IV -> po steroids, inhaled steroids, bronchodilators and anti tussive medications. Although her bronchospasm resolved she left the hospital with an ongoing cough. 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 CTA of the coronary arteries was without obstructive disease. She was discharged on 10/1 and returns with a dry cough, profound fatigue and shortness of breath with exertion. She has been spending most of the day in bed. She has no chest congestion or wheeze. No fevers, chills or sweats. No chest pain/pressure or palpitations. She was started on vancomycin and cefepime in the ER. Asked to evaluate.    PMHX:  Prothrombin gene mutation  DVT (deep venous thrombosis), right  PE (pulmonary embolism)  GERD (gastroesophageal reflux disease)  Herniated lumbar intervertebral disc  Lower back pain  Breast Cancer  Hypertension    PSHX:  tubal ligation  arthroscopy of right knee  lumpectomy  wrist Surgery    FAMILY HISTORY:  mother - prothrombin gene mutation   cousin -  prothrombin gene mutation   father - CAD    SOCIAL HISTORY:  ; lifelong non-smoker but exposed to second hand smoke (/both sons)    Pulmonary Medications:   guaiFENesin/dextromethorphan  Syrup 10 milliLiter(s) Oral four times a day PRN      Antimicrobials:  cefepime   IVPB 1000 milliGRAM(s) IV Intermittent every 8 hours  vancomycin  IVPB 1000 milliGRAM(s) IV Intermittent every 12 hours      Cardiology:  verapamil 120 milliGRAM(s) Oral daily      Other:  aspirin enteric coated 81 milliGRAM(s) Oral daily  atorvastatin 10 milliGRAM(s) Oral at bedtime  pantoprazole    Tablet 40 milliGRAM(s) Oral before breakfast      Allergies    No Known Allergies    HOME MEDICATIONS: see  H & P    REVIEW OF SYSTEMS:  Constitutional: As per HPI  HEENT: Within normal limits  CV: As per HPI  Resp: As per HPI  GI: Within normal limits   : Within normal limits  Musculoskeletal: Within normal limits  Skin: Within normal limits  Neurological: Within normal limits  Psychiatric: Within normal limits  Endocrine: Within normal limits  Hematologic/Lymphatic: prothrombin gene mutation  Allergic/Immunologic: Within normal limits    OBJECTIVE:    I&O's Detail    04 Oct 2018 07:  -  05 Oct 2018 07:00  --------------------------------------------------------  IN:    Oral Fluid: 500 mL  Total IN: 500 mL    OUT:  Total OUT: 0 mL    Total NET: 500 mL      05 Oct 2018 07:01  -  05 Oct 2018 14:58  --------------------------------------------------------  IN:    Oral Fluid: 480 mL  Total IN: 480 mL    OUT:  Total OUT: 0 mL    Total NET: 480 mL    Daily Height in cm: 147.32 (04 Oct 2018 19:00)      PHYSICAL EXAM:  ICU Vital Signs Last 24 Hrs  T(C): 36.7 (05 Oct 2018 11:42), Max: 36.7 (04 Oct 2018 18:26)  T(F): 98 (05 Oct 2018 11:42), Max: 98.1 (04 Oct 2018 18:26)  HR: 64 (05 Oct 2018 11:42) (52 - 64)  BP: 92/57 (05 Oct 2018 11:42) (92/57 - 115/74)  BP(mean): --  ABP: --  ABP(mean): --  RR: 18 (05 Oct 2018 11:42) (17 - 18)  SpO2: 98% (05 Oct 2018 11:42) (96% - 98%) on room air    General: Awake. Alert. Cooperative. No distress. Appears stated age 	  HEENT:   Atraumatic. Normocephalic. Anicteric. Normal oral mucosa. PERRL. EOMI.  Neck: Supple. Trachea midline. Thyroid without enlargement/tenderness/nodules. No carotid bruit. No JVD.	  Cardiovascular: Regular rate and rhythm. S1 S2 normal. No murmurs, rubs or gallops.  Respiratory: Respirations unlabored. Clear to auscultation and percussion bilaterally. Cough with deep inspiration. No curvature.  Abdomen: Soft. Non-tender. Non-distended. No organomegaly. No masses. Normal bowel sounds.  Extremities: Warm to touch. No clubbing or cyanosis. No pedal edema.  Pulses: 2+ peripheral pulses all extremities.	  Skin: Normal skin color. No rashes or lesions. No ecchymoses. No cyanosis. Warm to touch.  Lymph Nodes: Cervical, supraclavicular and axillary nodes normal  Neurological: Motor and sensory examination equal and normal. A and O x 3  Psychiatry: Appropriate mood and affect.      LABS:                          12.5   13.91 )-----------( 151      ( 05 Oct 2018 08:13 )             38.1                         14.6   22.5  )-----------( 173      ( 04 Oct 2018 14:41 )             42.6     10-    142  |  109<H>  |  16  ----------------------------<  112<H>  4.1   |  24  |  0.98    10-    139  |  104  |  18  ----------------------------<  85  4.2   |  21<L>  |  0.88    Ca      8.4      10-    Ca      9.1      10-      TPro  5.5<L>  /  Alb  3.3  /  TBili  0.4  /  DBili  x   /  AST  10  /  ALT  16  /  AlkPhos  42  10-    TPro  6.8  /  Alb  3.9  /  TBili  0.5  /  DBili  x   /  AST  15  /  ALT  19  /  AlkPhos  51  10-    PT/INR - ( 05 Oct 2018 08:16 )   PT: 24.7 sec;   INR: 2.15 ratio       PTT - ( 05 Oct 2018 08:16 )  PTT:29.9 sec    Venous Blood Gas:  10-04 @ 14:48  7.47/35/35/25/65  VBG Lactate: 2.3    Serum Pro-Brain Natriuretic Peptide: 636 pg/mL (10-04 @ 14:41)    < from: Transthoracic Echocardiogram (18 @ 10:03) >    Patient name: JOHNSON BIANCHI  YOB: 1945   Age: 73 (F)   MR#: [id number]  Study Date: 2018  Location: [address] [place], [place] [id number]: Fernando Espino RDCS  Study quality: Technically fair  Referring Physician: Arthur Ricks MD  Blood Pressure: 128/82 mmHg  Height: 150 cm  Weight: 66 kg  BSA: 1.6 m2  ------------------------------------------------------------------------  PROCEDURE: Transthoracic echocardiogram with 2-D, M-Mode  and complete spectral and color flow Doppler.  INDICATION: Supraventricular tachycardia (I47.1)  ------------------------------------------------------------------------  Dimensions:    Normal Values:  LA:     3.5    2.0 - 4.0 cm  Ao:     3.4    2.0 - 3.8 cm  SEPTUM: 0.8    0.6 - 1.2 cm  PWT:    0.7    0.6 - 1.1 cm  LVIDd:  4.7    3.0 - 5.6 cm  LVIDs:  3.4    1.8 - 4.0 cm  Derived variables:  LVMI: 70 g/m2  RWT: 0.29  Fractional short: 28 %  EF (Visual Estimate): 55-60 %  Doppler Peak Velocity (m/sec): AoV=1.4  ------------------------------------------------------------------------  Observations:  Mitral Valve: Mitral annular calcification, otherwise  normal mitral valve. Minimal mitral regurgitation.  Aortic Valve/Aorta: Calcified trileaflet aortic valve with  normal opening. Peak transaortic valve gradient equals 8 mm  Hg, mean transaortic valve gradient equals 5 mm Hg, aortic  valve velocity time integral equals 32 cm. Minimal aortic  regurgitation. Peak left ventricular outflow tract gradient  equals 4 mm Hg, mean gradient is equal to 2 mm Hg, LVOT  velocity time integral equals 21 cm.  Aortic Root: 3.4 cm.  LVOT diameter: 1.8 cm.  Left Atrium: Normal left atrium.  LA volume index = 27  cc/m2. Mobile interatrial septum.  Left Ventricle: Endocardium not well visualized; grossly  normal left ventricular systolic function. Normal left  ventricular internal dimensions and wall thicknesses.  Right Heart: Normal right atrium. Normal right ventricular  size and function. Normal tricuspid valve. Mild tricuspid  regurgitation. Pulmonic valve not well visualized. No  pulmonic regurgitation.  Pericardium/Pleura: Normal pericardium with no pericardial  effusion.  Hemodynamic: Estimated right ventricular systolic pressure  equals 21 mm Hg, assuming right atrial pressure equals 3 mm  Hg, consistent with normal pulmonary pressures.  ------------------------------------------------------------------------  Conclusions:  1. Mitral annular calcification, otherwise normal mitral  valve. Minimal mitral regurgitation.  2. Calcified trileaflet aortic valve with normal opening.  Minimal aortic regurgitation.  3. Endocardium not well visualized; grossly normal left  ventricular systolic function.  4. Normal right ventricular size and function.  *** Compared with echocardiogram of 10/12/2013,results are  similar on today's study.  ------------------------------------------------------------------------  Confirmed on  2018 - 12:56:58 by Saaron Laighold, M.D.  ------------------------------------------------------------------------    < end of copied text >  ---------------------------------------------------------------------------------------------------------------     MICROBIOLOGY:     Rapid Respiratory Viral Panel (10.04.18 @ 14:41)    Rapid RVP [facility]: [facility]: The FilmArray RVP Rapid uses polymerase chain reaction (PCR) and melt  curve analysis to screen for adenovirus; coronavirus HKU1, NL63, 229E,  OC43; human metapneumovirus (hMPV); human enterovirus/rhinovirus  (Entero/RV); influenza A; influenza A/H1;influenza A/H3; influenza  A/H1-2009; influenza B; parainfluenza viruses 1, 2, 3, 4; respiratory  syncytial virus; Bordetella pertussis; Mycoplasma pneumoniae; and  Chlamydophila pneumoniae.    Urinalysis Basic - ( 04 Oct 2018 15:18 )    Color: Light Yellow / Appearance: Clear / S.015 / pH: x  Gluc: x / Ketone: Negative  / Bili: Negative / Urobili: Negative   Blood: x / Protein: Negative / Nitrite: Negative   Leuk Esterase: Negative / RBC: x / WBC x   Sq Epi: x / Non Sq Epi: x / Bacteria: x    Culture - Urine (10.04.18 @ 17:12)    Specimen Source: .Urine Clean Catch (Midstream)    Culture Results:   <10,000 CFU/ml Normal Urogenital skye present    Culture - Blood (10.04.18 @ 16:23)    Specimen Source: .Blood Blood-Venous    Culture Results:   No growth to date.    Culture - Blood (10.04.18 @ 16:23)    Specimen Source: .Blood Blood-Peripheral    Culture Results:   No growth to date.    RADIOLOGY:  [x ] Chest radiographs reviewed and interpreted by me    < from: Xray Chest 2 Views PA/Lat (10.04.18 @ 14:36) >    EXAM:  XR CHEST PA LAT 2V                          PROCEDURE DATE:  10/04/2018      INTERPRETATION:  CLINICAL INFORMATION: Shortness of breath.    EXAM: PA and lateral chest radiographs    COMPARISON: Chest radiograph from 2018.    FINDINGS:  Redemonstrated micra pacemaker.  No focal lung consolidations, pleural effusions, or pneumothorax.  The cardiac silhouette is within normal limits.  Degenerative osseous changes without acute abnormalities.    IMPRESSION:   Clear lungs.    BUBBA DOSHI M.D., RADIOLOGY RESIDENT  This document has been electronically signed.  MEKA BURGOS M.D., ATTENDING RADIOLOGIST  This document has been electronically signed. Oct  4 2018  3:15PM    < end of copied text >  ---------------------------------------------------------------------------------------------------------------  < from: CT Angio Chest w/ IV Cont (18 @ 19:29) >    EXAM:  CT ANGIO CHEST (W)AW IC                          PROCEDURE DATE:  2018      INTERPRETATION:  CLINICAL INFORMATION: Shortness of breath. INR   subtherapeutic.    COMPARISON: CTA chest 2014    PROCEDURE:   CT Angiography of the Chest.  90 ml of Omnipaque 350 was injected intravenously. 10 ml were discarded.  Sagittal and coronal reformats were performed as well as 3D (MIP)   reconstructions.      FINDINGS:    CHEST:     LUNGS AND LARGE AIRWAYS: Patent central airways.  Bibasilar dependent   atelectasis. Left lower lobe calcified granuloma  PLEURA: Filling defects visualized within the left pulmonary artery, left   upper and lower lobar branches as well as the distal segmental and   subsegmental branches. Emboli are also noted within the segmental and   subsegmental branches of the right lower lobe pulmonary arterial branches.  VESSELS: Within normal limits. The main pulmonary artery measures   approximately 2.8 cm.  HEART: Heart size is normal. No pericardial effusion. No evidence of   right heart strain. Coronary artery calcifications.  MEDIASTINUM AND NAKIA: No lymphadenopathy.  CHEST WALL AND LOWER NECK: Within normal limits.  VISUALIZED UPPER ABDOMEN: Within normal limits.  BONES: Degenerative changes of the spine.    IMPRESSION:   Pulmonary embolism involving the left pulmonary artery, left upper lobe   are branches, as well as the distal segmental subsegmental branches.   Emboli are also noted of the right lower lobe segmental and subsegmental   branches.  No evidence of right heart strain or pulmonary infarct.    Findings were discussed with BEN Stephenson by Dr. Sosa on   2018 at 7:42 PM with read back.    KIMBERLY SOSA M.D., RADIOLOGY RESIDENT  This document has been electronically signed.  MARTA COLLINS M.D., ATTENDING RADIOLOGIST  This document has been electronically signed. Sep 17 2018  8:26PM      < end of copied text >  ---------------------------------------------------------------------------------------------------------------

## 2018-10-05 NOTE — PROGRESS NOTE ADULT - SUBJECTIVE AND OBJECTIVE BOX
Patient is a 73y old  Female who presents with a chief complaint of persistent cough and generalized weakness (05 Oct 2018 14:58)  73y    HPI:  Patient is a 73 year old female hypertensin, history of breast cancer s/p lumpectomy in remission, recent discharged from [facility] on  with complicated hospitalization- patient with pauses and symptomatic bradycardia required pacemaker placement, rhinoviral infection and recurrent bilateral pulmonary embolism (patient with prothrombin gene mutation). Pt  also had and increased wbc to 29 after placement of wireless pacemaker. wbc decreased to 19 and pt felt well and she was d/zain. pt was seen in office today and complainted ofi ncreased weakness and dry cough. Patient was noted to have a persistently increased wbc to 19 and was referred to ER for evaluation. She denies sputum production, subjective fevers, chills, dysuria, diarrhea, rashes, myalgias.     In the ED, blood cultures drawn, RVP collected, CXR unremarkable, urine culture collected. Given vancomycin and zosyn.       Review of Systems:  Review of Systems: GENERAL: No fevers, no chills.  EYES: No blurry vision,  No photophobia  ENT: No sore throat.  No dysphagia  Cardiovascular: No chest pain, palpitations, orthopnea  Pulmonary: + cough, no wheezing. No shortness of breath  Gastrointestinal: No abdominal pain, no diarrhea, no constipation.  No melena.  No hematochezia  : No dysuria.  No hematuria  Musculoskeletal: + weakness.  No myalgias.  Dermatology:  No rashes. Neuro: No Headache.  No vertigo.  No dizziness.	      Allergies and Intolerances:        Allergies:  	No Known Allergies:     Home Medications:   * Patient Currently Takes Medications as of 04-Oct-2018 15:29 documented in Structured Notes  · 	Aspirin Enteric Coated 81 mg oral delayed release tablet: 1 tab(s) orally once a day , Last Dose Taken:    · 	guaifenesin-dextromethorphan 100 mg-10 mg/5 mL oral liquid: 10 milliliter(s) orally 4 times a day, Last Dose Taken:    · 	verapamil 120 mg oral tablet: 1 tab(s) orally once a day, Last Dose Taken:    · 	omeprazole 40 mg oral delayed release capsule: 1 cap(s) orally once a day, Last Dose Taken:    · 	Lipitor 10 mg oral tablet: 1 tab(s) orally once a day (at bedtime), Last Dose Taken:    · 	Arnuity Ellipta 200 mcg inhalation powder: 1 puff(s) inhaled every 24 hours, Last Dose Taken:    · 	Coumadin: 4 milligram(s) orally once a day (at bedtime)  	PRESCRIPTION SENT TO PHARMACY, Last Dose Taken:      Patient History:    Past Medical History:  DVT (deep venous thrombosis), right  2013  GERD (gastroesophageal reflux disease)    Herniated lumbar intervertebral disc    HTN - Hypertension    Hx of Breast Cancer    Lower back pain    PE (pulmonary embolism)  3 yrs ago, was on Lovenox and coumadin  Prothrombin gene mutation.     Past Surgical History:  h/o Wrist Surgery    S/P arthroscopy of right knee    S/P Breast Lumpectomy  cyst removed from left breast.  S/P tubal ligation.     Family History:  Family history of prothrombin gene mutation, first cousin     Father  Still living? Unknown  Family history of heart disease, Age at diagnosis: Age Unknown.     Social History:  Social History (marital status, living situation, occupation, tobacco use, alcohol and drug use, and sexual history): denies tobacco, alcohol or drug use	     Tobacco Screening:  · Core Measure Site	No	        Vital Signs Last 24 Hrs  T(C): 36.8 (05 Oct 2018 20:50), Max: 36.8 (05 Oct 2018 20:50)  T(F): 98.2 (05 Oct 2018 20:50), Max: 98.2 (05 Oct 2018 20:50)  HR: 65 (05 Oct 2018 20:50) (52 - 65)  BP: 105/66 (05 Oct 2018 20:50) (92/57 - 115/74)  BP(mean): --  RR: 18 (05 Oct 2018 20:50) (18 - 18)  SpO2: 96% (05 Oct 2018 20:50) (96% - 98%)      PHYSICAL EXAM:  GENERAL: NAD, well-groomed, well-developed  HEAD:  Atraumatic, Normocephalic  EYES:  conjunctiva and sclera clear  ENMT: No tonsillar erythema, exudates, or enlargement; Moist mucous membranes, Good dentition, No lesions  NECK: Supple, No JVD  NERVOUS SYSTEM:  Alert & Oriented X3, Good concentration; Motor Strength 5/5 B/L upper and lower extremities  CHEST/LUNG: Clear to auscultation  bilaterally; No rales, rhonchi, wheezing, or rubs  HEART: Regular rate and rhythm; No murmurs, rubs, or gallops  ABDOMEN: Soft, Nontender, Nondistended; Bowel sounds present, neg HSM  EXTREMITIES:   No clubbing, cyanosis, or edema  LYMPH: No lymphadenopathy noted  SKIN: No rashes or lesions        CBC Full  -  ( 05 Oct 2018 08:13 )  WBC Count : 13.91 K/uL  Hemoglobin : 12.5 g/dL  Hematocrit : 38.1 %  Platelet Count - Automated : 151 K/uL  Mean Cell Volume : 94.1 fl  Mean Cell Hemoglobin : 30.9 pg  Mean Cell Hemoglobin Concentration : 32.8 gm/dL  Auto Neutrophil # : 4.44 K/uL  Auto Lymphocyte # : 8.89 K/uL  Auto Monocyte # : 0.41 K/uL  Auto Eosinophil # : 0.13 K/uL  Auto Basophil # : 0.01 K/uL  Auto Neutrophil % : 32.0 %  Auto Lymphocyte % : 63.9 %  Auto Monocyte % : 2.9 %  Auto Eosinophil % : 0.9 %  Auto Basophil % : 0.1 %    10    142  |  109<H>  |  16  ----------------------------<  112<H>  4.1   |  24  |  0.98    Ca    8.4      05 Oct 2018 06:22    TPro  5.5<L>  /  Alb  3.3  /  TBili  0.4  /  DBili  x   /  AST  10  /  ALT  16  /  AlkPhos  42  10-05    PT/INR - ( 05 Oct 2018 08:16 )   PT: 24.7 sec;   INR: 2.15 ratio         PTT - ( 05 Oct 2018 08:16 )  PTT:29.9 sec  Urinalysis Basic - ( 04 Oct 2018 15:18 )    Color: Light Yellow / Appearance: Clear / S.015 / pH: x  Gluc: x / Ketone: Negative  / Bili: Negative / Urobili: Negative   Blood: x / Protein: Negative / Nitrite: Negative   Leuk Esterase: Negative / RBC: x / WBC x   Sq Epi: x / Non Sq Epi: x / Bacteria: x

## 2018-10-05 NOTE — PROGRESS NOTE ADULT - SUBJECTIVE AND OBJECTIVE BOX
CC: f/u for cough and leukocytosis    Patient reports: no change in cough and exertional dyspnea, she otherwise feels okay.No GI or  complaints.    REVIEW OF SYSTEMS:  All other review of systems negative (Comprehensive ROS)    Antimicrobials Day #  :day 2  cefepime   IVPB 1000 milliGRAM(s) IV Intermittent every 8 hours  vancomycin  IVPB 1000 milliGRAM(s) IV Intermittent every 12 hours    Other Medications Reviewed    T(F): 98 (10-05-18 @ 11:42), Max: 99.6 (10-04-18 @ 14:51)  HR: 64 (10-05-18 @ 11:42)  BP: 92/57 (10-05-18 @ 11:42)  RR: 18 (10-05-18 @ 11:42)  SpO2: 98% (10-05-18 @ 11:42)  Wt(kg): --    PHYSICAL EXAM:  General: alert, no acute distress  Eyes:  anicteric, no conjunctival injection, no discharge  Oropharynx: no lesions or injection 	  Neck: supple, without adenopathy  Lungs: clear to auscultation  Heart: regular rate and rhythm; no murmur, rubs or gallops  Abdomen: soft, nondistended, nontender, without mass or organomegaly  Skin: no lesions  Extremities: no clubbing, cyanosis, or edema  Neurologic: alert, oriented, moves all extremities    LAB RESULTS:                        12.5   13.91 )-----------( 151      ( 05 Oct 2018 08:13 ); lymphocyte predominance               38.1     10-05    142  |  109<H>  |  16  ----------------------------<  112<H>  4.1   |  24  |  0.98    Ca    8.4      05 Oct 2018 06:22    TPro  5.5<L>  /  Alb  3.3  /  TBili  0.4  /  DBili  x   /  AST  10  /  ALT  16  /  AlkPhos  42  10-    LIVER FUNCTIONS - ( 05 Oct 2018 06:22 )  Alb: 3.3 g/dL / Pro: 5.5 g/dL / ALK PHOS: 42 U/L / ALT: 16 U/L / AST: 10 U/L / GGT: x           Urinalysis Basic - ( 04 Oct 2018 15:18 )    Color: Light Yellow / Appearance: Clear / S.015 / pH: x  Gluc: x / Ketone: Negative  / Bili: Negative / Urobili: Negative   Blood: x / Protein: Negative / Nitrite: Negative   Leuk Esterase: Negative / RBC: x / WBC x   Sq Epi: x / Non Sq Epi: x / Bacteria: x      MICROBIOLOGY:  RECENT CULTURES:      RADIOLOGY REVIEWED:  < from: Xray Chest 2 Views PA/Lat (10.04.18 @ 14:36) >  IMPRESSION:   Clear lungs.    < end of copied text >

## 2018-10-05 NOTE — CONSULT NOTE ADULT - SUBJECTIVE AND OBJECTIVE BOX
FULL NOTE TO FOLLOW    Patient seen and evaluated @   Chief Complaint:     HPI:  Patient is a 73 year old female hypertensin, history of breast cancer s/p lumpectomy in remission, recent discharged from [facility] on 9/30 with complicated hospitalization- patient with pauses and symptomatic bradycardia required pacemaker placement, rhinoviral infection and recurrent bilateral pulmonary embolism (patient with prothrombin gene mutation) presents to the ED with complaints of continued weakness and dry cough. Patient had these symptoms at time of discharge however they have not improved. She denies sputum production, subjective fevers, chills, dysuria, diarrhea, rashes, myalgias. Her only complaints are persistent fatigue and dry cough.    In the ED, blood cultures drawn, RVP collected, CXR unremarkable, urine culture collected. Given vancomycin and zosyn.      at bedside. (04 Oct 2018 15:45)    PMH:   Prothrombin gene mutation  GERD (gastroesophageal reflux disease)  DVT (deep venous thrombosis), right  Herniated lumbar intervertebral disc  Lower back pain  PE (pulmonary embolism)  Hx of Breast Cancer  HTN - Hypertension    PSH:   S/P tubal ligation  S/P arthroscopy of right knee  S/P Breast Lumpectomy  h/o Wrist Surgery  S/P Wrist Surgery    Medications:   aspirin enteric coated 81 milliGRAM(s) Oral daily  atorvastatin 10 milliGRAM(s) Oral at bedtime  cefepime   IVPB 1000 milliGRAM(s) IV Intermittent every 8 hours  guaiFENesin/dextromethorphan  Syrup 10 milliLiter(s) Oral four times a day PRN  pantoprazole    Tablet 40 milliGRAM(s) Oral before breakfast  vancomycin  IVPB 1000 milliGRAM(s) IV Intermittent every 12 hours  verapamil 120 milliGRAM(s) Oral daily    Allergies:  No Known Allergies    FAMILY HISTORY:  Family history of prothrombin gene mutation: first cousin  Family history of heart disease (Father): father    Social History:  Smoking:  Alcohol:  Drugs:    REVIEW OF SYSTEMS:  CONSTITUTIONAL: No weakness, fevers or chills  EYES/ENT: No visual changes;  No vertigo or throat pain   NECK: No pain or stiffness  RESPIRATORY: No cough, wheezing, hemoptysis; No shortness of breath  CARDIOVASCULAR: No chest pain or palpitations  GASTROINTESTINAL: No abdominal or epigastric pain. No nausea, vomiting, or hematemesis; No diarrhea or constipation. No melena or hematochezia.  GENITOURINARY: No dysuria, frequency or hematuria  NEUROLOGICAL: No numbness or weakness  SKIN: No itching, burning, rashes, or lesions   All other review of systems is negative unless indicated above    Physical Exam:  T(C): 36.5 (10-05-18 @ 04:17), Max: 37.6 (10-04-18 @ 14:51)  HR: 60 (10-05-18 @ 05:53) (52 - 78)  BP: 108/69 (10-05-18 @ 05:53) (108/69 - 140/84)  RR: 18 (10-05-18 @ 04:17) (17 - 30)  SpO2: 97% (10-05-18 @ 04:17) (96% - 100%)  Wt(kg): --    10-04 @ 07:01  -  10-05 @ 07:00  --------------------------------------------------------  IN: 500 mL / OUT: 0 mL / NET: 500 mL    10-05 @ 07:01  -  10-05 @ 09:38  --------------------------------------------------------  IN: 240 mL / OUT: 0 mL / NET: 240 mL      Daily Height in cm: 147.32 (04 Oct 2018 19:00)    Daily     Appearance:  Normal, NAD  Eyes:  PERRL, EOMI  HEENT: Normal oral muscosa, NC/AT  Neck:  No JVD or HJR  Respiratory: Clear to auscultation bilaterally  Cardiovascular: Normal S1 and S2 without murmurs, rubs or gallops  Abdomen:   BS normal, Soft,  Non-tender without organomegally or masses  Extremities: Without edema, pulses are full      Labs:                        12.5   13.91 )-----------( 151      ( 05 Oct 2018 08:13 )             38.1     10-05    142  |  109<H>  |  16  ----------------------------<  112<H>  4.1   |  24  |  0.98    Ca    8.4      05 Oct 2018 06:22    TPro  5.5<L>  /  Alb  3.3  /  TBili  0.4  /  DBili  x   /  AST  10  /  ALT  16  /  AlkPhos  42  10-05    PT/INR - ( 05 Oct 2018 08:16 )   PT: 24.7 sec;   INR: 2.15 ratio         PTT - ( 05 Oct 2018 08:16 )  PTT:29.9 sec      Serum Pro-Brain Natriuretic Peptide: 636 pg/mL (10-04 @ 14:41)        Thyroid Stimulating Hormone, Serum: 2.90 uIU/mL (09-29 @ 05:19)        ECG:    Echo:    Stress Testing:    Cath:    Interpretation of Telemetry:    Imaging: FULL NOTE TO FOLLOW    Patient seen and evaluated @ bedside  Chief Complaint:  fatigue, cough, dyspnea    HPI:  Patient is a 73 year old female hypertensin, history of breast cancer s/p lumpectomy in remission, recent discharged from [facility] on 9/30 with complicated hospitalization- patient with pauses and PAT required pacemaker placement, rhinoviral infection and recurrent bilateral pulmonary embolism (patient with prothrombin gene mutation) presents to the ED with complaints of continued weakness and dry cough. Patient had these symptoms at time of discharge however they have not improved. She denies sputum production, subjective fevers, chills, dysuria, diarrhea, rashes, myalgias. Her only complaints are persistent fatigue and dry cough.    In the ED, blood cultures drawn, RVP collected and negative, CXR unremarkable, urine culture collected. Given vancomycin and zosyn.     On tele, sinus artur, PVCs, no demand paced beats, no PAT.    PMH:   Prothrombin gene mutation  GERD (gastroesophageal reflux disease)  DVT (deep venous thrombosis), right  Herniated lumbar intervertebral disc  Lower back pain  PE (pulmonary embolism)  Hx of Breast Cancer  HTN - Hypertension    PSH:   S/P tubal ligation  S/P arthroscopy of right knee  S/P Breast Lumpectomy  h/o Wrist Surgery  S/P Wrist Surgery    Medications:   aspirin enteric coated 81 milliGRAM(s) Oral daily  atorvastatin 10 milliGRAM(s) Oral at bedtime  cefepime   IVPB 1000 milliGRAM(s) IV Intermittent every 8 hours  guaiFENesin/dextromethorphan  Syrup 10 milliLiter(s) Oral four times a day PRN  pantoprazole    Tablet 40 milliGRAM(s) Oral before breakfast  vancomycin  IVPB 1000 milliGRAM(s) IV Intermittent every 12 hours  verapamil 120 milliGRAM(s) Oral daily    Allergies:  No Known Allergies    FAMILY HISTORY:  Family history of prothrombin gene mutation: first cousin  Family history of heart disease (Father): father    Social History:  Smoking:  None      REVIEW OF SYSTEMS:  RESPIRATORY: + cough, mild shortness of breath  CARDIOVASCULAR: Non-descript retrosternal chest pain, non-pleuritic, no palpitations  All other review of systems is negative unless indicated above    Physical Exam:  T(C): 36.5 (10-05-18 @ 04:17), Max: 37.6 (10-04-18 @ 14:51)  HR: 60 (10-05-18 @ 05:53) (52 - 78)  BP: 108/69 (10-05-18 @ 05:53) (108/69 - 140/84)  RR: 18 (10-05-18 @ 04:17) (17 - 30)  SpO2: 97% (10-05-18 @ 04:17) (96% - 100%)  Wt(kg): --    10-04 @ 07:01  -  10-05 @ 07:00  --------------------------------------------------------  IN: 500 mL / OUT: 0 mL / NET: 500 mL    10-05 @ 07:01  -  10-05 @ 09:38  --------------------------------------------------------  IN: 240 mL / OUT: 0 mL / NET: 240 mL      Daily Height in cm: 147.32 (04 Oct 2018 19:00)    Daily     Appearance:  Normal, NAD  Eyes:  EOMI  Neck:  No JVD or HJR  Respiratory: Clear to auscultation bilaterally  Cardiovascular: Normal S1 and S2 with soft systolic murmur LSB, no rubs or gallops  Abdomen:   BS normal, Soft,  Non-tender without organomegaly or masses  Extremities: Without edema      Labs:                        12.5   13.91 )-----------( 151      ( 05 Oct 2018 08:13 )             38.1     10-05    142  |  109<H>  |  16  ----------------------------<  112<H>  4.1   |  24  |  0.98    Ca    8.4      05 Oct 2018 06:22    TPro  5.5<L>  /  Alb  3.3  /  TBili  0.4  /  DBili  x   /  AST  10  /  ALT  16  /  AlkPhos  42  10-05    PT/INR - ( 05 Oct 2018 08:16 )   PT: 24.7 sec;   INR: 2.15 ratio         PTT - ( 05 Oct 2018 08:16 )  PTT:29.9 sec      Serum Pro-Brain Natriuretic Peptide: 636 pg/mL (10-04 @ 14:41)        Thyroid Stimulating Hormone, Serum: 2.90 uIU/mL (09-29 @ 05:19)        ECG:  NSR.  NJOrmal axis and intervals.  Voltage criteria for LVH limb leads.  No abnormal repolarization changes.

## 2018-10-05 NOTE — CONSULT NOTE ADULT - ASSESSMENT
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    GERD    HTN    Breast cancer s/p lumpectomy    returns with profound fatigue, ongoing cough and shortness of breath with minimal exertion - likely deconditioning following a prolonged hospitalization and bronchial hyperreactivity following her recent rhinoviral infection - see no evidence of a bacterial infection    leukocytosis - perhaps related to steroids - lymphocyte predominant leukocytosis speaks against a bacterial infection - r/o lymphoproliferative disease    PLAN/RECOMMENDATIONS:    stable oxygenation on room air  ESR/CRP  CTA of the chest  albuterol/atrovent/pulmicort nebs  prednisone 50mg daily  robitussin DM/tessalon perles/hycodan at bedtime  would observe off antibiotics  coumadin for INR 2 - 3  optimize reflux treatment -> protonix/pepcid  cardiac meds: verapamil/lipitor    Thank you for the courtesy of this referral. Plan of care discussed with the patient and her  at bedside.    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    GERD     HTN    Breast cancer s/p lumpectomy    returns with profound fatigue, ongoing cough and shortness of breath with minimal exertion - (?) deconditioning following a prolonged hospitalization versus propagation of pulmonary emboli and bronchial hyperreactivity following her recent rhinoviral infection - see no evidence of a bacterial infection    leukocytosis - perhaps related to steroids - lymphocyte predominant leukocytosis speaks against a bacterial infection - r/o lymphoproliferative disease    PLAN/RECOMMENDATIONS:    stable oxygenation on room air  ESR/CRP  CTA of the chest  albuterol/atrovent/pulmicort nebs  prednisone 50mg daily  robitussin DM/tessalon perles/hycodan at bedtime  would observe off antibiotics  coumadin for INR 2 - 3  optimize reflux treatment -> protonix/pepcid  cardiac meds: verapamil/lipitor    Thank you for the courtesy of this referral. Plan of care discussed with the patient and her  at bedside.    Please call [phone] over the weekend with questions or clinical changes - Jass Whittington and Rd Diaz will be covering the service      Rd Lira MD, [facility] - [phone]  Pulmonary Medicine

## 2018-10-05 NOTE — CONSULT NOTE ADULT - ASSESSMENT
Impression:  Doubt cardiac etiology of current symptoms.  Patient has non-obstructive CAD by CTCA.  Doubt endocarditis.  No evidence of significant bradyarrythmia.  DOubt Dressler's from recent micra                     PPM.                       WBC is improving but did receive dose of antibiotics in ER.    Plan:  Await blood cultures.            Will order ESR and CRP.

## 2018-10-05 NOTE — CONSULT NOTE ADULT - REASON FOR ADMISSION
persistent cough and generalized weakness

## 2018-10-06 LAB
ANION GAP SERPL CALC-SCNC: 13 MMOL/L — SIGNIFICANT CHANGE UP (ref 5–17)
BUN SERPL-MCNC: 14 MG/DL — SIGNIFICANT CHANGE UP (ref 7–23)
CALCIUM SERPL-MCNC: 8.9 MG/DL — SIGNIFICANT CHANGE UP (ref 8.4–10.5)
CHLORIDE SERPL-SCNC: 104 MMOL/L — SIGNIFICANT CHANGE UP (ref 96–108)
CO2 SERPL-SCNC: 22 MMOL/L — SIGNIFICANT CHANGE UP (ref 22–31)
CREAT SERPL-MCNC: 0.76 MG/DL — SIGNIFICANT CHANGE UP (ref 0.5–1.3)
CRP SERPL-MCNC: 0.15 MG/DL — SIGNIFICANT CHANGE UP (ref 0–0.4)
ERYTHROCYTE [SEDIMENTATION RATE] IN BLOOD: 9 MM/HR — SIGNIFICANT CHANGE UP (ref 0–20)
GLUCOSE SERPL-MCNC: 237 MG/DL — HIGH (ref 70–99)
HCT VFR BLD CALC: 40.4 % — SIGNIFICANT CHANGE UP (ref 34.5–45)
HGB BLD-MCNC: 13.2 G/DL — SIGNIFICANT CHANGE UP (ref 11.5–15.5)
INR BLD: 2.3 RATIO — HIGH (ref 0.88–1.16)
MCHC RBC-ENTMCNC: 30.3 PG — SIGNIFICANT CHANGE UP (ref 27–34)
MCHC RBC-ENTMCNC: 32.7 GM/DL — SIGNIFICANT CHANGE UP (ref 32–36)
MCV RBC AUTO: 92.7 FL — SIGNIFICANT CHANGE UP (ref 80–100)
PLATELET # BLD AUTO: 151 K/UL — SIGNIFICANT CHANGE UP (ref 150–400)
POTASSIUM SERPL-MCNC: 4.2 MMOL/L — SIGNIFICANT CHANGE UP (ref 3.5–5.3)
POTASSIUM SERPL-SCNC: 4.2 MMOL/L — SIGNIFICANT CHANGE UP (ref 3.5–5.3)
PROTHROM AB SERPL-ACNC: 26.4 SEC — HIGH (ref 10–13.1)
RBC # BLD: 4.36 M/UL — SIGNIFICANT CHANGE UP (ref 3.8–5.2)
RBC # FLD: 14.1 % — SIGNIFICANT CHANGE UP (ref 10.3–14.5)
SODIUM SERPL-SCNC: 139 MMOL/L — SIGNIFICANT CHANGE UP (ref 135–145)
VANCOMYCIN TROUGH SERPL-MCNC: 15.3 UG/ML — SIGNIFICANT CHANGE UP (ref 10–20)
WBC # BLD: 14.68 K/UL — HIGH (ref 3.8–10.5)
WBC # FLD AUTO: 14.68 K/UL — HIGH (ref 3.8–10.5)

## 2018-10-06 PROCEDURE — 71275 CT ANGIOGRAPHY CHEST: CPT | Mod: 26

## 2018-10-06 RX ORDER — WARFARIN SODIUM 2.5 MG/1
4 TABLET ORAL ONCE
Qty: 0 | Refills: 0 | Status: COMPLETED | OUTPATIENT
Start: 2018-10-06 | End: 2018-10-06

## 2018-10-06 RX ADMIN — Medication 200 MILLIGRAM(S): at 05:04

## 2018-10-06 RX ADMIN — PANTOPRAZOLE SODIUM 40 MILLIGRAM(S): 20 TABLET, DELAYED RELEASE ORAL at 17:33

## 2018-10-06 RX ADMIN — Medication 200 MILLIGRAM(S): at 21:40

## 2018-10-06 RX ADMIN — Medication 0.5 MILLIGRAM(S): at 17:33

## 2018-10-06 RX ADMIN — ATORVASTATIN CALCIUM 10 MILLIGRAM(S): 80 TABLET, FILM COATED ORAL at 21:40

## 2018-10-06 RX ADMIN — Medication 3 MILLILITER(S): at 17:33

## 2018-10-06 RX ADMIN — Medication 3 MILLILITER(S): at 05:04

## 2018-10-06 RX ADMIN — Medication 120 MILLIGRAM(S): at 05:03

## 2018-10-06 RX ADMIN — Medication 250 MILLIGRAM(S): at 14:38

## 2018-10-06 RX ADMIN — Medication 3 MILLILITER(S): at 11:48

## 2018-10-06 RX ADMIN — Medication 250 MILLIGRAM(S): at 04:22

## 2018-10-06 RX ADMIN — FAMOTIDINE 20 MILLIGRAM(S): 10 INJECTION INTRAVENOUS at 05:03

## 2018-10-06 RX ADMIN — WARFARIN SODIUM 4 MILLIGRAM(S): 2.5 TABLET ORAL at 21:40

## 2018-10-06 RX ADMIN — Medication 81 MILLIGRAM(S): at 11:48

## 2018-10-06 RX ADMIN — Medication 200 MILLIGRAM(S): at 13:39

## 2018-10-06 RX ADMIN — CEFEPIME 100 MILLIGRAM(S): 1 INJECTION, POWDER, FOR SOLUTION INTRAMUSCULAR; INTRAVENOUS at 13:39

## 2018-10-06 RX ADMIN — CEFEPIME 100 MILLIGRAM(S): 1 INJECTION, POWDER, FOR SOLUTION INTRAMUSCULAR; INTRAVENOUS at 21:39

## 2018-10-06 RX ADMIN — Medication 3 MILLILITER(S): at 23:22

## 2018-10-06 RX ADMIN — PANTOPRAZOLE SODIUM 40 MILLIGRAM(S): 20 TABLET, DELAYED RELEASE ORAL at 05:03

## 2018-10-06 RX ADMIN — Medication 0.5 MILLIGRAM(S): at 05:04

## 2018-10-06 RX ADMIN — FAMOTIDINE 20 MILLIGRAM(S): 10 INJECTION INTRAVENOUS at 17:33

## 2018-10-06 RX ADMIN — CEFEPIME 100 MILLIGRAM(S): 1 INJECTION, POWDER, FOR SOLUTION INTRAMUSCULAR; INTRAVENOUS at 05:04

## 2018-10-06 RX ADMIN — Medication 3 MILLILITER(S): at 00:45

## 2018-10-06 NOTE — PROGRESS NOTE ADULT - SUBJECTIVE AND OBJECTIVE BOX
No new c/o.  Has sensation in retrosternum and at times feels need to take a deep breath (? involutary).    ESR and CRP normal.    Had 2 short runs SVT.        REVIEW OF SYSTEMS:  CARDIOVASCULAR: No chest pain, dyspnea or palpitations  All other review of systems is negative unless indicated above    Medications:  ALBUTerol/ipratropium for Nebulization 3 milliLiter(s) Nebulizer every 6 hours  aspirin enteric coated 81 milliGRAM(s) Oral daily  atorvastatin 10 milliGRAM(s) Oral at bedtime  benzonatate 200 milliGRAM(s) Oral three times a day  buDESOnide   0.5 milliGRAM(s) Respule 0.5 milliGRAM(s) Inhalation every 12 hours  cefepime   IVPB 1000 milliGRAM(s) IV Intermittent every 8 hours  famotidine    Tablet 20 milliGRAM(s) Oral two times a day  guaiFENesin/dextromethorphan  Syrup 10 milliLiter(s) Oral four times a day  HYDROcodone/homatropine Syrup 5 milliLiter(s) Oral at bedtime  pantoprazole    Tablet 40 milliGRAM(s) Oral two times a day  predniSONE   Tablet 50 milliGRAM(s) Oral daily  vancomycin  IVPB 1000 milliGRAM(s) IV Intermittent every 12 hours  verapamil 120 milliGRAM(s) Oral daily      Physical Exam:  Vitals:  T(C): 36.8 (10-06-18 @ 04:55), Max: 36.8 (10-05-18 @ 20:50)  HR: 76 (10-06-18 @ 04:55) (65 - 76)  BP: 134/79 (10-06-18 @ 04:55) (105/66 - 134/79)  BP(mean): --  RR: 18 (10-06-18 @ 04:55) (18 - 18)  SpO2: 95% (10-06-18 @ 04:55) (95% - 96%)  Wt(kg): --  Daily     Daily   I&O's Summary    05 Oct 2018 07:01  -  06 Oct 2018 07:00  --------------------------------------------------------  IN: 480 mL / OUT: 0 mL / NET: 480 mL        Appearance:  Normal, NAD  Eyes:  PERRL, EOMI  HEENT: Normal oral muscosa, NC/AT  Neck:  No JVD or HJR  Respiratory: Clear to auscultation bilaterally  Cardiovascular: Normal S1 and S2 without murmurs, rubs or gallops  Abdomen:   BS normal, Soft,  Non-tender without organomegally or masses  Extremities: Without edema, pulses are full          10-06    Complete Blood Count in AM (10.06.18 @ 07:50)    WBC Count: 14.68 K/uL    RBC Count: 4.36: Test Repeated Specimen integrity verified. M/uL    Hemoglobin: 13.2 g/dL    Hematocrit: 40.4 %    Mean Cell Volume: 92.7 fl    Mean Cell Hemoglobin: 30.3 pg    Mean Cell Hemoglobin Conc: 32.7 gm/dL    Red Cell Distrib Width: 14.1 %    Platelet Count - Automated: 151 K/uL    Sedimentation Rate, Erythrocyte (10.06.18 @ 07:50)    Sedimentation Rate, Erythrocyte: 9 mm/hr          139  |  104  |  14  ----------------------------<  237<H>  4.2   |  22  |  0.76    Ca    8.9      06 Oct 2018 06:49    TPro  5.5<L>  /  Alb  3.3  /  TBili  0.4  /  DBili  x   /  AST  10  /  ALT  16  /  AlkPhos  42  10-05    PT/INR - ( 06 Oct 2018 07:50 )   PT: 26.4 sec;   INR: 2.30 ratio         PTT - ( 05 Oct 2018 08:16 )  PTT:29.9 sec      Serum Pro-Brain Natriuretic Peptide: 636 pg/mL (10-04 @ 14:41)

## 2018-10-06 NOTE — PROGRESS NOTE ADULT - SUBJECTIVE AND OBJECTIVE BOX
Patient is a 73y old  Female who presents with a chief complaint of persistent cough and generalized weakness (05 Oct 2018 23:26)      INTERVAL HPI/OVERNIGHT EVENTS:  T(C): 36.8 (10-06-18 @ 04:55), Max: 36.8 (10-05-18 @ 20:50)  HR: 76 (10-06-18 @ 04:55) (64 - 76)  BP: 134/79 (10-06-18 @ 04:55) (92/57 - 134/79)  RR: 18 (10-06-18 @ 04:55) (18 - 18)  SpO2: 95% (10-06-18 @ 04:55) (95% - 98%)  Wt(kg): --  I&O's Summary    05 Oct 2018 07:01  -  06 Oct 2018 07:00  --------------------------------------------------------  IN: 480 mL / OUT: 0 mL / NET: 480 mL        LABS:                        12.5   13.91 )-----------( 151      ( 05 Oct 2018 08:13 )             38.1     10-    139  |  104  |  14  ----------------------------<  237<H>  4.2   |  22  |  0.76    Ca    8.9      06 Oct 2018 06:49    TPro  5.5<L>  /  Alb  3.3  /  TBili  0.4  /  DBili  x   /  AST  10  /  ALT  16  /  AlkPhos  42  10-05    PT/INR - ( 05 Oct 2018 08:16 )   PT: 24.7 sec;   INR: 2.15 ratio         PTT - ( 05 Oct 2018 08:16 )  PTT:29.9 sec  Urinalysis Basic - ( 04 Oct 2018 15:18 )    Color: Light Yellow / Appearance: Clear / S.015 / pH: x  Gluc: x / Ketone: Negative  / Bili: Negative / Urobili: Negative   Blood: x / Protein: Negative / Nitrite: Negative   Leuk Esterase: Negative / RBC: x / WBC x   Sq Epi: x / Non Sq Epi: x / Bacteria: x      CAPILLARY BLOOD GLUCOSE            Urinalysis Basic - ( 04 Oct 2018 15:18 )    Color: Light Yellow / Appearance: Clear / S.015 / pH: x  Gluc: x / Ketone: Negative  / Bili: Negative / Urobili: Negative   Blood: x / Protein: Negative / Nitrite: Negative   Leuk Esterase: Negative / RBC: x / WBC x   Sq Epi: x / Non Sq Epi: x / Bacteria: x      REVIEW OF SYSTEMS:  CONSTITUTIONAL: No fever, weight loss, or fatigue  EYES: No eye pain, visual disturbances, or discharge  ENMT:  No difficulty hearing, tinnitus, vertigo; No sinus or throat pain  NECK: No pain or stiffness  BREASTS: No pain, masses, or nipple discharge  RESPIRATORY: No cough, wheezing, chills or hemoptysis; No shortness of breath  CARDIOVASCULAR: No chest pain, palpitations, dizziness, or leg swelling  GASTROINTESTINAL: No abdominal or epigastric pain. No nausea, vomiting, or hematemesis; No diarrhea or constipation. No melena or hematochezia.  GENITOURINARY: No dysuria, frequency, hematuria, or incontinence  NEUROLOGICAL: No headaches, memory loss, loss of strength, numbness, or tremors  SKIN: No itching, burning, rashes, or lesions   LYMPH NODES: No enlarged glands  ENDOCRINE: No heat or cold intolerance; No hair loss  MUSCULOSKELETAL: No joint pain or swelling; No muscle, back, or extremity pain  PSYCHIATRIC: No depression, anxiety, mood swings, or difficulty sleeping  HEME/LYMPH: No easy bruising, or bleeding gums  ALLERY AND IMMUNOLOGIC: No hives or eczema    RADIOLOGY & ADDITIONAL TESTS:    Imaging Personally Reviewed:  [ ] YES  [ ] NO    Consultant(s) Notes Reviewed:  [ ] YES  [ ] NO    PHYSICAL EXAM:  GENERAL: NAD, well-groomed, well-developed  HEAD:  Atraumatic, Normocephalic  EYES: EOMI, PERRLA, conjunctiva and sclera clear  ENMT: No tonsillar erythema, exudates, or enlargement; Moist mucous membranes, Good dentition, No lesions  NECK: Supple, No JVD, Normal thyroid  NERVOUS SYSTEM:  Alert & Oriented X3, Good concentration; Motor Strength 5/5 B/L upper and lower extremities; DTRs 2+ intact and symmetric  CHEST/LUNG: Clear to auscultation bilaterally; No rales, rhonchi, wheezing, or rubs  HEART: Regular rate and rhythm; No murmurs, rubs, or gallops  ABDOMEN: Soft, Nontender, Nondistended; Bowel sounds present  EXTREMITIES:  2+ Peripheral Pulses, No clubbing, cyanosis, or edema  LYMPH: No lymphadenopathy noted  SKIN: No rashes or lesions    Care Discussed with Consultants/Other Providers [ ] YES  [ ] NO Patient is a 73y old  Female who presents with a chief complaint of persistent cough and generalized weakness (05 Oct 2018 23:26)      INTERVAL HPI/OVERNIGHT EVENTS:    Pt still w/ cough and some SOB with prolonged speaking  T(C): 36.8 (10-06-18 @ 04:55), Max: 36.8 (10-05-18 @ 20:50)  HR: 76 (10-06-18 @ 04:55) (64 - 76)  BP: 134/79 (10-06-18 @ 04:55) (92/57 - 134/79)  RR: 18 (10-06-18 @ 04:55) (18 - 18)  SpO2: 95% (10-06-18 @ 04:55) (95% - 98%)  Wt(kg): --  I&O's Summary    05 Oct 2018 07:01  -  06 Oct 2018 07:00  --------------------------------------------------------  IN: 480 mL / OUT: 0 mL / NET: 480 mL        LABS:                        12.5   13.91 )-----------( 151      ( 05 Oct 2018 08:13 )             38.1     10-    139  |  104  |  14  ----------------------------<  237<H>  4.2   |  22  |  0.76    Ca    8.9      06 Oct 2018 06:49    TPro  5.5<L>  /  Alb  3.3  /  TBili  0.4  /  DBili  x   /  AST  10  /  ALT  16  /  AlkPhos  42  10-05    PT/INR - ( 05 Oct 2018 08:16 )   PT: 24.7 sec;   INR: 2.15 ratio         PTT - ( 05 Oct 2018 08:16 )  PTT:29.9 sec  Urinalysis Basic - ( 04 Oct 2018 15:18 )    Color: Light Yellow / Appearance: Clear / S.015 / pH: x  Gluc: x / Ketone: Negative  / Bili: Negative / Urobili: Negative   Blood: x / Protein: Negative / Nitrite: Negative   Leuk Esterase: Negative / RBC: x / WBC x   Sq Epi: x / Non Sq Epi: x / Bacteria: x        Urinalysis Basic - ( 04 Oct 2018 15:18 )    Color: Light Yellow / Appearance: Clear / S.015 / pH: x  Gluc: x / Ketone: Negative  / Bili: Negative / Urobili: Negative   Blood: x / Protein: Negative / Nitrite: Negative   Leuk Esterase: Negative / RBC: x / WBC x   Sq Epi: x / Non Sq Epi: x / Bacteria: x      REVIEW OF SYSTEMS:  CONSTITUTIONAL: No fever, weight loss, or fatigue  EYES: No eye pain, visual disturbances, or discharge  ENMT:  No difficulty hearing, tinnitus, vertigo; No sinus or throat pain  NECK: No pain or stiffness  BREASTS: No pain, masses, or nipple discharge  RESPIRATORY: as above  CARDIOVASCULAR: No chest pain, palpitations, dizziness, or leg swelling  GASTROINTESTINAL: No abdominal or epigastric pain. No nausea, vomiting, or hematemesis; No diarrhea or constipation. No melena or hematochezia.  GENITOURINARY: No dysuria, frequency, hematuria, or incontinence  NEUROLOGICAL: No headaches, memory loss, loss of strength, numbness, or tremors  SKIN: No itching, burning, rashes, or lesions   LYMPH NODES: No enlarged glands  ENDOCRINE: No heat or cold intolerance; No hair loss  MUSCULOSKELETAL: No joint pain or swelling; No muscle, back, or extremity pain  PSYCHIATRIC: No depression, anxiety, mood swings, or difficulty sleeping  HEME/LYMPH: No easy bruising, or bleeding gums  ALLERY AND IMMUNOLOGIC: No hives or eczema        Consultant(s) Notes Reviewed:  [ ] YES  [ ] NO    PHYSICAL EXAM:  GENERAL: NAD  HEAD:  Atraumatic, Normocephalic  EYES: conjunctiva and sclera clear  ENMT: No tonsillar erythema, exudates, or enlargement; Moist mucous membranes  NECK: Supple, No JVD  NERVOUS SYSTEM:  Alert & Oriented X3, Good concentration; Motor Strength 5/5 B/L upper and lower extremities  CHEST/LUNG: Clear to auscultation bilaterally; No rales, rhonchi, wheezing, or rubs  HEART: Regular rate and rhythm; No murmurs, rubs, or gallops, OCC extrasystoles  ABDOMEN: Soft, Nontender, Nondistended; Bowel sounds present, neg HSM  EXTREMITIES:  No clubbing, cyanosis, or edema  LYMPH: No lymphadenopathy noted  SKIN: No rashes or lesions

## 2018-10-06 NOTE — DOWNTIME INTERRUPTION NOTE - WHICH MANUAL FORMS INITIATED?
Documentation on hold for POC and A& I flowsheets and Adult Patient Profile.  See paper record for additional documentation recorded during downtime.

## 2018-10-06 NOTE — PROGRESS NOTE ADULT - SUBJECTIVE AND OBJECTIVE BOX
CC: f/u for leukocytosis    Patient reports: stable dry cough, stable dyspnea upon exertion.No fever or chills.Cough is not productive.    REVIEW OF SYSTEMS:  All other review of systems negative (Comprehensive ROS)    Antimicrobials Day #  :day 3  cefepime   IVPB 1000 milliGRAM(s) IV Intermittent every 8 hours  vancomycin  IVPB 1000 milliGRAM(s) IV Intermittent every 12 hours    Other Medications Reviewed    T(F): 98.2 (10-06-18 @ 04:55), Max: 98.2 (10-05-18 @ 20:50)  HR: 76 (10-06-18 @ 04:55)  BP: 134/79 (10-06-18 @ 04:55)  RR: 18 (10-06-18 @ 04:55)  SpO2: 95% (10-06-18 @ 04:55)  Wt(kg): --    PHYSICAL EXAM:  General: alert, no acute distress  Eyes:  anicteric, no conjunctival injection, no discharge  Oropharynx: no lesions or injection 	  Neck: supple, without adenopathy  Lungs: clear to auscultation  Heart: regular rate and rhythm; no murmur, rubs or gallops  Abdomen: soft, nondistended, nontender, without mass or organomegaly  Skin: no lesions  Extremities: no clubbing, cyanosis, or edema  Neurologic: alert, oriented, moves all extremities    LAB RESULTS:                        13.2   14.68 )-----------( 151      ( 06 Oct 2018 07:50 )             40.4     10-    139  |  104  |  14  ----------------------------<  237<H>  4.2   |  22  |  0.76    Ca    8.9      06 Oct 2018 06:49    TPro  5.5<L>  /  Alb  3.3  /  TBili  0.4  /  DBili  x   /  AST  10  /  ALT  16  /  AlkPhos  42  10-05    LIVER FUNCTIONS - ( 05 Oct 2018 06:22 )  Alb: 3.3 g/dL / Pro: 5.5 g/dL / ALK PHOS: 42 U/L / ALT: 16 U/L / AST: 10 U/L / GGT: x           Urinalysis Basic - ( 04 Oct 2018 15:18 )    Color: Light Yellow / Appearance: Clear / S.015 / pH: x  Gluc: x / Ketone: Negative  / Bili: Negative / Urobili: Negative   Blood: x / Protein: Negative / Nitrite: Negative   Leuk Esterase: Negative / RBC: x / WBC x   Sq Epi: x / Non Sq Epi: x / Bacteria: x      MICROBIOLOGY:  RECENT CULTURES:  10-04 @ 17:12 .Urine Clean Catch (Midstream)     <10,000 CFU/ml Normal Urogenital skye present      10-04 @ 16:23 .Blood Blood-Peripheral     No growth to date.          RADIOLOGY REVIEWED:  < from: Xray Chest 2 Views PA/Lat (10.04.18 @ 14:36) >  IMPRESSION:   Clear lungs.    < end of copied text >

## 2018-10-07 LAB
ANION GAP SERPL CALC-SCNC: 12 MMOL/L — SIGNIFICANT CHANGE UP (ref 5–17)
BASOPHILS # BLD AUTO: 0.01 K/UL — SIGNIFICANT CHANGE UP (ref 0–0.2)
BASOPHILS NFR BLD AUTO: 0.1 % — SIGNIFICANT CHANGE UP (ref 0–2)
BUN SERPL-MCNC: 16 MG/DL — SIGNIFICANT CHANGE UP (ref 7–23)
CALCIUM SERPL-MCNC: 8.2 MG/DL — LOW (ref 8.4–10.5)
CHLORIDE SERPL-SCNC: 104 MMOL/L — SIGNIFICANT CHANGE UP (ref 96–108)
CO2 SERPL-SCNC: 22 MMOL/L — SIGNIFICANT CHANGE UP (ref 22–31)
CREAT SERPL-MCNC: 0.98 MG/DL — SIGNIFICANT CHANGE UP (ref 0.5–1.3)
EOSINOPHIL # BLD AUTO: 0.14 K/UL — SIGNIFICANT CHANGE UP (ref 0–0.5)
EOSINOPHIL NFR BLD AUTO: 1.4 % — SIGNIFICANT CHANGE UP (ref 0–6)
GLUCOSE SERPL-MCNC: 160 MG/DL — HIGH (ref 70–99)
HCT VFR BLD CALC: 36.8 % — SIGNIFICANT CHANGE UP (ref 34.5–45)
HGB BLD-MCNC: 11.7 G/DL — SIGNIFICANT CHANGE UP (ref 11.5–15.5)
IMM GRANULOCYTES NFR BLD AUTO: 0.2 % — SIGNIFICANT CHANGE UP (ref 0–1.5)
INR BLD: 3.12 RATIO — HIGH (ref 0.88–1.16)
LYMPHOCYTES # BLD AUTO: 6.73 K/UL — HIGH (ref 1–3.3)
LYMPHOCYTES # BLD AUTO: 66.4 % — HIGH (ref 13–44)
MANUAL SMEAR VERIFICATION: SIGNIFICANT CHANGE UP
MCHC RBC-ENTMCNC: 29.8 PG — SIGNIFICANT CHANGE UP (ref 27–34)
MCHC RBC-ENTMCNC: 31.8 GM/DL — LOW (ref 32–36)
MCV RBC AUTO: 93.9 FL — SIGNIFICANT CHANGE UP (ref 80–100)
MONOCYTES # BLD AUTO: 0.21 K/UL — SIGNIFICANT CHANGE UP (ref 0–0.9)
MONOCYTES NFR BLD AUTO: 2.1 % — SIGNIFICANT CHANGE UP (ref 2–14)
NEUTROPHILS # BLD AUTO: 3.03 K/UL — SIGNIFICANT CHANGE UP (ref 1.8–7.4)
NEUTROPHILS NFR BLD AUTO: 29.8 % — LOW (ref 43–77)
PLAT MORPH BLD: NORMAL — SIGNIFICANT CHANGE UP
PLATELET # BLD AUTO: 119 K/UL — LOW (ref 150–400)
POTASSIUM SERPL-MCNC: 3.2 MMOL/L — LOW (ref 3.5–5.3)
POTASSIUM SERPL-SCNC: 3.2 MMOL/L — LOW (ref 3.5–5.3)
PROTHROM AB SERPL-ACNC: 36.1 SEC — HIGH (ref 10–13.1)
RBC # BLD: 3.92 M/UL — SIGNIFICANT CHANGE UP (ref 3.8–5.2)
RBC # FLD: 14.5 % — SIGNIFICANT CHANGE UP (ref 10.3–14.5)
RBC BLD AUTO: NORMAL — SIGNIFICANT CHANGE UP
SMUDGE CELLS # BLD: PRESENT — SIGNIFICANT CHANGE UP
SODIUM SERPL-SCNC: 138 MMOL/L — SIGNIFICANT CHANGE UP (ref 135–145)
WBC # BLD: 10.14 K/UL — SIGNIFICANT CHANGE UP (ref 3.8–10.5)
WBC # FLD AUTO: 10.14 K/UL — SIGNIFICANT CHANGE UP (ref 3.8–10.5)

## 2018-10-07 RX ORDER — POTASSIUM CHLORIDE 20 MEQ
40 PACKET (EA) ORAL ONCE
Qty: 0 | Refills: 0 | Status: COMPLETED | OUTPATIENT
Start: 2018-10-07 | End: 2018-10-07

## 2018-10-07 RX ORDER — WARFARIN SODIUM 2.5 MG/1
3 TABLET ORAL ONCE
Qty: 0 | Refills: 0 | Status: COMPLETED | OUTPATIENT
Start: 2018-10-07 | End: 2018-10-07

## 2018-10-07 RX ADMIN — PANTOPRAZOLE SODIUM 40 MILLIGRAM(S): 20 TABLET, DELAYED RELEASE ORAL at 05:09

## 2018-10-07 RX ADMIN — WARFARIN SODIUM 3 MILLIGRAM(S): 2.5 TABLET ORAL at 21:41

## 2018-10-07 RX ADMIN — Medication 120 MILLIGRAM(S): at 05:10

## 2018-10-07 RX ADMIN — Medication 200 MILLIGRAM(S): at 14:21

## 2018-10-07 RX ADMIN — Medication 200 MILLIGRAM(S): at 05:09

## 2018-10-07 RX ADMIN — Medication 250 MILLIGRAM(S): at 04:25

## 2018-10-07 RX ADMIN — PANTOPRAZOLE SODIUM 40 MILLIGRAM(S): 20 TABLET, DELAYED RELEASE ORAL at 17:17

## 2018-10-07 RX ADMIN — Medication 81 MILLIGRAM(S): at 11:42

## 2018-10-07 RX ADMIN — Medication 50 MILLIGRAM(S): at 05:09

## 2018-10-07 RX ADMIN — FAMOTIDINE 20 MILLIGRAM(S): 10 INJECTION INTRAVENOUS at 05:09

## 2018-10-07 RX ADMIN — Medication 3 MILLILITER(S): at 17:17

## 2018-10-07 RX ADMIN — Medication 3 MILLILITER(S): at 11:42

## 2018-10-07 RX ADMIN — Medication 0.5 MILLIGRAM(S): at 05:10

## 2018-10-07 RX ADMIN — FAMOTIDINE 20 MILLIGRAM(S): 10 INJECTION INTRAVENOUS at 17:17

## 2018-10-07 RX ADMIN — CEFEPIME 100 MILLIGRAM(S): 1 INJECTION, POWDER, FOR SOLUTION INTRAMUSCULAR; INTRAVENOUS at 05:11

## 2018-10-07 RX ADMIN — Medication 3 MILLILITER(S): at 05:13

## 2018-10-07 RX ADMIN — Medication 40 MILLIEQUIVALENT(S): at 11:42

## 2018-10-07 RX ADMIN — ATORVASTATIN CALCIUM 10 MILLIGRAM(S): 80 TABLET, FILM COATED ORAL at 21:41

## 2018-10-07 RX ADMIN — Medication 3 MILLILITER(S): at 23:27

## 2018-10-07 RX ADMIN — Medication 0.5 MILLIGRAM(S): at 17:17

## 2018-10-07 RX ADMIN — Medication 200 MILLIGRAM(S): at 21:41

## 2018-10-07 NOTE — PROGRESS NOTE ADULT - SUBJECTIVE AND OBJECTIVE BOX
Patient is a 73y old  Female who presents with a chief complaint of persistent cough and generalized weakness (06 Oct 2018 13:44)      INTERVAL HPI/OVERNIGHT EVENTS:  T(C): 37.3 (10-07-18 @ 04:32), Max: 37.3 (10-07-18 @ 04:32)  HR: 79 (10-07-18 @ 04:32) (72 - 79)  BP: 118/75 (10-07-18 @ 04:32) (96/68 - 122/77)  RR: 18 (10-07-18 @ 04:32) (18 - 18)  SpO2: 93% (10-07-18 @ 04:32) (93% - 96%)  Wt(kg): --  I&O's Summary    06 Oct 2018 07:01  -  07 Oct 2018 07:00  --------------------------------------------------------  IN: 540 mL / OUT: 800 mL / NET: -260 mL        LABS:                        13.2   14.68 )-----------( 151      ( 06 Oct 2018 07:50 )             40.4     10-07    138  |  104  |  16  ----------------------------<  160<H>  3.2<L>   |  22  |  0.98    Ca    8.2<L>      07 Oct 2018 07:03      PT/INR - ( 07 Oct 2018 08:06 )   PT: 36.1 sec;   INR: 3.12 ratio             CAPILLARY BLOOD GLUCOSE              REVIEW OF SYSTEMS:  CONSTITUTIONAL: No fever, weight loss, or fatigue  EYES: No eye pain, visual disturbances, or discharge  ENMT:  No difficulty hearing, tinnitus, vertigo; No sinus or throat pain  NECK: No pain or stiffness  BREASTS: No pain, masses, or nipple discharge  RESPIRATORY: No cough, wheezing, chills or hemoptysis; No shortness of breath  CARDIOVASCULAR: No chest pain, palpitations, dizziness, or leg swelling  GASTROINTESTINAL: No abdominal or epigastric pain. No nausea, vomiting, or hematemesis; No diarrhea or constipation. No melena or hematochezia.  GENITOURINARY: No dysuria, frequency, hematuria, or incontinence  NEUROLOGICAL: No headaches, memory loss, loss of strength, numbness, or tremors  SKIN: No itching, burning, rashes, or lesions   LYMPH NODES: No enlarged glands  ENDOCRINE: No heat or cold intolerance; No hair loss  MUSCULOSKELETAL: No joint pain or swelling; No muscle, back, or extremity pain  PSYCHIATRIC: No depression, anxiety, mood swings, or difficulty sleeping  HEME/LYMPH: No easy bruising, or bleeding gums  ALLERY AND IMMUNOLOGIC: No hives or eczema    RADIOLOGY & ADDITIONAL TESTS:    Imaging Personally Reviewed:  [ ] YES  [ ] NO    Consultant(s) Notes Reviewed:  [ ] YES  [ ] NO    PHYSICAL EXAM:  GENERAL: NAD, well-groomed, well-developed  HEAD:  Atraumatic, Normocephalic  EYES: EOMI, PERRLA, conjunctiva and sclera clear  ENMT: No tonsillar erythema, exudates, or enlargement; Moist mucous membranes, Good dentition, No lesions  NECK: Supple, No JVD, Normal thyroid  NERVOUS SYSTEM:  Alert & Oriented X3, Good concentration; Motor Strength 5/5 B/L upper and lower extremities; DTRs 2+ intact and symmetric  CHEST/LUNG: Clear to auscultation bilaterally; No rales, rhonchi, wheezing, or rubs  HEART: Regular rate and rhythm; No murmurs, rubs, or gallops  ABDOMEN: Soft, Nontender, Nondistended; Bowel sounds present  EXTREMITIES:  2+ Peripheral Pulses, No clubbing, cyanosis, or edema  LYMPH: No lymphadenopathy noted  SKIN: No rashes or lesions    Care Discussed with Consultants/Other Providers [ ] YES  [ ] NO Patient is a 73y old  Female who presents with a chief complaint of persistent cough and generalized weakness (06 Oct 2018 13:44)      INTERVAL HPI/OVERNIGHT EVENTS:    PT still w/ cough  T(C): 37.3 (10-07-18 @ 04:32), Max: 37.3 (10-07-18 @ 04:32)  HR: 79 (10-07-18 @ 04:32) (72 - 79)  BP: 118/75 (10-07-18 @ 04:32) (96/68 - 122/77)  RR: 18 (10-07-18 @ 04:32) (18 - 18)  SpO2: 93% (10-07-18 @ 04:32) (93% - 96%)  Wt(kg): --  I&O's Summary    06 Oct 2018 07:01  -  07 Oct 2018 07:00  --------------------------------------------------------  IN: 540 mL / OUT: 800 mL / NET: -260 mL        LABS:                        13.2   14.68 )-----------( 151      ( 06 Oct 2018 07:50 )             40.4     10-07    138  |  104  |  16  ----------------------------<  160<H>  3.2<L>   |  22  |  0.98    Ca    8.2<L>      07 Oct 2018 07:03      PT/INR - ( 07 Oct 2018 08:06 )   PT: 36.1 sec;   INR: 3.12 ratio             CAPILLARY BLOOD GLUCOSE              REVIEW OF SYSTEMS:  CONSTITUTIONAL: No fever, weight loss, or fatigue  EYES: No eye pain, visual disturbances, or discharge  ENMT:  No difficulty hearing, tinnitus, vertigo; No sinus or throat pain  NECK: No pain or stiffness  BREASTS: No pain, masses, or nipple discharge  RESPIRATORY: + cough, no wheezing, chills or hemoptysis; No shortness of breath  CARDIOVASCULAR: No chest pain, palpitations, dizziness, or leg swelling  GASTROINTESTINAL: No abdominal or epigastric pain. No nausea, vomiting, or hematemesis; No diarrhea or constipation. No melena or hematochezia.  GENITOURINARY: No dysuria, frequency, hematuria, or incontinence  NEUROLOGICAL: No headaches, memory loss, loss of strength, numbness, or tremors  SKIN: No itching, burning, rashes, or lesions   LYMPH NODES: No enlarged glands  ENDOCRINE: No heat or cold intolerance; No hair loss  MUSCULOSKELETAL: No joint pain or swelling; No muscle, back, or extremity pain  PSYCHIATRIC: No depression, anxiety, mood swings, or difficulty sleeping  HEME/LYMPH: No easy bruising, or bleeding gums  ALLERY AND IMMUNOLOGIC: No hives or eczema        Consultant(s) Notes Reviewed:  [x ] YES  [ ] NO    PHYSICAL EXAM:  GENERAL: NAD, well-groomed, well-developed  HEAD:  Atraumatic, Normocephalic  EYES: conjunctiva and sclera clear  NECK: Supple, No JVD  NERVOUS SYSTEM:  Alert & Oriented X3, Good concentration; Motor Strength 5/5 B/L upper and lower extremities  CHEST/LUNG: Clear to auscultation bilaterally; No rales, rhonchi, wheezing, or rubs  HEART: Regular rate and rhythm; No murmurs, rubs, or gallops  ABDOMEN: Soft, Nontender, Nondistended; Bowel sounds present  EXTREMITIES:, No clubbing, cyanosis, or edema  LYMPH: No lymphadenopathy noted  SKIN: No rashes or lesions    Care Discussed with Consultants/Other Providers [x ] YES  [ ] NO

## 2018-10-07 NOTE — PROGRESS NOTE ADULT - SUBJECTIVE AND OBJECTIVE BOX
CC: f/u for leukocytosis    Patient reports: stable cough, ambulatory, no specific complaints    REVIEW OF SYSTEMS:  All other review of systems negative (Comprehensive ROS)    Antimicrobials Day #  :s/p 3 days of vanco and cefepime    Other Medications Reviewed    T(F): 98.5 (10-07-18 @ 12:13), Max: 99.1 (10-07-18 @ 04:32)  HR: 80 (10-07-18 @ 12:13)  BP: 107/72 (10-07-18 @ 12:13)  RR: 18 (10-07-18 @ 12:13)  SpO2: 94% (10-07-18 @ 12:13)  Wt(kg): --    PHYSICAL EXAM:  General: alert, no acute distress  Eyes:  anicteric, no conjunctival injection, no discharge  Oropharynx: no lesions or injection 	  Neck: supple, without adenopathy  Lungs: clear to auscultation  Heart: regular rate and rhythm; no murmur, rubs or gallops  Abdomen: soft, nondistended, nontender, without mass or organomegaly  Skin: no lesions  Extremities: no clubbing, cyanosis, or edema  Neurologic: alert, oriented, moves all extremities    LAB RESULTS:                        11.7   10.14 )-----------( 119      ( 07 Oct 2018 10:12 )             36.8     10-07    138  |  104  |  16  ----------------------------<  160<H>  3.2<L>   |  22  |  0.98    Ca    8.2<L>      07 Oct 2018 07:03          MICROBIOLOGY:  RECENT CULTURES:  10-04 @ 17:12 .Urine Clean Catch (Midstream)     <10,000 CFU/ml Normal Urogenital skye present      10-04 @ 16:23 .Blood Blood-Peripheral     No growth to date.          RADIOLOGY REVIEWED:  < from: CT Angio Chest w/ IV Cont (10.06.18 @ 15:45) >  INTERPRETATION:  CLINICAL INFORMATION: 73-year-old female with persistent   cough. Follow-up evaluation of pulmonary emboli.    COMPARISON: CT scan 09/17/2018    PROCEDURE:   CT Angiography of the Chest.  90 ml of Omnipaque 350 was injected intravenously. 10 ml were discarded.  Sagittal and coronal reformats were performed as well as 3D (MIP)   reconstructions.      FINDINGS:    CHEST:     LUNGS ANDLARGE AIRWAYS: Patent central airways.  No pulmonary nodules.  PLEURA: No pleural effusion.  VESSELS: No pulmonary emboli.  HEART: Heart size is normal. No pericardial effusion.  MEDIASTINUM AND NAKIA: No lymphadenopathy.  CHEST WALL AND LOWER NECK: Within normal limits.  VISUALIZED UPPER ABDOMEN: Within normal limits.  BONES: Within normal limits.    IMPRESSION:     No pulmonary emboli.    < end of copied text >

## 2018-10-07 NOTE — PROGRESS NOTE ADULT - SUBJECTIVE AND OBJECTIVE BOX
Still with cough.  Dyspnea only when coughing.    Tele reveals sinus with PVCs and some APCs..  NO further SVT.        REVIEW OF SYSTEMS:  CARDIOVASCULAR: No chest pain, dyspnea or palpitations  All other review of systems is negative unless indicated above    Medications:  ALBUTerol/ipratropium for Nebulization 3 milliLiter(s) Nebulizer every 6 hours  aspirin enteric coated 81 milliGRAM(s) Oral daily  atorvastatin 10 milliGRAM(s) Oral at bedtime  benzonatate 200 milliGRAM(s) Oral three times a day  buDESOnide   0.5 milliGRAM(s) Respule 0.5 milliGRAM(s) Inhalation every 12 hours  famotidine    Tablet 20 milliGRAM(s) Oral two times a day  guaiFENesin/dextromethorphan  Syrup 10 milliLiter(s) Oral four times a day  HYDROcodone/homatropine Syrup 5 milliLiter(s) Oral at bedtime  pantoprazole    Tablet 40 milliGRAM(s) Oral two times a day  predniSONE   Tablet 30 milliGRAM(s) Oral daily  verapamil 120 milliGRAM(s) Oral daily      Physical Exam:  Vitals:  T(C): 37.3 (10-07-18 @ 04:32), Max: 37.3 (10-07-18 @ 04:32)  HR: 79 (10-07-18 @ 04:32) (72 - 79)  BP: 118/75 (10-07-18 @ 04:32) (96/68 - 122/77)  BP(mean): --  RR: 18 (10-07-18 @ 04:32) (18 - 18)  SpO2: 93% (10-07-18 @ 04:32) (93% - 96%)  Wt(kg): --  Daily     Daily   I&O's Summary    06 Oct 2018 07:01  -  07 Oct 2018 07:00  --------------------------------------------------------  IN: 540 mL / OUT: 800 mL / NET: -260 mL    07 Oct 2018 07:01  -  07 Oct 2018 11:15  --------------------------------------------------------  IN: 220 mL / OUT: 0 mL / NET: 220 mL        Appearance:  Normal, NAD  Eyes:  PERRL, EOMI  HEENT: Normal oral mus[place]a, NC/AT  Neck:  No JVD or HJR  Respiratory: Clear to auscultation bilaterally  Cardiovascular: Normal S1 and S2 with soft systolic murmur LSB.  No rubs or gallops  Abdomen:   BS normal, Soft,  Non-tender without organomegaly or masses  Extremities: Without edema            10-07    Complete Blood Count + Automated Diff in AM (10.07.18 @ 10:12)    WBC Count: 10.14 K/uL    RBC Count: 3.92 M/uL    Hemoglobin: 11.7 g/dL    Hematocrit: 36.8 %    Mean Cell Volume: 93.9 fl    Mean Cell Hemoglobin: 29.8 pg    Mean Cell Hemoglobin Conc: 31.8 gm/dL    Red Cell Distrib Width: 14.5 %    Platelet Count - Automated: 119 K/uL        138  |  104  |  16  ----------------------------<  160<H>  3.2<L>   |  22  |  0.98    Ca    8.2<L>      07 Oct 2018 07:03      PT/INR - ( 07 Oct 2018 08:06 )   PT: 36.1 sec;   INR: 3.12 ratio               Serum Pro-Brain Natriuretic Peptide: 636 pg/mL (10-04 @ 14:41)

## 2018-10-08 ENCOUNTER — TRANSCRIPTION ENCOUNTER (OUTPATIENT)
Age: 73
End: 2018-10-08

## 2018-10-08 VITALS
SYSTOLIC BLOOD PRESSURE: 123 MMHG | TEMPERATURE: 98 F | OXYGEN SATURATION: 97 % | HEART RATE: 93 BPM | DIASTOLIC BLOOD PRESSURE: 85 MMHG | RESPIRATION RATE: 18 BRPM

## 2018-10-08 LAB
ANION GAP SERPL CALC-SCNC: 10 MMOL/L — SIGNIFICANT CHANGE UP (ref 5–17)
BASOPHILS # BLD AUTO: 0.02 K/UL — SIGNIFICANT CHANGE UP (ref 0–0.2)
BASOPHILS NFR BLD AUTO: 0.1 % — SIGNIFICANT CHANGE UP (ref 0–2)
BUN SERPL-MCNC: 13 MG/DL — SIGNIFICANT CHANGE UP (ref 7–23)
CALCIUM SERPL-MCNC: 8.7 MG/DL — SIGNIFICANT CHANGE UP (ref 8.4–10.5)
CHLORIDE SERPL-SCNC: 108 MMOL/L — SIGNIFICANT CHANGE UP (ref 96–108)
CO2 SERPL-SCNC: 25 MMOL/L — SIGNIFICANT CHANGE UP (ref 22–31)
CREAT SERPL-MCNC: 0.9 MG/DL — SIGNIFICANT CHANGE UP (ref 0.5–1.3)
EOSINOPHIL # BLD AUTO: 0.13 K/UL — SIGNIFICANT CHANGE UP (ref 0–0.5)
EOSINOPHIL NFR BLD AUTO: 0.9 % — SIGNIFICANT CHANGE UP (ref 0–6)
GLUCOSE SERPL-MCNC: 93 MG/DL — SIGNIFICANT CHANGE UP (ref 70–99)
HCT VFR BLD CALC: 38.5 % — SIGNIFICANT CHANGE UP (ref 34.5–45)
HGB BLD-MCNC: 13.2 G/DL — SIGNIFICANT CHANGE UP (ref 11.5–15.5)
IMM GRANULOCYTES NFR BLD AUTO: 0.3 % — SIGNIFICANT CHANGE UP (ref 0–1.5)
INR BLD: 2.75 RATIO — HIGH (ref 0.88–1.16)
LYMPHOCYTES # BLD AUTO: 66.1 % — HIGH (ref 13–44)
LYMPHOCYTES # BLD AUTO: 9.88 K/UL — HIGH (ref 1–3.3)
MANUAL SMEAR VERIFICATION: SIGNIFICANT CHANGE UP
MCHC RBC-ENTMCNC: 31.4 PG — SIGNIFICANT CHANGE UP (ref 27–34)
MCHC RBC-ENTMCNC: 34.3 GM/DL — SIGNIFICANT CHANGE UP (ref 32–36)
MCV RBC AUTO: 91.7 FL — SIGNIFICANT CHANGE UP (ref 80–100)
MONOCYTES # BLD AUTO: 0.56 K/UL — SIGNIFICANT CHANGE UP (ref 0–0.9)
MONOCYTES NFR BLD AUTO: 3.7 % — SIGNIFICANT CHANGE UP (ref 2–14)
NEUTROPHILS # BLD AUTO: 4.31 K/UL — SIGNIFICANT CHANGE UP (ref 1.8–7.4)
NEUTROPHILS NFR BLD AUTO: 28.9 % — LOW (ref 43–77)
NRBC # BLD: 0 /100 WBCS — SIGNIFICANT CHANGE UP (ref 0–0)
PLAT MORPH BLD: NORMAL — SIGNIFICANT CHANGE UP
PLATELET # BLD AUTO: 124 K/UL — LOW (ref 150–400)
POTASSIUM SERPL-MCNC: 4 MMOL/L — SIGNIFICANT CHANGE UP (ref 3.5–5.3)
POTASSIUM SERPL-SCNC: 4 MMOL/L — SIGNIFICANT CHANGE UP (ref 3.5–5.3)
PROTHROM AB SERPL-ACNC: 31.7 SEC — HIGH (ref 10–13.1)
RBC # BLD: 4.2 M/UL — SIGNIFICANT CHANGE UP (ref 3.8–5.2)
RBC # FLD: 14.5 % — SIGNIFICANT CHANGE UP (ref 10.3–14.5)
RBC BLD AUTO: NORMAL — SIGNIFICANT CHANGE UP
SMUDGE CELLS # BLD: PRESENT — SIGNIFICANT CHANGE UP
SODIUM SERPL-SCNC: 143 MMOL/L — SIGNIFICANT CHANGE UP (ref 135–145)
WBC # BLD: 14.95 K/UL — HIGH (ref 3.8–10.5)
WBC # FLD AUTO: 14.95 K/UL — HIGH (ref 3.8–10.5)

## 2018-10-08 RX ORDER — WARFARIN SODIUM 2.5 MG/1
1 TABLET ORAL
Qty: 17 | Refills: 0 | OUTPATIENT
Start: 2018-10-08 | End: 2018-11-06

## 2018-10-08 RX ORDER — WARFARIN SODIUM 2.5 MG/1
1 TABLET ORAL
Qty: 13 | Refills: 0 | OUTPATIENT
Start: 2018-10-08 | End: 2018-11-06

## 2018-10-08 RX ORDER — WARFARIN SODIUM 2.5 MG/1
1 TABLET ORAL
Qty: 0 | Refills: 0 | COMMUNITY

## 2018-10-08 RX ORDER — WARFARIN SODIUM 2.5 MG/1
4 TABLET ORAL
Qty: 0 | Refills: 0 | COMMUNITY

## 2018-10-08 RX ADMIN — FAMOTIDINE 20 MILLIGRAM(S): 10 INJECTION INTRAVENOUS at 05:19

## 2018-10-08 RX ADMIN — Medication 200 MILLIGRAM(S): at 05:19

## 2018-10-08 RX ADMIN — Medication 120 MILLIGRAM(S): at 05:19

## 2018-10-08 RX ADMIN — Medication 200 MILLIGRAM(S): at 13:39

## 2018-10-08 RX ADMIN — Medication 3 MILLILITER(S): at 05:19

## 2018-10-08 RX ADMIN — PANTOPRAZOLE SODIUM 40 MILLIGRAM(S): 20 TABLET, DELAYED RELEASE ORAL at 05:19

## 2018-10-08 RX ADMIN — Medication 81 MILLIGRAM(S): at 13:39

## 2018-10-08 RX ADMIN — Medication 30 MILLIGRAM(S): at 05:19

## 2018-10-08 RX ADMIN — Medication 0.5 MILLIGRAM(S): at 05:19

## 2018-10-08 NOTE — PROGRESS NOTE ADULT - PROBLEM SELECTOR PLAN 5
- s/p lumpectomy several years ago, currently in remission

## 2018-10-08 NOTE — PROGRESS NOTE ADULT - PROVIDER SPECIALTY LIST ADULT
Cardiology
Cardiology
Infectious Disease
Internal Medicine
Pulmonology
Internal Medicine

## 2018-10-08 NOTE — PROGRESS NOTE ADULT - REASON FOR ADMISSION
persistent cough and generalized weakness

## 2018-10-08 NOTE — PROGRESS NOTE ADULT - ASSESSMENT
Patient is a 73 year old female hypertensin, history of breast cancer s/p lumpectomy in remission, recent discharged from [facility] on 9/30 with complicated hospitalization- patient with pauses and symptomatic bradycardia required pacemaker placement, rhinoviral infection and recurrent bilateral pulmonary embolism (patient with prothrombin gene mutation) presents to the ED with complaints of continued weakness, dry cough, dyspnea and a persistently increased wbc.  Concern  with  recent pacemaker placement and increased wbc that pt may be  bacteremic, other possibilitis include steroid withdrawl or progression of PEs. Pt's wbc improved after abx, still unclear what caused her sx.  CTA NEG FOR PE, and rest of lungs clear. Pt was observed off abx for 24hrs and wbc increased to 14. Pt is nontoxic appearing and all cultures are neg, ? if increase due to steroids,  Case d/w ID and w/ cardiology will d/c to home today and continue steroid taper and monitor wbc and INR as outpt.
74 yo female with hypercoagulable state who is being admitted with cough, shortness of breath, and fatigue.  History of recurrent PE, s/p recent micra PPM for tachy-artur syndrome.  Her leukocytosis is secondary to a lymphocytosis which is atypical for bacterial infections.  Her CXR is without obvious infiltrates.  I do not have a high level of suspicion for infection.  RV/EV is not likely to cause symptoms at this point.The repeat RVP is negative.  CRP is less th .5  Her wbc has moderated, ? reflective of steroid taper, ? antibiotic effect  CTA without pneumonia or PE.No documented infection.  Suggest:  1.case reviewed with Dr Ricks, advise observation off antibiotics  2.If patient remains afebrile and stable there will be no ID objection to discharge in AM.
74 yo female with hypercoagulable state who is being admitted with cough, shortness of breath, and fatigue.  History of recurrent PE, s/p recent micra PPM for tachy-artur syndrome.  Her leukocytosis is secondary to a lymphocytosis which is atypical for bacterial infections.  Her CXR is without obvious infiltrates.  I do not have a high level of suspicion for infection.  RV/EV is not likely to cause symptoms at this point.The repeat RVP is negative.  No symptoms of reflux, I do not hear wheezes, ? if she could still have bronchospasm.  Her wbc has moderated, ? reflective of steroid taper  CTA pending to assess further  Suggest:  1.blood cultures x 2 sets NGSF  2.Favor stopping antibiotics  3.Await CTA
74 yo female with hypercoagulable state who is being admitted with cough, shortness of breath, and fatigue.  History of recurrent PE, s/p recent micra PPM for tachy-artur syndrome.  Her leukocytosis is secondary to a lymphocytosis which is atypical for bacterial infections.  Her CXR is without obvious infiltrates.  I do not have a high level of suspicion for infection.  RV/EV is not likely to cause symptoms at this point.The repeat RVP is negative.  No symptoms of reflux, I do not hear wheezes, ? if she could still have bronchospasm.  Her wbc has moderated, ? reflective of steroid taper  Suggest:  1.blood cultures x 2 sets are pending  2.Favor stopping antibiotics  3.Will defer to other services on any additional w/u at this point, discharge planning per primary team.
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    GERD     HTN    Breast cancer s/p lumpectomy    returns with profound fatigue/weakness, ongoing cough and shortness of breath with minimal exertion - (?) deconditioning following a prolonged hospitalization and bronchial hyperreactivity following her recent rhinoviral infection - see no evidence of a bacterial infection - no evidence of pneumonia or PE on repeat CTA of the chest    leukocytosis - perhaps related to steroids - lymphocyte predominant leukocytosis speaks against a bacterial infection - r/o lymphoproliferative disease    PLAN/RECOMMENDATIONS:    stable oxygenation on room air  inflammatory markers non-elevated  discontinue nebs  prednisone taper as written  arnuity ellpita  robitussin DM/tessalon perles/hycodan at bedtime  off antibiotics  coumadin for INR 2 - 3  optimize reflux treatment -> protonix/pepcid  cardiac meds: verapamil/lipitor    Will follow with you. Plan of care discussed with the patient at bedside and the dedicated floor NP.    Rd Lira MD, [facility] - [phone]  Pulmonary Medicine
Impression  No significant arrhythmias.                        Hypokalemia, ? due to mineralocorticoid effect of prednisone.    Plan:  K+ supplement           No coumadin tonight.    P
Impression:  Unclear etiology of atypical chest sensation.                     Blood cultures negative.                     Asymptomatic short runs SVT.    Plan:  Continue current meds.
Patient is a 73 year old female hypertensin, history of breast cancer s/p lumpectomy in remission, recent discharged from [facility] on 9/30 with complicated hospitalization- patient with pauses and symptomatic bradycardia required pacemaker placement, rhinoviral infection and recurrent bilateral pulmonary embolism (patient with prothrombin gene mutation) presents to the ED with complaints of continued weakness, dry cough, dyspnea and a persistently increased wbc.  Concern  with  recent pacemaker placement and increased wbc that pt may be  bacteremic, other possibilitis include steroid withdrawl or progression of PEs.
Patient is a 73 year old female hypertensin, history of breast cancer s/p lumpectomy in remission, recent discharged from [facility] on 9/30 with complicated hospitalization- patient with pauses and symptomatic bradycardia required pacemaker placement, rhinoviral infection and recurrent bilateral pulmonary embolism (patient with prothrombin gene mutation) presents to the ED with complaints of continued weakness, dry cough, dyspnea and a persistently increased wbc.  Concern  with  recent pacemaker placement and increased wbc that pt may be  bacteremic, other possibilitis include steroid withdrawl or progression of PEs. Pt's wbc improved after abx, still unclear what caused her sx. would continue abx for additional 24hrs, await pulm CTA to assess for any progression of PE.
Patient is a 73 year old female hypertensin, history of breast cancer s/p lumpectomy in remission, recent discharged from [facility] on 9/30 with complicated hospitalization- patient with pauses and symptomatic bradycardia required pacemaker placement, rhinoviral infection and recurrent bilateral pulmonary embolism (patient with prothrombin gene mutation) presents to the ED with complaints of continued weakness, dry cough, dyspnea and a persistently increased wbc.  Concern  with  recent pacemaker placement and increased wbc that pt may be  bacteremic, other possibilitis include steroid withdrawl or progression of PEs. Pt's wbc improved after abx, still unclear what caused her sx.  CTA NEG FOR PE, and rest of lungs clear. D/W ID and since cultures are neg  will d/c abx and observe off abx for additional 24hrs.  Will also decrease prednisone to 20mg.

## 2018-10-08 NOTE — PROGRESS NOTE ADULT - PROBLEM SELECTOR PLAN 7
- s/p pacemaker placement on 9/28  - c/w verapamil

## 2018-10-08 NOTE — DISCHARGE NOTE ADULT - MEDICATION SUMMARY - MEDICATIONS TO TAKE
I will START or STAY ON the medications listed below when I get home from the hospital:    predniSONE  -- 2 tab(s) by mouth once a day for 2 days  then   1 tab by mouth once a day for 3 days  then  stop   -- Indication: For steriod     Aspirin Enteric Coated 81 mg oral delayed release tablet  -- 1 tab(s) by mouth once a day   -- Swallow whole.  Do not crush.  Take with food or milk.    -- Indication: For Prevention of CAD     verapamil 120 mg oral tablet  -- 1 tab(s) by mouth once a day  -- Indication: For Tachy-artur syndrome    Coumadin 3 mg oral tablet  -- 1 tab(s) by mouth 3 times a week on Monday/Wednesday/Friday  -- Indication: For blood thinner please take at bedtime     Coumadin 4 mg oral tablet  -- 1 tab(s) by mouth 4 times a week on Tuesday/Thursday/Saturday/Sunday  -- Indication: For blood thinner please take at bedtime     Coumadin 3 mg oral tablet  -- 1 tab(s) by mouth 3 times a week on Monday/Wednesday/Friday  -- Indication: For blood thinner please take at bedtime     Lipitor 10 mg oral tablet  -- 1 tab(s) by mouth once a day (at bedtime)  -- Indication: For elevated cholesterol    benzonatate 100 mg oral capsule  -- 2 cap(s) by mouth 3 times a day  -- Indication: For Cough    omeprazole 40 mg oral delayed release capsule  -- 1 cap(s) by mouth once a day  -- Indication: For GI protective     Arnuity Ellipta 200 mcg inhalation powder  -- 1 puff(s) inhaled every 24 hours  -- Indication: For Cough    guaifenesin-dextromethorphan 100 mg-10 mg/5 mL oral liquid  -- 10 milliliter(s) by mouth 4 times a day  -- Indication: For Cough I will START or STAY ON the medications listed below when I get home from the hospital:    predniSONE 10 mg oral tablet  -- Prednisone tape: take 2 tabs by mouth for 2 days then take 1 tab daily for 3 days  -- It is very important that you take or use this exactly as directed.  Do not skip doses or discontinue unless directed by your doctor.  Obtain medical advice before taking any non-prescription drugs as some may affect the action of this medication.  Take with food or milk.    -- Indication: For Steriod taper     Aspirin Enteric Coated 81 mg oral delayed release tablet  -- 1 tab(s) by mouth once a day   -- Swallow whole.  Do not crush.  Take with food or milk.    -- Indication: For Prevention of CAD     verapamil 120 mg oral tablet  -- 1 tab(s) by mouth once a day  -- Indication: For Tachy-artur syndrome    Coumadin 3 mg oral tablet  -- 1 tab(s) by mouth 3 times a week on Monday/Wednesday/Friday  -- Indication: For blood thinner please take at bedtime     Coumadin 4 mg oral tablet  -- 1 tab(s) by mouth 4 times a week on Tuesday/Thursday/Saturday/Sunday  -- Indication: For blood thinner please take at bedtime     Lipitor 10 mg oral tablet  -- 1 tab(s) by mouth once a day (at bedtime)  -- Indication: For elevated cholesterol    benzonatate 100 mg oral capsule  -- 2 cap(s) by mouth 3 times a day  -- Indication: For Cough    omeprazole 40 mg oral delayed release capsule  -- 1 cap(s) by mouth once a day  -- Indication: For GI protective     Arnuity Ellipta 200 mcg inhalation powder  -- 1 puff(s) inhaled every 24 hours  -- Indication: For Cough    guaifenesin-dextromethorphan 100 mg-10 mg/5 mL oral liquid  -- 10 milliliter(s) by mouth 4 times a day  -- Indication: For Cough

## 2018-10-08 NOTE — PROGRESS NOTE ADULT - SUBJECTIVE AND OBJECTIVE BOX
Patient is a 73y old  Female who presents with a chief complaint of persistent cough and generalized weakness (08 Oct 2018 09:39)      INTERVAL HPI/OVERNIGHT EVENTS:    Pt stable w/o complaints except for occassional cough    T(C): 36.5 (10-08-18 @ 13:34), Max: 36.8 (10-08-18 @ 04:41)  HR: 93 (10-08-18 @ 13:34) (65 - 93)  BP: 123/85 (10-08-18 @ 13:34) (123/85 - 128/86)  RR: 18 (10-08-18 @ 13:34) (18 - 18)  SpO2: 97% (10-08-18 @ 13:34) (97% - 97%)  Wt(kg): --  I&O's Summary    07 Oct 2018 07:01  -  08 Oct 2018 07:00  --------------------------------------------------------  IN: 760 mL / OUT: 0 mL / NET: 760 mL    08 Oct 2018 07:01  -  08 Oct 2018 23:53  --------------------------------------------------------  IN: 400 mL / OUT: 0 mL / NET: 400 mL        LABS:                        13.2   14.95 )-----------( 124      ( 08 Oct 2018 06:45 )             38.5     10-08    143  |  108  |  13  ----------------------------<  93  4.0   |  25  |  0.90    Ca    8.7      08 Oct 2018 05:36      PT/INR - ( 08 Oct 2018 06:43 )   PT: 31.7 sec;   INR: 2.75 ratio         REVIEW OF SYSTEMS:  CONSTITUTIONAL: No fever, weight loss, or fatigue  EYES: No eye pain, visual disturbances, or discharge  ENMT:  No difficulty hearing, tinnitus, vertigo; No sinus or throat pain  NECK: No pain or stiffness  BREASTS: No pain, masses, or nipple discharge  RESPIRATORY: occ cough,no wheezing, chills or hemoptysis; No shortness of breath  CARDIOVASCULAR: No chest pain, palpitations, dizziness, or leg swelling  GASTROINTESTINAL: No abdominal or epigastric pain. No nausea, vomiting, or hematemesis; No diarrhea or constipation. No melena or hematochezia.  GENITOURINARY: No dysuria, frequency, hematuria, or incontinence  NEUROLOGICAL: No headaches, memory loss, loss of strength, numbness, or tremors  SKIN: No itching, burning, rashes, or lesions   LYMPH NODES: No enlarged glands  ENDOCRINE: No heat or cold intolerance; No hair loss  MUSCULOSKELETAL: No joint pain or swelling; No muscle, back, or extremity pain  PSYCHIATRIC: No depression, anxiety, mood swings, or difficulty sleeping  HEME/LYMPH: No easy bruising, or bleeding gums  ALLERY AND IMMUNOLOGIC: No hives or eczema      Consultant(s) Notes Reviewed:  [x ] YES  [ ] NO    PHYSICAL EXAM:  GENERAL: NAD  HEAD:  Atraumatic, Normocephalic  EYES: , conjunctiva and sclera clear  ENMT: No tonsillar erythema, exudates, or enlargement; Moist mucous membranes, Good dentition, No lesions  NECK: Supple, No JVD,   NERVOUS SYSTEM:  Alert & Oriented X3, Good concentration; Motor Strength 5/5 B/L upper and lower extremities  CHEST/LUNG: Clear to auscultation bilaterally; No rales, rhonchi, wheezing, or rubs  HEART: Regular rate and rhythm; No murmurs, rubs, or gallops  ABDOMEN: Soft, Nontender, Nondistended; Bowel sounds present, neg HSM  EXTREMITIES:  No clubbing, cyanosis, or edema  LYMPH: No lymphadenopathy noted  SKIN: No rashes or lesions    Care Discussed with Consultants/Other Providers [x ] YES  [ ] NO

## 2018-10-08 NOTE — PROGRESS NOTE ADULT - PROBLEM SELECTOR PLAN 4
coumadin 4 mg daily T.R.S.S; 3MG, M,W,F
- c/w coumadin 4 mg daily  - INR 2.79 today, repeat INR in the am  repeat CTA to r/o progression of clot
- c/w coumadin 4 mg daily
- c/w coumadin 4 mg daily  - INR 2.79 today, repeat INR in the am  repeat CTA to r/o progression of clot

## 2018-10-08 NOTE — PROGRESS NOTE ADULT - PROBLEM SELECTOR PLAN 3
pulm evaluation appreciated  bronchodilators  steroid taper
pulm evaluation  bronchodilators  steroids increased as per pulm
pulm evaluation  bronchodilators  decrease steroids
pulm evaluation  bronchodilators  steroids increased as per pulm

## 2018-10-08 NOTE — DISCHARGE NOTE ADULT - PATIENT PORTAL LINK FT
You can access the [facility] Patient Portal, offered by [facility], by registering with the following website: [email or web address]

## 2018-10-08 NOTE — DISCHARGE NOTE ADULT - HOSPITAL COURSE
to be done Patient is a 73 year old female hypertensin, history of breast cancer s/p lumpectomy in remission, recent discharged from [facility] on 9/30 with complicated hospitalization- patient with pauses and symptomatic bradycardia required pacemaker placement, rhinoviral infection and recurrent bilateral pulmonary embolism (patient with prothrombin gene mutation) presents to the ED with complaints of continued weakness, dry cough, dyspnea and a persistently increased wbc.  Concern  with  recent pacemaker placement and increased wbc that pt may be  bacteremic, other possibilitis include steroid withdrawl or progression of PEs. Pt's wbc improved after abx, still unclear what caused her sx.  CTA NEG FOR PE, and rest of lungs clear. Antibiotic stopped. Prednisone taper starting at 20 mg po x 2 days and 10 mg po x 3 days.  Coumadin changed to 3 mg po at HS on M/W/F and 4 mg po HE on T/TH/S/S   Patient will follow up on this Wednesday with Dr. Ricks Patient is a 73 year old female hypertensin, history of breast cancer s/p lumpectomy in remission, recent discharged from [facility] on 9/30 with complicated hospitalization- patient with pauses and symptomatic bradycardia required pacemaker placement, rhinoviral infection and recurrent bilateral pulmonary embolism (patient with prothrombin gene mutation) presents to the ED with complaints of continued weakness, dry cough, dyspnea and a persistently increased wbc.  Concern  with  recent pacemaker placement and increased wbc that pt may be  bacteremic, other possibilitis include steroid withdrawl or progression of PEs. Pt had blood cultures obtained and was started on Zosyn. ID consulted as well.  Repeat CTA was  NEG FOR PE, and rest of lungs were  clear. wbc  improved after abx,  but etiolhoy was still unclear as to what caused her sx.  AS per ID recommendations  Antibiotic were  stopped and Prednisone  taper  was initiated, starting at 20 mg po x 2 days and 10 mg po x 3 days.  Coumadin changed to 3 mg po at HS on M/W/F and 4 mg po HE on T/TH/S/S. Pt was deemed stable for d/c and was advised to  follow up on this Wednesday in office.

## 2018-10-08 NOTE — DISCHARGE NOTE ADULT - CARE PROVIDER_API CALL
Arthur Ricks), Internal Medicine; Medical Oncology  [address]  [address]  [place] [address]  Phone: [phone]  Fax: [phone]

## 2018-10-08 NOTE — PROGRESS NOTE ADULT - PROBLEM SELECTOR PLAN 1
- possibly related to steroids as all cultures neg  d/c to home   antibiotics d/zain  ck wbc as outpt
- possibly related to underlying infection however patient on steroids,  follow cbc with differential   bld cultures  antibiotics  id evaluation
- possibly related to underlying infection however patient on steroids,  follow cbc with differential   bld cultures neg thus far  continue antibiotics  id evaluation noted
- possibly related to underlying infection however patient on steroids,  follow cbc with differential   bld cultures neg thus far  d/c antibiotics  id evaluation noted

## 2018-10-08 NOTE — PROGRESS NOTE ADULT - PROBLEM SELECTOR PROBLEM 1
Leukocytosis, unspecified type

## 2018-10-08 NOTE — DISCHARGE NOTE ADULT - CARE PLAN
Principal Discharge DX:	Chest pain, unspecified type  Goal:	resolved  Assessment and plan of treatment:	HOME CARE INSTRUCTIONS  For the next few days, avoid physical activities that bring on chest pain. Continue physical activities as directed.  Do not smoke.  Avoid drinking alcohol.   Only take over-the-counter or prescription medicine for pain, discomfort, or fever as directed by your caregiver.  Follow your caregiver's suggestions for further testing if your chest pain does not go away.  Keep any follow-up appointments you made. If you do not go to an appointment, you could develop lasting (chronic) problems with pain. If there is any problem keeping an appointment, you must call to reschedule.   SEEK MEDICAL CARE IF:  You think you are having problems from the medicine you are taking. Read your medicine instructions carefully.  Your chest pain does not go away, even after treatment.  You develop a rash with blisters on your chest.  SEEK IMMEDIATE MEDICAL CARE IF:  You have increased chest pain or pain that spreads to your arm, neck, jaw, back, or abdomen.   You develop shortness of breath, an increasing cough, or you are coughing up blood.  You have severe back or abdominal pain, feel nauseous, or vomit.  You develop severe weakness, fainting, or chills.  You have a fever.  THIS IS AN EMERGENCY. Do not wait to see if the pain will go away. Get medical help at once. Call your local emergency services [phone]. Do not drive yourself to the hospital.  Secondary Diagnosis:	Cough  Assessment and plan of treatment:	continue Robitussin  continue Tessalon Pearle 2 caps 3 times a day   Continue Ellipta inhalation  Secondary Diagnosis:	Other acute pulmonary embolism without acute cor pulmonale  Assessment and plan of treatment:	continue Coumadin as follows:  Coumadin 3 mg po on Monday/Wednesdat/Friday                                 Coumadin 4 mg po on Tuesday/Thursday/Saturday/Sunday  Secondary Diagnosis:	Tachy-artur syndrome  Assessment and plan of treatment:	s/p PPM   continue Verapamil

## 2018-10-08 NOTE — DISCHARGE NOTE ADULT - PLAN OF CARE
resolved HOME CARE INSTRUCTIONS  For the next few days, avoid physical activities that bring on chest pain. Continue physical activities as directed.  Do not smoke.  Avoid drinking alcohol.   Only take over-the-counter or prescription medicine for pain, discomfort, or fever as directed by your caregiver.  Follow your caregiver's suggestions for further testing if your chest pain does not go away.  Keep any follow-up appointments you made. If you do not go to an appointment, you could develop lasting (chronic) problems with pain. If there is any problem keeping an appointment, you must call to reschedule.   SEEK MEDICAL CARE IF:  You think you are having problems from the medicine you are taking. Read your medicine instructions carefully.  Your chest pain does not go away, even after treatment.  You develop a rash with blisters on your chest.  SEEK IMMEDIATE MEDICAL CARE IF:  You have increased chest pain or pain that spreads to your arm, neck, jaw, back, or abdomen.   You develop shortness of breath, an increasing cough, or you are coughing up blood.  You have severe back or abdominal pain, feel nauseous, or vomit.  You develop severe weakness, fainting, or chills.  You have a fever.  THIS IS AN EMERGENCY. Do not wait to see if the pain will go away. Get medical help at once. Call your local emergency services [phone]. Do not drive yourself to the hospital. continue Robitussin  continue Tessalon Pearle 2 caps 3 times a day   Continue Ellipta inhalation continue Coumadin as follows:  Coumadin 3 mg po on Monday/Wednesdat/Friday                                 Coumadin 4 mg po on Tuesday/Thursday/Saturday/Sunday s/p PPM   continue Verapamil

## 2018-10-08 NOTE — PROGRESS NOTE ADULT - SUBJECTIVE AND OBJECTIVE BOX
[facility] - [facility]     PROGRESS NOTE    CHIEF COMPLAINT: cough; fatigue; leukocytosis; prothrombin gene mutation; h/o DVT/PE    INTERVAL HISTORY: resolved weakness - walking around the doll without shortness of breath; persistent hacking cough without chest congestion or wheeze; no fevers, chills or sweats; no chest pain/pressure or palpitations; no recent SVT    REVIEW OF SYSTEMS:  Constitutional: As per interval history  HEENT: Within normal limits  CV: As per interval history  Resp: As per interval history  GI: Within normal limits   : Within normal limits  Musculoskeletal: Within normal limits  Skin: Within normal limits  Neurological: Within normal limits  Psychiatric: Within normal limits  Endocrine: Within normal limits  Hematologic/Lymphatic: Within normal limits  Allergic/Immunologic: Within normal limits    MEDICATIONS:     Pulmonary "  ALBUTerol/ipratropium for Nebulization 3 milliLiter(s) Nebulizer every 6 hours  benzonatate 200 milliGRAM(s) Oral three times a day  buDESOnide   0.5 milliGRAM(s) Respule 0.5 milliGRAM(s) Inhalation every 12 hours  guaiFENesin/dextromethorphan  Syrup 10 milliLiter(s) Oral four times a day  HYDROcodone/homatropine Syrup 5 milliLiter(s) Oral at bedtime      Anti-microbials:      Cardiovascular:  verapamil 120 milliGRAM(s) Oral daily      Other:  aspirin enteric coated 81 milliGRAM(s) Oral daily  atorvastatin 10 milliGRAM(s) Oral at bedtime  famotidine    Tablet 20 milliGRAM(s) Oral two times a day  pantoprazole    Tablet 40 milliGRAM(s) Oral two times a day  predniSONE   Tablet 30 milliGRAM(s) Oral daily        OBJECTIVE:        I&O's Detail    07 Oct 2018 07:  -  08 Oct 2018 07:00  --------------------------------------------------------  IN:    Oral Fluid: 760 mL  Total IN: 760 mL    OUT:  Total OUT: 0 mL    Total NET: 760 mL      08 Oct 2018 07:  -  08 Oct 2018 11:00  --------------------------------------------------------  IN:    Oral Fluid: 200 mL  Total IN: 200 mL    OUT:  Total OUT: 0 mL    Total NET: 200 mL    PHYSICAL EXAM:       ICU Vital Signs Last 24 Hrs  T(C): 36.8 (08 Oct 2018 04:41), Max: 37 (07 Oct 2018 20:21)  T(F): 98.2 (08 Oct 2018 04:41), Max: 98.6 (07 Oct 2018 20:21)  HR: 65 (08 Oct 2018 04:41) (65 - 80)  BP: 128/86 (08 Oct 2018 04:41) (107/72 - 128/86)  BP(mean): --  ABP: --  ABP(mean): --  RR: 18 (08 Oct 2018 04:41) (18 - 18)  SpO2: 97% (08 Oct 2018 04:41) (94% - 97%) on room air     General: Awake. Alert. Cooperative. No distress. Appears stated age 	  HEENT:   Atraumatic. Normocephalic. Anicteric. Normal oral mucosa. PERRL. EOMI.  Neck: Supple. Trachea midline. Thyroid without enlargement/tenderness/nodules. No carotid bruit. No JVD.	  Cardiovascular: Regular rate and rhythm. S1 S2 normal. No murmurs, rubs or gallops.  Respiratory: Respirations unlabored. Clear to auscultation and percussion bilaterally. Cough with deep inspiration. No curvature.  Abdomen: Soft. Non-tender. Non-distended. No organomegaly. No masses. Normal bowel sounds.  Extremities: Warm to touch. No clubbing or cyanosis. No pedal edema.  Pulses: 2+ peripheral pulses all extremities.	  Skin: Normal skin color. No rashes or lesions. No ecchymoses. No cyanosis. Warm to touch.  Lymph Nodes: Cervical, supraclavicular and axillary nodes normal  Neurological: Motor and sensory examination equal and normal. A and O x 3  Psychiatry: Appropriate mood and affect.    LABS:                        13.2   14.95 )-----------( 124      ( 08 Oct 2018 06:45 )             38.5                         11.7   10.14 )-----------( 119      ( 07 Oct 2018 10:12 )             36.8     10-08    143  |  108  |  13  ----------------------------<  93  4.0   |  25  |  0.90    10-07    138  |  104  |  16  ----------------------------<  160<H>  3.2<L>   |  22  |  0.98    Ca      8.7      10-08    Ca      8.2<L>      10-07      TPro  5.5<L>  /  Alb  3.3  /  TBili  0.4  /  DBili  x   /  AST  10  /  ALT  16  /  AlkPhos  42  10    TPro  6.8  /  Alb  3.9  /  TBili  0.5  /  DBili  x   /  AST  15  /  ALT  19  /  AlkPhos  51  10    PT/INR - ( 08 Oct 2018 06:43 )   PT: 31.7 sec;   INR: 2.75 ratio      Serum Pro-Brain Natriuretic Peptide: 636 pg/mL (10-04 @ 14:41)    Sedimentation Rate, Erythrocyte (10.06.18 @ 07:50)    Sedimentation Rate, Erythrocyte: 9 mm/hr    C-Reactive Protein, Serum (10.06.18 @ 08:45)    C-Reactive Protein, Serum: 0.15 mg/dL    < from: Transthoracic Echocardiogram (18 @ 10:03) >    Patient name: JOHNSON BIANCHI  YOB: 1945   Age: 73 (F)   MR#: [id number]  Study Date: 2018  Location: [address] [place], [place] [id number]: Fernando Espino RDCS  Study quality: Technically fair  Referring Physician: Arthur Ricks MD  Blood Pressure: 128/82 mmHg  Height: 150 cm  Weight: 66 kg  BSA: 1.6 m2  ------------------------------------------------------------------------  PROCEDURE: Transthoracic echocardiogram with 2-D, M-Mode  and complete spectral and color flow Doppler.  INDICATION: Supraventricular tachycardia (I47.1)  ------------------------------------------------------------------------  Dimensions:    Normal Values:  LA:     3.5    2.0 - 4.0 cm  Ao:     3.4    2.0 - 3.8 cm  SEPTUM: 0.8    0.6 - 1.2 cm  PWT:    0.7    0.6 - 1.1 cm  LVIDd:  4.7    3.0 - 5.6 cm  LVIDs:  3.4    1.8 - 4.0 cm  Derived variables:  LVMI: 70 g/m2  RWT: 0.29  Fractional short: 28 %  EF (Visual Estimate): 55-60 %  Doppler Peak Velocity (m/sec): AoV=1.4  ------------------------------------------------------------------------  Observations:  Mitral Valve: Mitral annular calcification, otherwise  normal mitral valve. Minimal mitral regurgitation.  Aortic Valve/Aorta: Calcified trileaflet aortic valve with  normal opening. Peak transaortic valve gradient equals 8 mm  Hg, mean transaortic valve gradient equals 5 mm Hg, aortic  valve velocity time integral equals 32 cm. Minimal aortic  regurgitation. Peak left ventricular outflow tract gradient  equals 4 mm Hg, mean gradient is equal to 2 mm Hg, LVOT  velocity time integral equals 21 cm.  Aortic Root: 3.4 cm.  LVOT diameter: 1.8 cm.  Left Atrium: Normal left atrium.  LA volume index = 27  cc/m2. Mobile interatrial septum.  Left Ventricle: Endocardium not well visualized; grossly  normal left ventricular systolic function. Normal left  ventricular internal dimensions and wall thicknesses.  Right Heart: Normal right atrium. Normal right ventricular  size and function. Normal tricuspid valve. Mild tricuspid  regurgitation. Pulmonic valve not well visualized. No  pulmonic regurgitation.  Pericardium/Pleura: Normal pericardium with no pericardial  effusion.  Hemodynamic: Estimated right ventricular systolic pressure  equals 21 mm Hg, assuming right atrial pressure equals 3 mm  Hg, consistent with normal pulmonary pressures.  ------------------------------------------------------------------------  Conclusions:  1. Mitral annular calcification, otherwise normal mitral  valve. Minimal mitral regurgitation.  2. Calcified trileaflet aortic valve with normal opening.  Minimal aortic regurgitation.  3. Endocardium not well visualized; grossly normal left  ventricular systolic function.  4. Normal right ventricular size and function.  *** Compared with echocardiogram of 10/12/2013,results are  similar on today's study.  ------------------------------------------------------------------------  Confirmed on  2018 - 12:56:58 by Kathryn Díaz M.D.  ------------------------------------------------------------------------    < end of copied text >  ---------------------------------------------------------------------------------------------------------------   MICROBIOLOGY:     Rapid Respiratory Viral Panel (10.04.18 @ 14:41)    Rapid RVP Result: [facility] The FilmArray RVP Rapid uses polymerase chain reaction (PCR) and melt  curve analysis to screen for adenovirus; coronavirus HKU1, NL63, 229E,  OC43; human metapneumovirus (hMPV); human enterovirus/rhinovirus  (Entero/RV); influenza A; influenza A/H1;influenza A/H3; influenza  A/H1-2009; influenza B; parainfluenza viruses 1, 2, 3, 4; respiratory  syncytial virus; Bordetella pertussis; Mycoplasma pneumoniae; and  Chlamydophila pneumoniae.    Urinalysis Basic - ( 04 Oct 2018 15:18 )    Color: Light Yellow / Appearance: Clear / S.015 / pH: x  Gluc: x / Ketone: Negative  / Bili: Negative / Urobili: Negative   Blood: x / Protein: Negative / Nitrite: Negative   Leuk Esterase: Negative / RBC: x / WBC x   Sq Epi: x / Non Sq Epi: x / Bacteria: x    Culture - Urine (10.04.18 @ 17:12)    Specimen Source: .Urine Clean Catch (Midstream)    Culture Results:   <10,000 CFU/ml Normal Urogenital skye present    Culture - Blood (10.04.18 @ 16:23)    Specimen Source: .Blood Blood-Venous    Culture Results:   No growth to date.    Culture - Blood (10.04.18 @ 16:23)    Specimen Source: .Blood Blood-Peripheral    Culture Results:   No growth to date.    RADIOLOGY:  [x] Chest radiographs reviewed and interpreted by me    < from: CT Angio Chest w/ IV Cont (10.06.18 @ 15:45) >    EXAM:  CT ANGIO CHEST (W)AW IC                          PROCEDURE DATE:  10/06/2018      INTERPRETATION:  CLINICAL INFORMATION: 73-year-old female with persistent   cough. Follow-up evaluation of pulmonary emboli.    COMPARISON: CT scan 2018    PROCEDURE:   CT Angiography of the Chest.  90 ml of Omnipaque 350 was injected intravenously. 10 ml were discarded.  Sagittal and coronal reformats were performed as well as 3D (MIP)   reconstructions.    FINDINGS:    CHEST:     LUNGS ANDLARGE AIRWAYS: Patent central airways.  No pulmonary nodules.  PLEURA: No pleural effusion.  VESSELS: No pulmonary emboli.  HEART: Heart size is normal. No pericardial effusion.  MEDIASTINUM AND NAKIA: No lymphadenopathy.  CHEST WALL AND LOWER NECK: Within normal limits.  VISUALIZED UPPER ABDOMEN: Within normal limits.  BONES: Within normal limits.    IMPRESSION:     No pulmonary emboli.    HOMAR MENA M.D., ATTENDING RADIOLOGIST  This document has been electronically signed Oct  6 2018  5:41PM      < end of copied text >  ---------------------------------------------------------------------------------------------------------------  < from: Xray Chest 2 Views PA/Lat (10.04.18 @ 14:36) >    EXAM:  XR CHEST PA LAT 2V                          PROCEDURE DATE:  10/04/2018      INTERPRETATION:  CLINICAL INFORMATION: Shortness of breath.    EXAM: PA and lateral chest radiographs    COMPARISON: Chest radiograph from 2018.    FINDINGS:  Redemonstrated micra pacemaker.  No focal lung consolidations, pleural effusions, or pneumothorax.  The cardiac silhouette is within normal limits.  Degenerative osseous changes without acute abnormalities.    IMPRESSION:   Clear lungs.    BUBBA DOSHI M.D., RADIOLOGY RESIDENT  This document has been electronically signed.  MEKA BURGOS M.D., ATTENDING RADIOLOGIST  This document has been electronically signed. Oct  4 2018  3:15PM    < end of copied text >  ---------------------------------------------------------------------------------------------------------------

## 2018-10-08 NOTE — DISCHARGE NOTE ADULT - MEDICATION SUMMARY - MEDICATIONS TO STOP TAKING
I will STOP taking the medications listed below when I get home from the hospital:    Coumadin  -- 4 milligram(s) by mouth once a day (at bedtime)  PRESCRIPTION SENT TO PHARMACY

## 2018-10-09 LAB
CULTURE RESULTS: SIGNIFICANT CHANGE UP
CULTURE RESULTS: SIGNIFICANT CHANGE UP
SPECIMEN SOURCE: SIGNIFICANT CHANGE UP
SPECIMEN SOURCE: SIGNIFICANT CHANGE UP

## 2018-10-23 ENCOUNTER — NON-APPOINTMENT (OUTPATIENT)
Age: 73
End: 2018-10-23

## 2018-10-23 ENCOUNTER — APPOINTMENT (OUTPATIENT)
Dept: ELECTROPHYSIOLOGY | Facility: CLINIC | Age: 73
End: 2018-10-23
Payer: MEDICARE

## 2018-10-23 VITALS
HEART RATE: 54 BPM | SYSTOLIC BLOOD PRESSURE: 112 MMHG | BODY MASS INDEX: 29.89 KG/M2 | DIASTOLIC BLOOD PRESSURE: 78 MMHG | WEIGHT: 148 LBS | OXYGEN SATURATION: 98 %

## 2018-10-23 PROCEDURE — 93000 ELECTROCARDIOGRAM COMPLETE: CPT

## 2018-10-26 ENCOUNTER — CLINICAL ADVICE (OUTPATIENT)
Age: 73
End: 2018-10-26

## 2018-11-11 PROCEDURE — 90686 IIV4 VACC NO PRSV 0.5 ML IM: CPT

## 2018-11-11 PROCEDURE — 0387T: CPT

## 2018-11-11 PROCEDURE — 99285 EMERGENCY DEPT VISIT HI MDM: CPT | Mod: 25

## 2018-11-11 PROCEDURE — 85014 HEMATOCRIT: CPT

## 2018-11-11 PROCEDURE — 83735 ASSAY OF MAGNESIUM: CPT

## 2018-11-11 PROCEDURE — 84132 ASSAY OF SERUM POTASSIUM: CPT

## 2018-11-11 PROCEDURE — 93005 ELECTROCARDIOGRAM TRACING: CPT

## 2018-11-11 PROCEDURE — 86901 BLOOD TYPING SEROLOGIC RH(D): CPT

## 2018-11-11 PROCEDURE — 82947 ASSAY GLUCOSE BLOOD QUANT: CPT

## 2018-11-11 PROCEDURE — 93308 TTE F-UP OR LMTD: CPT

## 2018-11-11 PROCEDURE — 85610 PROTHROMBIN TIME: CPT

## 2018-11-11 PROCEDURE — 82803 BLOOD GASES ANY COMBINATION: CPT

## 2018-11-11 PROCEDURE — G0378: CPT

## 2018-11-11 PROCEDURE — 84295 ASSAY OF SERUM SODIUM: CPT

## 2018-11-11 PROCEDURE — 86900 BLOOD TYPING SEROLOGIC ABO: CPT

## 2018-11-11 PROCEDURE — 87633 RESP VIRUS 12-25 TARGETS: CPT

## 2018-11-11 PROCEDURE — 94640 AIRWAY INHALATION TREATMENT: CPT

## 2018-11-11 PROCEDURE — C1894: CPT

## 2018-11-11 PROCEDURE — 84443 ASSAY THYROID STIM HORMONE: CPT

## 2018-11-11 PROCEDURE — 80048 BASIC METABOLIC PNL TOTAL CA: CPT

## 2018-11-11 PROCEDURE — 71045 X-RAY EXAM CHEST 1 VIEW: CPT

## 2018-11-11 PROCEDURE — C1769: CPT

## 2018-11-11 PROCEDURE — 87040 BLOOD CULTURE FOR BACTERIA: CPT

## 2018-11-11 PROCEDURE — 84100 ASSAY OF PHOSPHORUS: CPT

## 2018-11-11 PROCEDURE — 82550 ASSAY OF CK (CPK): CPT

## 2018-11-11 PROCEDURE — 82435 ASSAY OF BLOOD CHLORIDE: CPT

## 2018-11-11 PROCEDURE — 84484 ASSAY OF TROPONIN QUANT: CPT

## 2018-11-11 PROCEDURE — 87798 DETECT AGENT NOS DNA AMP: CPT

## 2018-11-11 PROCEDURE — 87581 M.PNEUMON DNA AMP PROBE: CPT

## 2018-11-11 PROCEDURE — 87086 URINE CULTURE/COLONY COUNT: CPT

## 2018-11-11 PROCEDURE — 85652 RBC SED RATE AUTOMATED: CPT

## 2018-11-11 PROCEDURE — 86850 RBC ANTIBODY SCREEN: CPT

## 2018-11-11 PROCEDURE — C1786: CPT

## 2018-11-11 PROCEDURE — 85027 COMPLETE CBC AUTOMATED: CPT

## 2018-11-11 PROCEDURE — 87486 CHLMYD PNEUM DNA AMP PROBE: CPT

## 2018-11-11 PROCEDURE — 81003 URINALYSIS AUTO W/O SCOPE: CPT

## 2018-11-11 PROCEDURE — 96374 THER/PROPH/DIAG INJ IV PUSH: CPT

## 2018-11-11 PROCEDURE — 96372 THER/PROPH/DIAG INJ SC/IM: CPT | Mod: XU

## 2018-11-11 PROCEDURE — 83880 ASSAY OF NATRIURETIC PEPTIDE: CPT

## 2018-11-11 PROCEDURE — 85730 THROMBOPLASTIN TIME PARTIAL: CPT

## 2018-11-11 PROCEDURE — 82330 ASSAY OF CALCIUM: CPT

## 2018-11-11 PROCEDURE — 80053 COMPREHEN METABOLIC PANEL: CPT

## 2018-11-11 PROCEDURE — 80202 ASSAY OF VANCOMYCIN: CPT

## 2018-11-11 PROCEDURE — 81001 URINALYSIS AUTO W/SCOPE: CPT

## 2018-11-11 PROCEDURE — 83605 ASSAY OF LACTIC ACID: CPT

## 2018-11-11 PROCEDURE — 93970 EXTREMITY STUDY: CPT

## 2018-11-11 PROCEDURE — 71275 CT ANGIOGRAPHY CHEST: CPT

## 2018-11-11 PROCEDURE — 97161 PT EVAL LOW COMPLEX 20 MIN: CPT

## 2018-11-11 PROCEDURE — 86140 C-REACTIVE PROTEIN: CPT

## 2018-11-11 PROCEDURE — 71046 X-RAY EXAM CHEST 2 VIEWS: CPT

## 2018-11-11 PROCEDURE — 75574 CT ANGIO HRT W/3D IMAGE: CPT

## 2018-11-11 PROCEDURE — 82553 CREATINE MB FRACTION: CPT

## 2018-11-11 PROCEDURE — 93306 TTE W/DOPPLER COMPLETE: CPT

## 2018-12-05 ENCOUNTER — APPOINTMENT (OUTPATIENT)
Dept: ORTHOPEDIC SURGERY | Facility: CLINIC | Age: 73
End: 2018-12-05
Payer: MEDICARE

## 2018-12-05 VITALS — WEIGHT: 148 LBS | HEIGHT: 59 IN | BODY MASS INDEX: 29.84 KG/M2

## 2018-12-05 PROCEDURE — 99213 OFFICE O/P EST LOW 20 MIN: CPT | Mod: 25

## 2018-12-05 PROCEDURE — 20610 DRAIN/INJ JOINT/BURSA W/O US: CPT | Mod: LT

## 2018-12-05 PROCEDURE — 73562 X-RAY EXAM OF KNEE 3: CPT | Mod: LT

## 2018-12-12 ENCOUNTER — OUTPATIENT (OUTPATIENT)
Dept: OUTPATIENT SERVICES | Facility: HOSPITAL | Age: 73
LOS: 1 days | Discharge: HOME | End: 2018-12-12

## 2018-12-12 DIAGNOSIS — Z98.89 OTHER SPECIFIED POSTPROCEDURAL STATES: Chronic | ICD-10-CM

## 2018-12-12 DIAGNOSIS — Z98.51 TUBAL LIGATION STATUS: Chronic | ICD-10-CM

## 2018-12-12 DIAGNOSIS — D68.2 HEREDITARY DEFICIENCY OF OTHER CLOTTING FACTORS: ICD-10-CM

## 2018-12-14 ENCOUNTER — APPOINTMENT (OUTPATIENT)
Dept: HEMATOLOGY ONCOLOGY | Facility: CLINIC | Age: 73
End: 2018-12-14
Payer: MEDICARE

## 2018-12-14 ENCOUNTER — RESULT REVIEW (OUTPATIENT)
Age: 73
End: 2018-12-14

## 2018-12-14 VITALS
OXYGEN SATURATION: 100 % | TEMPERATURE: 98.3 F | DIASTOLIC BLOOD PRESSURE: 93 MMHG | WEIGHT: 149.91 LBS | HEART RATE: 62 BPM | RESPIRATION RATE: 16 BRPM | BODY MASS INDEX: 30.28 KG/M2 | SYSTOLIC BLOOD PRESSURE: 151 MMHG

## 2018-12-14 DIAGNOSIS — I26.99 OTHER PULMONARY EMBOLISM W/OUT ACUTE COR PULMONALE: ICD-10-CM

## 2018-12-14 DIAGNOSIS — D68.52 PROTHROMBIN GENE MUTATION: ICD-10-CM

## 2018-12-14 DIAGNOSIS — D72.820 LYMPHOCYTOSIS (SYMPTOMATIC): ICD-10-CM

## 2018-12-14 DIAGNOSIS — I82.409 ACUTE EMBOLISM AND THROMBOSIS OF UNSPECIFIED DEEP VEINS OF UNSPECIFIED LOWER EXTREMITY: ICD-10-CM

## 2018-12-14 PROCEDURE — 99215 OFFICE O/P EST HI 40 MIN: CPT

## 2018-12-14 RX ORDER — TRIAMCINOLONE ACETONIDE 40 MG/ML
40 INJECTION, SUSPENSION INTRA-ARTICULAR; INTRAMUSCULAR
Refills: 0 | Status: DISCONTINUED | COMMUNITY
End: 2018-12-14

## 2018-12-14 RX ORDER — DEXAMETHASONE SODIUM PHOSPHATE 4 MG/ML
INJECTION, SOLUTION INTRA-ARTICULAR; INTRALESIONAL; INTRAMUSCULAR; INTRAVENOUS; SOFT TISSUE
Refills: 0 | Status: DISCONTINUED | COMMUNITY
End: 2018-12-14

## 2018-12-14 NOTE — HISTORY OF PRESENT ILLNESS
[Disease:__________________________] : Disease: [unfilled] [de-identified] : Recurrent DVT/PE\par Monoclonal B-cell lymphocytosis of uncertain significance -+ dim k, CD 5,19,20,23. CD38 --. [de-identified] : Patient returns after a 4 year hiatus. Records reviewed. Feels fair. Fatigued. Hospitalized in September for  pulmonary emboli and viral syndrome and was given a pacemaker. Her INR was not therapeutic at that time and she had recently taken a long car trip. Reports a fleeting ache on her left side at times, not pleuritic..Having a left total knee replacement in January 2019. Bruises easily. No chest pain SOB, leg swelling, leg pain, bleeding. She stated she is now therapeutic on warfarin. She wishes to discuss possible use of a DOAC.

## 2018-12-14 NOTE — PHYSICAL EXAM
[Fully active, able to carry on all pre-disease performance without restriction] : Status 0 - Fully active, able to carry on all pre-disease performance without restriction [Normal] : normal spine exam without palpable tenderness, no kyphosis or scoliosis [de-identified] : Pacemaker

## 2018-12-14 NOTE — CONSULT LETTER
[Dear  ___] : Dear ~KANE, [Courtesy Letter:] : I had the pleasure of seeing your patient, [unfilled], in my office today. [Please see my note below.] : Please see my note below. [Sincerely,] : Sincerely, [FreeTextEntry2] : Arthur Ricks MD [FreeTextEntry3] : Tay\par David Rodriguez M.D., FACP\par Professor of Medicine\par [employer]\par Associate Chief, Division of Hematology\par [facility]\par [facility]\par [address]\[place] [address]\par [phone]\par \par \par \par

## 2018-12-14 NOTE — ASSESSMENT
[FreeTextEntry1] : 73 year old female heterozygous for prothrombin G with a history of recurrent pulmonary embolism and deep venous thrombophlebitis. She recently had recurrent pulmonary emboli againfollowing a prolonged car trip with concomitant subtherapeutic INR. She reports that her INR is now consistently in the therapeutic range. She was interested in learning about alternative oral anti-coagulation. I reviewed the DOACs with her. I explained that they are given at a fixed dose and do not require monitoring. I reviewed that they are considerably more expensive than Warfarin. I discussed the importance of not missing doses with DOACs due to their shorter half-lives than Warfarin and that missing a dose can have serious consequences. She was concerned about this because she does miss her medication doses. I explained that missing a dose of Warfarin is not as serious as doing so with a DOAC. She has decided to remain on Warfarin. \par \par She has been started on ASA in addition to warfarin.. This will necessitate close monitoring for possible bleeding complications. I would favor stopping ASA if possible. \par \par She is scheduled to have a total knee replacement. She will require bridging with Lovenox and continuance of full anticoagulation following surgery. \par \par Her monoclonal B cell lymphocytosis of uncertain significance remains stable. She does not meet diagnostic criteria for CLL. [Palliative Care Plan] : not applicable at this time

## 2019-01-09 ENCOUNTER — OUTPATIENT (OUTPATIENT)
Dept: OUTPATIENT SERVICES | Facility: HOSPITAL | Age: 74
LOS: 1 days | End: 2019-01-09
Payer: MEDICARE

## 2019-01-09 VITALS
WEIGHT: 145.95 LBS | DIASTOLIC BLOOD PRESSURE: 78 MMHG | RESPIRATION RATE: 16 BRPM | SYSTOLIC BLOOD PRESSURE: 138 MMHG | TEMPERATURE: 98 F | OXYGEN SATURATION: 98 % | HEIGHT: 58 IN

## 2019-01-09 DIAGNOSIS — M17.12 UNILATERAL PRIMARY OSTEOARTHRITIS, LEFT KNEE: ICD-10-CM

## 2019-01-09 DIAGNOSIS — Z95.0 PRESENCE OF CARDIAC PACEMAKER: ICD-10-CM

## 2019-01-09 DIAGNOSIS — Z98.89 OTHER SPECIFIED POSTPROCEDURAL STATES: Chronic | ICD-10-CM

## 2019-01-09 DIAGNOSIS — Z01.818 ENCOUNTER FOR OTHER PREPROCEDURAL EXAMINATION: ICD-10-CM

## 2019-01-09 DIAGNOSIS — D68.52 PROTHROMBIN GENE MUTATION: ICD-10-CM

## 2019-01-09 DIAGNOSIS — Z98.51 TUBAL LIGATION STATUS: Chronic | ICD-10-CM

## 2019-01-09 DIAGNOSIS — Z86.711 PERSONAL HISTORY OF PULMONARY EMBOLISM: ICD-10-CM

## 2019-01-09 DIAGNOSIS — Z98.890 OTHER SPECIFIED POSTPROCEDURAL STATES: Chronic | ICD-10-CM

## 2019-01-09 LAB
ALBUMIN SERPL ELPH-MCNC: 3.4 G/DL — SIGNIFICANT CHANGE UP (ref 3.3–5)
ALP SERPL-CCNC: 70 U/L — SIGNIFICANT CHANGE UP (ref 30–120)
ALT FLD-CCNC: 26 U/L DA — SIGNIFICANT CHANGE UP (ref 10–60)
ANION GAP SERPL CALC-SCNC: 7 MMOL/L — SIGNIFICANT CHANGE UP (ref 5–17)
APTT BLD: 41.4 SEC — HIGH (ref 27.5–36.3)
AST SERPL-CCNC: 17 U/L — SIGNIFICANT CHANGE UP (ref 10–40)
BILIRUB SERPL-MCNC: 0.3 MG/DL — SIGNIFICANT CHANGE UP (ref 0.2–1.2)
BLD GP AB SCN SERPL QL: SIGNIFICANT CHANGE UP
BUN SERPL-MCNC: 14 MG/DL — SIGNIFICANT CHANGE UP (ref 7–23)
CALCIUM SERPL-MCNC: 9.1 MG/DL — SIGNIFICANT CHANGE UP (ref 8.4–10.5)
CHLORIDE SERPL-SCNC: 109 MMOL/L — HIGH (ref 96–108)
CO2 SERPL-SCNC: 30 MMOL/L — SIGNIFICANT CHANGE UP (ref 22–31)
CREAT SERPL-MCNC: 0.86 MG/DL — SIGNIFICANT CHANGE UP (ref 0.5–1.3)
GLUCOSE SERPL-MCNC: 99 MG/DL — SIGNIFICANT CHANGE UP (ref 70–99)
HCT VFR BLD CALC: 39.5 % — SIGNIFICANT CHANGE UP (ref 34.5–45)
HGB BLD-MCNC: 13.3 G/DL — SIGNIFICANT CHANGE UP (ref 11.5–15.5)
INR BLD: 2.73 RATIO — HIGH (ref 0.88–1.16)
MCHC RBC-ENTMCNC: 31.1 PG — SIGNIFICANT CHANGE UP (ref 27–34)
MCHC RBC-ENTMCNC: 33.7 GM/DL — SIGNIFICANT CHANGE UP (ref 32–36)
MCV RBC AUTO: 92.3 FL — SIGNIFICANT CHANGE UP (ref 80–100)
MRSA PCR RESULT.: SIGNIFICANT CHANGE UP
NRBC # BLD: 0 /100 WBCS — SIGNIFICANT CHANGE UP (ref 0–0)
PLATELET # BLD AUTO: 180 K/UL — SIGNIFICANT CHANGE UP (ref 150–400)
POTASSIUM SERPL-MCNC: 4.5 MMOL/L — SIGNIFICANT CHANGE UP (ref 3.5–5.3)
POTASSIUM SERPL-SCNC: 4.5 MMOL/L — SIGNIFICANT CHANGE UP (ref 3.5–5.3)
PROT SERPL-MCNC: 6.8 G/DL — SIGNIFICANT CHANGE UP (ref 6–8.3)
PROTHROM AB SERPL-ACNC: 30.7 SEC — HIGH (ref 10–12.9)
RBC # BLD: 4.28 M/UL — SIGNIFICANT CHANGE UP (ref 3.8–5.2)
RBC # FLD: 12.8 % — SIGNIFICANT CHANGE UP (ref 10.3–14.5)
S AUREUS DNA NOSE QL NAA+PROBE: SIGNIFICANT CHANGE UP
SODIUM SERPL-SCNC: 146 MMOL/L — HIGH (ref 135–145)
WBC # BLD: 7.17 K/UL — SIGNIFICANT CHANGE UP (ref 3.8–10.5)
WBC # FLD AUTO: 7.17 K/UL — SIGNIFICANT CHANGE UP (ref 3.8–10.5)

## 2019-01-09 PROCEDURE — 86900 BLOOD TYPING SEROLOGIC ABO: CPT

## 2019-01-09 PROCEDURE — G0463: CPT

## 2019-01-09 PROCEDURE — 93005 ELECTROCARDIOGRAM TRACING: CPT

## 2019-01-09 PROCEDURE — 36415 COLL VENOUS BLD VENIPUNCTURE: CPT

## 2019-01-09 PROCEDURE — 87640 STAPH A DNA AMP PROBE: CPT

## 2019-01-09 PROCEDURE — 80053 COMPREHEN METABOLIC PANEL: CPT

## 2019-01-09 PROCEDURE — 93010 ELECTROCARDIOGRAM REPORT: CPT

## 2019-01-09 PROCEDURE — 85610 PROTHROMBIN TIME: CPT

## 2019-01-09 PROCEDURE — 86850 RBC ANTIBODY SCREEN: CPT

## 2019-01-09 PROCEDURE — 85730 THROMBOPLASTIN TIME PARTIAL: CPT

## 2019-01-09 PROCEDURE — 87641 MR-STAPH DNA AMP PROBE: CPT

## 2019-01-09 PROCEDURE — 86901 BLOOD TYPING SEROLOGIC RH(D): CPT

## 2019-01-09 PROCEDURE — 85027 COMPLETE CBC AUTOMATED: CPT

## 2019-01-09 RX ORDER — FLUTICASONE PROPIONATE 220 MCG
1 AEROSOL WITH ADAPTER (GRAM) INHALATION
Qty: 0 | Refills: 0 | COMMUNITY

## 2019-01-09 NOTE — H&P PST ADULT - PROBLEM SELECTOR PLAN 1
Left total knee replacement on 1/29/19  Diagnostics ordered  Pending medical clearance  Questions answered  Instructions reviewed and signed  Contact info given  Best wishes offered    Instructions reviewed and signed

## 2019-01-09 NOTE — H&P PST ADULT - NSANTHOSAYNRD_GEN_A_CORE
No. DEENA screening performed.  STOP BANG Legend: 0-2 = LOW Risk; 3-4 = INTERMEDIATE Risk; 5-8 = HIGH Risk

## 2019-01-09 NOTE — H&P PST ADULT - PROBLEM SELECTOR PLAN 4
Bilateral doppler LE requested  Signs and symptoms of DVT/PE reviewed  Called Dr Kidd office to make surgeon aware of extensive DVT history  Clinical pharmacist and hospitalist also made aware

## 2019-01-09 NOTE — H&P PST ADULT - PSH
History of carpal tunnel surgery of right wrist  1990's  History of lumbar surgery  2018  S/P arthroscopy of right knee  2012  S/P Breast Lumpectomy  2000's, 2004  S/P tubal ligation  age 30

## 2019-01-09 NOTE — H&P PST ADULT - PMH
BMI 30.0-30.9,adult    DVT (deep venous thrombosis), right  october 2013  GERD (gastroesophageal reflux disease)    Herniated lumbar intervertebral disc    History of pulmonary embolus (PE)  2012, 2018  HTN - Hypertension    Lower back pain    Osteoarthritis of left knee    Pacemaker  implanted 9/28/18  PE (pulmonary embolism)  3 yrs ago, was on Lovenox and coumadin  Prothrombin gene mutation

## 2019-01-09 NOTE — H&P PST ADULT - MUSCULOSKELETAL
details… detailed exam decreased ROM due to pain/no joint erythema/joint swelling/no calf tenderness

## 2019-01-09 NOTE — H&P PST ADULT - HISTORY OF PRESENT ILLNESS
74 yo female presents today to [facility] for scheduled LEFT total KNEE replacement on 1/29/19 with Roland Kidd MD.   RReports left knee pain for last few years with recent exacerbation with swelling.  Pain worst when she is arising from laying down and rates 9/10.  She has tried cortisone injections without relief.   Pain slightly relieved with tylenol.

## 2019-01-09 NOTE — H&P PST ADULT - FAMILY HISTORY
Family history of prothrombin gene mutation, first cousin     Father  Still living? Unknown  Family history of heart disease, Age at diagnosis: Age Unknown     Child  Still living? Yes, Estimated age: 41-50  Family history of renal failure, Age at diagnosis: Age Unknown  Family history of CHF (congestive heart failure), Age at diagnosis: Age Unknown     Sibling  Still living? Yes, Estimated age: 71-80  Family history of macular degeneration, Age at diagnosis: Age Unknown

## 2019-01-09 NOTE — H&P PST ADULT - NEGATIVE ENMT SYMPTOMS
no sinus symptoms/no hearing difficulty/no ear pain/no recurrent cold sores/no abnormal taste sensation/no nasal obstruction/no nasal congestion/no post-nasal discharge/no gum bleeding/no dry mouth/no throat pain/no dysphagia/no tinnitus/no vertigo/no nasal discharge/no nose bleeds

## 2019-01-14 ENCOUNTER — RX RENEWAL (OUTPATIENT)
Age: 74
End: 2019-01-14

## 2019-01-17 PROBLEM — M17.12 UNILATERAL PRIMARY OSTEOARTHRITIS, LEFT KNEE: Chronic | Status: ACTIVE | Noted: 2019-01-09

## 2019-01-17 PROBLEM — Z95.0 PRESENCE OF CARDIAC PACEMAKER: Chronic | Status: ACTIVE | Noted: 2019-01-09

## 2019-01-17 PROBLEM — M54.5 LOW BACK PAIN: Chronic | Status: ACTIVE | Noted: 2019-01-09

## 2019-01-18 ENCOUNTER — OUTPATIENT (OUTPATIENT)
Dept: OUTPATIENT SERVICES | Facility: HOSPITAL | Age: 74
LOS: 1 days | End: 2019-01-18
Payer: MEDICARE

## 2019-01-18 ENCOUNTER — APPOINTMENT (OUTPATIENT)
Dept: ULTRASOUND IMAGING | Facility: CLINIC | Age: 74
End: 2019-01-18
Payer: MEDICARE

## 2019-01-18 DIAGNOSIS — Z98.89 OTHER SPECIFIED POSTPROCEDURAL STATES: Chronic | ICD-10-CM

## 2019-01-18 DIAGNOSIS — Z98.890 OTHER SPECIFIED POSTPROCEDURAL STATES: Chronic | ICD-10-CM

## 2019-01-18 DIAGNOSIS — Z00.8 ENCOUNTER FOR OTHER GENERAL EXAMINATION: ICD-10-CM

## 2019-01-18 DIAGNOSIS — Z98.51 TUBAL LIGATION STATUS: Chronic | ICD-10-CM

## 2019-01-18 PROCEDURE — 93970 EXTREMITY STUDY: CPT | Mod: 26

## 2019-01-18 PROCEDURE — 93970 EXTREMITY STUDY: CPT

## 2019-01-23 ENCOUNTER — APPOINTMENT (OUTPATIENT)
Dept: ELECTROPHYSIOLOGY | Facility: CLINIC | Age: 74
End: 2019-01-23
Payer: MEDICARE

## 2019-01-23 ENCOUNTER — APPOINTMENT (OUTPATIENT)
Dept: ORTHOPEDIC SURGERY | Facility: CLINIC | Age: 74
End: 2019-01-23
Payer: MEDICARE

## 2019-01-23 VITALS
OXYGEN SATURATION: 98 % | DIASTOLIC BLOOD PRESSURE: 89 MMHG | HEIGHT: 59 IN | SYSTOLIC BLOOD PRESSURE: 134 MMHG | HEART RATE: 66 BPM | WEIGHT: 149 LBS | BODY MASS INDEX: 30.04 KG/M2

## 2019-01-23 VITALS — WEIGHT: 149 LBS | HEIGHT: 59 IN | BODY MASS INDEX: 30.04 KG/M2

## 2019-01-23 PROCEDURE — 93279 PRGRMG DEV EVAL PM/LDLS PM: CPT

## 2019-01-23 PROCEDURE — 99214 OFFICE O/P EST MOD 30 MIN: CPT

## 2019-01-23 PROCEDURE — 73562 X-RAY EXAM OF KNEE 3: CPT | Mod: LT

## 2019-01-23 NOTE — PROGRESS NOTE ADULT - ASSESSMENT
Presurgical evaluation:  1.	PT/INR in Preop   2.	Topical TXA  3.	Acetaminophen (1G IV q6x4 then 1G q12h PO). May add Celecoxib 100mg q12h for multimodal pain management if OK with Dr. Kidd and hospitalist.   4.	Verapamil with concurrent oxycodone or tramadol can increase serum levels of narcotic and subsequent risk of respiratory depression. No such interaction exists with hydromorphone, so hydromorphone is a safer option for PRN breakthrough pain for this patient.  5.	Resume ASA EC 81mg Qday POD1 (schedule daily at 6am)  6.	VTE prophylaxis (d/w Dr. Kidd): Enoxaparin 30mg SC q12h, begin evening of POD0, and continue through POD1. Then resume Enoxaparin 100mg SC Qday (as ordered preop by hematologist) on AM of POD2. Begin Warfarin evening of POD0, at least 2 hours after Enoxaparin dose administered.

## 2019-01-23 NOTE — PROGRESS NOTE ADULT - SUBJECTIVE AND OBJECTIVE BOX
Presurgical evaluation:    NKDA    Home Medications (per PST H&P):   · 	Aspirin Enteric Coated 81 mg once a day   · 	verapamil 120 mg once a day  · 	omeprazole 40 mg once a day  · 	Atorvastatin (Lipitor) 10 mg once a day (at bedtime)  · 	warfarin 5 mg once a day (interruption per alejandro and Dr. Kidd: hold warfarin after 1/23 dose. Enoxaparin 100mg SC Qday on 1/27 and 1/28 in AM, then hold.)    Past Medical History:  BMI 30.0-30.9,adult    DVT (deep venous thrombosis), recurrent  GERD (gastroesophageal reflux disease)    Herniated lumbar intervertebral disc    History of pulmonary embolus (PE)  2012, 2015, 2018  HTN – Hypertension  Monoclonal B-cell lymphocytosis of undetermined significance  Osteoarthritis of left knee    Pacemaker  implanted 9/28/18  Prothrombin gene mutation [id number], heterozygous    Past Surgical History:  History of carpal tunnel surgery of right wrist  1990's  History of lumbar surgery  2018  S/P Breast Lumpectomy  2000's, 2004    Family History:  Family history of prothrombin gene mutation, first cousin    PST values of interest:  PT/INR 30.7/2.73 (Þ warfarin)  SCr 0.86 mg/dL  LFTs WNL  QTc 402ms (WNL) Presurgical evaluation:    NKDA    Home Medications (per PST H&P):   · 	Aspirin Enteric Coated 81 mg once a day   · 	verapamil 120 mg once a day  · 	omeprazole 40 mg once a day  · 	Atorvastatin (Lipitor) 10 mg once a day (at bedtime)  · 	warfarin 5 mg (please confirm current daily dose with patient – it may be 2.5mg) once a day (interruption per heme and Dr. Kidd: hold warfarin after 1/23 dose. Enoxaparin 100mg SC Qday on 1/27 and 1/28 in AM, then hold.)    Past Medical History:  BMI 30.0-30.9,adult    DVT (deep venous thrombosis), recurrent  GERD (gastroesophageal reflux disease)    Herniated lumbar intervertebral disc    History of pulmonary embolus (PE)  2012, 2015, 2018  HTN – Hypertension  Monoclonal B-cell lymphocytosis of undetermined significance  Osteoarthritis of left knee    Pacemaker  implanted 9/28/18  Prothrombin gene mutation [id number], heterozygous    Past Surgical History:  History of carpal tunnel surgery of right wrist  1990's  History of lumbar surgery  2018  S/P Breast Lumpectomy  2000's, 2004    Family History:  Family history of prothrombin gene mutation, first cousin    PST values of interest:  PT/INR 30.7/2.73 (Þ warfarin)  SCr 0.86 mg/dL  LFTs WNL  QTc 402ms (WNL)

## 2019-01-23 NOTE — PHARMACOTHERAPY INTERVENTION NOTE - COMMENTS
Presurgical evaluation for postoperative medication management. Team emailed. Note placed in [place].

## 2019-01-28 ENCOUNTER — TRANSCRIPTION ENCOUNTER (OUTPATIENT)
Age: 74
End: 2019-01-28

## 2019-01-29 ENCOUNTER — APPOINTMENT (OUTPATIENT)
Dept: ORTHOPEDIC SURGERY | Facility: HOSPITAL | Age: 74
End: 2019-01-29

## 2019-01-29 ENCOUNTER — INPATIENT (INPATIENT)
Facility: HOSPITAL | Age: 74
LOS: 5 days | Discharge: HOME | DRG: 470 | End: 2019-02-04
Attending: SPECIALIST | Admitting: SPECIALIST
Payer: MEDICARE

## 2019-01-29 ENCOUNTER — RESULT REVIEW (OUTPATIENT)
Age: 74
End: 2019-01-29

## 2019-01-29 VITALS
HEART RATE: 71 BPM | WEIGHT: 147.27 LBS | SYSTOLIC BLOOD PRESSURE: 134 MMHG | OXYGEN SATURATION: 100 % | RESPIRATION RATE: 17 BRPM | DIASTOLIC BLOOD PRESSURE: 94 MMHG | HEIGHT: 58 IN | TEMPERATURE: 98 F

## 2019-01-29 DIAGNOSIS — Z98.89 OTHER SPECIFIED POSTPROCEDURAL STATES: Chronic | ICD-10-CM

## 2019-01-29 DIAGNOSIS — Z98.890 OTHER SPECIFIED POSTPROCEDURAL STATES: Chronic | ICD-10-CM

## 2019-01-29 DIAGNOSIS — M17.12 UNILATERAL PRIMARY OSTEOARTHRITIS, LEFT KNEE: ICD-10-CM

## 2019-01-29 DIAGNOSIS — Z98.51 TUBAL LIGATION STATUS: Chronic | ICD-10-CM

## 2019-01-29 LAB
ANION GAP SERPL CALC-SCNC: 9 MMOL/L — SIGNIFICANT CHANGE UP (ref 5–17)
APTT BLD: 33.5 SEC — SIGNIFICANT CHANGE UP (ref 28.5–37)
BUN SERPL-MCNC: 10 MG/DL — SIGNIFICANT CHANGE UP (ref 7–23)
CALCIUM SERPL-MCNC: 8.7 MG/DL — SIGNIFICANT CHANGE UP (ref 8.4–10.5)
CHLORIDE SERPL-SCNC: 105 MMOL/L — SIGNIFICANT CHANGE UP (ref 96–108)
CO2 SERPL-SCNC: 27 MMOL/L — SIGNIFICANT CHANGE UP (ref 22–31)
CREAT SERPL-MCNC: 0.94 MG/DL — SIGNIFICANT CHANGE UP (ref 0.5–1.3)
GLUCOSE SERPL-MCNC: 179 MG/DL — HIGH (ref 70–99)
HCT VFR BLD CALC: 35.1 % — SIGNIFICANT CHANGE UP (ref 34.5–45)
HGB BLD-MCNC: 11.6 G/DL — SIGNIFICANT CHANGE UP (ref 11.5–15.5)
INR BLD: 1.09 RATIO — SIGNIFICANT CHANGE UP (ref 0.88–1.16)
POTASSIUM SERPL-MCNC: 4.2 MMOL/L — SIGNIFICANT CHANGE UP (ref 3.5–5.3)
POTASSIUM SERPL-SCNC: 4.2 MMOL/L — SIGNIFICANT CHANGE UP (ref 3.5–5.3)
PROTHROM AB SERPL-ACNC: 11.9 SEC — SIGNIFICANT CHANGE UP (ref 10–12.9)
SODIUM SERPL-SCNC: 141 MMOL/L — SIGNIFICANT CHANGE UP (ref 135–145)

## 2019-01-29 PROCEDURE — 27447 TOTAL KNEE ARTHROPLASTY: CPT | Mod: LT

## 2019-01-29 PROCEDURE — 99223 1ST HOSP IP/OBS HIGH 75: CPT

## 2019-01-29 PROCEDURE — 93010 ELECTROCARDIOGRAM REPORT: CPT | Mod: 76

## 2019-01-29 PROCEDURE — 27447 TOTAL KNEE ARTHROPLASTY: CPT | Mod: AS,LT

## 2019-01-29 PROCEDURE — 88305 TISSUE EXAM BY PATHOLOGIST: CPT | Mod: 26

## 2019-01-29 PROCEDURE — 88311 DECALCIFY TISSUE: CPT | Mod: 26

## 2019-01-29 PROCEDURE — 73562 X-RAY EXAM OF KNEE 3: CPT | Mod: 26,LT

## 2019-01-29 RX ORDER — ACETAMINOPHEN 500 MG
1000 TABLET ORAL EVERY 12 HOURS
Qty: 0 | Refills: 0 | Status: DISCONTINUED | OUTPATIENT
Start: 2019-01-30 | End: 2019-02-04

## 2019-01-29 RX ORDER — ACETAMINOPHEN 500 MG
1000 TABLET ORAL EVERY 6 HOURS
Qty: 0 | Refills: 0 | Status: COMPLETED | OUTPATIENT
Start: 2019-01-29 | End: 2019-01-30

## 2019-01-29 RX ORDER — SODIUM CHLORIDE 9 MG/ML
1000 INJECTION, SOLUTION INTRAVENOUS
Qty: 0 | Refills: 0 | Status: DISCONTINUED | OUTPATIENT
Start: 2019-01-29 | End: 2019-01-29

## 2019-01-29 RX ORDER — CEFAZOLIN SODIUM 1 G
2000 VIAL (EA) INJECTION EVERY 8 HOURS
Qty: 0 | Refills: 0 | Status: COMPLETED | OUTPATIENT
Start: 2019-01-29 | End: 2019-01-30

## 2019-01-29 RX ORDER — VERAPAMIL HCL 240 MG
120 CAPSULE, EXTENDED RELEASE PELLETS 24 HR ORAL DAILY
Qty: 0 | Refills: 0 | Status: DISCONTINUED | OUTPATIENT
Start: 2019-01-29 | End: 2019-02-04

## 2019-01-29 RX ORDER — ASPIRIN/CALCIUM CARB/MAGNESIUM 324 MG
81 TABLET ORAL DAILY
Qty: 0 | Refills: 0 | Status: DISCONTINUED | OUTPATIENT
Start: 2019-01-30 | End: 2019-02-04

## 2019-01-29 RX ORDER — CEFAZOLIN SODIUM 1 G
2000 VIAL (EA) INJECTION ONCE
Qty: 0 | Refills: 0 | Status: COMPLETED | OUTPATIENT
Start: 2019-01-29 | End: 2019-01-29

## 2019-01-29 RX ORDER — MAGNESIUM HYDROXIDE 400 MG/1
30 TABLET, CHEWABLE ORAL DAILY
Qty: 0 | Refills: 0 | Status: DISCONTINUED | OUTPATIENT
Start: 2019-01-29 | End: 2019-02-04

## 2019-01-29 RX ORDER — ONDANSETRON 8 MG/1
4 TABLET, FILM COATED ORAL EVERY 6 HOURS
Qty: 0 | Refills: 0 | Status: DISCONTINUED | OUTPATIENT
Start: 2019-01-29 | End: 2019-02-04

## 2019-01-29 RX ORDER — CELECOXIB 200 MG/1
100 CAPSULE ORAL EVERY 12 HOURS
Qty: 0 | Refills: 0 | Status: DISCONTINUED | OUTPATIENT
Start: 2019-01-29 | End: 2019-02-04

## 2019-01-29 RX ORDER — HYDROMORPHONE HYDROCHLORIDE 2 MG/ML
4 INJECTION INTRAMUSCULAR; INTRAVENOUS; SUBCUTANEOUS
Qty: 0 | Refills: 0 | Status: DISCONTINUED | OUTPATIENT
Start: 2019-01-29 | End: 2019-02-04

## 2019-01-29 RX ORDER — HYDROMORPHONE HYDROCHLORIDE 2 MG/ML
0.5 INJECTION INTRAMUSCULAR; INTRAVENOUS; SUBCUTANEOUS
Qty: 0 | Refills: 0 | Status: DISCONTINUED | OUTPATIENT
Start: 2019-01-29 | End: 2019-01-29

## 2019-01-29 RX ORDER — ATORVASTATIN CALCIUM 80 MG/1
10 TABLET, FILM COATED ORAL AT BEDTIME
Qty: 0 | Refills: 0 | Status: DISCONTINUED | OUTPATIENT
Start: 2019-01-29 | End: 2019-02-04

## 2019-01-29 RX ORDER — CHLORHEXIDINE GLUCONATE 213 G/1000ML
1 SOLUTION TOPICAL ONCE
Qty: 0 | Refills: 0 | Status: COMPLETED | OUTPATIENT
Start: 2019-01-29 | End: 2019-01-29

## 2019-01-29 RX ORDER — POLYETHYLENE GLYCOL 3350 17 G/17G
17 POWDER, FOR SOLUTION ORAL DAILY
Qty: 0 | Refills: 0 | Status: DISCONTINUED | OUTPATIENT
Start: 2019-01-29 | End: 2019-02-04

## 2019-01-29 RX ORDER — PANTOPRAZOLE SODIUM 20 MG/1
40 TABLET, DELAYED RELEASE ORAL
Qty: 0 | Refills: 0 | Status: DISCONTINUED | OUTPATIENT
Start: 2019-01-29 | End: 2019-02-04

## 2019-01-29 RX ORDER — TRANEXAMIC ACID 100 MG/ML
1 INJECTION, SOLUTION INTRAVENOUS ONCE
Qty: 0 | Refills: 0 | Status: COMPLETED | OUTPATIENT
Start: 2019-01-29 | End: 2019-01-29

## 2019-01-29 RX ORDER — HYDROMORPHONE HYDROCHLORIDE 2 MG/ML
2 INJECTION INTRAMUSCULAR; INTRAVENOUS; SUBCUTANEOUS
Qty: 0 | Refills: 0 | Status: DISCONTINUED | OUTPATIENT
Start: 2019-01-29 | End: 2019-02-04

## 2019-01-29 RX ORDER — ACETAMINOPHEN 500 MG
1000 TABLET ORAL ONCE
Qty: 0 | Refills: 0 | Status: COMPLETED | OUTPATIENT
Start: 2019-01-29 | End: 2019-01-29

## 2019-01-29 RX ORDER — SODIUM CHLORIDE 9 MG/ML
1000 INJECTION, SOLUTION INTRAVENOUS
Qty: 0 | Refills: 0 | Status: DISCONTINUED | OUTPATIENT
Start: 2019-01-29 | End: 2019-02-02

## 2019-01-29 RX ORDER — WARFARIN SODIUM 2.5 MG/1
5 TABLET ORAL ONCE
Qty: 0 | Refills: 0 | Status: COMPLETED | OUTPATIENT
Start: 2019-01-29 | End: 2019-01-29

## 2019-01-29 RX ORDER — VERAPAMIL HCL 240 MG
120 CAPSULE, EXTENDED RELEASE PELLETS 24 HR ORAL DAILY
Qty: 0 | Refills: 0 | Status: DISCONTINUED | OUTPATIENT
Start: 2019-01-29 | End: 2019-01-29

## 2019-01-29 RX ORDER — ENOXAPARIN SODIUM 100 MG/ML
100 INJECTION SUBCUTANEOUS DAILY
Qty: 0 | Refills: 0 | Status: DISCONTINUED | OUTPATIENT
Start: 2019-01-31 | End: 2019-02-04

## 2019-01-29 RX ORDER — SENNA PLUS 8.6 MG/1
2 TABLET ORAL AT BEDTIME
Qty: 0 | Refills: 0 | Status: DISCONTINUED | OUTPATIENT
Start: 2019-01-29 | End: 2019-02-04

## 2019-01-29 RX ORDER — ENOXAPARIN SODIUM 100 MG/ML
30 INJECTION SUBCUTANEOUS EVERY 12 HOURS
Qty: 0 | Refills: 0 | Status: COMPLETED | OUTPATIENT
Start: 2019-01-29 | End: 2019-01-30

## 2019-01-29 RX ORDER — APREPITANT 80 MG/1
40 CAPSULE ORAL ONCE
Qty: 0 | Refills: 0 | Status: COMPLETED | OUTPATIENT
Start: 2019-01-29 | End: 2019-01-29

## 2019-01-29 RX ORDER — DOCUSATE SODIUM 100 MG
100 CAPSULE ORAL THREE TIMES A DAY
Qty: 0 | Refills: 0 | Status: DISCONTINUED | OUTPATIENT
Start: 2019-01-29 | End: 2019-02-04

## 2019-01-29 RX ORDER — HYDROMORPHONE HYDROCHLORIDE 2 MG/ML
0.5 INJECTION INTRAMUSCULAR; INTRAVENOUS; SUBCUTANEOUS
Qty: 0 | Refills: 0 | Status: DISCONTINUED | OUTPATIENT
Start: 2019-01-29 | End: 2019-02-04

## 2019-01-29 RX ADMIN — HYDROMORPHONE HYDROCHLORIDE 0.5 MILLIGRAM(S): 2 INJECTION INTRAMUSCULAR; INTRAVENOUS; SUBCUTANEOUS at 14:53

## 2019-01-29 RX ADMIN — HYDROMORPHONE HYDROCHLORIDE 0.5 MILLIGRAM(S): 2 INJECTION INTRAMUSCULAR; INTRAVENOUS; SUBCUTANEOUS at 20:55

## 2019-01-29 RX ADMIN — Medication 100 MILLIGRAM(S): at 21:33

## 2019-01-29 RX ADMIN — HYDROMORPHONE HYDROCHLORIDE 4 MILLIGRAM(S): 2 INJECTION INTRAMUSCULAR; INTRAVENOUS; SUBCUTANEOUS at 19:30

## 2019-01-29 RX ADMIN — Medication 400 MILLIGRAM(S): at 22:35

## 2019-01-29 RX ADMIN — HYDROMORPHONE HYDROCHLORIDE 0.5 MILLIGRAM(S): 2 INJECTION INTRAMUSCULAR; INTRAVENOUS; SUBCUTANEOUS at 15:00

## 2019-01-29 RX ADMIN — Medication 1000 MILLIGRAM(S): at 16:30

## 2019-01-29 RX ADMIN — HYDROMORPHONE HYDROCHLORIDE 0.5 MILLIGRAM(S): 2 INJECTION INTRAMUSCULAR; INTRAVENOUS; SUBCUTANEOUS at 13:01

## 2019-01-29 RX ADMIN — CELECOXIB 100 MILLIGRAM(S): 200 CAPSULE ORAL at 21:33

## 2019-01-29 RX ADMIN — SENNA PLUS 2 TABLET(S): 8.6 TABLET ORAL at 21:33

## 2019-01-29 RX ADMIN — SODIUM CHLORIDE 100 MILLILITER(S): 9 INJECTION, SOLUTION INTRAVENOUS at 17:27

## 2019-01-29 RX ADMIN — ENOXAPARIN SODIUM 30 MILLIGRAM(S): 100 INJECTION SUBCUTANEOUS at 21:33

## 2019-01-29 RX ADMIN — Medication 100 MILLIGRAM(S): at 18:52

## 2019-01-29 RX ADMIN — HYDROMORPHONE HYDROCHLORIDE 4 MILLIGRAM(S): 2 INJECTION INTRAMUSCULAR; INTRAVENOUS; SUBCUTANEOUS at 20:00

## 2019-01-29 RX ADMIN — WARFARIN SODIUM 5 MILLIGRAM(S): 2.5 TABLET ORAL at 22:35

## 2019-01-29 RX ADMIN — HYDROMORPHONE HYDROCHLORIDE 4 MILLIGRAM(S): 2 INJECTION INTRAMUSCULAR; INTRAVENOUS; SUBCUTANEOUS at 23:46

## 2019-01-29 RX ADMIN — SODIUM CHLORIDE 100 MILLILITER(S): 9 INJECTION, SOLUTION INTRAVENOUS at 14:14

## 2019-01-29 RX ADMIN — CHLORHEXIDINE GLUCONATE 1 APPLICATION(S): 213 SOLUTION TOPICAL at 07:48

## 2019-01-29 RX ADMIN — Medication 400 MILLIGRAM(S): at 15:45

## 2019-01-29 RX ADMIN — HYDROMORPHONE HYDROCHLORIDE 0.5 MILLIGRAM(S): 2 INJECTION INTRAMUSCULAR; INTRAVENOUS; SUBCUTANEOUS at 13:15

## 2019-01-29 RX ADMIN — HYDROMORPHONE HYDROCHLORIDE 0.5 MILLIGRAM(S): 2 INJECTION INTRAMUSCULAR; INTRAVENOUS; SUBCUTANEOUS at 14:14

## 2019-01-29 RX ADMIN — HYDROMORPHONE HYDROCHLORIDE 0.5 MILLIGRAM(S): 2 INJECTION INTRAMUSCULAR; INTRAVENOUS; SUBCUTANEOUS at 12:45

## 2019-01-29 RX ADMIN — HYDROMORPHONE HYDROCHLORIDE 0.5 MILLIGRAM(S): 2 INJECTION INTRAMUSCULAR; INTRAVENOUS; SUBCUTANEOUS at 14:30

## 2019-01-29 RX ADMIN — APREPITANT 40 MILLIGRAM(S): 80 CAPSULE ORAL at 07:48

## 2019-01-29 RX ADMIN — ATORVASTATIN CALCIUM 10 MILLIGRAM(S): 80 TABLET, FILM COATED ORAL at 21:32

## 2019-01-29 RX ADMIN — HYDROMORPHONE HYDROCHLORIDE 0.5 MILLIGRAM(S): 2 INJECTION INTRAMUSCULAR; INTRAVENOUS; SUBCUTANEOUS at 20:39

## 2019-01-29 NOTE — CONSULT NOTE ADULT - ASSESSMENT
Pt is a 75 yo female with pmh of DVT, gerd, herniated lumbar disc, PE x2 presenting for left knee replacement.    -Left total knee replacement:  POD: 0  Pain control  Bowel [employer]  PT/OT    -Hx pacemaker:  sp interrogation 10/2018. 8 year battery life documented.     -Hx of DVT/PEx2:  was on coumadin 5mg po daily. Coumadin was stopped on 1/23/19 and pt started on lovenox 100mg SQ daily on 1/27 and 1/28.     Tonight, pt to begin enoxaparin 30mg SC q12 and to cw to POD1. On POD2, to start enoxaparin 100mg SC daily as per pre-op hematologist.   Coumadin to be given on POD0, 2 hours after enoxaparin dose.     -DVT Ppx:  see above. Pt is a 75 yo female with pmh of DVT, gerd, herniated lumbar disc, PE x2 presenting for left knee replacement.    -Left total knee replacement:  POD: 0  Pain control  Bowel Regiment  PT/OT  Orthopedic noted.     Tachycardia post op:  Paroxysmal aflutter vs atrial fibrillation.   On telemetry. Cardiology Dr Wrad consulted.   Now nsr at 61bpm.     -Hx pacemaker:  sp interrogation 10/2018. 8 year battery life documented.     -Hx of DVT/PEx2:  was on coumadin 5mg po daily. Coumadin was stopped on 1/23/19 and pt started on lovenox 100mg SQ daily on 1/27 and 1/28.     Tonight, pt to begin enoxaparin 30mg SC q12 and to cw to POD1. On POD2, to start enoxaparin 100mg SC daily as per pre-op hematologist.   Coumadin to be given on POD0, 2 hours after enoxaparin dose.     -DVT Ppx:  see above. Pt is a 75 yo female with pmh of DVT, gerd, herniated lumbar disc, PE x2 presenting for left knee replacement.    -Left total knee replacement:  POD: 0  Pain control  Bowel Regiment  PT/OT  Orthopedic noted.     Tachycardia post op:  Paroxysmal aflutter vs atrial fibrillation. Discussed with cardio, tacycardia is artifiact. will monitor. cancelled cardiology consult. Will recall if needed.  Cardiology Dr Ward.   Now nsr at 61bpm.     -Hx pacemaker:  sp interrogation 10/2018. 8 year battery life documented.     -Hx of DVT/PEx2:  was on coumadin 5mg po daily. Coumadin was stopped on 1/23/19 and pt started on lovenox 100mg SQ daily on 1/27 and 1/28.   Tonight, pt to begin enoxaparin 30mg SC q12 and to cw to POD1. On POD2, to start enoxaparin 100mg SC daily as per pre-op hematologist.   Coumadin to be given on POD0, 2 hours after enoxaparin dose.     -Htn:  cw verapamil.     -DVT Ppx:  see above.

## 2019-01-29 NOTE — PROGRESS NOTE ADULT - SUBJECTIVE AND OBJECTIVE BOX
Orthopaedic Post Op Note    Procedure: Left TKR  Surgeon: Roland Kidd    74y Female comfortable, without complaints. Reported pain score = 5  Denies N/V, CP, SOB, numbness/tingling of extremities.    PE:  Vital Signs Last 24 Hrs  T(C): 36.5 (29 Jan 2019 16:00), Max: 36.9 (29 Jan 2019 07:19)  T(F): 97.7 (29 Jan 2019 16:00), Max: 98.4 (29 Jan 2019 07:19)  HR: 72 (29 Jan 2019 16:00) (52 - 109)  BP: 109/62 (29 Jan 2019 16:00) (105/64 - 140/68)  RR: 16 (29 Jan 2019 16:00) (10 - 23)  SpO2: 99% (29 Jan 2019 16:00) (98% - 100%)  General: Pt alert and oriented   Lungs: + BS CTA bilaterally  Heart: +S1 & S2 heard, RRR  Abd: + BS heard, soft, NT, ND  Left Knee Dressing: C/D/I, functioning hemovac drain in place  Bilateral LEs:  Motor:   5/5 dorsiflexion, plantarflexion, EHL  Sensation intact to LT   2+ DP Pulses  SCDs in place    A/P: 74y Female POD#0 s/p Left TKR  - Stable  - Acetaminophen, Celebrex, Dilaudid for Pain Control   - DVT ppx: Lovenox transitioning to Coumadin  - Maki op IV abx: Ancef  - PT, OT per protocol  - CPM  - F/U AM Labs  DCP = Home Thurs if PT/OT, medically cleared

## 2019-01-29 NOTE — PHYSICAL THERAPY INITIAL EVALUATION ADULT - PLANNED THERAPY INTERVENTIONS, PT EVAL
transfer training/gait training/5 steps with handrail and straight cane with supervision in 3-5 sessions

## 2019-01-29 NOTE — BRIEF OPERATIVE NOTE - PROCEDURE
<<-----Click on this checkbox to enter Procedure Total knee arthroplasty  01/29/2019  left total knee  Active  KAREN

## 2019-01-29 NOTE — CONSULT NOTE ADULT - SUBJECTIVE AND OBJECTIVE BOX
Patient is a 74y old  Female who presents for left knee replacement.     HPI:  Pt is a 75 yo female with pmh of DVT, gerd, herniated lumbar disc, PE x2 presenting for left knee replacement. Pt with hx of left knee pain for a few years. Notes pain is exacerbated recently and associated with swelling. Pain is worse with getting up from laying flat. Notes pain is a 9/10. Sp cortisone injections with no relief. Notes some relief with tylenol.     Today:      REVIEW OF SYSTEMS  General: no lethargy	  Skin/Breast: no rash  Ophthalmologic: no blurry vision  ENMT:	no sore throat, no ear pain  Respiratory and Thorax:	no sob, no cough, wheezing  Cardiovascular:	no chest pain, no palpitations, no lower extremity swelling  Gastrointestinal:	no nausea, no vomiting,   Genitourinary: no dysuria, no frequency	  Musculoskeletal:	 no muscle pain  Neurological: no weakness  Psychiatric: no anxiety	  Hematology/Lymphatics:	 no bruising  Endocrine: no increased thirst, no change in appetite    PAST MEDICAL & SURGICAL HISTORY:  History of pulmonary embolus (PE): 2012, 2018  BMI 30.0-30.9,adult  Lower back pain  Pacemaker: implanted 9/28/18  Osteoarthritis of left knee  Prothrombin gene mutation  GERD (gastroesophageal reflux disease)  DVT (deep venous thrombosis), right: october 2013  Herniated lumbar intervertebral disc  PE (pulmonary embolism): 3 yrs ago, was on Lovenox and coumadin  HTN - Hypertension  History of lumbar surgery: 2018  History of carpal tunnel surgery of right wrist: 1990&#x27;s  S/P tubal ligation: age 30  S/P arthroscopy of right knee: 2012  S/P Breast Lumpectomy: 2000&#x27;s, 2004    FAMILY HISTORY:  Family history of macular degeneration (Sibling)  Family history of CHF (congestive heart failure) (Child)  Family history of renal failure (Child)  Family history of prothrombin gene mutation: first cousin  Family history of heart disease (Father): father    SOCIAL HISTORY:  Tobacco: denies  Etoh: denies  Drug use: denies    Allergies:  No Known Allergies    Home Medications:  Lipitor 10 mg oral tablet: 1 tab(s) orally once a day (at bedtime) (29 Jan 2019 07:45)  omeprazole 40 mg oral delayed release capsule: 1 cap(s) orally once a day (29 Jan 2019 07:45)  verapamil 120 mg oral tablet: 1 tab(s) orally once a day (29 Jan 2019 07:45)  warfarin 5 mg oral tablet: 1 tab(s) orally once a day (29 Jan 2019 07:45)    EXAM:  Vital Signs Last 24 Hrs  T(C): 36.9 (29 Jan 2019 07:19), Max: 36.9 (29 Jan 2019 07:19)  T(F): 98.4 (29 Jan 2019 07:19), Max: 98.4 (29 Jan 2019 07:19)  HR: 77 (29 Jan 2019 10:00) (64 - 87)  BP: 126/64 (29 Jan 2019 10:00) (105/65 - 140/68)  BP(mean): --  RR: 13 (29 Jan 2019 10:00) (13 - 17)  SpO2: 100% (29 Jan 2019 07:19) (100% - 100%)    PHYSICAL EXAM:  Constitutional: awake sleeping in stretcher chair.   Eyes: eomi, perrla.  ENMT: patent nares, moist mucus membranes  Neck: supple  Breasts: deferred  Back: non tender. no scoliosis.   Respiratory: bilaterally clear to auscultation, no wheezing, no rhonchi, no crackles, no decreased air entry  Cardiovascular: s1s2, rrr, no murmurs.   Gastrointestinal: soft, non tender, +bowel sounds, no rebound, no guarding.   Genitourinary: deferred  Rectal: deferred   Extremities: no edema.   Vascular:   Neurological: alert and oriented to person, date and place.   Skin: intact no rash, warm to touch.   Lymph Nodes:  Musculoskeletal: moves all 4 extremities  Psychiatric: no homicidal, suicidal ideation. appropriate affect.     LABS:  PT/INR - ( 29 Jan 2019 07:38 )   PT: 11.9 sec;   INR: 1.09 ratio     PTT - ( 29 Jan 2019 07:38 )  PTT:33.5 sec    Chart review:  Labs reviewed.  Xray reviewed.  History and physical reviewed. Patient is a 74y old  Female who presents for left knee replacement.     HPI:  Pt is a 73 yo female with pmh of DVT, gerd, herniated lumbar disc, PE x2 presenting for left knee replacement. Pt with hx of left knee pain for a few years. Notes pain is exacerbated recently and associated with swelling. Pain is worse with getting up from laying flat. Notes pain is a 9/10. Sp cortisone injections with no relief. Notes some relief with tylenol.     Today:  Pt wtih sudden onset tachycardia to 180-190's on telemetry. Pt c/o 10/10 excruciating pain. IV pain medication given. Pt denies palpitations, chest pain, sob. Pt states she has a pacemaker.     REVIEW OF SYSTEMS  General: +ve lethargy	  Skin/Breast: no rash  Ophthalmologic: no blurry vision  ENMT:	no sore throat, no ear pain  Respiratory and Thorax:	no sob, no cough, wheezing  Cardiovascular:	no chest pain, no palpitations, no lower extremity swelling  Gastrointestinal:	no nausea, no vomiting,   Genitourinary: no dysuria, no frequency	  Musculoskeletal:	 no muscle pain  Neurological: no weakness  Psychiatric: no anxiety	  Hematology/Lymphatics:	 no bruising  Endocrine: no increased thirst, no change in appetite    PAST MEDICAL & SURGICAL HISTORY:  History of pulmonary embolus (PE): 2012, 2018  BMI 30.0-30.9,adult  Lower back pain  Pacemaker: implanted 9/28/18  Osteoarthritis of left knee  Prothrombin gene mutation  GERD (gastroesophageal reflux disease)  DVT (deep venous thrombosis), right: october 2013  Herniated lumbar intervertebral disc  PE (pulmonary embolism): 3 yrs ago, was on Lovenox and coumadin  HTN - Hypertension  History of lumbar surgery: 2018  History of carpal tunnel surgery of right wrist: 1990&#x27;s  S/P tubal ligation: age 30  S/P arthroscopy of right knee: 2012  S/P Breast Lumpectomy: 2000&#x27;s, 2004    FAMILY HISTORY:  Family history of macular degeneration (Sibling)  Family history of CHF (congestive heart failure) (Child)  Family history of renal failure (Child)  Family history of prothrombin gene mutation: first cousin  Family history of heart disease (Father): father    SOCIAL HISTORY:  Tobacco: denies  Etoh: denies  Drug use: denies    Allergies:  No Known Allergies    Home Medications:  Lipitor 10 mg oral tablet: 1 tab(s) orally once a day (at bedtime) (29 Jan 2019 07:45)  omeprazole 40 mg oral delayed release capsule: 1 cap(s) orally once a day (29 Jan 2019 07:45)  verapamil 120 mg oral tablet: 1 tab(s) orally once a day (29 Jan 2019 07:45)  warfarin 5 mg oral tablet: 1 tab(s) orally once a day (29 Jan 2019 07:45)    EXAM:  Vital Signs Last 24 Hrs  T(C): 36.9 (29 Jan 2019 07:19), Max: 36.9 (29 Jan 2019 07:19)  T(F): 98.4 (29 Jan 2019 07:19), Max: 98.4 (29 Jan 2019 07:19)  HR: 77 (29 Jan 2019 10:00) (64 - 87)  BP: 126/64 (29 Jan 2019 10:00) (105/65 - 140/68)  BP(mean): --  RR: 13 (29 Jan 2019 10:00) (13 - 17)  SpO2: 100% (29 Jan 2019 07:19) (100% - 100%)    PHYSICAL EXAM:  Constitutional: awake sleeping in stretcher chair.   Eyes: eomi  ENMT: patent nares, moist mucus membranes  Neck: supple  Respiratory: bilaterally clear to auscultation, no wheezing, no rhonchi, no crackles, no decreased air entry  Cardiovascular: s1s2, rrr, no murmurs.   Gastrointestinal: soft, non tender, +bowel sounds, no rebound, no guarding.   Extremities:   Neurological: alert and oriented to person, date and place.   Skin: no rash, warm to touch.   Psychiatric: lethargic    LABS:  PT/INR - ( 29 Jan 2019 07:38 )   PT: 11.9 sec;   INR: 1.09 ratio     PTT - ( 29 Jan 2019 07:38 )  PTT:33.5 sec    Chart review:  Labs reviewed.  Xray reviewed.  History and physical reviewed. Patient is a 74y old  Female who presents for left knee replacement.     HPI:  Pt is a 75 yo female with pmh of DVT, gerd, herniated lumbar disc, PE x2 presenting for left knee replacement. Pt with hx of left knee pain for a few years. Notes pain is exacerbated recently and associated with swelling. Pain is worse with getting up from laying flat. Notes pain is a 9/10. Sp cortisone injections with no relief. Notes some relief with tylenol.     Today:  Pt wtih sudden onset tachycardia to 180-190's on telemetry. Pt c/o 10/10 excruciating pain. IV pain medication given. Pt denies palpitations, chest pain, sob. Pt states she has a pacemaker.     REVIEW OF SYSTEMS  General: +ve lethargy	  Skin/Breast: no rash  Ophthalmologic: no blurry vision  ENMT:	no sore throat, no ear pain  Respiratory and Thorax:	no sob, no cough, wheezing  Cardiovascular:	no chest pain, no palpitations, no lower extremity swelling  Gastrointestinal:	no nausea, no vomiting,   Genitourinary: no dysuria, no frequency	  Musculoskeletal:	 no muscle pain  Neurological: no weakness  Psychiatric: no anxiety	  Hematology/Lymphatics:	 no bruising  Endocrine: no increased thirst, no change in appetite    PAST MEDICAL & SURGICAL HISTORY:  History of pulmonary embolus (PE): 2012, 2018  BMI 30.0-30.9,adult  Lower back pain  Pacemaker: implanted 9/28/18  Osteoarthritis of left knee  Prothrombin gene mutation  GERD (gastroesophageal reflux disease)  DVT (deep venous thrombosis), right: october 2013  Herniated lumbar intervertebral disc  PE (pulmonary embolism): 3 yrs ago, was on Lovenox and coumadin  HTN - Hypertension  History of lumbar surgery: 2018  History of carpal tunnel surgery of right wrist: 1990&#x27;s  S/P tubal ligation: age 30  S/P arthroscopy of right knee: 2012  S/P Breast Lumpectomy: 2000&#x27;s, 2004    FAMILY HISTORY:  Family history of macular degeneration (Sibling)  Family history of CHF (congestive heart failure) (Child)  Family history of renal failure (Child)  Family history of prothrombin gene mutation: first cousin  Family history of heart disease (Father): father    SOCIAL HISTORY:  Tobacco: denies  Etoh: denies  Drug use: denies    Allergies:  No Known Allergies    Home Medications:  Lipitor 10 mg oral tablet: 1 tab(s) orally once a day (at bedtime) (29 Jan 2019 07:45)  omeprazole 40 mg oral delayed release capsule: 1 cap(s) orally once a day (29 Jan 2019 07:45)  verapamil 120 mg oral tablet: 1 tab(s) orally once a day (29 Jan 2019 07:45)  warfarin 5 mg oral tablet: 1 tab(s) orally once a day (29 Jan 2019 07:45)    EXAM:  Vital Signs Last 24 Hrs  T(C): 36.9 (29 Jan 2019 07:19), Max: 36.9 (29 Jan 2019 07:19)  T(F): 98.4 (29 Jan 2019 07:19), Max: 98.4 (29 Jan 2019 07:19)  HR: 77 (29 Jan 2019 10:00) (64 - 87)  BP: 126/64 (29 Jan 2019 10:00) (105/65 - 140/68)  BP(mean): --  RR: 13 (29 Jan 2019 10:00) (13 - 17)  SpO2: 100% (29 Jan 2019 07:19) (100% - 100%)    PHYSICAL EXAM:  Constitutional: awake sleeping in stretcher chair.   Eyes: eomi  ENMT: patent nares, moist mucus membranes  Neck: supple  Respiratory: bilaterally clear to auscultation, no wheezing, no rhonchi, no crackles, no decreased air entry  Cardiovascular: s1s2, rrr, no murmurs.   Gastrointestinal: soft, non tender, +bowel sounds, no rebound, no guarding.   Extremities: LLE with ace dressing and hemovac  Neurological: alert and oriented to person, date and place.   Skin: no rash, warm to touch.   Psychiatric: lethargic    LABS:  PT/INR - ( 29 Jan 2019 07:38 )   PT: 11.9 sec;   INR: 1.09 ratio     PTT - ( 29 Jan 2019 07:38 )  PTT:33.5 sec    Chart review:  Labs reviewed.  Xray reviewed.  History and physical reviewed.

## 2019-01-30 ENCOUNTER — TRANSCRIPTION ENCOUNTER (OUTPATIENT)
Age: 74
End: 2019-01-30

## 2019-01-30 LAB
ANION GAP SERPL CALC-SCNC: 8 MMOL/L — SIGNIFICANT CHANGE UP (ref 5–17)
BUN SERPL-MCNC: 8 MG/DL — SIGNIFICANT CHANGE UP (ref 7–23)
CALCIUM SERPL-MCNC: 8.2 MG/DL — LOW (ref 8.4–10.5)
CHLORIDE SERPL-SCNC: 103 MMOL/L — SIGNIFICANT CHANGE UP (ref 96–108)
CO2 SERPL-SCNC: 28 MMOL/L — SIGNIFICANT CHANGE UP (ref 22–31)
CREAT SERPL-MCNC: 0.81 MG/DL — SIGNIFICANT CHANGE UP (ref 0.5–1.3)
GLUCOSE SERPL-MCNC: 100 MG/DL — HIGH (ref 70–99)
HCT VFR BLD CALC: 31.7 % — LOW (ref 34.5–45)
HGB BLD-MCNC: 10.6 G/DL — LOW (ref 11.5–15.5)
MCHC RBC-ENTMCNC: 30.3 PG — SIGNIFICANT CHANGE UP (ref 27–34)
MCHC RBC-ENTMCNC: 33.4 GM/DL — SIGNIFICANT CHANGE UP (ref 32–36)
MCV RBC AUTO: 90.6 FL — SIGNIFICANT CHANGE UP (ref 80–100)
NRBC # BLD: 0 /100 WBCS — SIGNIFICANT CHANGE UP (ref 0–0)
PLATELET # BLD AUTO: 134 K/UL — LOW (ref 150–400)
POTASSIUM SERPL-MCNC: 3.5 MMOL/L — SIGNIFICANT CHANGE UP (ref 3.5–5.3)
POTASSIUM SERPL-SCNC: 3.5 MMOL/L — SIGNIFICANT CHANGE UP (ref 3.5–5.3)
RBC # BLD: 3.5 M/UL — LOW (ref 3.8–5.2)
RBC # FLD: 12.6 % — SIGNIFICANT CHANGE UP (ref 10.3–14.5)
SODIUM SERPL-SCNC: 139 MMOL/L — SIGNIFICANT CHANGE UP (ref 135–145)
WBC # BLD: 7.15 K/UL — SIGNIFICANT CHANGE UP (ref 3.8–10.5)
WBC # FLD AUTO: 7.15 K/UL — SIGNIFICANT CHANGE UP (ref 3.8–10.5)

## 2019-01-30 PROCEDURE — 99233 SBSQ HOSP IP/OBS HIGH 50: CPT

## 2019-01-30 RX ORDER — ACETAMINOPHEN 500 MG
2 TABLET ORAL
Qty: 0 | Refills: 0 | DISCHARGE
Start: 2019-01-30 | End: 2019-02-12

## 2019-01-30 RX ORDER — DOCUSATE SODIUM 100 MG
1 CAPSULE ORAL
Qty: 0 | Refills: 0 | DISCHARGE
Start: 2019-01-30

## 2019-01-30 RX ORDER — WARFARIN SODIUM 2.5 MG/1
5 TABLET ORAL ONCE
Qty: 0 | Refills: 0 | Status: COMPLETED | OUTPATIENT
Start: 2019-01-30 | End: 2019-01-30

## 2019-01-30 RX ORDER — CELECOXIB 200 MG/1
1 CAPSULE ORAL
Qty: 60 | Refills: 0 | OUTPATIENT
Start: 2019-01-30 | End: 2019-02-28

## 2019-01-30 RX ORDER — POLYETHYLENE GLYCOL 3350 17 G/17G
17 POWDER, FOR SOLUTION ORAL
Qty: 0 | Refills: 0 | DISCHARGE
Start: 2019-01-30

## 2019-01-30 RX ORDER — SENNA PLUS 8.6 MG/1
2 TABLET ORAL
Qty: 0 | Refills: 0 | DISCHARGE
Start: 2019-01-30

## 2019-01-30 RX ADMIN — Medication 120 MILLIGRAM(S): at 05:15

## 2019-01-30 RX ADMIN — Medication 1000 MILLIGRAM(S): at 09:33

## 2019-01-30 RX ADMIN — HYDROMORPHONE HYDROCHLORIDE 4 MILLIGRAM(S): 2 INJECTION INTRAMUSCULAR; INTRAVENOUS; SUBCUTANEOUS at 18:41

## 2019-01-30 RX ADMIN — HYDROMORPHONE HYDROCHLORIDE 4 MILLIGRAM(S): 2 INJECTION INTRAMUSCULAR; INTRAVENOUS; SUBCUTANEOUS at 00:25

## 2019-01-30 RX ADMIN — HYDROMORPHONE HYDROCHLORIDE 4 MILLIGRAM(S): 2 INJECTION INTRAMUSCULAR; INTRAVENOUS; SUBCUTANEOUS at 10:38

## 2019-01-30 RX ADMIN — PANTOPRAZOLE SODIUM 40 MILLIGRAM(S): 20 TABLET, DELAYED RELEASE ORAL at 05:15

## 2019-01-30 RX ADMIN — ENOXAPARIN SODIUM 30 MILLIGRAM(S): 100 INJECTION SUBCUTANEOUS at 21:28

## 2019-01-30 RX ADMIN — Medication 81 MILLIGRAM(S): at 05:14

## 2019-01-30 RX ADMIN — CELECOXIB 100 MILLIGRAM(S): 200 CAPSULE ORAL at 21:28

## 2019-01-30 RX ADMIN — CELECOXIB 100 MILLIGRAM(S): 200 CAPSULE ORAL at 09:31

## 2019-01-30 RX ADMIN — ENOXAPARIN SODIUM 30 MILLIGRAM(S): 100 INJECTION SUBCUTANEOUS at 09:32

## 2019-01-30 RX ADMIN — Medication 1000 MILLIGRAM(S): at 09:32

## 2019-01-30 RX ADMIN — Medication 100 MILLIGRAM(S): at 02:00

## 2019-01-30 RX ADMIN — HYDROMORPHONE HYDROCHLORIDE 4 MILLIGRAM(S): 2 INJECTION INTRAMUSCULAR; INTRAVENOUS; SUBCUTANEOUS at 19:11

## 2019-01-30 RX ADMIN — Medication 100 MILLIGRAM(S): at 21:28

## 2019-01-30 RX ADMIN — HYDROMORPHONE HYDROCHLORIDE 4 MILLIGRAM(S): 2 INJECTION INTRAMUSCULAR; INTRAVENOUS; SUBCUTANEOUS at 11:30

## 2019-01-30 RX ADMIN — HYDROMORPHONE HYDROCHLORIDE 4 MILLIGRAM(S): 2 INJECTION INTRAMUSCULAR; INTRAVENOUS; SUBCUTANEOUS at 05:14

## 2019-01-30 RX ADMIN — Medication 1000 MILLIGRAM(S): at 21:29

## 2019-01-30 RX ADMIN — Medication 100 MILLIGRAM(S): at 13:38

## 2019-01-30 RX ADMIN — WARFARIN SODIUM 5 MILLIGRAM(S): 2.5 TABLET ORAL at 21:27

## 2019-01-30 RX ADMIN — HYDROMORPHONE HYDROCHLORIDE 4 MILLIGRAM(S): 2 INJECTION INTRAMUSCULAR; INTRAVENOUS; SUBCUTANEOUS at 05:45

## 2019-01-30 RX ADMIN — ATORVASTATIN CALCIUM 10 MILLIGRAM(S): 80 TABLET, FILM COATED ORAL at 21:27

## 2019-01-30 RX ADMIN — Medication 100 MILLIGRAM(S): at 05:14

## 2019-01-30 RX ADMIN — CELECOXIB 100 MILLIGRAM(S): 200 CAPSULE ORAL at 21:27

## 2019-01-30 RX ADMIN — CELECOXIB 100 MILLIGRAM(S): 200 CAPSULE ORAL at 09:33

## 2019-01-30 RX ADMIN — SODIUM CHLORIDE 100 MILLILITER(S): 9 INJECTION, SOLUTION INTRAVENOUS at 05:16

## 2019-01-30 RX ADMIN — Medication 400 MILLIGRAM(S): at 03:49

## 2019-01-30 NOTE — DIETITIAN INITIAL EVALUATION ADULT. - OTHER INFO
Pt is a 73yo female arrived from home (lives with ) admitted for scheduled left TKR 1/29. Per EMR, PMH includes DVT 2013, GERD, Herniated lumbar intervertebral disc, PE in 2012, 2018, HTN, lower back pain, osteoarthritis of left knee, pacemaker since 9/28/18, prothrombin gene mutation. Pt states she has no difficulty chewing/swallowing, but appetite has been decreased since being in hospital. PTA, pt states her appetite was excellent & she loves to eat. Loves cooking Greek and Italian food, chicken, steak, beans. Pt is on coumadin, and oral and written education was provided re coumadin/vitamin K relationship and food choices. Pt verbalized feeling confident in her understanding of keeping vit K intake consistent and what foods contained high amounts of vit K. Pt. states wt for the past few years has remained between 148-152lb. NKFA, diet order is Regular. Pt states the amount of food she consumes varies, but that it's been between % of her trays. Fluid intake was encouraged, as pt says she normally just consumes 3-4 cups coffee with dash of whole milk in a day. Per EMR, skin is WDL and no pressure ulcers noted at this time. No ETOH/drug/tobacco use. Pt is a 75yo female arrived from home (lives with ) admitted for scheduled left TKR 1/29. Per EMR, PMH includes DVT 2013, GERD, Herniated lumbar intervertebral disc, PE in 2012, 2018, HTN, lower back pain, osteoarthritis of left knee, pacemaker since 9/28/18, prothrombin gene mutation. Pt states she has no difficulty chewing/swallowing, but appetite has been decreased since being in hospital. PTA, pt states her appetite was excellent & she tends to overeat, especially on bread. Loves cooking Greek and Italian food, chicken, steak, beans. Pt is on coumadin, and oral and written education was provided re coumadin/vitamin K relationship and food choices. Pt verbalized feeling confident in her understanding of keeping vit K intake consistent and what foods contained high amounts of vit K. Pt. states wt for the past few years has remained between 148-152lb. NKFA, diet order is Regular. Pt states the amount of food she consumes varies, but that it's been between % of her trays. Fluid intake was encouraged, as pt says she normally just consumes 3-4 cups coffee with dash of whole milk in a day. Per EMR, skin is WDL and no pressure ulcers noted at this time. No ETOH/drug/tobacco use.

## 2019-01-30 NOTE — DISCHARGE NOTE ADULT - PLAN OF CARE
Improve ambulation, ADLs and quality of life Physical Therapy/Occupational Therapy for ambulation, transfers, stairs, ADL's, Range of Motion Exercises, Isometrics.  Full weight bearing as tolerated with rolling walker  Range of Motion Goals: Flexion 120 degrees; Extension 0 degrees  Keep incision clean and dry.  Suture/prineo dressing removal 14 days after surgery at rehab facility or Surgeon's office  May shower post-op day #5 if no drainage from incision  - Call your doctor if you experience:  • An increase in pain not controlled by pain medication or change in activity or position.  • Temperature greater than 101° F.  • Redness, increased swelling or foul smelling drainage from or around the incision.  • Numbness, tingling or a change in color or temperature of the operative leg.  • Call your doctor immediately if you experience chest pain, shortness of breath or calf pain. See below Physical Therapy/Occupational Therapy for ambulation, transfers, stairs, ADL's, Range of Motion Exercises, Isometrics.  Full weight bearing as tolerated with rolling walker  Range of Motion Goals: Flexion 120 degrees; Extension 0 degrees  Use CPM 2 hours 2 times a day. Start daily @ 6AM.  Settings: Extension = 0; Flexion now at 70 deg. ; advance 5-10deg. daily to goal 120 deg.  Consider Pain meds prior to CPM; Apply ice to knee Post CPM  Keep incision clean and dry.  Dressing with ABD pad daily till drainage/bleeding stops.  May shower post-op day #5 if no drainage from incision  Coumadin daily, INR daily or per [place] MD. Stop Lovenox when INR therapeutic x 2 days straight.  Suture  removal 14 days after surgery at rehab facility or Surgeon's office  - Call your doctor if you experience:  • An increase in pain not controlled by pain medication or change in activity or position.  • Temperature greater than 101° F.  • Redness, increased swelling or foul smelling drainage from or around the incision.  • Numbness, tingling or a change in color or temperature of the operative leg.  • Call your doctor immediately if you experience chest pain, shortness of breath or calf pain.  follow up with Dr. Kidd 2 weeks post-operatively

## 2019-01-30 NOTE — OCCUPATIONAL THERAPY INITIAL EVALUATION ADULT - ORIENTATION, REHAB EVAL
oriented to person, place, time and situation/Patient educated verbally regarding LE weight bearing status, d/c plan, DME needs & role of OT. Pt. provided with education folder including functional exercises, TKR education & caregiver guide pamphlet.

## 2019-01-30 NOTE — DISCHARGE NOTE ADULT - HOME CARE AGENCY
[facility] - [phone]/ [phone]  Registered Nurse to visit the day after hospital discharge; Physical Therapist to follow. Please contact the home care agency at the above phone number if you have not heard from them by 12 noon on the day after your hospital discharge.

## 2019-01-30 NOTE — DISCHARGE NOTE ADULT - CARE PLAN
Principal Discharge DX:	Primary osteoarthritis of left knee  Goal:	Improve ambulation, ADLs and quality of life  Assessment and plan of treatment:	Physical Therapy/Occupational Therapy for ambulation, transfers, stairs, ADL's, Range of Motion Exercises, Isometrics.  Full weight bearing as tolerated with rolling walker  Range of Motion Goals: Flexion 120 degrees; Extension 0 degrees  Keep incision clean and dry.  Suture/prineo dressing removal 14 days after surgery at rehab facility or Surgeon's office  May shower post-op day #5 if no drainage from incision  - Call your doctor if you experience:  • An increase in pain not controlled by pain medication or change in activity or position.  • Temperature greater than 101° F.  • Redness, increased swelling or foul smelling drainage from or around the incision.  • Numbness, tingling or a change in color or temperature of the operative leg.  • Call your doctor immediately if you experience chest pain, shortness of breath or calf pain. Principal Discharge DX:	Primary osteoarthritis of left knee  Goal:	Improve ambulation, ADLs and quality of life  Assessment and plan of treatment:	See below Principal Discharge DX:	Primary osteoarthritis of left knee  Goal:	Improve ambulation, ADLs and quality of life  Assessment and plan of treatment:	Physical Therapy/Occupational Therapy for ambulation, transfers, stairs, ADL's, Range of Motion Exercises, Isometrics.  Full weight bearing as tolerated with rolling walker  Range of Motion Goals: Flexion 120 degrees; Extension 0 degrees  Use CPM 2 hours 2 times a day. Start daily @ 6AM.  Settings: Extension = 0; Flexion now at 70 deg. ; advance 5-10deg. daily to goal 120 deg.  Consider Pain meds prior to CPM; Apply ice to knee Post CPM  Keep incision clean and dry.  Dressing with ABD pad daily till drainage/bleeding stops.  May shower post-op day #5 if no drainage from incision  Coumadin daily, INR daily or per House MD. Stop Lovenox when INR therapeutic x 2 days straight.  Suture  removal 14 days after surgery at rehab facility or Surgeon's office  - Call your doctor if you experience:  • An increase in pain not controlled by pain medication or change in activity or position.  • Temperature greater than 101° F.  • Redness, increased swelling or foul smelling drainage from or around the incision.  • Numbness, tingling or a change in color or temperature of the operative leg.  • Call your doctor immediately if you experience chest pain, shortness of breath or calf pain.  follow up with Dr. Kidd 2 weeks post-operatively

## 2019-01-30 NOTE — DISCHARGE NOTE ADULT - HOSPITAL COURSE
This patient was admitted to [facility] with a history of severe degenerative joint disease of the left knee.  Patient went to Pre-Surgical Testing at [facility] and was medically cleared to undergo elective procedure. Patient underwent Left TKR by Dr. Roland Kidd on 1/29/19. Procedure was well tolerated.  No operative or lucy-operative complications arose during patients hospital course.  Patient received antibiotic according to SCIP guidelines for infection prevention. Lovenox and Coumadin were given for DVT prophylaxis.  Anesthesia, Medical Hospitalist, Physical Therapy and Occupational Therapy were consulted. Patient is stable for discharge with a good prognosis.  Appropriate discharge instructions and medications are provided in this document. This patient was admitted to [facility] with a history of severe degenerative joint disease of the left knee.  Patient went to Pre-Surgical Testing at [facility] and was medically cleared to undergo elective procedure. Patient underwent Left TKR by Dr. Roland Kidd on 1/29/19. Procedure was well tolerated.  No operative or lucy-operative complications arose during patients hospital course.  Patient received antibiotic according to SCIP guidelines for infection prevention. Lovenox and Coumadin were given for DVT prophylaxis.  Anesthesia, Medical Hospitalist, Physical Therapy and Occupational Therapy were consulted. Hemovac removed on POD 2. Clean incision with no SSI. Stable Neurovascular exam of LE. Patient is stable for discharge with a good prognosis.  Appropriate discharge instructions and medications are provided in this document. D/C plan for Sub Acute Rehab - [facility]

## 2019-01-30 NOTE — DISCHARGE NOTE ADULT - INSTRUCTIONS
none  For Constipation :   • Increase your water intake. Drink at least 8 glasses of water daily.  • Try adding fiber to your diet by eating fruits, vegetables and foods that are rich in grains.  • If you do experience constipation, you may take an over-the-counter stool softener/laxative such as Maki Colace, [place] or  Milk of Magnesia.

## 2019-01-30 NOTE — DISCHARGE NOTE ADULT - PATIENT PORTAL LINK FT
You can access the [facility] Patient Portal, offered by [facility], by registering with the following website: [email or web address]

## 2019-01-30 NOTE — PROGRESS NOTE ADULT - SUBJECTIVE AND OBJECTIVE BOX
ORTHOPEDIC ATTENDING PROGRESS NOTE  JOHNSON BIANCHI      74y Female                                                                                                                               POD # 1       STATUS POST:               Pre-Op Dx: Primary osteoarthritis of left knee    Post-Op Dx:  Primary osteoarthritis of left knee    Procedure: Total knee arthroplasty: left total knee                                                Pain (0-10):  Pt reports  Current Pain Management:  [ ] PCA   [x ] Po Analgesics [ ] IM /IV Anagesics     T(F): 98.2  HR: 61  BP: 133/75  RR: 16  SpO2: 100%                         10.6   7.15  )-----------( 134      ( 30 Jan 2019 07:24 )             31.7         01-29    141  |  105  |  10  ----------------------------<  179<H>  4.2   |  27  |  0.94    Ca    8.7      29 Jan 2019 20:57      Physical Exam :    -  Dressing C/D/I.   -   Distal Neurvascular status intact grossly.   -   Warm well perfused; capillary refill <3 seconds   -   (+)EHL/FHL   -   (+) Sensation to light touch  -   (-) Calf tenderness Bilaterally    A/P: 74y Female s/p Total knee arthroplasty: left total knee     -   Ortho Stable  -   Pain control   -   Medicine to follow  -   DVT ppx:     [ ]SCDs     [ ] ASA     [ ] Eliquis     [x ] Lovenox/Coumadin   -   Weight bearing status:  WBAT [x ]        PWB    [ ]     TTWB  [ ]      NWB  [ ]   -  Dispo:     Home [x ]     Acute Rehab [ ]     JEFFRY [ ]     TBD [ ]

## 2019-01-30 NOTE — PROGRESS NOTE ADULT - SUBJECTIVE AND OBJECTIVE BOX
Patient is a 74y old who is S/P Left TKR on 01/29/2019.     INTERVAL HPI/OVERNIGHT EVENTS: Patient complains of pain and inability to sleep for past several days. Pain is located to the left operated knee and rated as 9/10. Fearful of taking pain medications.     MEDICATIONS  (STANDING):  acetaminophen   Tablet .. 1000 milliGRAM(s) Oral every 12 hours  aspirin enteric coated 81 milliGRAM(s) Oral daily  atorvastatin 10 milliGRAM(s) Oral at bedtime  celecoxib 100 milliGRAM(s) Oral every 12 hours  docusate sodium 100 milliGRAM(s) Oral three times a day  enoxaparin Injectable 30 milliGRAM(s) SubCutaneous every 12 hours  lactated ringers. 1000 milliLiter(s) (100 mL/Hr) IV Continuous <Continuous>  pantoprazole    Tablet 40 milliGRAM(s) Oral before breakfast  verapamil  milliGRAM(s) Oral daily    MEDICATIONS  (PRN):  aluminum hydroxide/magnesium hydroxide/simethicone Suspension 30 milliLiter(s) Oral four times a day PRN Indigestion  HYDROmorphone   Tablet 2 milliGRAM(s) Oral every 3 hours PRN Mild Pain (1 - 3)  HYDROmorphone   Tablet 4 milliGRAM(s) Oral every 3 hours PRN Moderate Pain (4 - 6)  HYDROmorphone  Injectable 0.5 milliGRAM(s) IV Push every 3 hours PRN Severe Pain (7 - 10)  magnesium hydroxide Suspension 30 milliLiter(s) Oral daily PRN Constipation  ondansetron Injectable 4 milliGRAM(s) IV Push every 6 hours PRN Nausea and/or Vomiting  polyethylene glycol 3350 17 Gram(s) Oral daily PRN Constipation  senna 2 Tablet(s) Oral at bedtime PRN Constipation      Allergies    No Known Allergies    Intolerances        REVIEW OF SYSTEMS:  CONSTITUTIONAL: No fever, weight loss, or fatigue  EYES: No eye pain, visual disturbances, or discharge  ENMT:  No difficulty hearing, tinnitus, vertigo; No sinus or throat pain  NECK: No pain or stiffness  BREASTS: No pain, masses, or nipple discharge  RESPIRATORY: No cough, wheezing, chills or hemoptysis; No shortness of breath  CARDIOVASCULAR: No chest pain, palpitations, lightheadedness, or leg swelling  GASTROINTESTINAL: No abdominal or epigastric pain. No nausea, vomiting, or hematemesis; No diarrhea or constipation. No melena or hematochezia.  GENITOURINARY: No dysuria, frequency, hematuria, or incontinence  NEUROLOGICAL: No headaches, memory loss, vertigo, loss of strength, numbness, or tremors  SKIN: No itching, burning, rashes, or lesions   LYMPH NODES: No enlarged glands  ENDOCRINE: No heat or cold intolerance; No hair loss; No polydipsia or polyuria  MUSCULOSKELETAL: No joint pain or swelling; No muscle, back, or extremity pain  PSYCHIATRIC: No depression, anxiety, or mood swings  HEME/LYMPH: No easy bruising, or bleeding gums  ALLERGY AND IMMUNOLOGIC: No hives or eczema    Vital Signs Last 24 Hrs  T(C): 36.8 (30 Jan 2019 07:49), Max: 37.1 (29 Jan 2019 19:06)  T(F): 98.2 (30 Jan 2019 07:49), Max: 98.7 (29 Jan 2019 19:06)  HR: 61 (30 Jan 2019 07:49) (52 - 109)  BP: 133/75 (30 Jan 2019 07:49) (105/64 - 145/86)  BP(mean): --  RR: 16 (30 Jan 2019 07:49) (10 - 23)  SpO2: 100% (30 Jan 2019 07:49) (95% - 100%)    PHYSICAL EXAM:  GENERAL: NAD, well-groomed, well-developed  HEAD:  Atraumatic, Normocephalic  EYES: EOMI, PERRLA, conjunctiva and sclera clear  ENMT: Moist mucous membranes, Good dentition, No lesions; No tonsillar erythema, exudates, or enlargement  NECK: Supple, No JVD, Normal thyroid  NERVOUS SYSTEM:  Alert & Oriented X3, Good concentration; All 4 extremities mobile, no gross sensory deficits.   CHEST/LUNG: Clear to auscultation bilaterally; No rales, rhonchi, wheezing, or rubs  HEART: Regular rate and rhythm; No murmurs, rubs, or gallops  ABDOMEN: Soft, Nontender, Nondistended; Bowel sounds present  EXTREMITIES:  2+ Peripheral Pulses, No clubbing, cyanosis, or edema  LYMPH: No lymphadenopathy noted  SKIN: No rashes or lesions    LABS:                        10.6   7.15  )-----------( 134      ( 30 Jan 2019 07:24 )             31.7     30 Jan 2019 07:24    139    |  103    |  8      ----------------------------<  100    3.5     |  28     |  0.81     Ca    8.2        30 Jan 2019 07:24      PT/INR - ( 29 Jan 2019 07:38 )   PT: 11.9 sec;   INR: 1.09 ratio         PTT - ( 29 Jan 2019 07:38 )  PTT:33.5 sec    CAPILLARY BLOOD GLUCOSE      Assessment and Plan     74F with PMH of DVT, PE x 2, Prothrombin Gene Mutation, HTN, Hx Pacemaker and Herniated Lumbar disc S/P Left TKR    S/P Left TKR POD 1  Pain control inadequate due to patients fear of being addicted to controlled substances she is under utelizing them. Had conversation with her regarding needing adequate treatment so that she can successfully participate in PT to make meaningful progress towards ambulation. Informed nursing staff as well. Continue bowel regimen and work with PT/OT. Ortho on board and appreciate their recommendations.     HTN  Controlled and will continue Verapamil    Hx pacemaker  sp interrogation 10/2018. 8 year battery life documented.     Hx of DVT/PEx2 and Prothrombin Gene Mutation  Was on coumadin 5mg po daily. Coumadin was stopped on 1/23/19 and pt started on lovenox 100mg SQ daily on 1/27 and 1/28.   Tonight, pt to begin enoxaparin 30mg SC q12 and to cw to POD1. On POD2, to start enoxaparin 100mg SC daily as per pre-op hematologist.   Coumadin to be given on POD0, 2 hours after enoxaparin dose. Monitor INR    DVT Prophylaxis   On therapeutic Anticoagulation    Disposition  Full Code/Inpatient and will transition home on discharge in 1-2 days                  RADIOLOGY & ADDITIONAL TESTS:    Imaging Personally Reviewed:  [ ] YES     Consultant(s) Notes Reviewed:      Care Discussed with Consultants/Other Providers:    Advanced Directives: [ ] DNR  [ ] No feeding tube  [ ] MOLST in chart  [ ] MOLST completed today  [ ] Unknown

## 2019-01-30 NOTE — DISCHARGE NOTE ADULT - DURABLE MEDICAL EQUIPMENT AGENCY
[facility] [place]  -  CPM ( Please return to company at the end of use)

## 2019-01-30 NOTE — DIETITIAN INITIAL EVALUATION ADULT. - PERTINENT MEDS FT
Lovenox, aluminum hydroxide/magnesium hydroxide/simethicone suspension, lipitor, celebrex, colace, dilaudid, Mg hydroxide suspension, zofran, protonix, senna, miralax, verapamil

## 2019-01-30 NOTE — DISCHARGE NOTE ADULT - ADDITIONAL INSTRUCTIONS
follow up with Dr. Kidd 2 weeks post-operatively Physical Therapy/Occupational Therapy for ambulation, transfers, stairs, ADL's, Range of Motion Exercises, Isometrics.  Full weight bearing as tolerated with rolling walker  Range of Motion Goals: Flexion 120 degrees; Extension 0 degrees  Use CPM 2 hours 2 times a day. Start daily @ 6AM.  Settings: Extension = 0; Flexion now at 70 deg. ; advance 5-10deg. daily to goal 120 deg.  Consider Pain meds prior to CPM; Apply ice to knee Post CPM  Keep incision clean and dry.  Dressing with ABD pad daily till drainage/bleeding stops.  May shower post-op day #5 if no drainage from incision  Coumadin daily, INR daily or per [place] MD. Stop Lovenox when INR therapeutic x 2 days straight.  Suture  removal 14 days after surgery at rehab facility or Surgeon's office  - Call your doctor if you experience:  • An increase in pain not controlled by pain medication or change in activity or position.  • Temperature greater than 101° F.  • Redness, increased swelling or foul smelling drainage from or around the incision.  • Numbness, tingling or a change in color or temperature of the operative leg.  • Call your doctor immediately if you experience chest pain, shortness of breath or calf pain.  follow up with Dr. Kidd 2 weeks post-operatively

## 2019-01-30 NOTE — DISCHARGE NOTE ADULT - MEDICATION SUMMARY - MEDICATIONS TO TAKE
I will START or STAY ON the medications listed below when I get home from the hospital:    CPM machine  -- - Use CPM 2 hours, 2 times a day  - Start 0-60 degrees  - Advance 5-10 degrees each session as tolerated  to goal 110 degrees  - Consider Pain meds prior to CPM  -  Apply ice to knee Post CPM   -  ICD 10 codes:  Z96.652 (Left TKR)  -  Date of Surgery: 1/29/19  -  Start Date: 1/30/19  -  Discharge Date: 1/31/19    -- Indication: For DME    celecoxib 100 mg oral capsule  -- 1 cap(s) by mouth every 12 hours  -- Indication: For Pain regimen    acetaminophen 500 mg oral tablet  -- 2 tab(s) by mouth every 12 hours  -- Indication: For Pain regimen    Aspirin Enteric Coated 81 mg oral delayed release tablet  -- 1 tab(s) by mouth once a day   -- Swallow whole.  Do not crush.  Take with food or milk.    -- Indication: For DVT prophylaxis    HYDROmorphone 2 mg oral tablet  -- 1 tab(s) by mouth every 3 hours, As needed, Mild Pain (1 - 3)  -- Indication: For Acute mild pain    HYDROmorphone 4 mg oral tablet  -- 1 tab(s) by mouth every 3 hours, As needed, Moderate Pain (4 - 6)  -- Indication: For Acute moderate pain    verapamil 120 mg oral tablet  -- 1 tab(s) by mouth once a day  -- Indication: For arrhythmia    warfarin 5 mg oral tablet  -- 1 tab(s) by mouth once a day  -- Indication: For DVT prevention    Lipitor 10 mg oral tablet  -- 1 tab(s) by mouth once a day (at bedtime)  -- Indication: For Cholesterol    senna oral tablet  -- 2 tab(s) by mouth once a day (at bedtime), As needed, Constipation  -- Indication: For Constipation    docusate sodium 100 mg oral capsule  -- 1 cap(s) by mouth 3 times a day  -- Indication: For Constipation    polyethylene glycol 3350 oral powder for reconstitution  -- 17 gram(s) by mouth once a day, As needed, Constipation  -- Indication: For Constipation    omeprazole 40 mg oral delayed release capsule  -- 1 cap(s) by mouth once a day  -- Indication: For Stomach lining protection I will START or STAY ON the medications listed below when I get home from the hospital:    CPM machine  -- - Use CPM 2 hours, 2 times a day  - Start 0-60 degrees  - Advance 5-10 degrees each session as tolerated  to goal 110 degrees  - Consider Pain meds prior to CPM  -  Apply ice to knee Post CPM   -  ICD 10 codes:  Z96.652 (Left TKR)  -  Date of Surgery: 1/29/19  -  Start Date: 1/30/19  -  Discharge Date: 1/31/19    -- Indication: For DME    celecoxib 100 mg oral capsule  -- 1 cap(s) by mouth every 12 hours  -- Indication: For Pain regimen    acetaminophen 500 mg oral tablet  -- 2 tab(s) by mouth every 12 hours  -- Indication: For Pain regimen    Aspirin Enteric Coated 81 mg oral delayed release tablet  -- 1 tab(s) by mouth once a day   -- Swallow whole.  Do not crush.  Take with food or milk.    -- Indication: For DVT prophylaxis    HYDROmorphone 4 mg oral tablet  -- 1 tab(s) by mouth every 3 hours, As needed, Moderate Pain (4 - 6)  -- Indication: For Acute moderate pain    HYDROmorphone 2 mg oral tablet  -- 1 tab by mouth every 4 hours, As Needed -Moderate Pain; 2 tabs by mouth every 4 hours as needed for severe pain MDD:6  -- Indication: For Pain    verapamil 120 mg oral tablet  -- 1 tab(s) by mouth once a day  -- Indication: For arrhythmia    warfarin 5 mg oral tablet  -- 1 tab(s) by mouth once a day  -- Indication: For DVT prevention    Lipitor 10 mg oral tablet  -- 1 tab(s) by mouth once a day (at bedtime)  -- Indication: For Cholesterol    senna oral tablet  -- 2 tab(s) by mouth once a day (at bedtime), As needed, Constipation  -- Indication: For Constipation    docusate sodium 100 mg oral capsule  -- 1 cap(s) by mouth 3 times a day  -- Indication: For Constipation    polyethylene glycol 3350 oral powder for reconstitution  -- 17 gram(s) by mouth once a day, As needed, Constipation  -- Indication: For Constipation    omeprazole 40 mg oral delayed release capsule  -- 1 cap(s) by mouth once a day  -- Indication: For Stomach lining protection

## 2019-01-30 NOTE — DISCHARGE NOTE ADULT - NS AS DC FOLLOWUP STROKE INST
Influenza vaccination (VIS Pub Date: August 7, 2015)/Smoking Cessation Influenza vaccination (VIS Pub Date: August 7, 2015)/Smoking Cessation/Coumadin/Warfarin

## 2019-01-30 NOTE — OCCUPATIONAL THERAPY INITIAL EVALUATION ADULT - ADDITIONAL COMMENTS
Pt lives in a house with 4 steps with handrail to enter, bedroom/bathroom main floor. 12 steps with handrail to access laundry room. Pt has bathtub with doors. Pt owns a rolling walker and cane. Pt reports that her spouse is available to assist her upon d/c home prn. Pt lives in a house with 4 steps with handrail to enter, bedroom/bathroom main floor. 12 steps with handrail to access laundry room. Pt has bathtub with doors. Pt owns a rolling walker and cane. Pt reports that her spouse is available to assist her upon d/c home prn. Pt declined to order a commode.

## 2019-01-31 LAB
ANION GAP SERPL CALC-SCNC: 5 MMOL/L — SIGNIFICANT CHANGE UP (ref 5–17)
ANION GAP SERPL CALC-SCNC: 6 MMOL/L — SIGNIFICANT CHANGE UP (ref 5–17)
APTT BLD: 32.2 SEC — SIGNIFICANT CHANGE UP (ref 28.5–37)
BUN SERPL-MCNC: 10 MG/DL — SIGNIFICANT CHANGE UP (ref 7–23)
BUN SERPL-MCNC: 10 MG/DL — SIGNIFICANT CHANGE UP (ref 7–23)
CALCIUM SERPL-MCNC: 8.4 MG/DL — SIGNIFICANT CHANGE UP (ref 8.4–10.5)
CALCIUM SERPL-MCNC: 8.8 MG/DL — SIGNIFICANT CHANGE UP (ref 8.4–10.5)
CHLORIDE SERPL-SCNC: 106 MMOL/L — SIGNIFICANT CHANGE UP (ref 96–108)
CHLORIDE SERPL-SCNC: 108 MMOL/L — SIGNIFICANT CHANGE UP (ref 96–108)
CO2 SERPL-SCNC: 30 MMOL/L — SIGNIFICANT CHANGE UP (ref 22–31)
CO2 SERPL-SCNC: 30 MMOL/L — SIGNIFICANT CHANGE UP (ref 22–31)
CREAT SERPL-MCNC: 0.85 MG/DL — SIGNIFICANT CHANGE UP (ref 0.5–1.3)
CREAT SERPL-MCNC: 0.91 MG/DL — SIGNIFICANT CHANGE UP (ref 0.5–1.3)
GLUCOSE SERPL-MCNC: 100 MG/DL — HIGH (ref 70–99)
GLUCOSE SERPL-MCNC: 104 MG/DL — HIGH (ref 70–99)
HCT VFR BLD CALC: 30.6 % — LOW (ref 34.5–45)
HGB BLD-MCNC: 10.1 G/DL — LOW (ref 11.5–15.5)
INR BLD: 1.34 RATIO — HIGH (ref 0.88–1.16)
MAGNESIUM SERPL-MCNC: 1.7 MG/DL — SIGNIFICANT CHANGE UP (ref 1.6–2.6)
MCHC RBC-ENTMCNC: 30.2 PG — SIGNIFICANT CHANGE UP (ref 27–34)
MCHC RBC-ENTMCNC: 33 GM/DL — SIGNIFICANT CHANGE UP (ref 32–36)
MCV RBC AUTO: 91.6 FL — SIGNIFICANT CHANGE UP (ref 80–100)
NRBC # BLD: 0 /100 WBCS — SIGNIFICANT CHANGE UP (ref 0–0)
PLATELET # BLD AUTO: 115 K/UL — LOW (ref 150–400)
POTASSIUM SERPL-MCNC: 4.2 MMOL/L — SIGNIFICANT CHANGE UP (ref 3.5–5.3)
POTASSIUM SERPL-MCNC: 4.9 MMOL/L — SIGNIFICANT CHANGE UP (ref 3.5–5.3)
POTASSIUM SERPL-SCNC: 4.2 MMOL/L — SIGNIFICANT CHANGE UP (ref 3.5–5.3)
POTASSIUM SERPL-SCNC: 4.9 MMOL/L — SIGNIFICANT CHANGE UP (ref 3.5–5.3)
PROTHROM AB SERPL-ACNC: 14.7 SEC — HIGH (ref 10–12.9)
RBC # BLD: 3.34 M/UL — LOW (ref 3.8–5.2)
RBC # FLD: 12.7 % — SIGNIFICANT CHANGE UP (ref 10.3–14.5)
SODIUM SERPL-SCNC: 142 MMOL/L — SIGNIFICANT CHANGE UP (ref 135–145)
SODIUM SERPL-SCNC: 143 MMOL/L — SIGNIFICANT CHANGE UP (ref 135–145)
TROPONIN I SERPL-MCNC: 0 NG/ML — LOW (ref 0.02–0.06)
WBC # BLD: 7.74 K/UL — SIGNIFICANT CHANGE UP (ref 3.8–10.5)
WBC # FLD AUTO: 7.74 K/UL — SIGNIFICANT CHANGE UP (ref 3.8–10.5)

## 2019-01-31 PROCEDURE — 12345: CPT | Mod: NC

## 2019-01-31 PROCEDURE — 99233 SBSQ HOSP IP/OBS HIGH 50: CPT

## 2019-01-31 RX ORDER — WARFARIN SODIUM 2.5 MG/1
5 TABLET ORAL ONCE
Qty: 0 | Refills: 0 | Status: COMPLETED | OUTPATIENT
Start: 2019-01-31 | End: 2019-01-31

## 2019-01-31 RX ADMIN — HYDROMORPHONE HYDROCHLORIDE 4 MILLIGRAM(S): 2 INJECTION INTRAMUSCULAR; INTRAVENOUS; SUBCUTANEOUS at 04:45

## 2019-01-31 RX ADMIN — HYDROMORPHONE HYDROCHLORIDE 4 MILLIGRAM(S): 2 INJECTION INTRAMUSCULAR; INTRAVENOUS; SUBCUTANEOUS at 21:50

## 2019-01-31 RX ADMIN — PANTOPRAZOLE SODIUM 40 MILLIGRAM(S): 20 TABLET, DELAYED RELEASE ORAL at 07:00

## 2019-01-31 RX ADMIN — HYDROMORPHONE HYDROCHLORIDE 4 MILLIGRAM(S): 2 INJECTION INTRAMUSCULAR; INTRAVENOUS; SUBCUTANEOUS at 22:20

## 2019-01-31 RX ADMIN — Medication 81 MILLIGRAM(S): at 05:18

## 2019-01-31 RX ADMIN — Medication 120 MILLIGRAM(S): at 05:18

## 2019-01-31 RX ADMIN — Medication 1000 MILLIGRAM(S): at 22:08

## 2019-01-31 RX ADMIN — Medication 1000 MILLIGRAM(S): at 08:47

## 2019-01-31 RX ADMIN — Medication 100 MILLIGRAM(S): at 11:50

## 2019-01-31 RX ADMIN — Medication 100 MILLIGRAM(S): at 05:18

## 2019-01-31 RX ADMIN — HYDROMORPHONE HYDROCHLORIDE 4 MILLIGRAM(S): 2 INJECTION INTRAMUSCULAR; INTRAVENOUS; SUBCUTANEOUS at 18:42

## 2019-01-31 RX ADMIN — CELECOXIB 100 MILLIGRAM(S): 200 CAPSULE ORAL at 22:08

## 2019-01-31 RX ADMIN — Medication 1000 MILLIGRAM(S): at 22:10

## 2019-01-31 RX ADMIN — CELECOXIB 100 MILLIGRAM(S): 200 CAPSULE ORAL at 08:46

## 2019-01-31 RX ADMIN — WARFARIN SODIUM 5 MILLIGRAM(S): 2.5 TABLET ORAL at 22:09

## 2019-01-31 RX ADMIN — Medication 100 MILLIGRAM(S): at 22:09

## 2019-01-31 RX ADMIN — POLYETHYLENE GLYCOL 3350 17 GRAM(S): 17 POWDER, FOR SOLUTION ORAL at 11:50

## 2019-01-31 RX ADMIN — HYDROMORPHONE HYDROCHLORIDE 4 MILLIGRAM(S): 2 INJECTION INTRAMUSCULAR; INTRAVENOUS; SUBCUTANEOUS at 12:20

## 2019-01-31 RX ADMIN — HYDROMORPHONE HYDROCHLORIDE 4 MILLIGRAM(S): 2 INJECTION INTRAMUSCULAR; INTRAVENOUS; SUBCUTANEOUS at 19:12

## 2019-01-31 RX ADMIN — ENOXAPARIN SODIUM 100 MILLIGRAM(S): 100 INJECTION SUBCUTANEOUS at 08:47

## 2019-01-31 RX ADMIN — HYDROMORPHONE HYDROCHLORIDE 4 MILLIGRAM(S): 2 INJECTION INTRAMUSCULAR; INTRAVENOUS; SUBCUTANEOUS at 11:48

## 2019-01-31 RX ADMIN — CELECOXIB 100 MILLIGRAM(S): 200 CAPSULE ORAL at 22:10

## 2019-01-31 RX ADMIN — HYDROMORPHONE HYDROCHLORIDE 4 MILLIGRAM(S): 2 INJECTION INTRAMUSCULAR; INTRAVENOUS; SUBCUTANEOUS at 05:15

## 2019-01-31 RX ADMIN — Medication 1000 MILLIGRAM(S): at 08:51

## 2019-01-31 RX ADMIN — CELECOXIB 100 MILLIGRAM(S): 200 CAPSULE ORAL at 08:51

## 2019-01-31 RX ADMIN — ATORVASTATIN CALCIUM 10 MILLIGRAM(S): 80 TABLET, FILM COATED ORAL at 22:09

## 2019-01-31 NOTE — PROGRESS NOTE ADULT - SUBJECTIVE AND OBJECTIVE BOX
POD #: 2  S: Pt without complaints. No SOB,CP, N/V. Tolerated Diet well.  Pain comfortable (3/10 ) on  Interval Rx.   No BM yet, + flatus, No abdominal pain.  PT activity yesterday:  Walked with walker - Min  Pain Rx:  acetaminophen   Tablet .. 1000 milliGRAM(s) Oral every 12 hours  celecoxib 100 milliGRAM(s) Oral every 12 hours  HYDROmorphone   Tablet 2 milliGRAM(s) Oral every 3 hours PRN  HYDROmorphone   Tablet 4 milliGRAM(s) Oral every 3 hours PRN  HYDROmorphone  Injectable 0.5 milliGRAM(s) IV Push every 3 hours PRN      O: General: On exam, No Apparent Distress  Vital Signs Last 24 Hrs  T(C): 36.8 (31 Jan 2019 07:29), Max: 37.2 (30 Jan 2019 23:13)  T(F): 98.2 (31 Jan 2019 07:29), Max: 98.9 (30 Jan 2019 23:13)  HR: 75 (31 Jan 2019 07:29) (69 - 94)  BP: 113/74 (31 Jan 2019 07:29) (101/66 - 128/83)  RR: 18 (31 Jan 2019 07:29) (16 - 18)  SpO2: 94% (31 Jan 2019 07:29) (93% - 96%)    Lungs: BS clear bilat.  Heart: RR&R  [Abdomen]: + BS, soft , benign exam     Ext(Knee): Left Knee OP [ Springer ] Dressing removed, [Incision] clean, dry, & intact; Nylon sutures intact; no  dehiscence; No  cellulitis; Min effusion - [ soft ]. Minimal-Mod  soft tissue swelling knee/thigh.   ROM: Extension 0 deg. ; Flexion 50 deg. PROM  CPM = 55 deg flexion [Increased to 60 deg]  Neurologic:  Has sensation over feet & toes bilat. Full AROM bilat feet & toes. EHL/AT = 5/5  Vascular: Feet toes warm, pink. DP = 2+. No calf tenderness bilat..  Urine Out [11P-7A] = Voiding; HVAC = 70 ml O/N, clotted now > Removed    VTEP: On Bilat. Venodynes + aspirin enteric coated 81 milliGRAM(s) Oral daily  enoxaparin Injectable 100 milliGRAM(s) SubCutaneous daily + Warfarin daily    Activity in PT yesterday Noted.[Walked 20 ft. with walker]. [Sat up for 1 hours].  Labs yesterday noted.[Post Op Anemia(min, )(tolerated with amb); Chem:  stable    Hospitalist input noted.    Labs Today:     CBC:                    10.1   7.74  )-----------( 115      ( 31 Jan 2019 07:33 )             30.6       01-31  Chem:  143  |  108  |  10  ----------------------------<  100<H>  4.9   |  30  |  0.85    Ca    8.8      31 Jan 2019 07:33      Primary Orthopedic Assessment:  • Stable from Orthopedic perspective  • Neuro motor exam stable  • Labs: CBC with Min Post-Op Anemia/ Chem stable      Plan:   • Continue:  PT/OT/WBAT with assistance of a walker/Ice to knee/ Knee ROM         Incentive spirometry encouraged   • Continue DVT prophylaxis as prescribed, including use of compression devices and ankle pumps  • Continue Pain Rx  • Plans per Medicine /   • Discharge planning – anticipated discharge is Subacute Rehab facility per Pt & family choice when medically stable & cleared by PT/OT

## 2019-01-31 NOTE — PROGRESS NOTE ADULT - SUBJECTIVE AND OBJECTIVE BOX
ORTHOPEDIC ATTENDING PROGRESS NOTE  JOHNSON BIANCHI      74y Female                                                                                                                               POD # 2       STATUS POST:               Pre-Op Dx: Primary osteoarthritis of left knee    Post-Op Dx:  Primary osteoarthritis of left knee    Procedure: Total knee arthroplasty: left total knee                                                Pain (0-10):  Pt reports  Current Pain Management:  [ ] PCA   [x ] Po Analgesics [ ] IM /IV Anagesics     T(F): 98.5  HR: 69  BP: 122/477  RR: 16  SpO2: 95%               Physical Exam :    -  Dressing C/D/I.   -   Distal Neurvascular status intact grossly.   -   Warm well perfused; capillary refill <3 seconds   -   (+)EHL/FHL   -   (+) Sensation to light touch  -   (-) Calf tenderness Bilaterally    A/P: 74y Female s/p Total knee arthroplasty: left total knee     -   Ortho Stable  -   Pain control   -   Medicine to follow  -   DVT ppx:     [ ]SCDs     [ ] ASA     [ ] Eliquis     [x ] Lovenox/Coumadin  -   Weight bearing status:  WBAT [x]        PWB    [ ]     TTWB  [ ]      NWB  [ ]   -  Dispo:     Home [ ]     Acute Rehab [ ]     JEFFRY [x ]     TBD [ ]

## 2019-01-31 NOTE — PROGRESS NOTE ADULT - SUBJECTIVE AND OBJECTIVE BOX
Patient is a 74y old who is S/P Left TKR on 01/29/2019.     INTERVAL HPI/OVERNIGHT EVENTS: Patient has no complaints and feeling better. Pain improved; slept well overnight. No BM yet.     MEDICATIONS  (STANDING):  acetaminophen   Tablet .. 1000 milliGRAM(s) Oral every 12 hours  aspirin enteric coated 81 milliGRAM(s) Oral daily  atorvastatin 10 milliGRAM(s) Oral at bedtime  celecoxib 100 milliGRAM(s) Oral every 12 hours  docusate sodium 100 milliGRAM(s) Oral three times a day  enoxaparin Injectable 30 milliGRAM(s) SubCutaneous every 12 hours  lactated ringers. 1000 milliLiter(s) (100 mL/Hr) IV Continuous <Continuous>  pantoprazole    Tablet 40 milliGRAM(s) Oral before breakfast  verapamil  milliGRAM(s) Oral daily    MEDICATIONS  (PRN):  aluminum hydroxide/magnesium hydroxide/simethicone Suspension 30 milliLiter(s) Oral four times a day PRN Indigestion  HYDROmorphone   Tablet 2 milliGRAM(s) Oral every 3 hours PRN Mild Pain (1 - 3)  HYDROmorphone   Tablet 4 milliGRAM(s) Oral every 3 hours PRN Moderate Pain (4 - 6)  HYDROmorphone  Injectable 0.5 milliGRAM(s) IV Push every 3 hours PRN Severe Pain (7 - 10)  magnesium hydroxide Suspension 30 milliLiter(s) Oral daily PRN Constipation  ondansetron Injectable 4 milliGRAM(s) IV Push every 6 hours PRN Nausea and/or Vomiting  polyethylene glycol 3350 17 Gram(s) Oral daily PRN Constipation  senna 2 Tablet(s) Oral at bedtime PRN Constipation      Allergies    No Known Allergies    Intolerances        REVIEW OF SYSTEMS:  CONSTITUTIONAL: No fever, weight loss, or fatigue  EYES: No eye pain, visual disturbances, or discharge  ENMT:  No difficulty hearing, tinnitus, vertigo; No sinus or throat pain  NECK: No pain or stiffness  BREASTS: No pain, masses, or nipple discharge  RESPIRATORY: No cough, wheezing, chills or hemoptysis; No shortness of breath  CARDIOVASCULAR: No chest pain, palpitations, lightheadedness, or leg swelling  GASTROINTESTINAL: No abdominal or epigastric pain. No nausea, vomiting, or hematemesis; No diarrhea or constipation. No melena or hematochezia.  GENITOURINARY: No dysuria, frequency, hematuria, or incontinence  NEUROLOGICAL: No headaches, memory loss, vertigo, loss of strength, numbness, or tremors  SKIN: No itching, burning, rashes, or lesions   LYMPH NODES: No enlarged glands  ENDOCRINE: No heat or cold intolerance; No hair loss; No polydipsia or polyuria  MUSCULOSKELETAL: No joint pain or swelling; No muscle, back, or extremity pain  PSYCHIATRIC: No depression, anxiety, or mood swings  HEME/LYMPH: No easy bruising, or bleeding gums  ALLERGY AND IMMUNOLOGIC: No hives or eczema    Vital Signs Last 24 Hrs  T(C): 36.8 (30 Jan 2019 07:49), Max: 37.1 (29 Jan 2019 19:06)  T(F): 98.2 (30 Jan 2019 07:49), Max: 98.7 (29 Jan 2019 19:06)  HR: 61 (30 Jan 2019 07:49) (52 - 109)  BP: 133/75 (30 Jan 2019 07:49) (105/64 - 145/86)  BP(mean): --  RR: 16 (30 Jan 2019 07:49) (10 - 23)  SpO2: 100% (30 Jan 2019 07:49) (95% - 100%)    PHYSICAL EXAM:  GENERAL: NAD, well-groomed, well-developed  HEAD:  Atraumatic, Normocephalic  EYES: EOMI, PERRLA, conjunctiva and sclera clear  ENMT: Moist mucous membranes, Good dentition, No lesions; No tonsillar erythema, exudates, or enlargement  NECK: Supple, No JVD, Normal thyroid  NERVOUS SYSTEM:  Alert & Oriented X3, Good concentration; All 4 extremities mobile, no gross sensory deficits.   CHEST/LUNG: Clear to auscultation bilaterally; No rales, rhonchi, wheezing, or rubs  HEART: Regular rate and rhythm; No murmurs, rubs, or gallops  ABDOMEN: Soft, Nontender, Nondistended; Bowel sounds present  EXTREMITIES:  2+ Peripheral Pulses, No clubbing, cyanosis, or edema  LYMPH: No lymphadenopathy noted  SKIN: No rashes or lesions    LABS:                        10.6   7.15  )-----------( 134      ( 30 Jan 2019 07:24 )             31.7     30 Jan 2019 07:24    139    |  103    |  8      ----------------------------<  100    3.5     |  28     |  0.81     Ca    8.2        30 Jan 2019 07:24      PT/INR - ( 29 Jan 2019 07:38 )   PT: 11.9 sec;   INR: 1.09 ratio         PTT - ( 29 Jan 2019 07:38 )  PTT:33.5 sec    CAPILLARY BLOOD GLUCOSE      Assessment and Plan     74F with PMH of DVT, PE x 2, Prothrombin Gene Mutation, HTN, Hx Pacemaker and Herniated Lumbar disc S/P Left TKR    S/P Left TKR POD 2  Pain control improved. Continue to participate in PT to make meaningful progress towards ambulation.  Continue bowel regimen and work with PT/OT. Ortho on board and appreciate their recommendations.     Thrombocytopenia  Could be related to HIT but will continue to observe for one more day. if continues to drop then will need to stop Lovenox and check antibodies.     HTN  Controlled and will continue Verapamil    Hx pacemaker  sp interrogation 10/2018. 8 year battery life documented.     Hx of DVT/PEx2 and Prothrombin Gene Mutation  Was on coumadin 5mg po daily. Coumadin was stopped on 1/23/19 and pt started on lovenox 100mg SQ daily on 1/27 and 1/28.   Start enoxaparin 100mg SC daily as per pre-op hematologist.   Coumadin to be given on POD0, 2 hours after enoxaparin dose. Monitor INR as it is still sub-therapeutic.     DVT Prophylaxis   On therapeutic Anticoagulation    Disposition  Full Code/Inpatient and will transition home on discharge in 1-2 days                  RADIOLOGY & ADDITIONAL TESTS:    Imaging Personally Reviewed:  [ ] YES     Consultant(s) Notes Reviewed:      Care Discussed with Consultants/Other Providers:    Advanced Directives: [ ] DNR  [ ] No feeding tube  [ ] MOLST in chart  [ ] MOLST completed today  [ ] Unknown

## 2019-01-31 NOTE — CHART NOTE - NSCHARTNOTEFT_GEN_A_CORE
called by RN 5 beats vtach on monitor  patient asymptomatic.    as per cards note patient had normal echo 4 months ago.     check electrolytes.      cont to monitor.

## 2019-01-31 NOTE — CHART NOTE - NSCHARTNOTEFT_GEN_A_CORE
Called by RN for chest pain, seen & examined at the bedside. AAO x3, reports 10/10 lower substernal tightness-like chest pain, radiating to the back, started acutely when she was coming back from her bathroom, was constant for about 5 min, now down to 5/10, no palpitations, SOB, dizziness, nausea, or vomiting.   Vitals: HR 79/min, /81, RR 16/min, SPO2 96% on RA, Temp 98.4 F.  O/E: AAO X3, not in respiratory distress, flat supine without orthopnea, no JVD, no pedal edema, Heart: normal S1 & S2, no M or extra sounds, Lungs: good air entry B/L equal, (+) B/L basal rales, no rhonchi, Abdomen: soft, NT, ND, BS (+), no palpable masses or organomegaly, (+) B/L DPA & PTA pulses, no carotid bruits.  EKG stat showed SR at 80/min with occasional uniform PVCs, QRS axis (- 30), no ST-T abnormalities.  A/P: Chest pain possibly musculoskeletal, r/o ACS, patient with H/O thrombophilia with DVT/ PE, on therapeutic LMWH bridge, with Coumadin just resumed, still subtherapeutic INR, will check troponin X2, 1st set stat, pain control, continue telemonitoring, discussed with patient.

## 2019-02-01 LAB
ANION GAP SERPL CALC-SCNC: 3 MMOL/L — LOW (ref 5–17)
APTT BLD: 46.5 SEC — HIGH (ref 28.5–37)
BUN SERPL-MCNC: 11 MG/DL — SIGNIFICANT CHANGE UP (ref 7–23)
CALCIUM SERPL-MCNC: 8.1 MG/DL — LOW (ref 8.4–10.5)
CHLORIDE SERPL-SCNC: 107 MMOL/L — SIGNIFICANT CHANGE UP (ref 96–108)
CO2 SERPL-SCNC: 33 MMOL/L — HIGH (ref 22–31)
CREAT SERPL-MCNC: 0.95 MG/DL — SIGNIFICANT CHANGE UP (ref 0.5–1.3)
GLUCOSE SERPL-MCNC: 93 MG/DL — SIGNIFICANT CHANGE UP (ref 70–99)
HCT VFR BLD CALC: 29.6 % — LOW (ref 34.5–45)
HGB BLD-MCNC: 9.6 G/DL — LOW (ref 11.5–15.5)
INR BLD: 1.57 RATIO — HIGH (ref 0.88–1.16)
MCHC RBC-ENTMCNC: 30.1 PG — SIGNIFICANT CHANGE UP (ref 27–34)
MCHC RBC-ENTMCNC: 32.4 GM/DL — SIGNIFICANT CHANGE UP (ref 32–36)
MCV RBC AUTO: 92.8 FL — SIGNIFICANT CHANGE UP (ref 80–100)
NRBC # BLD: 0 /100 WBCS — SIGNIFICANT CHANGE UP (ref 0–0)
PLATELET # BLD AUTO: 131 K/UL — LOW (ref 150–400)
POTASSIUM SERPL-MCNC: 3.9 MMOL/L — SIGNIFICANT CHANGE UP (ref 3.5–5.3)
POTASSIUM SERPL-SCNC: 3.9 MMOL/L — SIGNIFICANT CHANGE UP (ref 3.5–5.3)
PROTHROM AB SERPL-ACNC: 17.3 SEC — HIGH (ref 10–12.9)
RBC # BLD: 3.19 M/UL — LOW (ref 3.8–5.2)
RBC # FLD: 13.1 % — SIGNIFICANT CHANGE UP (ref 10.3–14.5)
SODIUM SERPL-SCNC: 143 MMOL/L — SIGNIFICANT CHANGE UP (ref 135–145)
TROPONIN I SERPL-MCNC: 0 NG/ML — LOW (ref 0.02–0.06)
WBC # BLD: 6.04 K/UL — SIGNIFICANT CHANGE UP (ref 3.8–10.5)
WBC # FLD AUTO: 6.04 K/UL — SIGNIFICANT CHANGE UP (ref 3.8–10.5)

## 2019-02-01 PROCEDURE — 99232 SBSQ HOSP IP/OBS MODERATE 35: CPT

## 2019-02-01 RX ORDER — DIPHENHYDRAMINE HCL 50 MG
25 CAPSULE ORAL ONCE
Qty: 0 | Refills: 0 | Status: COMPLETED | OUTPATIENT
Start: 2019-02-01 | End: 2019-02-01

## 2019-02-01 RX ORDER — WARFARIN SODIUM 2.5 MG/1
5 TABLET ORAL ONCE
Qty: 0 | Refills: 0 | Status: COMPLETED | OUTPATIENT
Start: 2019-02-01 | End: 2019-02-01

## 2019-02-01 RX ADMIN — Medication 25 MILLIGRAM(S): at 20:21

## 2019-02-01 RX ADMIN — WARFARIN SODIUM 5 MILLIGRAM(S): 2.5 TABLET ORAL at 21:19

## 2019-02-01 RX ADMIN — Medication 81 MILLIGRAM(S): at 05:51

## 2019-02-01 RX ADMIN — Medication 100 MILLIGRAM(S): at 21:19

## 2019-02-01 RX ADMIN — ATORVASTATIN CALCIUM 10 MILLIGRAM(S): 80 TABLET, FILM COATED ORAL at 21:19

## 2019-02-01 RX ADMIN — HYDROMORPHONE HYDROCHLORIDE 4 MILLIGRAM(S): 2 INJECTION INTRAMUSCULAR; INTRAVENOUS; SUBCUTANEOUS at 08:50

## 2019-02-01 RX ADMIN — PANTOPRAZOLE SODIUM 40 MILLIGRAM(S): 20 TABLET, DELAYED RELEASE ORAL at 05:51

## 2019-02-01 RX ADMIN — CELECOXIB 100 MILLIGRAM(S): 200 CAPSULE ORAL at 08:54

## 2019-02-01 RX ADMIN — Medication 1000 MILLIGRAM(S): at 08:54

## 2019-02-01 RX ADMIN — CELECOXIB 100 MILLIGRAM(S): 200 CAPSULE ORAL at 21:19

## 2019-02-01 RX ADMIN — Medication 1000 MILLIGRAM(S): at 22:04

## 2019-02-01 RX ADMIN — POLYETHYLENE GLYCOL 3350 17 GRAM(S): 17 POWDER, FOR SOLUTION ORAL at 13:04

## 2019-02-01 RX ADMIN — Medication 1000 MILLIGRAM(S): at 08:52

## 2019-02-01 RX ADMIN — Medication 120 MILLIGRAM(S): at 05:51

## 2019-02-01 RX ADMIN — Medication 100 MILLIGRAM(S): at 13:03

## 2019-02-01 RX ADMIN — ENOXAPARIN SODIUM 100 MILLIGRAM(S): 100 INJECTION SUBCUTANEOUS at 08:50

## 2019-02-01 RX ADMIN — HYDROMORPHONE HYDROCHLORIDE 4 MILLIGRAM(S): 2 INJECTION INTRAMUSCULAR; INTRAVENOUS; SUBCUTANEOUS at 05:50

## 2019-02-01 RX ADMIN — CELECOXIB 100 MILLIGRAM(S): 200 CAPSULE ORAL at 08:49

## 2019-02-01 RX ADMIN — HYDROMORPHONE HYDROCHLORIDE 4 MILLIGRAM(S): 2 INJECTION INTRAMUSCULAR; INTRAVENOUS; SUBCUTANEOUS at 05:20

## 2019-02-01 RX ADMIN — HYDROMORPHONE HYDROCHLORIDE 4 MILLIGRAM(S): 2 INJECTION INTRAMUSCULAR; INTRAVENOUS; SUBCUTANEOUS at 13:03

## 2019-02-01 RX ADMIN — HYDROMORPHONE HYDROCHLORIDE 4 MILLIGRAM(S): 2 INJECTION INTRAMUSCULAR; INTRAVENOUS; SUBCUTANEOUS at 18:01

## 2019-02-01 RX ADMIN — Medication 100 MILLIGRAM(S): at 05:51

## 2019-02-01 NOTE — PROGRESS NOTE ADULT - ASSESSMENT
Assessment and Plan     74F with PMH of DVT, PE x 2, Prothrombin Gene Mutation, HTN, Hx Pacemaker and Herniated Lumbar disc S/P Left TKR    S/P Left TKR POD 3  Pain control improved. Continue to participate in PT to make meaningful progress towards ambulation.  Continue bowel regimen and work with PT/OT. Ortho on board and appreciate their recommendations.     Thrombocytopenia  Could be related to HIT but platelets did rebound today so will continue to monitor. If further drop consider HIT.     HTN  Controlled and will continue Verapamil    Hx pacemaker  sp interrogation 10/2018. 8 year battery life documented.     Hx of DVT/PEx2 and Prothrombin Gene Mutation  Enoxaparin 100mg SC daily as per pre-op hematologist with warfarin bridge. INR Still sub-therapeutic. Target 2.0 to 3.0.  Monitor INR as it is still sub-therapeutic.     DVT Prophylaxis   On therapeutic Anticoagulation    Disposition  Full Code/Inpatient and will transition home on discharge in 1-2 days

## 2019-02-01 NOTE — PROGRESS NOTE ADULT - SUBJECTIVE AND OBJECTIVE BOX
Patient is a 74y old who is S/P Left TKR on 01/29/2019.     Subjective:  Patient doing well and resting comfortable. Slept well last night. Labs were not drawn this AM.       INTERVAL HPI/OVERNIGHT EVENTS:    MEDICATIONS  (STANDING):  acetaminophen   Tablet .. 1000 milliGRAM(s) Oral every 12 hours  aspirin enteric coated 81 milliGRAM(s) Oral daily  atorvastatin 10 milliGRAM(s) Oral at bedtime  celecoxib 100 milliGRAM(s) Oral every 12 hours  docusate sodium 100 milliGRAM(s) Oral three times a day  enoxaparin Injectable 100 milliGRAM(s) SubCutaneous daily  lactated ringers. 1000 milliLiter(s) (100 mL/Hr) IV Continuous <Continuous>  pantoprazole    Tablet 40 milliGRAM(s) Oral before breakfast  verapamil  milliGRAM(s) Oral daily    MEDICATIONS  (PRN):  aluminum hydroxide/magnesium hydroxide/simethicone Suspension 30 milliLiter(s) Oral four times a day PRN Indigestion  bisacodyl Suppository 10 milliGRAM(s) Rectal daily PRN If no bowel movement by postoperative day #2  HYDROmorphone   Tablet 2 milliGRAM(s) Oral every 3 hours PRN Mild Pain (1 - 3)  HYDROmorphone   Tablet 4 milliGRAM(s) Oral every 3 hours PRN Moderate Pain (4 - 6)  HYDROmorphone  Injectable 0.5 milliGRAM(s) IV Push every 3 hours PRN Severe Pain (7 - 10)  magnesium hydroxide Suspension 30 milliLiter(s) Oral daily PRN Constipation  ondansetron Injectable 4 milliGRAM(s) IV Push every 6 hours PRN Nausea and/or Vomiting  polyethylene glycol 3350 17 Gram(s) Oral daily PRN Constipation  senna 2 Tablet(s) Oral at bedtime PRN Constipation      Allergies    No Known Allergies    Intolerances        REVIEW OF SYSTEMS:  CONSTITUTIONAL: No fever, weight loss, or fatigue  EYES: No eye pain, visual disturbances, or discharge  ENMT:  No difficulty hearing, tinnitus, vertigo; No sinus or throat pain  NECK: No pain or stiffness  BREASTS: No pain, masses, or nipple discharge  RESPIRATORY: No cough, wheezing, chills or hemoptysis; No shortness of breath  CARDIOVASCULAR: No chest pain, palpitations, lightheadedness, or leg swelling  GASTROINTESTINAL: No abdominal or epigastric pain. No nausea, vomiting, or hematemesis; No diarrhea or constipation. No melena or hematochezia.  GENITOURINARY: No dysuria, frequency, hematuria, or incontinence  NEUROLOGICAL: No headaches, memory loss, vertigo, loss of strength, numbness, or tremors  SKIN: No itching, burning, rashes, or lesions   LYMPH NODES: No enlarged glands  ENDOCRINE: No heat or cold intolerance; No hair loss; No polydipsia or polyuria  MUSCULOSKELETAL: No joint pain or swelling; No muscle, back, or extremity pain  PSYCHIATRIC: No depression, anxiety, or mood swings  HEME/LYMPH: No easy bruising, or bleeding gums  ALLERGY AND IMMUNOLOGIC: No hives or eczema    Vital Signs Last 24 Hrs  T(C): 36.6 (01 Feb 2019 07:19), Max: 37.2 (31 Jan 2019 23:10)  T(F): 97.8 (01 Feb 2019 07:19), Max: 98.9 (31 Jan 2019 23:10)  HR: 71 (01 Feb 2019 07:19) (71 - 91)  BP: 97/62 (01 Feb 2019 07:19) (97/62 - 123/81)  BP(mean): --  RR: 18 (01 Feb 2019 07:19) (15 - 18)  SpO2: 94% (01 Feb 2019 07:19) (94% - 96%)    PHYSICAL EXAM:  GENERAL: NAD, well-groomed, well-developed  HEAD:  Atraumatic, Normocephalic  EYES: EOMI, PERRLA, conjunctiva and sclera clear  ENMT: Moist mucous membranes, Good dentition, No lesions; No tonsillar erythema, exudates, or enlargement  NECK: Supple, No JVD, Normal thyroid  NERVOUS SYSTEM:  Alert & Oriented X3, Good concentration; All 4 extremities mobile, no gross sensory deficits.   CHEST/LUNG: Clear to auscultation bilaterally; No rales, rhonchi, wheezing, or rubs  HEART: Regular rate and rhythm; No murmurs, rubs, or gallops  ABDOMEN: Soft, Nontender, Nondistended; Bowel sounds present  EXTREMITIES:  2+ Peripheral Pulses, No clubbing, cyanosis, or edema  LYMPH: No lymphadenopathy noted  SKIN: No rashes or lesions    LABS:                        9.6    6.04  )-----------( 131      ( 01 Feb 2019 11:47 )             29.6     01 Feb 2019 11:47    143    |  107    |  11     ----------------------------<  93     3.9     |  33     |  0.95     Ca    8.1        01 Feb 2019 11:47  Mg     1.7       31 Jan 2019 18:41      PT/INR - ( 31 Jan 2019 07:33 )   PT: 14.7 sec;   INR: 1.34 ratio         PTT - ( 31 Jan 2019 07:33 )  PTT:32.2 sec    CAPILLARY BLOOD GLUCOSE          RADIOLOGY & ADDITIONAL TESTS:    Imaging Personally Reviewed:  [ ] YES     Consultant(s) Notes Reviewed:      Care Discussed with Consultants/Other Providers:    Advanced Directives: [ ] DNR  [ ] No feeding tube  [ ] MOLST in chart  [ ] MOLST completed today  [ ] Unknown

## 2019-02-01 NOTE — PROGRESS NOTE ADULT - SUBJECTIVE AND OBJECTIVE BOX
POD#:  3  S/P: Left TKR                       SUBJECTIVE: Patient seen and examined this am by BEN De La Paz.  Feeling well.  Had chest pain last night, which has resolved.  Progressing slowly with PT. Reported Pain Score = 3    OBJECTIVE:     Vital Signs Last 24 Hrs  T(C): 36.6 (01 Feb 2019 07:19), Max: 37.2 (31 Jan 2019 23:10)  T(F): 97.8 (01 Feb 2019 07:19), Max: 98.9 (31 Jan 2019 23:10)  HR: 71 (01 Feb 2019 07:19) (71 - 91)  BP: 97/62 (01 Feb 2019 07:19) (97/62 - 123/81)  RR: 18 (01 Feb 2019 07:19) (15 - 18)  SpO2: 94% (01 Feb 2019 07:19) (94% - 96%)    Left Knee:          Dressing removed: incision clean/dry/intact, sutures in place.  Drain site clean and dry.  Bilateral LEs:         Sensation:  intact to light touch          Motor exam:  5/5 dorsiflexion/plantarflexion/EHL          2+ DP pulses          calf supple, NT         SCDs in place    LABS:                        9.6    6.04  )-----------( 131      ( 01 Feb 2019 11:47 )             29.6     02-01    143  |  107  |  11  ----------------------------<  93  3.9   |  33<H>  |  0.95    Ca    8.1<L>      01 Feb 2019 11:47  Mg     1.7     01-31            MEDICATIONS:  Anticoagulation:  aspirin enteric coated 81 milliGRAM(s) Oral daily  enoxaparin Injectable 100 milliGRAM(s) SubCutaneous daily  warfarin 5 milliGRAM(s) Oral once      Pain medications:   acetaminophen   Tablet .. 1000 milliGRAM(s) Oral every 12 hours  celecoxib 100 milliGRAM(s) Oral every 12 hours  HYDROmorphone   Tablet 2 milliGRAM(s) Oral every 3 hours PRN  HYDROmorphone   Tablet 4 milliGRAM(s) Oral every 3 hours PRN  HYDROmorphone  Injectable 0.5 milliGRAM(s) IV Push every 3 hours PRN        A/P :  Patient stable s/p Left TKR POD # 3  -    dressing changed  -    Pain control  -    DVT ppx: Lovenox bridging to coumadin  -    Weight bearing status: WBAT   -    Physical Therapy  -    Occupational Therapy  -    Hospitalist note appreciated  -    Discharge plan: rehab tomorrow if insurance authorized

## 2019-02-02 LAB
ANION GAP SERPL CALC-SCNC: 8 MMOL/L — SIGNIFICANT CHANGE UP (ref 5–17)
APTT BLD: 37.2 SEC — HIGH (ref 28.5–37)
BUN SERPL-MCNC: 12 MG/DL — SIGNIFICANT CHANGE UP (ref 7–23)
CALCIUM SERPL-MCNC: 8.7 MG/DL — SIGNIFICANT CHANGE UP (ref 8.4–10.5)
CHLORIDE SERPL-SCNC: 108 MMOL/L — SIGNIFICANT CHANGE UP (ref 96–108)
CO2 SERPL-SCNC: 27 MMOL/L — SIGNIFICANT CHANGE UP (ref 22–31)
CREAT SERPL-MCNC: 0.82 MG/DL — SIGNIFICANT CHANGE UP (ref 0.5–1.3)
GLUCOSE SERPL-MCNC: 89 MG/DL — SIGNIFICANT CHANGE UP (ref 70–99)
HCT VFR BLD CALC: 29.6 % — LOW (ref 34.5–45)
HGB BLD-MCNC: 9.6 G/DL — LOW (ref 11.5–15.5)
INR BLD: 1.78 RATIO — HIGH (ref 0.88–1.16)
MCHC RBC-ENTMCNC: 30 PG — SIGNIFICANT CHANGE UP (ref 27–34)
MCHC RBC-ENTMCNC: 32.4 GM/DL — SIGNIFICANT CHANGE UP (ref 32–36)
MCV RBC AUTO: 92.5 FL — SIGNIFICANT CHANGE UP (ref 80–100)
NRBC # BLD: 0 /100 WBCS — SIGNIFICANT CHANGE UP (ref 0–0)
PLATELET # BLD AUTO: 157 K/UL — SIGNIFICANT CHANGE UP (ref 150–400)
POTASSIUM SERPL-MCNC: 4.3 MMOL/L — SIGNIFICANT CHANGE UP (ref 3.5–5.3)
POTASSIUM SERPL-SCNC: 4.3 MMOL/L — SIGNIFICANT CHANGE UP (ref 3.5–5.3)
PROTHROM AB SERPL-ACNC: 19.7 SEC — HIGH (ref 10–12.9)
RBC # BLD: 3.2 M/UL — LOW (ref 3.8–5.2)
RBC # FLD: 13.1 % — SIGNIFICANT CHANGE UP (ref 10.3–14.5)
SODIUM SERPL-SCNC: 143 MMOL/L — SIGNIFICANT CHANGE UP (ref 135–145)
WBC # BLD: 5.45 K/UL — SIGNIFICANT CHANGE UP (ref 3.8–10.5)
WBC # FLD AUTO: 5.45 K/UL — SIGNIFICANT CHANGE UP (ref 3.8–10.5)

## 2019-02-02 PROCEDURE — 99232 SBSQ HOSP IP/OBS MODERATE 35: CPT

## 2019-02-02 RX ORDER — WARFARIN SODIUM 2.5 MG/1
5 TABLET ORAL ONCE
Qty: 0 | Refills: 0 | Status: COMPLETED | OUTPATIENT
Start: 2019-02-02 | End: 2019-02-02

## 2019-02-02 RX ADMIN — ATORVASTATIN CALCIUM 10 MILLIGRAM(S): 80 TABLET, FILM COATED ORAL at 21:24

## 2019-02-02 RX ADMIN — SENNA PLUS 2 TABLET(S): 8.6 TABLET ORAL at 21:23

## 2019-02-02 RX ADMIN — CELECOXIB 100 MILLIGRAM(S): 200 CAPSULE ORAL at 09:20

## 2019-02-02 RX ADMIN — HYDROMORPHONE HYDROCHLORIDE 4 MILLIGRAM(S): 2 INJECTION INTRAMUSCULAR; INTRAVENOUS; SUBCUTANEOUS at 12:36

## 2019-02-02 RX ADMIN — Medication 1000 MILLIGRAM(S): at 22:02

## 2019-02-02 RX ADMIN — WARFARIN SODIUM 5 MILLIGRAM(S): 2.5 TABLET ORAL at 21:24

## 2019-02-02 RX ADMIN — HYDROMORPHONE HYDROCHLORIDE 4 MILLIGRAM(S): 2 INJECTION INTRAMUSCULAR; INTRAVENOUS; SUBCUTANEOUS at 05:08

## 2019-02-02 RX ADMIN — Medication 120 MILLIGRAM(S): at 05:08

## 2019-02-02 RX ADMIN — Medication 100 MILLIGRAM(S): at 05:08

## 2019-02-02 RX ADMIN — ENOXAPARIN SODIUM 100 MILLIGRAM(S): 100 INJECTION SUBCUTANEOUS at 08:50

## 2019-02-02 RX ADMIN — HYDROMORPHONE HYDROCHLORIDE 4 MILLIGRAM(S): 2 INJECTION INTRAMUSCULAR; INTRAVENOUS; SUBCUTANEOUS at 23:28

## 2019-02-02 RX ADMIN — HYDROMORPHONE HYDROCHLORIDE 4 MILLIGRAM(S): 2 INJECTION INTRAMUSCULAR; INTRAVENOUS; SUBCUTANEOUS at 12:06

## 2019-02-02 RX ADMIN — Medication 81 MILLIGRAM(S): at 05:08

## 2019-02-02 RX ADMIN — PANTOPRAZOLE SODIUM 40 MILLIGRAM(S): 20 TABLET, DELAYED RELEASE ORAL at 05:08

## 2019-02-02 RX ADMIN — HYDROMORPHONE HYDROCHLORIDE 4 MILLIGRAM(S): 2 INJECTION INTRAMUSCULAR; INTRAVENOUS; SUBCUTANEOUS at 22:44

## 2019-02-02 RX ADMIN — Medication 1000 MILLIGRAM(S): at 12:26

## 2019-02-02 RX ADMIN — Medication 1000 MILLIGRAM(S): at 22:27

## 2019-02-02 RX ADMIN — HYDROMORPHONE HYDROCHLORIDE 4 MILLIGRAM(S): 2 INJECTION INTRAMUSCULAR; INTRAVENOUS; SUBCUTANEOUS at 05:39

## 2019-02-02 RX ADMIN — Medication 100 MILLIGRAM(S): at 12:59

## 2019-02-02 RX ADMIN — CELECOXIB 100 MILLIGRAM(S): 200 CAPSULE ORAL at 21:24

## 2019-02-02 RX ADMIN — CELECOXIB 100 MILLIGRAM(S): 200 CAPSULE ORAL at 21:23

## 2019-02-02 RX ADMIN — POLYETHYLENE GLYCOL 3350 17 GRAM(S): 17 POWDER, FOR SOLUTION ORAL at 21:22

## 2019-02-02 RX ADMIN — Medication 100 MILLIGRAM(S): at 21:23

## 2019-02-02 RX ADMIN — CELECOXIB 100 MILLIGRAM(S): 200 CAPSULE ORAL at 08:50

## 2019-02-02 RX ADMIN — Medication 1000 MILLIGRAM(S): at 12:06

## 2019-02-02 NOTE — PROGRESS NOTE ADULT - ASSESSMENT
74F with PMH of DVT, PE x 2, Prothrombin Gene Mutation, HTN, Hx Pacemaker and Herniated Lumbar disc S/P Left TKR    S/P Left TKR  Pain control improved. Continue to participate in PT to make meaningful progress towards ambulation.  Continue bowel regimen and work with PT/OT. Ortho on board and appreciate their recommendations.     Thrombocytopenia  Could be related to HIT but platelets did rebound today so will continue to monitor. If further drop consider HIT.     HTN  Controlled and will continue Verapamil    Hx pacemaker  sp interrogation 10/2018. 8 year battery life documented.     Hx of DVT/PEx2 and Prothrombin Gene Mutation  Enoxaparin 100mg SC daily as per pre-op hematologist with warfarin bridge. INR Still sub-therapeutic. Target 2.0 to 3.0.  Monitor INR as it is still sub-therapeutic.     DVT Prophylaxis   On therapeutic Anticoagulation    Disposition  Full Code/Inpatient and will transition home on discharge in 1-2 days      Plan of care was discussed with patient, in great details, All questions were answered to their satisfaction  Seems to understand, and in agreement

## 2019-02-02 NOTE — PROGRESS NOTE ADULT - SUBJECTIVE AND OBJECTIVE BOX
CC.  S/p Left Total knee replacement   HPI.  Patient reports left knee pain is controlled.  Offers no other complaints                Constitutional: No fever, fatigue or weight loss.  Skin: No rash.  Eyes: No recent vision problems or eye pain.  ENT: No congestion, ear pain, or sore throat.  Endocrine: No thyroid problems.  Cardiovascular: No chest pain or palpation.  Respiratory: No cough, shortness of breath, congestion, or wheezing.  Gastrointestinal: No abdominal pain, nausea, vomiting, or diarrhea.  Genitourinary: No dysuria.  Musculoskeletal: No joint swelling.  Neurologic: No headache.      Vital Signs Last 24 Hrs  T(C): 37 (02-02-19 @ 07:58), Max: 37 (02-02-19 @ 07:58)  T(F): 98.6 (02-02-19 @ 07:58), Max: 98.6 (02-02-19 @ 07:58)  HR: 81 (02-02-19 @ 07:58) (79 - 83)  BP: 108/72 (02-02-19 @ 07:58) (96/63 - 117/73)  BP(mean): --  RR: 16 (02-02-19 @ 07:58) (14 - 16)  SpO2: 97% (02-02-19 @ 07:58) (95% - 97%)        PHYSICAL EXAM-  GENERAL: NAD, well-groomed, well-developed  HEAD:  Atraumatic, Normocephalic  EYES: EOMI, PERRLA, conjunctiva and sclera clear  NECK: Supple, No JVD, Normal thyroid  NERVOUS SYSTEM:  Alert & Oriented X3, Motor Strength 5/5 B/L upper and lower extremities; DTRs 2+ intact and symmetric  CHEST/LUNG: Clear to percussion bilaterally; No rales, rhonchi, wheezing, or rubs  HEART: Regular rate and rhythm; No murmurs, rubs, or gallops  ABDOMEN: Soft, Nontender, Nondistended; Bowel sounds present  EXTREMITIES:  2+ Peripheral Pulses, No clubbing, cyanosis, or edema  SKIN: No rashes or lesions                                  9.6    5.45  )-----------( 157      ( 02 Feb 2019 07:44 )             29.6     02-02    143  |  108  |  12  ----------------------------<  89  4.3   |  27  |  0.82    Ca    8.7      02 Feb 2019 07:44  Mg     1.7     01-31      CARDIAC MARKERS ( 01 Feb 2019 07:22 )  .000 ng/mL / x     / x     / x     / x      CARDIAC MARKERS ( 31 Jan 2019 23:16 )  .000 ng/mL / x     / x     / x     / x              PT/INR - ( 02 Feb 2019 07:44 )   PT: 19.7 sec;   INR: 1.78 ratio         PTT - ( 02 Feb 2019 07:44 )  PTT:37.2 sec        MEDICATIONS  (STANDING):  acetaminophen   Tablet .. 1000 milliGRAM(s) Oral every 12 hours  aspirin enteric coated 81 milliGRAM(s) Oral daily  atorvastatin 10 milliGRAM(s) Oral at bedtime  celecoxib 100 milliGRAM(s) Oral every 12 hours  docusate sodium 100 milliGRAM(s) Oral three times a day  enoxaparin Injectable 100 milliGRAM(s) SubCutaneous daily  pantoprazole    Tablet 40 milliGRAM(s) Oral before breakfast  verapamil  milliGRAM(s) Oral daily    MEDICATIONS  (PRN):  aluminum hydroxide/magnesium hydroxide/simethicone Suspension 30 milliLiter(s) Oral four times a day PRN Indigestion  bisacodyl Suppository 10 milliGRAM(s) Rectal daily PRN If no bowel movement by postoperative day #2  HYDROmorphone   Tablet 2 milliGRAM(s) Oral every 3 hours PRN Mild Pain (1 - 3)  HYDROmorphone   Tablet 4 milliGRAM(s) Oral every 3 hours PRN Moderate Pain (4 - 6)  HYDROmorphone  Injectable 0.5 milliGRAM(s) IV Push every 3 hours PRN Severe Pain (7 - 10)  magnesium hydroxide Suspension 30 milliLiter(s) Oral daily PRN Constipation  ondansetron Injectable 4 milliGRAM(s) IV Push every 6 hours PRN Nausea and/or Vomiting  polyethylene glycol 3350 17 Gram(s) Oral daily PRN Constipation  senna 2 Tablet(s) Oral at bedtime PRN Constipation    Imaging Personally Reviewed:     [x ] YES  [ ] NO    Consultant(s) Notes Reviewed:  [x ] YES  [ ] NO    Care Discussed with Consultants/Other Providers [x ] YES  [ ] NO

## 2019-02-02 NOTE — PROGRESS NOTE ADULT - SUBJECTIVE AND OBJECTIVE BOX
Post Op     JOHNSON BIANCHI      74y        Female                                                                                                                 T(C): 37 (02-02-19 @ 07:58), Max: 37 (02-02-19 @ 07:58)  HR: 81 (02-02-19 @ 07:58) (79 - 83)  BP: 108/72 (02-02-19 @ 07:58) (96/63 - 117/73)  RR: 16 (02-02-19 @ 07:58) (14 - 16)  SpO2: 97% (02-02-19 @ 07:58) (95% - 97%)  Wt(kg): --    S/P   total knee replacement    Patient denies shortness of breath, chest pain, dyspnea, No complaints  Pain is 3/10    Physical Exam    Extremity: Bilaterally:  No holmon                                           No Cord calves soft b/l                                           PAS on                                           Neurovascular intact                                          Motor intact EHL/FHL                                          Sensation intact                                          Pulses intact DP/PT                                         Calves Soft                                         Dressing Clean / Dry / Intact  dressing removed  some redness  along suture line 2ndery  to swelling sutures taught  advised ice and ambulation                                         Capillary refill with 5 seconds                          9.6    5.45  )-----------( 157      ( 02 Feb 2019 07:44 )             29.6       02-02    143  |  108  |  12  ----------------------------<  89  4.3   |  27  |  0.82    Ca    8.7      02 Feb 2019 07:44  Mg     1.7     01-31        A/P  -- S/P total knee replacement    -  Medicine To Follow   - DVT prophylaxis PAS lovenox coumadin bridge  - PT & OT   - Analagesia  - Incentive Spirometry  - Discharge Planning monday  pending  authorization  - Safety Precautions  -  CBC , BMP daily Post Op     JOHNSON BIANCHI      74y        Female                                                                                                                 T(C): 37 (02-02-19 @ 07:58), Max: 37 (02-02-19 @ 07:58)  HR: 81 (02-02-19 @ 07:58) (79 - 83)  BP: 108/72 (02-02-19 @ 07:58) (96/63 - 117/73)  RR: 16 (02-02-19 @ 07:58) (14 - 16)  SpO2: 97% (02-02-19 @ 07:58) (95% - 97%)  Wt(kg): --    S/P   total knee replacement    Patient denies shortness of breath, chest pain, dyspnea, No complaints  Pain is 3/10    Physical Exam    Extremity: Bilaterally:  No holmon                                           No Cord calves soft b/l                                           PAS on                                           Neurovascular intact                                          Motor intact EHL/FHL                                          Sensation intact                                          Pulses intact DP/PT                                         Calves Soft                                         Dressing Clean / Dry / Intact  dressing removed  some redness  along suture line 2ndery  to swelling sutures taught  advised ice and ambulation                                         Capillary refill with 5 seconds                          9.6    5.45  )-----------( 157      ( 02 Feb 2019 07:44 )             29.6       02-02    143  |  108  |  12  ----------------------------<  89  4.3   |  27  |  0.82    Ca    8.7      02 Feb 2019 07:44  Mg     1.7     01-31        A/P  -- S/P total knee replacement    -  Medicine To Follow   - DVT prophylaxis PAS lovenox coumadin bridge pt inr ordered  medicine to  order coumadin  - PT & OT   - Analagesia  - Incentive Spirometry  - Discharge Planning monday  pending  authorization  - Safety Precautions  -  CBC , BMP daily

## 2019-02-03 LAB
ANION GAP SERPL CALC-SCNC: 8 MMOL/L — SIGNIFICANT CHANGE UP (ref 5–17)
APTT BLD: 28.4 SEC — LOW (ref 28.5–37)
BUN SERPL-MCNC: 13 MG/DL — SIGNIFICANT CHANGE UP (ref 7–23)
CALCIUM SERPL-MCNC: 8.5 MG/DL — SIGNIFICANT CHANGE UP (ref 8.4–10.5)
CHLORIDE SERPL-SCNC: 107 MMOL/L — SIGNIFICANT CHANGE UP (ref 96–108)
CO2 SERPL-SCNC: 29 MMOL/L — SIGNIFICANT CHANGE UP (ref 22–31)
CREAT SERPL-MCNC: 0.98 MG/DL — SIGNIFICANT CHANGE UP (ref 0.5–1.3)
GLUCOSE SERPL-MCNC: 95 MG/DL — SIGNIFICANT CHANGE UP (ref 70–99)
HCT VFR BLD CALC: 29.9 % — LOW (ref 34.5–45)
HGB BLD-MCNC: 9.7 G/DL — LOW (ref 11.5–15.5)
INR BLD: 1.98 RATIO — HIGH (ref 0.88–1.16)
INR BLD: 2.18 RATIO — HIGH (ref 0.88–1.16)
MCHC RBC-ENTMCNC: 30 PG — SIGNIFICANT CHANGE UP (ref 27–34)
MCHC RBC-ENTMCNC: 32.4 GM/DL — SIGNIFICANT CHANGE UP (ref 32–36)
MCV RBC AUTO: 92.6 FL — SIGNIFICANT CHANGE UP (ref 80–100)
NRBC # BLD: 0 /100 WBCS — SIGNIFICANT CHANGE UP (ref 0–0)
PLATELET # BLD AUTO: 171 K/UL — SIGNIFICANT CHANGE UP (ref 150–400)
POTASSIUM SERPL-MCNC: 4.7 MMOL/L — SIGNIFICANT CHANGE UP (ref 3.5–5.3)
POTASSIUM SERPL-SCNC: 4.7 MMOL/L — SIGNIFICANT CHANGE UP (ref 3.5–5.3)
PROTHROM AB SERPL-ACNC: 22 SEC — HIGH (ref 10–12.9)
PROTHROM AB SERPL-ACNC: 24.3 SEC — HIGH (ref 10–12.9)
RBC # BLD: 3.23 M/UL — LOW (ref 3.8–5.2)
RBC # FLD: 12.9 % — SIGNIFICANT CHANGE UP (ref 10.3–14.5)
SODIUM SERPL-SCNC: 144 MMOL/L — SIGNIFICANT CHANGE UP (ref 135–145)
WBC # BLD: 5.16 K/UL — SIGNIFICANT CHANGE UP (ref 3.8–10.5)
WBC # FLD AUTO: 5.16 K/UL — SIGNIFICANT CHANGE UP (ref 3.8–10.5)

## 2019-02-03 PROCEDURE — 99232 SBSQ HOSP IP/OBS MODERATE 35: CPT

## 2019-02-03 RX ORDER — WARFARIN SODIUM 2.5 MG/1
4 TABLET ORAL ONCE
Qty: 0 | Refills: 0 | Status: COMPLETED | OUTPATIENT
Start: 2019-02-03 | End: 2019-02-03

## 2019-02-03 RX ADMIN — ENOXAPARIN SODIUM 100 MILLIGRAM(S): 100 INJECTION SUBCUTANEOUS at 10:03

## 2019-02-03 RX ADMIN — Medication 120 MILLIGRAM(S): at 05:08

## 2019-02-03 RX ADMIN — CELECOXIB 100 MILLIGRAM(S): 200 CAPSULE ORAL at 10:03

## 2019-02-03 RX ADMIN — PANTOPRAZOLE SODIUM 40 MILLIGRAM(S): 20 TABLET, DELAYED RELEASE ORAL at 05:08

## 2019-02-03 RX ADMIN — Medication 1000 MILLIGRAM(S): at 10:05

## 2019-02-03 RX ADMIN — HYDROMORPHONE HYDROCHLORIDE 4 MILLIGRAM(S): 2 INJECTION INTRAMUSCULAR; INTRAVENOUS; SUBCUTANEOUS at 01:59

## 2019-02-03 RX ADMIN — HYDROMORPHONE HYDROCHLORIDE 4 MILLIGRAM(S): 2 INJECTION INTRAMUSCULAR; INTRAVENOUS; SUBCUTANEOUS at 06:30

## 2019-02-03 RX ADMIN — HYDROMORPHONE HYDROCHLORIDE 4 MILLIGRAM(S): 2 INJECTION INTRAMUSCULAR; INTRAVENOUS; SUBCUTANEOUS at 05:42

## 2019-02-03 RX ADMIN — Medication 1000 MILLIGRAM(S): at 10:35

## 2019-02-03 RX ADMIN — ATORVASTATIN CALCIUM 10 MILLIGRAM(S): 80 TABLET, FILM COATED ORAL at 21:59

## 2019-02-03 RX ADMIN — HYDROMORPHONE HYDROCHLORIDE 4 MILLIGRAM(S): 2 INJECTION INTRAMUSCULAR; INTRAVENOUS; SUBCUTANEOUS at 12:58

## 2019-02-03 RX ADMIN — SENNA PLUS 2 TABLET(S): 8.6 TABLET ORAL at 21:58

## 2019-02-03 RX ADMIN — Medication 100 MILLIGRAM(S): at 21:58

## 2019-02-03 RX ADMIN — Medication 1000 MILLIGRAM(S): at 21:59

## 2019-02-03 RX ADMIN — Medication 81 MILLIGRAM(S): at 05:08

## 2019-02-03 RX ADMIN — CELECOXIB 100 MILLIGRAM(S): 200 CAPSULE ORAL at 21:59

## 2019-02-03 RX ADMIN — Medication 100 MILLIGRAM(S): at 13:50

## 2019-02-03 RX ADMIN — Medication 100 MILLIGRAM(S): at 05:08

## 2019-02-03 RX ADMIN — WARFARIN SODIUM 4 MILLIGRAM(S): 2.5 TABLET ORAL at 21:58

## 2019-02-03 RX ADMIN — CELECOXIB 100 MILLIGRAM(S): 200 CAPSULE ORAL at 10:33

## 2019-02-03 RX ADMIN — HYDROMORPHONE HYDROCHLORIDE 4 MILLIGRAM(S): 2 INJECTION INTRAMUSCULAR; INTRAVENOUS; SUBCUTANEOUS at 02:55

## 2019-02-03 RX ADMIN — MAGNESIUM HYDROXIDE 30 MILLILITER(S): 400 TABLET, CHEWABLE ORAL at 18:35

## 2019-02-03 RX ADMIN — HYDROMORPHONE HYDROCHLORIDE 4 MILLIGRAM(S): 2 INJECTION INTRAMUSCULAR; INTRAVENOUS; SUBCUTANEOUS at 12:28

## 2019-02-03 NOTE — PROGRESS NOTE ADULT - SUBJECTIVE AND OBJECTIVE BOX
CC.  S/p Left Total knee replacement   HPI.  Patient reports left knee pain is controlled.  Offers no other complaints                Constitutional: No fever, fatigue or weight loss.  Skin: No rash.  Eyes: No recent vision problems or eye pain.  ENT: No congestion, ear pain, or sore throat.  Endocrine: No thyroid problems.  Cardiovascular: No chest pain or palpation.  Respiratory: No cough, shortness of breath, congestion, or wheezing.  Gastrointestinal: No abdominal pain, nausea, vomiting, or diarrhea.  Genitourinary: No dysuria.  Musculoskeletal: No joint swelling.  Neurologic: No headache.      Vital Signs Last 24 Hrs  T(C): 36.7 (03 Feb 2019 08:02), Max: 36.8 (02 Feb 2019 23:05)  T(F): 98 (03 Feb 2019 08:02), Max: 98.2 (02 Feb 2019 23:05)  HR: 64 (03 Feb 2019 08:02) (64 - 76)  BP: 120/79 (03 Feb 2019 08:02) (107/76 - 145/78)  BP(mean): --  RR: 18 (03 Feb 2019 08:02) (16 - 18)  SpO2: 95% (03 Feb 2019 08:02) (95% - 95%)      PHYSICAL EXAM-  GENERAL: NAD, well-groomed, well-developed  HEAD:  Atraumatic, Normocephalic  EYES: EOMI, PERRLA, conjunctiva and sclera clear  NECK: Supple, No JVD, Normal thyroid  NERVOUS SYSTEM:  Alert & Oriented X3, Motor Strength 5/5 B/L upper and lower extremities; DTRs 2+ intact and symmetric  CHEST/LUNG: Clear to percussion bilaterally; No rales, rhonchi, wheezing, or rubs  HEART: Regular rate and rhythm; No murmurs, rubs, or gallops  ABDOMEN: Soft, Nontender, Nondistended; Bowel sounds present  EXTREMITIES:  2+ Peripheral Pulses, No clubbing, cyanosis, or edema  SKIN: No rashes or lesions                          9.7    5.16  )-----------( 171      ( 03 Feb 2019 07:47 )             29.9     02-03    144  |  107  |  13  ----------------------------<  95  4.7   |  29  |  0.98    Ca    8.5      03 Feb 2019 07:47              PT/INR - ( 03 Feb 2019 07:47 )   PT: 22.0 sec;   INR: 1.98 ratio         PTT - ( 03 Feb 2019 07:47 )  PTT:28.4 sec      MEDICATIONS  (STANDING):  acetaminophen   Tablet .. 1000 milliGRAM(s) Oral every 12 hours  aspirin enteric coated 81 milliGRAM(s) Oral daily  atorvastatin 10 milliGRAM(s) Oral at bedtime  celecoxib 100 milliGRAM(s) Oral every 12 hours  docusate sodium 100 milliGRAM(s) Oral three times a day  enoxaparin Injectable 100 milliGRAM(s) SubCutaneous daily  pantoprazole    Tablet 40 milliGRAM(s) Oral before breakfast  verapamil  milliGRAM(s) Oral daily    MEDICATIONS  (PRN):  aluminum hydroxide/magnesium hydroxide/simethicone Suspension 30 milliLiter(s) Oral four times a day PRN Indigestion  bisacodyl Suppository 10 milliGRAM(s) Rectal daily PRN If no bowel movement by postoperative day #2  HYDROmorphone   Tablet 2 milliGRAM(s) Oral every 3 hours PRN Mild Pain (1 - 3)  HYDROmorphone   Tablet 4 milliGRAM(s) Oral every 3 hours PRN Moderate Pain (4 - 6)  HYDROmorphone  Injectable 0.5 milliGRAM(s) IV Push every 3 hours PRN Severe Pain (7 - 10)  magnesium hydroxide Suspension 30 milliLiter(s) Oral daily PRN Constipation  ondansetron Injectable 4 milliGRAM(s) IV Push every 6 hours PRN Nausea and/or Vomiting  polyethylene glycol 3350 17 Gram(s) Oral daily PRN Constipation  senna 2 Tablet(s) Oral at bedtime PRN Constipation      Imaging Personally Reviewed:     [x ] YES  [ ] NO    Consultant(s) Notes Reviewed:  [x ] YES  [ ] NO    Care Discussed with Consultants/Other Providers [x ] YES  [ ] NO

## 2019-02-03 NOTE — PROGRESS NOTE ADULT - SUBJECTIVE AND OBJECTIVE BOX
POD#:  5  S/P: Left TKR                       SUBJECTIVE: Patient seen and examined.  Sitting up in chair.   Feeling well.  Reported Pain Score = 3    OBJECTIVE:     Vital Signs Last 24 Hrs  T(C): 36.7 (03 Feb 2019 08:02), Max: 36.8 (02 Feb 2019 23:05)  T(F): 98 (03 Feb 2019 08:02), Max: 98.2 (02 Feb 2019 23:05)  HR: 64 (03 Feb 2019 08:02) (64 - 76)  BP: 120/79 (03 Feb 2019 08:02) (107/76 - 145/78)  RR: 18 (03 Feb 2019 08:02) (16 - 18)  SpO2: 95% (03 Feb 2019 08:02) (95% - 95%)    Left Knee:          Incision: clean/dry/intact, lucy-incisional resolving ecchymosis  Bilateral LEs:         Sensation:  intact to light touch          Motor exam:  5/5 dorsiflexion/plantarflexion/EHL          2+ DP pulses          calf supple, NT         SCDs in place    LABS:                        9.7    5.16  )-----------( 171      ( 03 Feb 2019 07:47 )             29.9     02-03    144  |  107  |  13  ----------------------------<  95  4.7   |  29  |  0.98    Ca    8.5      03 Feb 2019 07:47      PT/INR - ( 03 Feb 2019 11:52 )   PT: 24.3 sec;   INR: 2.18 ratio         PTT - ( 03 Feb 2019 07:47 )  PTT:28.4 sec      MEDICATIONS:  Anticoagulation:  aspirin enteric coated 81 milliGRAM(s) Oral daily  enoxaparin Injectable 100 milliGRAM(s) SubCutaneous daily  warfarin 4 milliGRAM(s) Oral once      Pain medications:   acetaminophen   Tablet .. 1000 milliGRAM(s) Oral every 12 hours  celecoxib 100 milliGRAM(s) Oral every 12 hours  HYDROmorphone   Tablet 2 milliGRAM(s) Oral every 3 hours PRN  HYDROmorphone   Tablet 4 milliGRAM(s) Oral every 3 hours PRN  HYDROmorphone  Injectable 0.5 milliGRAM(s) IV Push every 3 hours PRN        A/P : Patient stable s/p Left TKR POD # 5  -    Pain control  -    DVT ppx: lovenox bridging to Coumadin  -    Weight bearing status: WBAT   -    Physical Therapy  -    Occupational Therapy  -    Discharge plan:  awaiting authorization for rehab, possibly tomorrow

## 2019-02-03 NOTE — PROGRESS NOTE ADULT - ASSESSMENT
74F with PMH of DVT, PE x 2, Prothrombin Gene Mutation, HTN, Hx Pacemaker and Herniated Lumbar disc S/P Left TKR    S/P Left TKR  Pain control improved. Continue to participate in PT to make meaningful progress towards ambulation.  Continue bowel regimen and work with PT/OT. Ortho on board and appreciate their recommendations.     Thrombocytopenia  Could be related to HIT but platelets continue to trend up, so will continue to monitor. If further drop consider HIT.     HTN  Controlled and will continue Verapamil    Hx pacemaker  sp interrogation 10/2018. 8 year battery life documented.     Hx of DVT/PEx2 and Prothrombin Gene Mutation  Enoxaparin 100mg SC daily as per pre-op hematologist with warfarin bridge. INR Still sub-therapeutic. Target 2.0 to 3.0.  Monitor INR as it is still sub-therapeutic.     DVT Prophylaxis   On therapeutic Anticoagulation    Disposition  Full Code/Inpatient and will transition home on discharge in 1-2 days      Plan of care was discussed with patient, in great details, All questions were answered to their satisfaction  Seems to understand, and in agreement

## 2019-02-04 VITALS
OXYGEN SATURATION: 96 % | DIASTOLIC BLOOD PRESSURE: 84 MMHG | TEMPERATURE: 98 F | HEART RATE: 79 BPM | SYSTOLIC BLOOD PRESSURE: 135 MMHG | RESPIRATION RATE: 16 BRPM

## 2019-02-04 LAB
ANION GAP SERPL CALC-SCNC: 5 MMOL/L — SIGNIFICANT CHANGE UP (ref 5–17)
APTT BLD: 39.7 SEC — HIGH (ref 28.5–37)
BUN SERPL-MCNC: 14 MG/DL — SIGNIFICANT CHANGE UP (ref 7–23)
CALCIUM SERPL-MCNC: 9.1 MG/DL — SIGNIFICANT CHANGE UP (ref 8.4–10.5)
CHLORIDE SERPL-SCNC: 107 MMOL/L — SIGNIFICANT CHANGE UP (ref 96–108)
CO2 SERPL-SCNC: 31 MMOL/L — SIGNIFICANT CHANGE UP (ref 22–31)
CREAT SERPL-MCNC: 0.88 MG/DL — SIGNIFICANT CHANGE UP (ref 0.5–1.3)
GLUCOSE SERPL-MCNC: 97 MG/DL — SIGNIFICANT CHANGE UP (ref 70–99)
HCT VFR BLD CALC: 31 % — LOW (ref 34.5–45)
HGB BLD-MCNC: 10.1 G/DL — LOW (ref 11.5–15.5)
INR BLD: 2.11 RATIO — HIGH (ref 0.88–1.16)
MCHC RBC-ENTMCNC: 29.7 PG — SIGNIFICANT CHANGE UP (ref 27–34)
MCHC RBC-ENTMCNC: 32.6 GM/DL — SIGNIFICANT CHANGE UP (ref 32–36)
MCV RBC AUTO: 91.2 FL — SIGNIFICANT CHANGE UP (ref 80–100)
NRBC # BLD: 0 /100 WBCS — SIGNIFICANT CHANGE UP (ref 0–0)
PLATELET # BLD AUTO: 206 K/UL — SIGNIFICANT CHANGE UP (ref 150–400)
POTASSIUM SERPL-MCNC: 4.6 MMOL/L — SIGNIFICANT CHANGE UP (ref 3.5–5.3)
POTASSIUM SERPL-SCNC: 4.6 MMOL/L — SIGNIFICANT CHANGE UP (ref 3.5–5.3)
PROTHROM AB SERPL-ACNC: 23.5 SEC — HIGH (ref 10–12.9)
RBC # BLD: 3.4 M/UL — LOW (ref 3.8–5.2)
RBC # FLD: 12.9 % — SIGNIFICANT CHANGE UP (ref 10.3–14.5)
SODIUM SERPL-SCNC: 143 MMOL/L — SIGNIFICANT CHANGE UP (ref 135–145)
WBC # BLD: 5.66 K/UL — SIGNIFICANT CHANGE UP (ref 3.8–10.5)
WBC # FLD AUTO: 5.66 K/UL — SIGNIFICANT CHANGE UP (ref 3.8–10.5)

## 2019-02-04 PROCEDURE — 97530 THERAPEUTIC ACTIVITIES: CPT

## 2019-02-04 PROCEDURE — 80048 BASIC METABOLIC PNL TOTAL CA: CPT

## 2019-02-04 PROCEDURE — 97535 SELF CARE MNGMENT TRAINING: CPT

## 2019-02-04 PROCEDURE — 97110 THERAPEUTIC EXERCISES: CPT

## 2019-02-04 PROCEDURE — 85610 PROTHROMBIN TIME: CPT

## 2019-02-04 PROCEDURE — 36415 COLL VENOUS BLD VENIPUNCTURE: CPT

## 2019-02-04 PROCEDURE — 88311 DECALCIFY TISSUE: CPT

## 2019-02-04 PROCEDURE — 83735 ASSAY OF MAGNESIUM: CPT

## 2019-02-04 PROCEDURE — 85027 COMPLETE CBC AUTOMATED: CPT

## 2019-02-04 PROCEDURE — 88305 TISSUE EXAM BY PATHOLOGIST: CPT

## 2019-02-04 PROCEDURE — 97165 OT EVAL LOW COMPLEX 30 MIN: CPT

## 2019-02-04 PROCEDURE — 97161 PT EVAL LOW COMPLEX 20 MIN: CPT

## 2019-02-04 PROCEDURE — 94664 DEMO&/EVAL PT USE INHALER: CPT

## 2019-02-04 PROCEDURE — 84484 ASSAY OF TROPONIN QUANT: CPT

## 2019-02-04 PROCEDURE — 73562 X-RAY EXAM OF KNEE 3: CPT

## 2019-02-04 PROCEDURE — 85018 HEMOGLOBIN: CPT

## 2019-02-04 PROCEDURE — 93005 ELECTROCARDIOGRAM TRACING: CPT

## 2019-02-04 PROCEDURE — 99232 SBSQ HOSP IP/OBS MODERATE 35: CPT

## 2019-02-04 PROCEDURE — 97116 GAIT TRAINING THERAPY: CPT

## 2019-02-04 PROCEDURE — C1776: CPT

## 2019-02-04 PROCEDURE — C1713: CPT

## 2019-02-04 PROCEDURE — 85730 THROMBOPLASTIN TIME PARTIAL: CPT

## 2019-02-04 PROCEDURE — C1889: CPT

## 2019-02-04 PROCEDURE — 85014 HEMATOCRIT: CPT

## 2019-02-04 RX ORDER — HYDROMORPHONE HYDROCHLORIDE 2 MG/ML
1 INJECTION INTRAMUSCULAR; INTRAVENOUS; SUBCUTANEOUS
Qty: 42 | Refills: 0
Start: 2019-02-04 | End: 2019-02-10

## 2019-02-04 RX ORDER — MELOXICAM 15 MG/1
1 TABLET ORAL
Qty: 30 | Refills: 0
Start: 2019-02-04 | End: 2019-03-05

## 2019-02-04 RX ORDER — CELECOXIB 200 MG/1
1 CAPSULE ORAL
Qty: 60 | Refills: 0
Start: 2019-02-04 | End: 2019-03-05

## 2019-02-04 RX ORDER — HYDROMORPHONE HYDROCHLORIDE 2 MG/ML
1 INJECTION INTRAMUSCULAR; INTRAVENOUS; SUBCUTANEOUS
Qty: 42 | Refills: 0 | OUTPATIENT
Start: 2019-02-04 | End: 2019-02-10

## 2019-02-04 RX ORDER — HYDROMORPHONE HYDROCHLORIDE 2 MG/ML
1 INJECTION INTRAMUSCULAR; INTRAVENOUS; SUBCUTANEOUS
Qty: 0 | Refills: 0 | DISCHARGE
Start: 2019-02-04

## 2019-02-04 RX ORDER — WARFARIN SODIUM 2.5 MG/1
5 TABLET ORAL ONCE
Qty: 0 | Refills: 0 | Status: DISCONTINUED | OUTPATIENT
Start: 2019-02-04 | End: 2019-02-04

## 2019-02-04 RX ORDER — HYDROMORPHONE HYDROCHLORIDE 2 MG/ML
1 INJECTION INTRAMUSCULAR; INTRAVENOUS; SUBCUTANEOUS
Qty: 0 | Refills: 0 | COMMUNITY
Start: 2019-02-04

## 2019-02-04 RX ADMIN — Medication 1000 MILLIGRAM(S): at 12:13

## 2019-02-04 RX ADMIN — ENOXAPARIN SODIUM 100 MILLIGRAM(S): 100 INJECTION SUBCUTANEOUS at 09:06

## 2019-02-04 RX ADMIN — Medication 120 MILLIGRAM(S): at 05:19

## 2019-02-04 RX ADMIN — POLYETHYLENE GLYCOL 3350 17 GRAM(S): 17 POWDER, FOR SOLUTION ORAL at 05:19

## 2019-02-04 RX ADMIN — Medication 100 MILLIGRAM(S): at 05:19

## 2019-02-04 RX ADMIN — PANTOPRAZOLE SODIUM 40 MILLIGRAM(S): 20 TABLET, DELAYED RELEASE ORAL at 05:19

## 2019-02-04 RX ADMIN — CELECOXIB 100 MILLIGRAM(S): 200 CAPSULE ORAL at 09:02

## 2019-02-04 RX ADMIN — Medication 1000 MILLIGRAM(S): at 11:43

## 2019-02-04 RX ADMIN — HYDROMORPHONE HYDROCHLORIDE 4 MILLIGRAM(S): 2 INJECTION INTRAMUSCULAR; INTRAVENOUS; SUBCUTANEOUS at 05:50

## 2019-02-04 RX ADMIN — CELECOXIB 100 MILLIGRAM(S): 200 CAPSULE ORAL at 09:32

## 2019-02-04 RX ADMIN — Medication 81 MILLIGRAM(S): at 05:20

## 2019-02-04 RX ADMIN — Medication 100 MILLIGRAM(S): at 13:12

## 2019-02-04 RX ADMIN — HYDROMORPHONE HYDROCHLORIDE 4 MILLIGRAM(S): 2 INJECTION INTRAMUSCULAR; INTRAVENOUS; SUBCUTANEOUS at 05:19

## 2019-02-04 NOTE — PROGRESS NOTE ADULT - SUBJECTIVE AND OBJECTIVE BOX
CC.  S/p Left Total knee replacement   HPI.  Patient reports left knee pain is controlled.  Offers no other complaints                Constitutional: No fever, fatigue or weight loss.  Skin: No rash.  Eyes: No recent vision problems or eye pain.  ENT: No congestion, ear pain, or sore throat.  Endocrine: No thyroid problems.  Cardiovascular: No chest pain or palpation.  Respiratory: No cough, shortness of breath, congestion, or wheezing.  Gastrointestinal: No abdominal pain, nausea, vomiting, or diarrhea.  Genitourinary: No dysuria.  Musculoskeletal: No joint swelling.  Neurologic: No headache.      Vital Signs Last 24 Hrs  T(C): 36.8 (04 Feb 2019 07:59), Max: 37 (03 Feb 2019 15:13)  T(F): 98.2 (04 Feb 2019 07:59), Max: 98.6 (03 Feb 2019 15:13)  HR: 79 (04 Feb 2019 07:59) (73 - 79)  BP: 135/84 (04 Feb 2019 07:59) (107/66 - 135/84)  BP(mean): --  RR: 16 (04 Feb 2019 07:59) (16 - 18)  SpO2: 96% (04 Feb 2019 07:59) (94% - 96%)      PHYSICAL EXAM-  GENERAL: NAD, well-groomed, well-developed  HEAD:  Atraumatic, Normocephalic  EYES: EOMI, PERRLA, conjunctiva and sclera clear  NECK: Supple, No JVD, Normal thyroid  NERVOUS SYSTEM:  Alert & Oriented X3, Motor Strength 5/5 B/L upper and lower extremities; DTRs 2+ intact and symmetric  CHEST/LUNG: Clear to percussion bilaterally; No rales, rhonchi, wheezing, or rubs  HEART: Regular rate and rhythm; No murmurs, rubs, or gallops  ABDOMEN: Soft, Nontender, Nondistended; Bowel sounds present  EXTREMITIES:  2+ Peripheral Pulses, No clubbing, cyanosis, or edema  SKIN: No rashes or lesions                                    10.1   5.66  )-----------( 206      ( 04 Feb 2019 06:12 )             31.0     02-04    143  |  107  |  14  ----------------------------<  97  4.6   |  31  |  0.88    Ca    9.1      04 Feb 2019 06:12              PT/INR - ( 04 Feb 2019 06:12 )   PT: 23.5 sec;   INR: 2.11 ratio         PTT - ( 04 Feb 2019 06:12 )  PTT:39.7 sec      MEDICATIONS  (STANDING):  acetaminophen   Tablet .. 1000 milliGRAM(s) Oral every 12 hours  aspirin enteric coated 81 milliGRAM(s) Oral daily  atorvastatin 10 milliGRAM(s) Oral at bedtime  celecoxib 100 milliGRAM(s) Oral every 12 hours  docusate sodium 100 milliGRAM(s) Oral three times a day  pantoprazole    Tablet 40 milliGRAM(s) Oral before breakfast  verapamil  milliGRAM(s) Oral daily    MEDICATIONS  (PRN):  aluminum hydroxide/magnesium hydroxide/simethicone Suspension 30 milliLiter(s) Oral four times a day PRN Indigestion  bisacodyl Suppository 10 milliGRAM(s) Rectal daily PRN If no bowel movement by postoperative day #2  HYDROmorphone   Tablet 2 milliGRAM(s) Oral every 3 hours PRN Mild Pain (1 - 3)  HYDROmorphone   Tablet 4 milliGRAM(s) Oral every 3 hours PRN Moderate Pain (4 - 6)  HYDROmorphone  Injectable 0.5 milliGRAM(s) IV Push every 3 hours PRN Severe Pain (7 - 10)  magnesium hydroxide Suspension 30 milliLiter(s) Oral daily PRN Constipation  ondansetron Injectable 4 milliGRAM(s) IV Push every 6 hours PRN Nausea and/or Vomiting  polyethylene glycol 3350 17 Gram(s) Oral daily PRN Constipation  senna 2 Tablet(s) Oral at bedtime PRN Constipation        Imaging Personally Reviewed:     [x ] YES  [ ] NO    Consultant(s) Notes Reviewed:  [x ] YES  [ ] NO    Care Discussed with Consultants/Other Providers [x ] YES  [ ] NO

## 2019-02-04 NOTE — PROGRESS NOTE ADULT - ASSESSMENT
74F with PMH of DVT, PE x 2, Prothrombin Gene Mutation, HTN, Hx Pacemaker and Herniated Lumbar disc S/P Left TKR    S/P Left TKR  Pain control improved. Continue to participate in PT to make meaningful progress towards ambulation.  Continue bowel regimen and work with PT/OT. Ortho on board and appreciate their recommendations.     Thrombocytopenia  Could be related to HIT but platelets continue to trend up, so will continue to monitor. If further drop consider HIT.     HTN  Controlled and will continue Verapamil    Hx pacemaker  sp interrogation 10/2018. 8 year battery life documented.     Hx of DVT/PEx2 and Prothrombin Gene Mutation  INR is 2.1 today.  continue with coumadin, and D/C Lovenox.  Monitor INR    DVT Prophylaxis   On therapeutic Anticoagulation    Disposition  Full Code/Inpatient        Plan of care was discussed with patient, in great details, All questions were answered to their satisfaction  Seems to understand, and in agreement

## 2019-02-04 NOTE — PROGRESS NOTE ADULT - SUBJECTIVE AND OBJECTIVE BOX
ORTHOPEDIC PA PROGRESS NOTE  JOHNSON BIANCHI      74y Female                                                                                                                               POD #6        STATUS POST:               Pre-Op Dx: Primary osteoarthritis of left knee    Post-Op Dx:  Primary osteoarthritis of left knee    Procedure: Total knee arthroplasty: left total knee                                              Patient comfortable pain controlled.  Voiding urine passing gas and tolerating p.o diet.  No complaints  Pain (0-10):   Current Pain Management:  [ ] PCA   [ ] Po Analgesics [ ] IM /IV Anagesics     T(F): --  HR: 73  BP: 127/84  RR: --  SpO2: --                        10.1   5.66  )-----------( 206      ( 04 Feb 2019 06:12 )             31.0                     02-04    143  |  107  |  14  ----------------------------<  97  4.6   |  31  |  0.88    Ca    9.1      04 Feb 2019 06:12      Physical Exam :    -   Surgical site clean and dry nylon sutures in place  -   Distal Neurvascular status intact grossly.   -   Warm well perfused; capillary refill <3 seconds   -   (+)EHL/FHL 5/5 dorsi/plantar flexion intact  -   (+) Sensation to light touch  -   (-) Calf tenderness Bilaterally    A/P: 74y Female s/p Primary osteoarthritis of left knee    -   Ortho Stable  -   Pain control   -   Medicine to follow  -   DVT ppx:     [x ]SCDs     [ ] ASA     [ ] Eliquis     [x ] Lovenox coumadin  -   Weight bearing status:  WBAT [x ]        PWB    [ ]     TTWB  [ ]      NWB  [ ]   -  Dispo:     Home [ ]     Acute Rehab [ ]     JEFFRY [x ]     TBD [ ]

## 2019-02-13 ENCOUNTER — APPOINTMENT (OUTPATIENT)
Dept: ORTHOPEDIC SURGERY | Facility: CLINIC | Age: 74
End: 2019-02-13
Payer: MEDICARE

## 2019-02-13 VITALS — WEIGHT: 149 LBS | HEIGHT: 59 IN | BODY MASS INDEX: 30.04 KG/M2

## 2019-02-13 PROCEDURE — 99024 POSTOP FOLLOW-UP VISIT: CPT

## 2019-02-13 PROCEDURE — 73560 X-RAY EXAM OF KNEE 1 OR 2: CPT | Mod: LT

## 2019-03-13 ENCOUNTER — APPOINTMENT (OUTPATIENT)
Dept: ORTHOPEDIC SURGERY | Facility: CLINIC | Age: 74
End: 2019-03-13
Payer: MEDICARE

## 2019-03-13 VITALS — HEIGHT: 59 IN | BODY MASS INDEX: 30.04 KG/M2 | WEIGHT: 149 LBS

## 2019-03-13 PROCEDURE — 73562 X-RAY EXAM OF KNEE 3: CPT | Mod: LT

## 2019-03-13 PROCEDURE — 99024 POSTOP FOLLOW-UP VISIT: CPT

## 2019-03-22 NOTE — ED ADULT NURSE NOTE - CAS TRG GEN SKIN CONDITION
Attempted to call pt regarding chest X ray. Phone number is out of order or incorrect.   
I tried to reach pt about his chest xray results. Since his phone number isnt working I Googled him and tried to reach out to his company to to have him call us back.     
Warm/Dry

## 2019-04-24 ENCOUNTER — APPOINTMENT (OUTPATIENT)
Dept: ORTHOPEDIC SURGERY | Facility: CLINIC | Age: 74
End: 2019-04-24
Payer: MEDICARE

## 2019-04-24 VITALS — HEIGHT: 59 IN | WEIGHT: 149 LBS | BODY MASS INDEX: 30.04 KG/M2

## 2019-04-24 DIAGNOSIS — M76.32 ILIOTIBIAL BAND SYNDROME, LEFT LEG: ICD-10-CM

## 2019-04-24 PROCEDURE — 73562 X-RAY EXAM OF KNEE 3: CPT | Mod: LT

## 2019-04-24 PROCEDURE — 99024 POSTOP FOLLOW-UP VISIT: CPT

## 2019-06-05 ENCOUNTER — APPOINTMENT (OUTPATIENT)
Dept: ORTHOPEDIC SURGERY | Facility: CLINIC | Age: 74
End: 2019-06-05
Payer: MEDICARE

## 2019-06-05 VITALS — HEIGHT: 59 IN | BODY MASS INDEX: 30.04 KG/M2 | WEIGHT: 149 LBS

## 2019-06-05 PROCEDURE — 99213 OFFICE O/P EST LOW 20 MIN: CPT

## 2019-06-05 PROCEDURE — 73562 X-RAY EXAM OF KNEE 3: CPT | Mod: LT

## 2019-06-05 NOTE — ADDENDUM
[FreeTextEntry1] : I, Vivian Montemayor , acted solely as a scribe for Dr. Roland Kidd on this date 06/05/2019.\par \par All medical record entries made by the Scribe were at my, Dr. Roland Kidd, direction and personally dictated by me on 06/05/2019. I have reviewed the chart and agree that the record accurately reflects my personal performance of the history, physical exam, assessment and plan. I have also personally directed, reviewed, and agreed with the chart.

## 2019-06-05 NOTE — DISCUSSION/SUMMARY
[de-identified] : The underlying pathophysiology was reviewed in great detail with the patient as well as the various treatment options. I suggested to the patient that she increase her strength and ROM with a home exercise program, a protocol sheet was provided for her and i discuss the exercises with her in detail. She is to utilize ice and NSAIDs as needed to help alleviate her symptoms. I would like to see the patient back in the office in 6 weeks.

## 2019-06-05 NOTE — HISTORY OF PRESENT ILLNESS
[de-identified] : 74 year old female presents for an evaluation of left knee pain, s/p left TKA on 1/29/2019. Shes reports that she has been experiencing pain and weakness about her knee. the patient has not been compliant with following a home exercise program. She experiences weakness and discomfort along the anterior aspect of her left knee that is exacerbated with standing, walking, and climbing stairs. She has no other complaints at this time.

## 2019-06-05 NOTE — PHYSICAL EXAM
[LE] : Sensory: Intact in bilateral lower extremities [de-identified] : Constitutional\par o Appearance : well-nourished, well developed, alert, in no acute distress \par Head and Face\par o Head :\par ¦ Inspection : atraumatic, normocephalic\par o Face :\par ¦ Inspection : no visible rash or discoloration\par Respiratory\par o Respiratory Effort: breathing unlabored \par Neurologic\par o Mental Status Examination :\par ¦ Orientation : grossly oriented to person, place and time\par Psychiatric\par o Mood and Affect: mood normal, affect appropriate \par \par Lumbosacral Spine\par o Inspection : no lesions or deformities\par o Palpation : paraspinal musculature nontender\par o Stability : no subluxations present\par o Range of Motion : extension and sidebending to the right causes lower back pain but not radiating pain\par o Tests: Straight leg raising and Kevin tests are negative \par \par Right Lower Extremity\par o Buttock : no tenderness, swelling or deformities \par o Right Hip :\par ¦ Inspection/Palpation : no tenderness, swelling or deformities\par ¦ Range of Motion : full and painless in all planes, no crepitance\par ¦ Stability : joint stability intact\par ¦ Strength : extension, flexion, adduction, abduction, internal rotation and external rotation 4/5 \par \par o Right Knee :\par ¦ Inspection/Palpation : no tenderness to palpation, no swelling\par ¦ Range of Motion : active flexion and extension full and painless, no crepitance\par ¦ Stability : no valgus or varus instability present on provocative testing\par ¦ Strength : flexion and extension 5/5\par ¦ Tests and Signs : Anterior Drawer negative, Lachman negative, Kevin's negative\par ¦ Dorsalis Pedis Artery : pulse 2+\par ¦ Posterior Tibial Artery : pulse 2+ \par \par Left Lower Extremity\par o Buttock : no tenderness, swelling or deformities\par o Left Hip :\par ¦ Inspection/Palpation : ITB tenderness to below her knee and Gerdy's tubercle. , no swelling or deformities\par ¦ Range of Motion : full and painless in all planes, no crepitance\par ¦ Stability : joint stability intact\par ¦ Strength : extension, flexion, adduction, abduction, internal rotation and external rotation 4-/5\par \par o Left Knee :\par ¦ Inspection/Palpation : mild peripatellar tenderness, no swelling, surgical incisions are well healed \par ¦ Range of Motion : 0-125 degrees full flexion and extension, no crepitance, good patellofemoral glide\par ¦ Stability : mild valgus instability present on provocative testing\par ¦ Strength : flexion and extension 4/5\par ¦ Tests and Signs : Anterior Drawer negative\par ¦ Dorsalis Pedis Artery : pulse 2+\par ¦ Posterior Tibial Artery : pulse 2+ \par \par Gait: Heel-to-toe , no significant extremity swelling or lymphedema\par \par Radiology:\par Left Knee: Standing AP, lateral and skyline views of the knee were obtained and revealed excellent position of her total knee replacement with central tracking of the patella.

## 2019-07-07 NOTE — RESULTS/DATA
07-Jul-2019
[FreeTextEntry1] : WBC 7,300 , Hgb 13.3, Hct 38.6, Plts 174,000, Diff 43P, 50L, 5M, 2Eo, 1 Ba     ALC 3600

## 2019-07-18 NOTE — MEDICAL STUDENT PROGRESS NOTE(EDUCATION) - NS MD HP STUD SUPERVISOR COMMENTS FT
[facility] provider reviewed discharge instructions with the patient. Please see Fellow note from 9/28 for final plan  Addendum: Patient received Micra due to sinus pauses >3s

## 2019-08-05 ENCOUNTER — APPOINTMENT (OUTPATIENT)
Dept: ORTHOPEDIC SURGERY | Facility: CLINIC | Age: 74
End: 2019-08-05
Payer: MEDICARE

## 2019-08-05 VITALS — HEIGHT: 59 IN | BODY MASS INDEX: 30.04 KG/M2 | WEIGHT: 149 LBS

## 2019-08-05 DIAGNOSIS — M17.12 UNILATERAL PRIMARY OSTEOARTHRITIS, LEFT KNEE: ICD-10-CM

## 2019-08-05 PROCEDURE — 99213 OFFICE O/P EST LOW 20 MIN: CPT

## 2019-08-05 PROCEDURE — 73562 X-RAY EXAM OF KNEE 3: CPT | Mod: LT

## 2019-08-06 ENCOUNTER — APPOINTMENT (OUTPATIENT)
Dept: ELECTROPHYSIOLOGY | Facility: CLINIC | Age: 74
End: 2019-08-06
Payer: MEDICARE

## 2019-08-06 VITALS
OXYGEN SATURATION: 97 % | HEART RATE: 62 BPM | HEIGHT: 59 IN | BODY MASS INDEX: 28.43 KG/M2 | DIASTOLIC BLOOD PRESSURE: 88 MMHG | SYSTOLIC BLOOD PRESSURE: 142 MMHG | WEIGHT: 141 LBS

## 2019-08-06 PROCEDURE — 93279 PRGRMG DEV EVAL PM/LDLS PM: CPT

## 2019-11-04 ENCOUNTER — APPOINTMENT (OUTPATIENT)
Dept: ORTHOPEDIC SURGERY | Facility: CLINIC | Age: 74
End: 2019-11-04

## 2019-12-05 ENCOUNTER — APPOINTMENT (OUTPATIENT)
Dept: ELECTROPHYSIOLOGY | Facility: CLINIC | Age: 74
End: 2019-12-05
Payer: MEDICARE

## 2019-12-05 PROCEDURE — 93294 REM INTERROG EVL PM/LDLS PM: CPT

## 2019-12-05 PROCEDURE — 93296 REM INTERROG EVL PM/IDS: CPT

## 2020-01-28 NOTE — PATIENT PROFILE ADULT - NSASFALLNEEDSASSIST_GEN_A_NUR
[facility] Emergency Department  [address]   [place] [address]  Phone:  [phone]  Fax:  [phone]                                    Miguel Sol   MRN: [id number]    Department:  [facility] Emergency Department   Date of Visit:  1/28/2020           After Visit Summary Signature Page    I have received my discharge instructions, and my questions have been answered. I have discussed any challenges I see with this plan with the nurse or doctor.    ..........................................................................................................................................  Patient/Patient Representative Signature      ..........................................................................................................................................  Patient Representative Print Name and Relationship to Patient    ..................................................               ................................................  Date                                   Time    ..........................................................................................................................................  Reviewed by Signature/Title    ...................................................              ..............................................  Date                                               Time          22EPIC Rev 08/18        no

## 2020-01-31 NOTE — OCCUPATIONAL THERAPY INITIAL EVALUATION ADULT - PRECAUTIONS/LIMITATIONS, REHAB EVAL
surgical precautions/fall precautions PAST MEDICAL HISTORY:  Hyperlipidemia     Hypertension PAST MEDICAL HISTORY:  Cerebrovascular accident (CVA)     Hyperlipidemia     Hypertension     Peripheral neuropathy

## 2020-02-03 NOTE — ED PROVIDER NOTE - PMH
DVT (deep venous thrombosis), right  october 2013  GERD (gastroesophageal reflux disease)    Herniated lumbar intervertebral disc    HTN - Hypertension    Hx of Breast Cancer    Lower back pain    PE (pulmonary embolism)  3 yrs ago, was on Lovenox and coumadin  Prothrombin gene mutation
none

## 2020-02-03 NOTE — ED CDU PROVIDER INITIAL DAY NOTE - CONSTITUTIONAL NEGATIVE STATEMENT, MLM
Consent: The patient's consent was obtained including but not limited to risks of crusting, scabbing, blistering, scarring, darker or lighter pigmentary change, recurrence, incomplete removal and infection.
Detail Level: Detailed
Medical Necessity Information: It is in your best interest to select a reason for this procedure from the list below. All of these items fulfill various CMS LCD requirements except the new and changing color options.
Render Post-Care Instructions In Note?: no
Post-Care Instructions: I reviewed with the patient in detail post-care instructions. Patient is to wear sunprotection, and avoid picking at any of the treated lesions. Pt may apply Vaseline to crusted or scabbing areas.
Medical Necessity Clause: This procedure was medically necessary because the lesions that were treated were:
no fever and no chills.

## 2020-03-09 ENCOUNTER — APPOINTMENT (OUTPATIENT)
Dept: ELECTROPHYSIOLOGY | Facility: CLINIC | Age: 75
End: 2020-03-09

## 2020-04-24 ENCOUNTER — APPOINTMENT (OUTPATIENT)
Dept: ELECTROPHYSIOLOGY | Facility: CLINIC | Age: 75
End: 2020-04-24
Payer: MEDICARE

## 2020-04-24 PROCEDURE — 93296 REM INTERROG EVL PM/IDS: CPT

## 2020-04-24 PROCEDURE — 93294 REM INTERROG EVL PM/LDLS PM: CPT

## 2020-08-06 ENCOUNTER — NON-APPOINTMENT (OUTPATIENT)
Age: 75
End: 2020-08-06

## 2020-08-06 ENCOUNTER — APPOINTMENT (OUTPATIENT)
Dept: ELECTROPHYSIOLOGY | Facility: CLINIC | Age: 75
End: 2020-08-06
Payer: MEDICARE

## 2020-08-06 VITALS
SYSTOLIC BLOOD PRESSURE: 134 MMHG | WEIGHT: 148 LBS | HEIGHT: 59 IN | BODY MASS INDEX: 29.84 KG/M2 | OXYGEN SATURATION: 98 % | DIASTOLIC BLOOD PRESSURE: 81 MMHG | HEART RATE: 60 BPM

## 2020-08-06 PROCEDURE — 93279 PRGRMG DEV EVAL PM/LDLS PM: CPT

## 2020-09-28 NOTE — H&P ADULT - NSHPLABSRESULTS_GEN_ALL_CORE
heart catheterization 12/13/2016    False positive. No CAD    History of nuclear stress test 12/06/2016    lexiscan-mild ischemia mid inferior,EF70%    Hx of echocardiogram 06/26/2017    EF 55-60%. Grade 1 diastolic dysfunction.  Hyperhomocysteinemia (HCC)     Moderate depressive disorder     Morbid obesity (HCC)     Neuropathy     Normocytic anemia     Orthostasis     Osteoarthritis     Sleep apnea     Spinal stenosis of lumbar region        FAMILY HISTORY  Family History   Problem Relation Age of Onset    Cancer Mother     High Blood Pressure Mother     Stroke Mother     Heart Disease Father     High Blood Pressure Father     Cancer Maternal Aunt     Diabetes type 2  Daughter     Coronary Art Dis Daughter     Obesity Daughter     COPD Daughter     No Known Problems Daughter     No Known Problems Son     No Known Problems Son     No Known Problems Son     No Known Problems Son     No Known Problems Son     No Known Problems Son        SOCIAL HISTORY  Social History     Socioeconomic History    Marital status:       Spouse name: Not on file    Number of children: 6    Years of education: Not on file    Highest education level: Not on file   Occupational History    Occupation: management     Comment: /retired   Social Needs    Financial resource strain: Not on file    Food insecurity     Worry: Not on file     Inability: Not on file   [employer] needs     Medical: Not on file     Non-medical: Not on file   Tobacco Use    Smoking status: Never Smoker    Smokeless tobacco: Never Used   Substance and Sexual Activity    Alcohol use: No    Drug use: No    Sexual activity: Not Currently     Partners: Male   Lifestyle    Physical activity     Days per week: Not on file     Minutes per session: Not on file    Stress: Not on file   Relationships    Social connections     Talks on phone: Not on file     Gets together: Not on file     Attends Jain service: Not on file     Active member of club or organization: Not on file     Attends meetings of clubs or organizations: Not on file     Relationship status: Not on file    Intimate partner violence     Fear of current or ex partner: Not on file     Emotionally abused: Not on file     Physically abused: Not on file     Forced sexual activity: Not on file   Other Topics Concern    Not on file   Social History Narrative    Not on file        SURGICAL HISTORY  Past Surgical History:   Procedure Laterality Date    CATARACT REMOVAL  2008    CHOLECYSTECTOMY      EYE SURGERY      cataracts   [address] Left 09/17/2018    [place] Right 2003    Dr. Staton Cancer  2006    total lt shoulder     SHOULDER SURGERY  2006    total rt shoulder    TOTAL KNEE ARTHROPLASTY Right     TOTAL KNEE ARTHROPLASTY  02/2011    left       CURRENT MEDICATIONS  Current Outpatient Medications   Medication Sig Dispense Refill    busPIRone (BUSPAR) 5 MG tablet Take 1 tablet by mouth 2 times daily 60 tablet 0    nitrofurantoin, macrocrystal-monohydrate, (MACROBID) 100 MG capsule Take 1 capsule by mouth 2 times daily for 7 days 14 capsule 0    mirabegron (MYRBETRIQ) 25 MG TB24 Take 1 tablet by mouth daily 30 tablet 1    omeprazole (PRILOSEC) 20 MG delayed release capsule Take 1 capsule by mouth twice daily 60 capsule 0    sertraline (ZOLOFT) 100 MG tablet Take 100 mg by mouth daily      rOPINIRole (REQUIP) 0.5 MG tablet Take 1 tablet by mouth 3 times daily 90 tablet 0    amitriptyline (ELAVIL) 25 MG tablet TAKE 1 TABLET BY MOUTH EVERY DAY AT BEDTIME 90 tablet 0    oxyCODONE-acetaminophen (PERCOCET) 7.5-325 MG per tablet Take 1 tablet by mouth 3 times daily as needed for Pain.       diphenhydrAMINE (BENADRYL) 25 MG tablet Take 25 mg by mouth nightly       acetaminophen (TYLENOL) 500 MG tablet Take 500 mg by mouth as needed for Pain  ALPRAZolam (XANAX) 0.5 MG tablet TAKE 1 TABLET BY MOUTH ONCE DAILY AS NEEDED FOR ANXIETY (Patient not taking: Reported on 9/28/2020) 30 tablet 0    EQUATE STOOL SOFTENER 100 MG capsule TAKE 1 CAPSULE BY MOUTH ONCE DAILY (Patient not taking: Reported on 9/28/2020) 90 capsule 1    diclofenac sodium 1 % GEL Apply topically 3 times daily as needed 04/11/18 Applies to lower back area and  down       No current facility-administered medications for this visit. ALLERGIES  Allergies   Allergen Reactions    Latex        RECENT LABS    Lab Results   Component Value Date    LABA1C 6.0 10/30/2019     Lab Results   Component Value Date    .5 10/30/2019       Lab Results   Component Value Date    CHOL 159 07/17/2019    CHOL 129 08/03/2018    CHOL 139 04/12/2018     Lab Results   Component Value Date    LDLCALC 64 07/17/2019       Lab Results   Component Value Date    WBC 6.3 02/04/2020    HGB 12.3 02/04/2020    HCT 37.9 02/04/2020    MCV 86.3 02/04/2020     02/04/2020       PHYSICAL EXAM  /80   Pulse 64   Temp 97.3 °F (36.3 °C)   Ht 5' 2\" (1.575 m)   Wt 216 lb 3.2 oz (98.1 kg)   SpO2 94%   BMI 39.54 kg/m²     Physical Exam  Constitutional:       Appearance: Normal appearance. HENT:      Head: Normocephalic and atraumatic. Eyes:      Comments: EOM grossly intact. Cardiovascular:      Rate and Rhythm: Normal rate and regular rhythm. Heart sounds: No murmur. No friction rub. No gallop. Pulmonary:      Effort: Pulmonary effort is normal.      Breath sounds: Normal breath sounds. No wheezing, rhonchi or rales. Abdominal:      General: Abdomen is flat. Palpations: Abdomen is soft. There is no mass. Tenderness: There is no abdominal tenderness. There is no right CVA tenderness, left CVA tenderness or guarding. Skin:     General: Skin is warm and dry. Neurological:      Mental Status: She is alert and oriented to person, place, and time.       Comments: Cranial nerves II-XII grossly intact   Psychiatric:         Mood and Affect: Mood normal.         Behavior: Behavior normal.         ASSESSMENT & PLAN  1. Acute cystitis without hematuria  UA positive for leukocytes. Sample was not adequate volume for culture. Prescribed Macrobid BID x 7 days and instructed the patient to drink plenty of water. - nitrofurantoin, macrocrystal-monohydrate, (MACROBID) 100 MG capsule; Take 1 capsule by mouth 2 times daily for 7 days  Dispense: 14 capsule; Refill: 0  - Culture, Urine  - POCT Urinalysis no Micro    2. Anxiety  Patient's pain management doctor is recquiring that she wean off Xanax. Prescribed Buspar 5 mg BID. - busPIRone (BUSPAR) 5 MG tablet; Take 1 tablet by mouth 2 times daily  Dispense: 60 tablet; Refill: 0    3. Overactive bladder  Patient states that she has had ongoing issues with increased urinary frequency and urgency for months, with several episodes of incontinence. Prescribed Myrbetriq for the patient to try and instructed her to complete her antibiotic before starting this. - mirabegron (MYRBETRIQ) 25 MG TB24; Take 1 tablet by mouth daily  Dispense: 30 tablet; Refill: 1          No follow-ups on file.             Electronically signed by [facility]AIN[facility] on 9/28/2020 15.5   11.8  )-----------( 158      ( 17 Sep 2018 13:14 )             46.3       09-17    136  |  98  |  13  ----------------------------<  87  3.6   |  24  |  1.02    Ca    10.1      17 Sep 2018 13:15    TPro  7.3  /  Alb  4.3  /  TBili  0.5  /  DBili  x   /  AST  21  /  ALT  18  /  AlkPhos  72  09-17          CAPILLARY BLOOD GLUCOSE          PT/INR - ( 17 Sep 2018 13:02 )   PT: 12.7 sec;   INR: 1.17 ratio         PTT - ( 17 Sep 2018 13:02 )  PTT:22.7 sec    I personally reviewed & interpreted the lab findings above; CBC mild leukocytosis. CMP c/w CKD III   I personally reviewed & interpreted the radiographic findings; CTA chest c/w PE   I personally reviewed & interpreted the EKG findings; NSR

## 2020-10-08 ENCOUNTER — APPOINTMENT (OUTPATIENT)
Dept: ORTHOPEDIC SURGERY | Facility: CLINIC | Age: 75
End: 2020-10-08
Payer: MEDICARE

## 2020-10-08 DIAGNOSIS — M75.42 IMPINGEMENT SYNDROME OF LEFT SHOULDER: ICD-10-CM

## 2020-10-08 DIAGNOSIS — M25.512 PAIN IN LEFT SHOULDER: ICD-10-CM

## 2020-10-08 PROCEDURE — 99214 OFFICE O/P EST MOD 30 MIN: CPT | Mod: 25

## 2020-10-08 PROCEDURE — 72040 X-RAY EXAM NECK SPINE 2-3 VW: CPT

## 2020-10-08 PROCEDURE — 73030 X-RAY EXAM OF SHOULDER: CPT | Mod: LT

## 2020-10-08 PROCEDURE — 20610 DRAIN/INJ JOINT/BURSA W/O US: CPT | Mod: LT

## 2020-11-09 ENCOUNTER — APPOINTMENT (OUTPATIENT)
Dept: ELECTROPHYSIOLOGY | Facility: CLINIC | Age: 75
End: 2020-11-09
Payer: MEDICARE

## 2020-11-09 PROCEDURE — 93296 REM INTERROG EVL PM/IDS: CPT

## 2020-11-09 PROCEDURE — 93294 REM INTERROG EVL PM/LDLS PM: CPT

## 2020-11-13 ENCOUNTER — OUTPATIENT (OUTPATIENT)
Dept: OUTPATIENT SERVICES | Facility: HOSPITAL | Age: 75
LOS: 1 days | End: 2020-11-13
Payer: MEDICARE

## 2020-11-13 ENCOUNTER — APPOINTMENT (OUTPATIENT)
Dept: RADIOLOGY | Facility: CLINIC | Age: 75
End: 2020-11-13
Payer: MEDICARE

## 2020-11-13 DIAGNOSIS — Z98.89 OTHER SPECIFIED POSTPROCEDURAL STATES: Chronic | ICD-10-CM

## 2020-11-13 DIAGNOSIS — Z98.890 OTHER SPECIFIED POSTPROCEDURAL STATES: Chronic | ICD-10-CM

## 2020-11-13 DIAGNOSIS — Z00.8 ENCOUNTER FOR OTHER GENERAL EXAMINATION: ICD-10-CM

## 2020-11-13 DIAGNOSIS — Z98.51 TUBAL LIGATION STATUS: Chronic | ICD-10-CM

## 2020-11-13 PROCEDURE — 73590 X-RAY EXAM OF LOWER LEG: CPT

## 2020-11-13 PROCEDURE — 73590 X-RAY EXAM OF LOWER LEG: CPT | Mod: 26,LT

## 2021-02-24 ENCOUNTER — NON-APPOINTMENT (OUTPATIENT)
Age: 76
End: 2021-02-24

## 2021-02-24 ENCOUNTER — APPOINTMENT (OUTPATIENT)
Dept: ELECTROPHYSIOLOGY | Facility: CLINIC | Age: 76
End: 2021-02-24
Payer: MEDICARE

## 2021-02-24 PROCEDURE — 93296 REM INTERROG EVL PM/IDS: CPT

## 2021-02-24 PROCEDURE — 93294 REM INTERROG EVL PM/LDLS PM: CPT

## 2021-05-18 ENCOUNTER — NON-APPOINTMENT (OUTPATIENT)
Age: 76
End: 2021-05-18

## 2021-05-18 ENCOUNTER — LABORATORY RESULT (OUTPATIENT)
Age: 76
End: 2021-05-18

## 2021-05-18 ENCOUNTER — APPOINTMENT (OUTPATIENT)
Dept: CARDIOLOGY | Facility: CLINIC | Age: 76
End: 2021-05-18
Payer: MEDICARE

## 2021-05-18 VITALS
HEIGHT: 59 IN | RESPIRATION RATE: 16 BRPM | OXYGEN SATURATION: 96 % | WEIGHT: 142 LBS | DIASTOLIC BLOOD PRESSURE: 83 MMHG | TEMPERATURE: 98 F | SYSTOLIC BLOOD PRESSURE: 128 MMHG | BODY MASS INDEX: 28.63 KG/M2 | HEART RATE: 68 BPM

## 2021-05-18 PROCEDURE — 99213 OFFICE O/P EST LOW 20 MIN: CPT

## 2021-05-18 PROCEDURE — 99072 ADDL SUPL MATRL&STAF TM PHE: CPT

## 2021-05-18 PROCEDURE — 93000 ELECTROCARDIOGRAM COMPLETE: CPT

## 2021-05-18 RX ORDER — ENOXAPARIN SODIUM 100 MG/ML
100 INJECTION SUBCUTANEOUS
Qty: 1 | Refills: 0 | Status: COMPLETED | COMMUNITY
Start: 2019-01-14 | End: 2021-05-18

## 2021-05-18 RX ORDER — ASPIRIN 81 MG/1
81 TABLET, CHEWABLE ORAL DAILY
Refills: 0 | Status: COMPLETED | COMMUNITY
Start: 2018-12-14 | End: 2021-05-18

## 2021-05-18 NOTE — DISCUSSION/SUMMARY
[FreeTextEntry1] : This is a 76-year-old female with past medical history significant for recurrent blood clots in the lower extremities, and pulmonary emboli on lifelong warfarin therapy, status post left knee replacement, status post tonsillectomy, status post wrist and thumb surgery, status post permanent pacemaker, who was recently found to have a new heart murmur and comes in for cardiac consultation.  She denies chest pain, shortness of breath, dizziness or syncope.\par She did have one episode of chest pain the day after she received her second COVID-19 vaccine in April 2021.  She has no history of rheumatic fever.  She may drink 4 cups of coffee per day.\par Her cardiac risk factors include elevated cholesterol.  She is currently on Lipitor 10 mg daily.\par Electrocardiogram done May 18, 2021 demonstrated normal sinus rhythm rate 70 bpm otherwise remarkable for left axis deviation, and possible Q waves in V1 and V2.\par She will schedule an echo Doppler examination to evaluate her murmur, left ventricular function, chamber size, and rule out hypertrophy, as well as any wall motion abnormalities.\par I have asked her to schedule an exercise stress test to rule out significant coronary artery disease.\par I have asked her to get me a copy of her most recent blood work from her primary care physician.\par The patient will follow-up with me after above-noted diagnostic tests are completed.

## 2021-05-18 NOTE — PHYSICAL EXAM
[Well Developed] : well developed [Well Nourished] : well nourished [No Acute Distress] : no acute distress [Normal Conjunctiva] : normal conjunctiva [Normal Venous Pressure] : normal venous pressure [No Carotid Bruit] : no carotid bruit [Normal S1, S2] : normal S1, S2 [No Murmur] : no murmur [No Rub] : no rub [5th Left ICS - MCL] : palpated at the 5th LICS in the midclavicular line [Normal] : normal [No Precordial Heave] : no precordial heave was noted [Normal Rate] : normal [Rhythm Regular] : regular [No Gallop] : no gallop heard [Click] : no click [Distant] : the heart sounds were ~L not distant [Pericardial Rub] : no pericardial rub [II] : a grade 2 [I] : a grade 1 [2+] : left 2+ [Clear Lung Fields] : clear lung fields [Good Air Entry] : good air entry [No Respiratory Distress] : no respiratory distress  [Soft] : abdomen soft [Non Tender] : non-tender [No Masses/organomegaly] : no masses/organomegaly [Normal Bowel Sounds] : normal bowel sounds [Normal Gait] : normal gait [No Edema] : no edema [No Cyanosis] : no cyanosis [No Clubbing] : no clubbing [No Varicosities] : no varicosities [No Rash] : no rash [No Skin Lesions] : no skin lesions [Moves all extremities] : moves all extremities [No Focal Deficits] : no focal deficits [Normal Speech] : normal speech [Alert and Oriented] : alert and oriented [Normal memory] : normal memory

## 2021-05-18 NOTE — REASON FOR VISIT
[CV Risk Factors and Non-Cardiac Disease] : CV risk factors and non-cardiac disease [Hyperlipidemia] : hyperlipidemia [Hypertension] : hypertension [Other: ____] : [unfilled]

## 2021-05-21 ENCOUNTER — NON-APPOINTMENT (OUTPATIENT)
Age: 76
End: 2021-05-21

## 2021-05-25 ENCOUNTER — APPOINTMENT (OUTPATIENT)
Dept: ELECTROPHYSIOLOGY | Facility: CLINIC | Age: 76
End: 2021-05-25

## 2021-05-27 ENCOUNTER — APPOINTMENT (OUTPATIENT)
Dept: ELECTROPHYSIOLOGY | Facility: CLINIC | Age: 76
End: 2021-05-27
Payer: MEDICARE

## 2021-05-27 ENCOUNTER — NON-APPOINTMENT (OUTPATIENT)
Age: 76
End: 2021-05-27

## 2021-05-27 PROCEDURE — 93296 REM INTERROG EVL PM/IDS: CPT

## 2021-05-27 PROCEDURE — 93294 REM INTERROG EVL PM/LDLS PM: CPT

## 2021-06-01 ENCOUNTER — APPOINTMENT (OUTPATIENT)
Dept: CARDIOLOGY | Facility: CLINIC | Age: 76
End: 2021-06-01
Payer: MEDICARE

## 2021-06-01 PROCEDURE — 93306 TTE W/DOPPLER COMPLETE: CPT

## 2021-06-01 PROCEDURE — 99072 ADDL SUPL MATRL&STAF TM PHE: CPT

## 2021-06-15 ENCOUNTER — NON-APPOINTMENT (OUTPATIENT)
Age: 76
End: 2021-06-15

## 2021-06-15 ENCOUNTER — APPOINTMENT (OUTPATIENT)
Dept: CARDIOLOGY | Facility: CLINIC | Age: 76
End: 2021-06-15
Payer: MEDICARE

## 2021-06-15 VITALS
OXYGEN SATURATION: 96 % | TEMPERATURE: 97.7 F | RESPIRATION RATE: 15 BRPM | HEART RATE: 78 BPM | SYSTOLIC BLOOD PRESSURE: 127 MMHG | HEIGHT: 59 IN | BODY MASS INDEX: 28.83 KG/M2 | WEIGHT: 143 LBS | DIASTOLIC BLOOD PRESSURE: 82 MMHG

## 2021-06-15 PROCEDURE — 99214 OFFICE O/P EST MOD 30 MIN: CPT

## 2021-06-15 PROCEDURE — 99072 ADDL SUPL MATRL&STAF TM PHE: CPT

## 2021-06-15 RX ORDER — ATORVASTATIN CALCIUM 10 MG/1
10 TABLET, FILM COATED ORAL
Refills: 0 | Status: COMPLETED | COMMUNITY
End: 2021-06-15

## 2021-06-15 NOTE — DISCUSSION/SUMMARY
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  She denies chest pain, shortness of breath, dizziness or syncope.  She may get occasional dyspnea on exertion.\par She did have one episode of chest pain the day after she received her second COVID-19 vaccine in April 2021.  She has no history of rheumatic fever.  She may drink 4 cups of coffee per day.\par Her cardiac risk factors include elevated cholesterol.  She is currently on Lipitor 10 mg daily.\par Electrocardiogram done May 18, 2021 demonstrated normal sinus rhythm rate 70 bpm otherwise remarkable for left axis deviation, and possible Q waves in V1 and V2.\par The Q waves in leads V1 and V2 represent a change compared to the electrocardiogram from her primary care physician.\par The patient denies any history of myocardial infarction is unclear why she has Q waves in the anterior septal region.\par I have asked her to schedule a nuclear stress test to evaluate her coronary artery perfusion.  She will follow-up with me after the nuclear stress test is completed.  She understands if she does develop any chest pain, shortness of breath, dizziness or syncope she must go to the emergency room immediately.\par She will increase the Lipitor to 20 mg daily to further reduce her LDL cholesterol.  She will have new blood work in 6 weeks.\par \par

## 2021-07-06 ENCOUNTER — APPOINTMENT (OUTPATIENT)
Dept: CARDIOLOGY | Facility: CLINIC | Age: 76
End: 2021-07-06
Payer: MEDICARE

## 2021-07-06 PROCEDURE — 99072 ADDL SUPL MATRL&STAF TM PHE: CPT

## 2021-07-06 PROCEDURE — 93015 CV STRESS TEST SUPVJ I&R: CPT

## 2021-07-06 PROCEDURE — 78452 HT MUSCLE IMAGE SPECT MULT: CPT

## 2021-07-06 PROCEDURE — A9500: CPT

## 2021-07-15 ENCOUNTER — FORM ENCOUNTER (OUTPATIENT)
Age: 76
End: 2021-07-15

## 2021-07-28 ENCOUNTER — LABORATORY RESULT (OUTPATIENT)
Age: 76
End: 2021-07-28

## 2021-07-28 ENCOUNTER — APPOINTMENT (OUTPATIENT)
Dept: CARDIOLOGY | Facility: CLINIC | Age: 76
End: 2021-07-28
Payer: MEDICARE

## 2021-07-28 ENCOUNTER — NON-APPOINTMENT (OUTPATIENT)
Age: 76
End: 2021-07-28

## 2021-07-28 VITALS
TEMPERATURE: 97.6 F | SYSTOLIC BLOOD PRESSURE: 110 MMHG | HEART RATE: 92 BPM | DIASTOLIC BLOOD PRESSURE: 70 MMHG | WEIGHT: 143 LBS | RESPIRATION RATE: 16 BRPM | BODY MASS INDEX: 28.83 KG/M2 | OXYGEN SATURATION: 96 % | HEIGHT: 59 IN

## 2021-07-28 PROCEDURE — 99214 OFFICE O/P EST MOD 30 MIN: CPT

## 2021-07-28 PROCEDURE — 93000 ELECTROCARDIOGRAM COMPLETE: CPT

## 2021-07-28 PROCEDURE — 99072 ADDL SUPL MATRL&STAF TM PHE: CPT

## 2021-07-28 NOTE — REASON FOR VISIT
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 She has a hx of multiple DVTs and PE on warfarin. \par \par Chest pain two weeks ago, lasting 30 minutes, tightness, substernal without radiation. no associated nausea, vomiting. She was diaphoretic. Also felt dyspenic with this. at home eating dinner. \par Denies exertional chest pain - was walking in [employer] this weekend without exertional chest discomfort. Had similar chest pain while watching TV 2 weeks prior to this episode that last 40-60 minutes\par \par She had her nuclear stress test and had chest pain and dyspnea during the test. She was only able to tolerate 5 minutes of Denys. \par \par denies syncope, pre syncope, orthonpea, has periperal edema that is mild but unchanged from prior.\par

## 2021-07-28 NOTE — DISCUSSION/SUMMARY
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  She denies chest pain, shortness of breath, dizziness or syncope.  She may get occasional dyspnea on exertion.\par The patient has had 3 episodes of chest discomfort twice at rest and one with exertion.\par Electrocardiogram done July 28, 2021 demonstrates normal sinus rhythm at a rate of 70 bpm is otherwise remarkable for left axis deviation, left anterior hemiblock, and left ventricular hypertrophy.\par \par She had a nuclear stress test done July 6, 2021 which demonstrated small to moderate fixed defect in the inferolateral wall inferior apical wall consistent with infarct, and hypokinesis involving the distal inferolateral wall and inferior apical walls.  The patient developed 3 out of 10 chest tightness at 4 minutes and 45 seconds of exercise that resolved after 5 minutes of the recovery period.  The patient also has abnormal resting electrocardiogram with ST–T wave changes consistent coronary artery disease.\par Given the patient's abnormal nuclear stress test, and symptoms, I recommended she undergo a cardiac catheterization to rule out significant coronary artery disease.\par I have explained the procedure to her, and she will proceed with scheduling this examination.  She understands she should discontinue her warfarin 3 days prior to the catheterization procedure.\par She will follow up with me after the procedure is completed.\par pmh:\par She did have one episode of chest pain the day after she received her second COVID-19 vaccine in April 2021.  She has no history of rheumatic fever.  She may drink 4 cups of coffee per day.\par Her cardiac risk factors include elevated cholesterol.  She is currently on Lipitor 10 mg daily.\par Electrocardiogram done May 18, 2021 demonstrated normal sinus rhythm rate 70 bpm otherwise remarkable for left axis deviation, and possible Q waves in V1 and V2.\par The Q waves in leads V1 and V2 represent a change compared to the electrocardiogram from her primary care physician.\par The patient denies any history of myocardial infarction is unclear why she has Q waves in the anterior septal region.\par \par \par

## 2021-07-28 NOTE — PHYSICAL EXAM
[Well Developed] : well developed [Well Nourished] : well nourished [No Acute Distress] : no acute distress [Normal Conjunctiva] : normal conjunctiva [Normal Venous Pressure] : normal venous pressure [No Carotid Bruit] : no carotid bruit [Normal S1, S2] : normal S1, S2 [No Murmur] : no murmur [No Rub] : no rub [5th Left ICS - MCL] : palpated at the 5th LICS in the midclavicular line [Normal] : normal [No Precordial Heave] : no precordial heave was noted [Normal Rate] : normal [Rhythm Regular] : regular [No Gallop] : no gallop heard [II] : a grade 2 [I] : a grade 1 [2+] : left 2+ [Clear Lung Fields] : clear lung fields [Good Air Entry] : good air entry [No Respiratory Distress] : no respiratory distress  [Soft] : abdomen soft [Non Tender] : non-tender [No Masses/organomegaly] : no masses/organomegaly [Normal Bowel Sounds] : normal bowel sounds [Normal Gait] : normal gait [No Edema] : no edema [No Cyanosis] : no cyanosis [No Clubbing] : no clubbing [No Varicosities] : no varicosities [No Rash] : no rash [No Skin Lesions] : no skin lesions [Moves all extremities] : moves all extremities [No Focal Deficits] : no focal deficits [Alert and Oriented] : alert and oriented [Normal Speech] : normal speech [Normal memory] : normal memory [Click] : no click [Pericardial Rub] : no pericardial rub

## 2021-08-03 ENCOUNTER — NON-APPOINTMENT (OUTPATIENT)
Age: 76
End: 2021-08-03

## 2021-08-06 ENCOUNTER — OUTPATIENT (OUTPATIENT)
Dept: OUTPATIENT SERVICES | Facility: HOSPITAL | Age: 76
LOS: 1 days | End: 2021-08-06
Payer: MEDICARE

## 2021-08-06 VITALS
OXYGEN SATURATION: 99 % | RESPIRATION RATE: 20 BRPM | DIASTOLIC BLOOD PRESSURE: 80 MMHG | HEART RATE: 72 BPM | SYSTOLIC BLOOD PRESSURE: 161 MMHG | WEIGHT: 145.95 LBS | HEIGHT: 58 IN | TEMPERATURE: 99 F

## 2021-08-06 VITALS
HEART RATE: 96 BPM | OXYGEN SATURATION: 96 % | DIASTOLIC BLOOD PRESSURE: 78 MMHG | RESPIRATION RATE: 16 BRPM | SYSTOLIC BLOOD PRESSURE: 137 MMHG

## 2021-08-06 DIAGNOSIS — Z98.890 OTHER SPECIFIED POSTPROCEDURAL STATES: Chronic | ICD-10-CM

## 2021-08-06 DIAGNOSIS — Z98.51 TUBAL LIGATION STATUS: Chronic | ICD-10-CM

## 2021-08-06 DIAGNOSIS — Z96.659 PRESENCE OF UNSPECIFIED ARTIFICIAL KNEE JOINT: Chronic | ICD-10-CM

## 2021-08-06 DIAGNOSIS — Z98.89 OTHER SPECIFIED POSTPROCEDURAL STATES: Chronic | ICD-10-CM

## 2021-08-06 DIAGNOSIS — R94.39 ABNORMAL RESULT OF OTHER CARDIOVASCULAR FUNCTION STUDY: ICD-10-CM

## 2021-08-06 DIAGNOSIS — Z95.0 PRESENCE OF CARDIAC PACEMAKER: Chronic | ICD-10-CM

## 2021-08-06 LAB
ALBUMIN SERPL ELPH-MCNC: 4.4 G/DL — SIGNIFICANT CHANGE UP (ref 3.3–5)
ALP SERPL-CCNC: 71 U/L — SIGNIFICANT CHANGE UP (ref 40–120)
ALT FLD-CCNC: 13 U/L — SIGNIFICANT CHANGE UP (ref 10–45)
ANION GAP SERPL CALC-SCNC: 12 MMOL/L — SIGNIFICANT CHANGE UP (ref 5–17)
APTT BLD: 32.5 SEC — SIGNIFICANT CHANGE UP (ref 27.5–35.5)
AST SERPL-CCNC: 15 U/L — SIGNIFICANT CHANGE UP (ref 10–40)
BILIRUB SERPL-MCNC: 0.4 MG/DL — SIGNIFICANT CHANGE UP (ref 0.2–1.2)
BUN SERPL-MCNC: 15 MG/DL — SIGNIFICANT CHANGE UP (ref 7–23)
CALCIUM SERPL-MCNC: 9.2 MG/DL — SIGNIFICANT CHANGE UP (ref 8.4–10.5)
CHLORIDE SERPL-SCNC: 107 MMOL/L — SIGNIFICANT CHANGE UP (ref 96–108)
CO2 SERPL-SCNC: 25 MMOL/L — SIGNIFICANT CHANGE UP (ref 22–31)
CREAT SERPL-MCNC: 0.91 MG/DL — SIGNIFICANT CHANGE UP (ref 0.5–1.3)
GLUCOSE SERPL-MCNC: 99 MG/DL — SIGNIFICANT CHANGE UP (ref 70–99)
HCT VFR BLD CALC: 39.7 % — SIGNIFICANT CHANGE UP (ref 34.5–45)
HGB BLD-MCNC: 12.8 G/DL — SIGNIFICANT CHANGE UP (ref 11.5–15.5)
INR BLD: 1.32 RATIO — HIGH (ref 0.88–1.16)
MCHC RBC-ENTMCNC: 28.6 PG — SIGNIFICANT CHANGE UP (ref 27–34)
MCHC RBC-ENTMCNC: 32.2 GM/DL — SIGNIFICANT CHANGE UP (ref 32–36)
MCV RBC AUTO: 88.8 FL — SIGNIFICANT CHANGE UP (ref 80–100)
NRBC # BLD: 0 /100 WBCS — SIGNIFICANT CHANGE UP (ref 0–0)
PLATELET # BLD AUTO: 187 K/UL — SIGNIFICANT CHANGE UP (ref 150–400)
POTASSIUM SERPL-MCNC: 3.9 MMOL/L — SIGNIFICANT CHANGE UP (ref 3.5–5.3)
POTASSIUM SERPL-SCNC: 3.9 MMOL/L — SIGNIFICANT CHANGE UP (ref 3.5–5.3)
PROT SERPL-MCNC: 7.1 G/DL — SIGNIFICANT CHANGE UP (ref 6–8.3)
PROTHROM AB SERPL-ACNC: 15.6 SEC — HIGH (ref 10.6–13.6)
RBC # BLD: 4.47 M/UL — SIGNIFICANT CHANGE UP (ref 3.8–5.2)
RBC # FLD: 13.5 % — SIGNIFICANT CHANGE UP (ref 10.3–14.5)
SODIUM SERPL-SCNC: 144 MMOL/L — SIGNIFICANT CHANGE UP (ref 135–145)
WBC # BLD: 9.61 K/UL — SIGNIFICANT CHANGE UP (ref 3.8–10.5)
WBC # FLD AUTO: 9.61 K/UL — SIGNIFICANT CHANGE UP (ref 3.8–10.5)

## 2021-08-06 PROCEDURE — 93010 ELECTROCARDIOGRAM REPORT: CPT

## 2021-08-06 PROCEDURE — 93005 ELECTROCARDIOGRAM TRACING: CPT

## 2021-08-06 PROCEDURE — 99152 MOD SED SAME PHYS/QHP 5/>YRS: CPT

## 2021-08-06 PROCEDURE — 93458 L HRT ARTERY/VENTRICLE ANGIO: CPT | Mod: 26

## 2021-08-06 PROCEDURE — 85730 THROMBOPLASTIN TIME PARTIAL: CPT

## 2021-08-06 PROCEDURE — 80053 COMPREHEN METABOLIC PANEL: CPT

## 2021-08-06 PROCEDURE — C1887: CPT

## 2021-08-06 PROCEDURE — 85027 COMPLETE CBC AUTOMATED: CPT

## 2021-08-06 PROCEDURE — C1894: CPT

## 2021-08-06 PROCEDURE — 85610 PROTHROMBIN TIME: CPT

## 2021-08-06 PROCEDURE — C1769: CPT

## 2021-08-06 PROCEDURE — 93458 L HRT ARTERY/VENTRICLE ANGIO: CPT

## 2021-08-06 RX ORDER — ATORVASTATIN CALCIUM 80 MG/1
1 TABLET, FILM COATED ORAL
Qty: 0 | Refills: 0 | DISCHARGE

## 2021-08-06 RX ORDER — WARFARIN SODIUM 2.5 MG/1
1 TABLET ORAL
Qty: 0 | Refills: 0 | DISCHARGE

## 2021-08-06 NOTE — ASU DISCHARGE PLAN (ADULT/PEDIATRIC) - ASU DC SPECIAL INSTRUCTIONSFT
Wound Care:   the day AFTER your procedure remove bandage GENTLY, and clean using  mild soap and gentle warm, water stream, pat dry. leave OPEN to air. YOU MAY SHOWER   DO NOT apply lotions, creams, ointments, powder, perfumes to your incision site  DO NOT SOAK your site for 1 week ( no baths, no pools, no tubs, etc...)  Check  your groin and /or wrist daily. A small amount of bruising, and soreness are normal    ACTIVITY: for 24 hours   - DO NOT DRIVE  - DO NOT make any important decisions or sign legal documents   - DO NOT operate heavy machineries   - you may resume sexual activity in 48 hours, unless otherwise instructed by your cardiologist       If your procedure was done through the [facility]: for the NEXT 5 DAYS  - Limit climbing stairs, DO NOT soak in bathtub or pool  - no strenuous activities, pushing, pulling, straining  - Do not lift anything 10lbs or heavier     MEDICATION:   take your medications as explained ( see discharge paperwork)   If you received a STENT, you will be taking antiplatelet medications to KEEP YOUR STENT OPEN ( eg: Aspirin, Plavix, Brilinta, Effient, etc).  Take as prescribed DO NOT STOP taking them without consulting with your cardiologist first.     Follow heart healthy diet recommended by your doctor, if you smoke STOP SMOKING ( may call [phone] for center of tobacco control if you need assistance)     CALL your doctor to make appointment in 2 WEEKS     ***CALL YOUR DOCTOR***  if you experience: fever, chills, body aches, or severe pain, swelling, redness, heat or yellow discharge at incision site  If you experience bleeding or excruciating pain at the procedural site, swelling ( golf ball size) at your procedural site  If you experience CHEST PAIN  If you experience extremity numbness, tingling, temperature change ( of your procedural site)   If you are unable to reach your doctor, you may contact:   -Cardiology Office at [facility] at [phone] or   - [phone]

## 2021-08-06 NOTE — ASU DISCHARGE PLAN (ADULT/PEDIATRIC) - CARE PROVIDER_API CALL
Bolivar Lomeli (MD)  Cardiology; Cardiovascular Disease; Internal Medicine  [address]  [place] [address]  Phone: [phone]  Fax: [phone]  Follow Up Time: 2 weeks

## 2021-08-06 NOTE — H&P CARDIOLOGY - PSH
Artificial pacemaker  MICRA  History of carpal tunnel surgery of right wrist  1990's  History of lumbar surgery  2018  History of total knee replacement, unspecified laterality    S/P arthroscopy of right knee  2012  S/P Breast Lumpectomy  2000's, 2004  S/P tubal ligation  age 30

## 2021-08-06 NOTE — H&P CARDIOLOGY - PMH
BMI 30.0-30.9,adult    DVT (deep venous thrombosis), right  october 2013  GERD (gastroesophageal reflux disease)    Herniated lumbar intervertebral disc    History of pulmonary embolus (PE)  2012, 2018  HTN - Hypertension    Lower back pain    Osteoarthritis of left knee    Pacemaker  implanted 9/28/18  PE (pulmonary embolism)  3 yrs ago, was on Lovenox and coumadin  Prothrombin gene mutation    Tachy-artur syndrome    
Initial (On Arrival)

## 2021-08-06 NOTE — H&P CARDIOLOGY - HISTORY OF PRESENT ILLNESS
75 y/o female PMH HTN, HLD, GERD, tachy-artur s/p MICRA PPM, DVT/PE on Coumadin (last dose Tuesday 8/3), with c/o 2 episodes of non-radiating non-exertional chest pain over the past few weeks. She also reports recent marked increased fatigue. Seen and evaluated by cardiologist, Dr. Bolivar Lomeli, and had a nuclear stress test done 7/6/2021 revealing inferolateral and inferoapical defects. Referred for LHC today.

## 2021-08-16 ENCOUNTER — RESULT CHARGE (OUTPATIENT)
Age: 76
End: 2021-08-16

## 2021-08-17 ENCOUNTER — NON-APPOINTMENT (OUTPATIENT)
Age: 76
End: 2021-08-17

## 2021-08-17 ENCOUNTER — APPOINTMENT (OUTPATIENT)
Dept: ELECTROPHYSIOLOGY | Facility: CLINIC | Age: 76
End: 2021-08-17
Payer: MEDICARE

## 2021-08-17 VITALS — HEART RATE: 76 BPM | DIASTOLIC BLOOD PRESSURE: 84 MMHG | SYSTOLIC BLOOD PRESSURE: 123 MMHG

## 2021-08-17 PROBLEM — I49.5 SICK SINUS SYNDROME: Chronic | Status: ACTIVE | Noted: 2021-08-06

## 2021-08-17 PROCEDURE — 93279 PRGRMG DEV EVAL PM/LDLS PM: CPT

## 2021-08-17 PROCEDURE — 93000 ELECTROCARDIOGRAM COMPLETE: CPT | Mod: 59

## 2021-08-24 ENCOUNTER — NON-APPOINTMENT (OUTPATIENT)
Age: 76
End: 2021-08-24

## 2021-08-24 ENCOUNTER — APPOINTMENT (OUTPATIENT)
Dept: CARDIOLOGY | Facility: CLINIC | Age: 76
End: 2021-08-24
Payer: MEDICARE

## 2021-08-24 VITALS
WEIGHT: 141 LBS | BODY MASS INDEX: 28.43 KG/M2 | TEMPERATURE: 97.9 F | DIASTOLIC BLOOD PRESSURE: 76 MMHG | RESPIRATION RATE: 16 BRPM | OXYGEN SATURATION: 97 % | HEIGHT: 59 IN | HEART RATE: 73 BPM | SYSTOLIC BLOOD PRESSURE: 120 MMHG

## 2021-08-24 PROCEDURE — 93000 ELECTROCARDIOGRAM COMPLETE: CPT

## 2021-08-24 PROCEDURE — 99214 OFFICE O/P EST MOD 30 MIN: CPT

## 2021-08-24 RX ORDER — WARFARIN 2.5 MG/1
2.5 TABLET ORAL
Qty: 180 | Refills: 0 | Status: COMPLETED | COMMUNITY
Start: 2021-08-19

## 2021-08-24 RX ORDER — VERAPAMIL HYDROCHLORIDE 120 MG/1
120 CAPSULE, EXTENDED RELEASE ORAL
Qty: 90 | Refills: 0 | Status: ACTIVE | COMMUNITY
Start: 2021-08-13

## 2021-08-24 RX ORDER — WARFARIN 1 MG/1
1 TABLET ORAL
Qty: 24 | Refills: 0 | Status: COMPLETED | COMMUNITY
Start: 2021-03-07

## 2021-08-24 NOTE — DISCUSSION/SUMMARY
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  She denies chest pain, shortness of breath, dizziness or syncope.  She may get occasional dyspnea on exertion.\par The patient has had 3 episodes of chest discomfort twice at rest and one with exertion.\par The patient had a cardiac catheterization done August 6, 2021 which demonstrated a 30% proximal lesion left anterior descending artery, luminal irregularities in the first diagonal branch, right PDA and right coronary artery.\par Medical management is recommended with more aggressive reduction her LDL cholesterol to less than 70 mg/dL.\par The patient had blood work done July 28, 2021 which demonstrated a direct LDL cholesterol of 81 mg/dL, cholesterol 161, HDL of 52, triglycerides 178, and non-HDL cholesterol 109 mg/dL.\par In order to achieve target LDL, she will increase her Lipitor to 40 mg/day.  She understands she must have follow-up blood work in 6 to 8 weeks.\par \par Electrocardiogram done July 28, 2021 demonstrates normal sinus rhythm at a rate of 70 bpm is otherwise remarkable for left axis deviation, left anterior hemiblock, and left ventricular hypertrophy.\par \par She had a nuclear stress test done July 6, 2021 which demonstrated small to moderate fixed defect in the inferolateral wall inferior apical wall consistent with infarct, and hypokinesis involving the distal inferolateral wall and inferior apical walls.  The patient developed 3 out of 10 chest tightness at 4 minutes and 45 seconds of exercise that resolved after 5 minutes of the recovery period.  The patient also has abnormal resting electrocardiogram with ST–T wave changes consistent coronary artery disease.\par PMH:\par She did have one episode of chest pain the day after she received her second COVID-19 vaccine in April 2021.  She has no history of rheumatic fever.  She may drink 4 cups of coffee per day.\par Her cardiac risk factors include elevated cholesterol.  She is currently on Lipitor 10 mg daily.\par \par Electrocardiogram done May 18, 2021 demonstrated normal sinus rhythm rate 70 bpm otherwise remarkable for left axis deviation, and possible Q waves in V1 and V2.\par The Q waves in leads V1 and V2 represent a change compared to the electrocardiogram from her primary care physician.\par The patient denies any history of myocardial infarction is unclear why she has Q waves in the anterior septal region.\par \par \par

## 2021-08-24 NOTE — REASON FOR VISIT
[CV Risk Factors and Non-Cardiac Disease] : CV risk factors and non-cardiac disease [Hyperlipidemia] : hyperlipidemia [Hypertension] : hypertension [Other: ____] : [unfilled] [Coronary Artery Disease] : coronary artery disease [FreeTextEntry1] : This is a 76F presents for CV evaluation of abnormal nuclear stress (inferolateral and interfoapical defect),abnormal ECG, HTN, HLD, tachy-artur 2018 s/p permanent pacemaker. She has a hx of multiple DVTs and PE on warfarin. \par \par She underwent cardiac catheterization on 8/6/2021. This demonstrated a 30% stenosis in the proximal LAD. \par \par She reports she is welling well at this visit. She has had 2-3 episodes of chest discomfort which last for a few seconds and resolve since the cath procedure. The discomfort is very mild and goes away after a few seconds. No radiation. She denies any palpitations, shortness of breath, dizziness, and syncope. \par \par Lipid panel drawn on 7/28/2021 demonstrated:\par Cholesterol 161, HDL 52, Tg 178, LDL 73, Non-.\par Given known CAD with LDL goal <70, will increase dose of Atorvastatin from 20mg to 40mg. \par \par \par \par

## 2021-10-25 ENCOUNTER — NON-APPOINTMENT (OUTPATIENT)
Age: 76
End: 2021-10-25

## 2021-10-25 ENCOUNTER — APPOINTMENT (OUTPATIENT)
Dept: CARDIOLOGY | Facility: CLINIC | Age: 76
End: 2021-10-25
Payer: MEDICARE

## 2021-10-25 VITALS
HEIGHT: 59 IN | WEIGHT: 138 LBS | SYSTOLIC BLOOD PRESSURE: 110 MMHG | RESPIRATION RATE: 16 BRPM | HEART RATE: 64 BPM | BODY MASS INDEX: 27.82 KG/M2 | DIASTOLIC BLOOD PRESSURE: 70 MMHG | OXYGEN SATURATION: 97 % | TEMPERATURE: 97.9 F

## 2021-10-25 PROCEDURE — 99214 OFFICE O/P EST MOD 30 MIN: CPT | Mod: 25

## 2021-10-25 PROCEDURE — 93000 ELECTROCARDIOGRAM COMPLETE: CPT

## 2021-10-25 NOTE — ASSESSMENT
[FreeTextEntry1] : This is a 76 year year old female here today for follow up cardiac evaluation. 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 \par She is s/p cardiac cath done on 8/6/2021 which demonstrated 30% stenosis in the proximal LAD with mild atherosclerosis. She remains on Atorvastatin 20mg PO DAILY. She was instructed on her last visit to increase this dose to 40mg PO DAILY but has not done so as of yet. She states that she does have some leg cramping and soreness. \par \par -She has no history of rheumatic fever.  She may drink 4 cups of coffee per day. Her cardiac risk factors include elevated cholesterol\par \par BLOOD PRESSURE:\par -BP is well controlled in today's visit.\par -She remains on Verapamil 120mg PO DAILY for management of her tachybrady syndrome and she does have a pace maker \par \par BLOOD WORK:\par -New blood work was done 9/7/2021 which demonstrated LDL of 83. She is on Atorvastatin 20mg PO DAILY. \par -We will add Zetia 10mg  PO Daily for LDL goal of 70 or less since she does have evidence for mild CAD.\par \par CHOLESTEROL CONTROL:\par -Patient will continue the advised  TLC diet and to continue follow-up for treatment of hyperlipidemia and repeat blood testing with diet and exercise. I have discussed different exercises and the importance of maintenance of optimal body weight. The importance of staying within guidelines and recommendations was stressed to the patient today and they acknowledged that they understand this to me verbally.\par \par  -Ms. BIANCHI was educated and advised that failure to follow-up with my medical recommendations to lower cholesterol can result in severe health consequences therefore, they will continuing a low saturated and low fat diet and to avoid excessive carbohydrates to help reduce triglycerides and that lowering LDL levels is associated with a significant decrease in serious cardiac events including but not limited to heart attack stroke and overall death. We will continue lipid lowering agents as advised based on blood test results and the patient understands to call if they develop severe muscle discomfort or if they have a reddish tinted discoloration in their urine.\par \par TESTING/REPORTS:\par -EKG done Oct 25, 2021 which demonstrated regular sinus rhythm with nonspecific ST-T wave changes, BPM of 67 with occasional PAC. \par \par -Electrocardiogram done July 28, 2021 demonstrates normal sinus rhythm at a rate of 70 bpm is otherwise remarkable for left axis deviation, left anterior hemiblock, and left ventricular hypertrophy.\par \par -Electrocardiogram done May 18, 2021 demonstrated normal sinus rhythm rate 70 bpm otherwise remarkable for left axis deviation, and possible Q waves in V1 and V2. The Q waves in leads V1 and V2 represent a change compared to the electrocardiogram from her primary care physician. The patient denies any history of myocardial infarction is unclear why she has Q waves in the anterior septal region.\par \par -She had a nuclear stress test done July 6, 2021 which demonstrated small to moderate fixed defect in the inferolateral wall inferior apical wall consistent with infarct, and hypokinesis involving the distal inferolateral wall and inferior apical walls.  The patient developed 3 out of 10 chest tightness at 4 minutes and 45 seconds of exercise that resolved after 5 minutes of the recovery period.  \par \par -The patient also has abnormal resting electrocardiogram with ST–T wave changes consistent coronary artery disease.\par \par -The patient had a cardiac catheterization done August 6, 2021 which demonstrated a 30% proximal lesion left anterior descending artery, luminal irregularities in the first diagonal branch, right PDA and right coronary artery.\par \par PLAN:\par -Start Zetia 10mg  PO Daily \par -She will continue with her usual medications and will contact the office if she is having any complaints between now and their next follow up appointment.\par -She will follow up with her PCP.\par \par I have discussed the plan of care with Ms. JOHNSON BIANCHI  and she  will follow up in 3 months. She is compliant with all of her medications.\par \par The patient understands that aerobic exercises must be increased to minutes 4 times/week and a detailed discussion of lifestyle modification was done today. \par The patient has a good understanding of the diagnosis, treatment plan and lifestyle modification. \par She will contact me at the office for any questions with their care or any changes in their health status.\par \par The plan of care was discussed with the patient with supervision physician Dr. Bolivar Lomeli and will be carried out as noted above.\par \par Nieves VARGAS

## 2021-10-25 NOTE — DISCUSSION/SUMMARY
[FreeTextEntry1] : Dr. Lomeli-(PRIOR VISIT and PMH WITH Dr. 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  She denies chest pain, shortness of breath, dizziness or syncope.  She may get occasional dyspnea on exertion.\par \par The patient has had 3 episodes of chest discomfort twice at rest and one with exertion.\par \par The patient had a cardiac catheterization done August 6, 2021 which demonstrated a 30% proximal lesion left anterior descending artery, luminal irregularities in the first diagonal branch, right PDA and right coronary artery.\par \par Medical management is recommended with more aggressive reduction her LDL cholesterol to less than 70 mg/dL. The patient had blood work done July 28, 2021 which demonstrated a direct LDL cholesterol of 81 mg/dL, cholesterol 161, HDL of 52, triglycerides 178, and non-HDL cholesterol 109 mg/dL.\par In order to achieve target LDL, she will increase her Lipitor to 40 mg/day.  She understands she must have follow-up blood work in 6 to 8 weeks.\par \par Electrocardiogram done July 28, 2021 demonstrates normal sinus rhythm at a rate of 70 bpm is otherwise remarkable for left axis deviation, left anterior hemiblock, and left ventricular hypertrophy.\par \par She had a nuclear stress test done July 6, 2021 which demonstrated small to moderate fixed defect in the inferolateral wall inferior apical wall consistent with infarct, and hypokinesis involving the distal inferolateral wall and inferior apical walls.  The patient developed 3 out of 10 chest tightness at 4 minutes and 45 seconds of exercise that resolved after 5 minutes of the recovery period.  The patient also has abnormal resting electrocardiogram with ST–T wave changes consistent coronary artery disease.\par \par PMH:\par She did have one episode of chest pain the day after she received her second COVID-19 vaccine in April 2021.  She has no history of rheumatic fever.  She may drink 4 cups of coffee per day.\par Her cardiac risk factors include elevated cholesterol.  She is currently on Lipitor 10 mg daily.\par \par Electrocardiogram done May 18, 2021 demonstrated normal sinus rhythm rate 70 bpm otherwise remarkable for left axis deviation, and possible Q waves in V1 and V2.\par The Q waves in leads V1 and V2 represent a change compared to the electrocardiogram from her primary care physician.\par The patient denies any history of myocardial infarction is unclear why she has Q waves in the anterior septal region.\par

## 2021-10-25 NOTE — PHYSICAL EXAM
[Well Developed] : well developed [Well Nourished] : well nourished [No Acute Distress] : no acute distress [Normal Conjunctiva] : normal conjunctiva [Normal Venous Pressure] : normal venous pressure [No Carotid Bruit] : no carotid bruit [Normal S1, S2] : normal S1, S2 [No Murmur] : no murmur [No Rub] : no rub [5th Left ICS - MCL] : palpated at the 5th LICS in the midclavicular line [Normal] : normal [No Precordial Heave] : no precordial heave was noted [Normal Rate] : normal [Rhythm Regular] : regular [No Gallop] : no gallop heard [II] : a grade 2 [I] : a grade 1 [2+] : left 2+ [Clear Lung Fields] : clear lung fields [Good Air Entry] : good air entry [No Respiratory Distress] : no respiratory distress  [Soft] : abdomen soft [Non Tender] : non-tender [No Masses/organomegaly] : no masses/organomegaly [Normal Bowel Sounds] : normal bowel sounds [Normal Gait] : normal gait [No Edema] : no edema [No Cyanosis] : no cyanosis [No Clubbing] : no clubbing [No Varicosities] : no varicosities [No Rash] : no rash [No Skin Lesions] : no skin lesions [Moves all extremities] : moves all extremities [No Focal Deficits] : no focal deficits [Normal Speech] : normal speech [Alert and Oriented] : alert and oriented [Normal memory] : normal memory [Click] : no click [Pericardial Rub] : no pericardial rub

## 2021-10-25 NOTE — CARDIOLOGY SUMMARY
[de-identified] : 10/25/2021 [de-identified] : NUC: 7/6/2021 [de-identified] : 6/1/2021 [de-identified] : CATH: 8/6/2021

## 2021-10-25 NOTE — REASON FOR VISIT
[CV Risk Factors and Non-Cardiac Disease] : CV risk factors and non-cardiac disease [Hyperlipidemia] : hyperlipidemia [Hypertension] : hypertension [Coronary Artery Disease] : coronary artery disease [Other: ____] : [unfilled] [FreeTextEntry1] : This is a 76 year year old female here today for follow up cardiac evaluation. 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 \par She is s/p cardiac cath done on 8/6/2021 which demonstrated 30% stenosis in the proximal LAD with mild atherosclerosis. She remains on Atorvastatin 20mg PO DAILY. She was instructed on her last visit to increase this dose to 40mg PO DAILY but has not done so as of yet. She states that she does have some leg cramping and soreness. \par

## 2021-11-01 ENCOUNTER — APPOINTMENT (OUTPATIENT)
Dept: CARDIOLOGY | Facility: CLINIC | Age: 76
End: 2021-11-01

## 2021-11-17 ENCOUNTER — APPOINTMENT (OUTPATIENT)
Dept: ELECTROPHYSIOLOGY | Facility: CLINIC | Age: 76
End: 2021-11-17

## 2021-12-22 ENCOUNTER — APPOINTMENT (OUTPATIENT)
Dept: ORTHOPEDIC SURGERY | Facility: CLINIC | Age: 76
End: 2021-12-22
Payer: MEDICARE

## 2021-12-22 DIAGNOSIS — M54.12 RADICULOPATHY, CERVICAL REGION: ICD-10-CM

## 2021-12-22 PROCEDURE — 73030 X-RAY EXAM OF SHOULDER: CPT | Mod: LT

## 2021-12-22 PROCEDURE — 20611 DRAIN/INJ JOINT/BURSA W/US: CPT | Mod: RT

## 2021-12-22 PROCEDURE — 99214 OFFICE O/P EST MOD 30 MIN: CPT | Mod: 25

## 2021-12-22 PROCEDURE — 72040 X-RAY EXAM NECK SPINE 2-3 VW: CPT

## 2021-12-22 NOTE — DISCUSSION/SUMMARY
[de-identified] : I discussed the underlying pathophysiology of the patient's condition in great detail with the patient. I went over the patient's x-rays with them in great detail. The extent of the patient’s arthritis was discussed in great detail with them. The patient elected to receive a cortisone injection into her right shoulder today and tolerated it well. I instructed the patient on ROM exercises and told them to take it easy. The use of ice and rest was reviewed with the patient. The patient may resume activities tomorrow. At this time, she will start a course of physical therapy for strengthening and flexibility. A prescription was provided. All of her questions were answered. She understands and consents to the plan.\par \par FU 1 month.\par after an intra-articular right shoulder cortisone injection today (12/22/2021).\par after undergoing PT for her neck and shoulders.

## 2021-12-22 NOTE — HISTORY OF PRESENT ILLNESS
[de-identified] : 76 year old RHD female presents complaining of bilateral arm pain, right worse than left, that started in September. She denies injury. She states that she cannot lift her arms, especially the right arm. She notes pain past her elbows and occasionally in her fingers. She notes loss of  strength. She feels like her shoulders are weak. She denies neck pain. She does have stiffness. Of Note: She was treated with a subacromial cortisone injection for left shoulder impingement syndrome in October 2020. \par Pmhx of DVT and PE and is on Coumadin. She is COVID boosted.

## 2021-12-22 NOTE — REASON FOR VISIT
[Follow-Up Visit] : a follow-up visit for [Shoulder Pain] : shoulder pain [FreeTextEntry2] : neck pain

## 2021-12-22 NOTE — PHYSICAL EXAM
[de-identified] : Constitutional\par o Appearance : well-nourished, well developed, alert, in no acute distress \par Head and Face\par o Head :\par ¦ Inspection : atraumatic, normocephalic\par o Face :\par ¦ Inspection : no visible rash or discoloration\par Respiratory\par o Respiratory Effort: breathing unlabored \par Neurologic\par o Sensation : Normal sensation \par Psychiatric\par o Mood and Affect: mood normal, affect appropriate \par Lymphatic\par o Additional Nodes : No palpable lymph nodes present \par \par o Cervical Spine\par ¦ Inspection/Palpation : alignment midline, normal degree of lordosis present, left paracervical tenderness\par ¦ Range of Motion : extension causes discomfort R>L, limited rotation and sidebending but they do not reproduce her symptoms\par ¦ Skin : normal appearance, no masses or tenderness, trachea midline\par Tests: Negative Spurling’s test\par \par Right Upper Extremity\par o Right Shoulder :\par ¦ Inspection/Palpation : anterior capsular and biceps tenderness, no swelling or deformities\par ¦ Range of Motion : forward flexion to about 120° with pain, pain with full internal and external rotation, no crepitance\par ¦ Strength : forward elevation 4+/5, IR/ER at the side 5/5, external rotation at 90° of abduction 4+/5, supraspinatus 4+/5, adduction and abduction 4+/5, biceps/triceps 5/5, strength limited by pain\par ¦ Stability : no joint instability on provocative testing \par Tests: Schultz positive, Neer test positive, Cyndy test positive, drop arm test negative, Graettinger's test positive \par \par Left Upper Extremity\par o Left Shoulder :\par ¦ Inspection/Palpation : mild anterior capsular tenderness, no swelling or deformities\par ¦ Range of Motion : FROM, pain with full forward flexion and at the extremes of rotation, no crepitance\par ¦ Strength : forward elevation 5/5, internal rotation 5/5, external rotation 5/5, external rotation at 90° of abduction 5/5, supraspinatus 5/5, adduction and abduction 5/5, biceps/triceps 5/5, strength slightly limited by pain\par ¦ Stability : no joint instability on provocative testing\par  Tests: Schultz positive, Neer test positive, Cyndy test positive, drop arm test negative\par \par Gait and Station:\par Gait: gait normal, no significant extremity swelling or lymphedema, good proprioception and balance\par \par Radiology Results \par o Cervical Spine : A/P and lateral views were obtained and revealed diffuse degenerative disc disease worse at C4/C5 and C5/C6, reversal of the normal cervical lordosis, a slight listing to the right of the head.\par o Right Shoulder : AP internal/external rotation and outlet views were obtained and revealed moderate degenerative arthritis of the right shoulder.\par o Left Shoulder : AP internal/external rotation and outlet views were obtained and revealed moderate degenerative arthritis of the right shoulder and AC joint. \par \par o Right Shoulder: Indication- shoulder arthritis, Anatomic location- right intraarticular glenohumeral joint, Spray-area was sterilized with Betadine and alcohol and anesthetized with Ethyl Chloride , Needle used - 20G, Medications given- 5cc's lidocaine, 0.5cc's kenalog, 0.5cc's dexamethasone under Ultrasound guidance, and patient tolerated it well.

## 2021-12-22 NOTE — ADDENDUM
[FreeTextEntry1] : I, Thomas Hoang, acted solely as a scribe for Dr. Roland Kidd on this date 12/22/2021.\par All medical record entries made by the Scribe were at my, Dr. Roland Kidd, direction and personally dictated by me on 12/22/2021. I have reviewed the chart and agree that the record accurately reflects my personal performance of the history, physical exam, assessment and plan. I have also personally directed, reviewed, and agreed with the chart.

## 2022-01-03 ENCOUNTER — NON-APPOINTMENT (OUTPATIENT)
Age: 77
End: 2022-01-03

## 2022-01-03 ENCOUNTER — APPOINTMENT (OUTPATIENT)
Dept: ELECTROPHYSIOLOGY | Facility: CLINIC | Age: 77
End: 2022-01-03
Payer: MEDICARE

## 2022-01-03 PROCEDURE — 93294 REM INTERROG EVL PM/LDLS PM: CPT | Mod: NC

## 2022-01-03 PROCEDURE — 93296 REM INTERROG EVL PM/IDS: CPT | Mod: NC

## 2022-01-26 ENCOUNTER — APPOINTMENT (OUTPATIENT)
Dept: ORTHOPEDIC SURGERY | Facility: CLINIC | Age: 77
End: 2022-01-26

## 2022-01-27 ENCOUNTER — APPOINTMENT (OUTPATIENT)
Dept: ORTHOPEDIC SURGERY | Facility: CLINIC | Age: 77
End: 2022-01-27

## 2022-03-01 ENCOUNTER — APPOINTMENT (OUTPATIENT)
Dept: CARDIOLOGY | Facility: CLINIC | Age: 77
End: 2022-03-01
Payer: MEDICARE

## 2022-03-01 ENCOUNTER — NON-APPOINTMENT (OUTPATIENT)
Age: 77
End: 2022-03-01

## 2022-03-01 VITALS
RESPIRATION RATE: 15 BRPM | DIASTOLIC BLOOD PRESSURE: 79 MMHG | BODY MASS INDEX: 28.22 KG/M2 | TEMPERATURE: 98 F | HEIGHT: 59 IN | HEART RATE: 62 BPM | WEIGHT: 140 LBS | OXYGEN SATURATION: 98 % | SYSTOLIC BLOOD PRESSURE: 118 MMHG

## 2022-03-01 PROCEDURE — 99214 OFFICE O/P EST MOD 30 MIN: CPT

## 2022-03-01 PROCEDURE — 93000 ELECTROCARDIOGRAM COMPLETE: CPT

## 2022-03-01 NOTE — DISCUSSION/SUMMARY
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 \par The patient been complaining of atypical left-sided chest wall pain reproducible to touch usually occurring at rest.  She does carry a pocketbook on that side.  She denies shortness of breath, dizziness, palpitations, or syncope.\par Electrocardiogram done March 1, 2022 demonstrate normal sinus rhythm rate of 62 bpm is otherwise remarkable for poor R wave progression, left axis deviation.\par This pain is likely musculoskeletal in nature.  She is going to see her primary care physician later this week and will have blood work done for lipid profile.\par She is currently hemodynamically stable and asymptomatic from a cardiac standpoint.\par Given the presence of coronary artery disease, her LDL target is less than 70 mg/dL.\par She will use a heating pad for 20 minutes twice a day, Advil and Motrin as needed.\par Cardiac catheterization done August 6, 2021 which demonstrated a 30% proximal lesion left anterior descending artery, luminal irregularities in the first diagonal branch, right PDA and right coronary artery.\par \par Medical management is recommended with more aggressive reduction her LDL cholesterol to less than 70 mg/dL. The patient had blood work done July 28, 2021 which demonstrated a direct LDL cholesterol of 81 mg/dL, cholesterol 161, HDL of 52, triglycerides 178, and non-HDL cholesterol 109 mg/dL.\par In order to achieve target LDL, she will increase her Lipitor to 40 mg/day.  She understands she must have follow-up blood work in 6 to 8 weeks.\par \par Electrocardiogram done July 28, 2021 demonstrates normal sinus rhythm at a rate of 70 bpm is otherwise remarkable for left axis deviation, left anterior hemiblock, and left ventricular hypertrophy.\par PMH:\par Nuclear stress test done July 6, 2021 which demonstrated small to moderate fixed defect in the inferolateral wall inferior apical wall consistent with infarct, and hypokinesis involving the distal inferolateral wall and inferior apical walls.  The patient developed 3 out of 10 chest tightness at 4 minutes and 45 seconds of exercise that resolved after 5 minutes of the recovery period.  The patient also has abnormal resting electrocardiogram with ST–T wave changes consistent coronary artery disease.\par She did have one episode of chest pain the day after she received her second COVID-19 vaccine in April 2021.\par Electrocardiogram done May 18, 2021 demonstrated normal sinus rhythm rate 70 bpm otherwise remarkable for left axis deviation, and possible Q waves in V1 and V2.\par The Q waves in leads V1 and V2 represent a change compared to the electrocardiogram from her primary care physician.\par \par The patient understands that aerobic exercises must be increased to 40 minutes 4 times per week. A detailed discussion of lifestyle modification was done today. The patient has a good understanding of the diagnosis, and treatment plan. Lifestyle modification was also outlined.

## 2022-03-01 NOTE — ASSESSMENT
[FreeTextEntry1] : Prior note nurse practitioner [place] October 25, 2021::\par \par This is a 76 year year old female here today for follow up cardiac evaluation. 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 \par She is s/p cardiac cath done on 8/6/2021 which demonstrated 30% stenosis in the proximal LAD with mild atherosclerosis. She remains on Atorvastatin 20mg PO DAILY. She was instructed on her last visit to increase this dose to 40mg PO DAILY but has not done so as of yet. She states that she does have some leg cramping and soreness. \par \par -She has no history of rheumatic fever.  She may drink 4 cups of coffee per day. Her cardiac risk factors include elevated cholesterol\par \par BLOOD PRESSURE:\par -BP is well controlled in today's visit.\par -She remains on Verapamil 120mg PO DAILY for management of her tachybrady syndrome and she does have a pace maker \par \par BLOOD WORK:\par -New blood work was done 9/7/2021 which demonstrated LDL of 83. She is on Atorvastatin 20mg PO DAILY. \par -We will add Zetia 10mg  PO Daily for LDL goal of 70 or less since she does have evidence for mild CAD.\par \par CHOLESTEROL CONTROL:\par -Patient will continue the advised  TLC diet and to continue follow-up for treatment of hyperlipidemia and repeat blood testing with diet and exercise. I have discussed different exercises and the importance of maintenance of optimal body weight. The importance of staying within guidelines and recommendations was stressed to the patient today and they acknowledged that they understand this to me verbally.\par \par  -Ms. BIANCHI was educated and advised that failure to follow-up with my medical recommendations to lower cholesterol can result in severe health consequences therefore, they will continuing a low saturated and low fat diet and to avoid excessive carbohydrates to help reduce triglycerides and that lowering LDL levels is associated with a significant decrease in serious cardiac events including but not limited to heart attack stroke and overall death. We will continue lipid lowering agents as advised based on blood test results and the patient understands to call if they develop severe muscle discomfort or if they have a reddish tinted discoloration in their urine.\par \par TESTING/REPORTS:\par -EKG done Oct 25, 2021 which demonstrated regular sinus rhythm with nonspecific ST-T wave changes, BPM of 67 with occasional PAC. \par \par -Electrocardiogram done July 28, 2021 demonstrates normal sinus rhythm at a rate of 70 bpm is otherwise remarkable for left axis deviation, left anterior hemiblock, and left ventricular hypertrophy.\par \par -Electrocardiogram done May 18, 2021 demonstrated normal sinus rhythm rate 70 bpm otherwise remarkable for left axis deviation, and possible Q waves in V1 and V2. The Q waves in leads V1 and V2 represent a change compared to the electrocardiogram from her primary care physician. The patient denies any history of myocardial infarction is unclear why she has Q waves in the anterior septal region.\par \par -She had a nuclear stress test done July 6, 2021 which demonstrated small to moderate fixed defect in the inferolateral wall inferior apical wall consistent with infarct, and hypokinesis involving the distal inferolateral wall and inferior apical walls.  The patient developed 3 out of 10 chest tightness at 4 minutes and 45 seconds of exercise that resolved after 5 minutes of the recovery period.  \par \par -The patient also has abnormal resting electrocardiogram with ST–T wave changes consistent coronary artery disease.\par \par -The patient had a cardiac catheterization done August 6, 2021 which demonstrated a 30% proximal lesion left anterior descending artery, luminal irregularities in the first diagonal branch, right PDA and right coronary artery.\par \par PLAN:\par -Start Zetia 10mg  PO Daily \par -She will continue with her usual medications and will contact the office if she is having any complaints between now and their next follow up appointment.\par -She will follow up with her PCP.\par \par I have discussed the plan of care with Ms. JOHNSON BIANCHI  and she  will follow up in 3 months. She is compliant with all of her medications.\par \par The patient understands that aerobic exercises must be increased to minutes 4 times/week and a detailed discussion of lifestyle modification was done today. \par The patient has a good understanding of the diagnosis, treatment plan and lifestyle modification. \par She will contact me at the office for any questions with their care or any changes in their health status.\par \par The plan of care was discussed with the patient with supervision physician Dr. Bolivar Lomeli and will be carried out as noted above.\par \par Nieves VARGAS  oral

## 2022-03-01 NOTE — CARDIOLOGY SUMMARY
[de-identified] : 10/25/2021 [de-identified] : NUC: 7/6/2021 [de-identified] : 6/1/2021 [de-identified] : CATH: 8/6/2021

## 2022-03-01 NOTE — REASON FOR VISIT
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 Patient complains of atypical chest pain that occurs at rest for the past couple of months and is reproducible on palpation. Patient describes it as achy chest discomfort that does not radiate. Patient denies any shortness of breath, palpitations, or recent episodes of syncope.  \par Cardiac cath done on 8/6/2021 which demonstrated 30% stenosis in the proximal LAD with mild atherosclerosis.\par

## 2022-04-05 ENCOUNTER — APPOINTMENT (OUTPATIENT)
Dept: ELECTROPHYSIOLOGY | Facility: CLINIC | Age: 77
End: 2022-04-05
Payer: MEDICARE

## 2022-04-05 ENCOUNTER — NON-APPOINTMENT (OUTPATIENT)
Age: 77
End: 2022-04-05

## 2022-04-05 PROCEDURE — 93294 REM INTERROG EVL PM/LDLS PM: CPT

## 2022-04-05 PROCEDURE — 93296 REM INTERROG EVL PM/IDS: CPT

## 2022-04-12 ENCOUNTER — EMERGENCY (EMERGENCY)
Facility: HOSPITAL | Age: 77
LOS: 1 days | Discharge: HOME | End: 2022-04-12
Attending: EMERGENCY MEDICINE
Payer: MEDICARE

## 2022-04-12 VITALS
OXYGEN SATURATION: 97 % | HEART RATE: 73 BPM | RESPIRATION RATE: 19 BRPM | SYSTOLIC BLOOD PRESSURE: 110 MMHG | DIASTOLIC BLOOD PRESSURE: 96 MMHG | TEMPERATURE: 98 F

## 2022-04-12 VITALS
HEIGHT: 58 IN | OXYGEN SATURATION: 97 % | TEMPERATURE: 98 F | DIASTOLIC BLOOD PRESSURE: 88 MMHG | WEIGHT: 145.06 LBS | HEART RATE: 64 BPM | SYSTOLIC BLOOD PRESSURE: 128 MMHG | RESPIRATION RATE: 22 BRPM

## 2022-04-12 DIAGNOSIS — Z96.659 PRESENCE OF UNSPECIFIED ARTIFICIAL KNEE JOINT: Chronic | ICD-10-CM

## 2022-04-12 DIAGNOSIS — Z98.51 TUBAL LIGATION STATUS: Chronic | ICD-10-CM

## 2022-04-12 DIAGNOSIS — Z98.890 OTHER SPECIFIED POSTPROCEDURAL STATES: Chronic | ICD-10-CM

## 2022-04-12 DIAGNOSIS — Z95.0 PRESENCE OF CARDIAC PACEMAKER: Chronic | ICD-10-CM

## 2022-04-12 DIAGNOSIS — Z98.89 OTHER SPECIFIED POSTPROCEDURAL STATES: Chronic | ICD-10-CM

## 2022-04-12 LAB
ALBUMIN SERPL ELPH-MCNC: 3.8 G/DL — SIGNIFICANT CHANGE UP (ref 3.3–5)
ALP SERPL-CCNC: 75 U/L — SIGNIFICANT CHANGE UP (ref 40–120)
ALT FLD-CCNC: 16 U/L — SIGNIFICANT CHANGE UP (ref 10–45)
ANION GAP SERPL CALC-SCNC: 12 MMOL/L — SIGNIFICANT CHANGE UP (ref 5–17)
AST SERPL-CCNC: 22 U/L — SIGNIFICANT CHANGE UP (ref 10–40)
BASOPHILS # BLD AUTO: 0.04 K/UL — SIGNIFICANT CHANGE UP (ref 0–0.2)
BASOPHILS NFR BLD AUTO: 0.5 % — SIGNIFICANT CHANGE UP (ref 0–2)
BILIRUB SERPL-MCNC: 0.3 MG/DL — SIGNIFICANT CHANGE UP (ref 0.2–1.2)
BUN SERPL-MCNC: 11 MG/DL — SIGNIFICANT CHANGE UP (ref 7–23)
CALCIUM SERPL-MCNC: 8.9 MG/DL — SIGNIFICANT CHANGE UP (ref 8.4–10.5)
CHLORIDE SERPL-SCNC: 104 MMOL/L — SIGNIFICANT CHANGE UP (ref 96–108)
CO2 SERPL-SCNC: 25 MMOL/L — SIGNIFICANT CHANGE UP (ref 22–31)
CREAT SERPL-MCNC: 0.85 MG/DL — SIGNIFICANT CHANGE UP (ref 0.5–1.3)
EGFR: 70 ML/MIN/1.73M2 — SIGNIFICANT CHANGE UP
EOSINOPHIL # BLD AUTO: 0.14 K/UL — SIGNIFICANT CHANGE UP (ref 0–0.5)
EOSINOPHIL NFR BLD AUTO: 1.6 % — SIGNIFICANT CHANGE UP (ref 0–6)
GLUCOSE SERPL-MCNC: 90 MG/DL — SIGNIFICANT CHANGE UP (ref 70–99)
HCT VFR BLD CALC: 38.9 % — SIGNIFICANT CHANGE UP (ref 34.5–45)
HGB BLD-MCNC: 12.7 G/DL — SIGNIFICANT CHANGE UP (ref 11.5–15.5)
IMM GRANULOCYTES NFR BLD AUTO: 0.7 % — SIGNIFICANT CHANGE UP (ref 0–1.5)
LYMPHOCYTES # BLD AUTO: 3.85 K/UL — HIGH (ref 1–3.3)
LYMPHOCYTES # BLD AUTO: 43.6 % — SIGNIFICANT CHANGE UP (ref 13–44)
MCHC RBC-ENTMCNC: 29.2 PG — SIGNIFICANT CHANGE UP (ref 27–34)
MCHC RBC-ENTMCNC: 32.6 GM/DL — SIGNIFICANT CHANGE UP (ref 32–36)
MCV RBC AUTO: 89.4 FL — SIGNIFICANT CHANGE UP (ref 80–100)
MONOCYTES # BLD AUTO: 0.64 K/UL — SIGNIFICANT CHANGE UP (ref 0–0.9)
MONOCYTES NFR BLD AUTO: 7.2 % — SIGNIFICANT CHANGE UP (ref 2–14)
NEUTROPHILS # BLD AUTO: 4.11 K/UL — SIGNIFICANT CHANGE UP (ref 1.8–7.4)
NEUTROPHILS NFR BLD AUTO: 46.4 % — SIGNIFICANT CHANGE UP (ref 43–77)
NRBC # BLD: 0 /100 WBCS — SIGNIFICANT CHANGE UP (ref 0–0)
NT-PROBNP SERPL-SCNC: 227 PG/ML — SIGNIFICANT CHANGE UP (ref 0–300)
PLATELET # BLD AUTO: 214 K/UL — SIGNIFICANT CHANGE UP (ref 150–400)
POTASSIUM SERPL-MCNC: 5 MMOL/L — SIGNIFICANT CHANGE UP (ref 3.5–5.3)
POTASSIUM SERPL-SCNC: 5 MMOL/L — SIGNIFICANT CHANGE UP (ref 3.5–5.3)
PROT SERPL-MCNC: 6.7 G/DL — SIGNIFICANT CHANGE UP (ref 6–8.3)
RAPID RVP RESULT: SIGNIFICANT CHANGE UP
RBC # BLD: 4.35 M/UL — SIGNIFICANT CHANGE UP (ref 3.8–5.2)
RBC # FLD: 13.2 % — SIGNIFICANT CHANGE UP (ref 10.3–14.5)
SARS-COV-2 RNA SPEC QL NAA+PROBE: SIGNIFICANT CHANGE UP
SODIUM SERPL-SCNC: 141 MMOL/L — SIGNIFICANT CHANGE UP (ref 135–145)
TROPONIN T, HIGH SENSITIVITY RESULT: 8 NG/L — SIGNIFICANT CHANGE UP (ref 0–51)
WBC # BLD: 8.84 K/UL — SIGNIFICANT CHANGE UP (ref 3.8–10.5)
WBC # FLD AUTO: 8.84 K/UL — SIGNIFICANT CHANGE UP (ref 3.8–10.5)

## 2022-04-12 PROCEDURE — 71275 CT ANGIOGRAPHY CHEST: CPT | Mod: MA

## 2022-04-12 PROCEDURE — 36415 COLL VENOUS BLD VENIPUNCTURE: CPT

## 2022-04-12 PROCEDURE — 83880 ASSAY OF NATRIURETIC PEPTIDE: CPT

## 2022-04-12 PROCEDURE — 99285 EMERGENCY DEPT VISIT HI MDM: CPT

## 2022-04-12 PROCEDURE — 71275 CT ANGIOGRAPHY CHEST: CPT | Mod: 26,MA

## 2022-04-12 PROCEDURE — 85025 COMPLETE CBC W/AUTO DIFF WBC: CPT

## 2022-04-12 PROCEDURE — 99285 EMERGENCY DEPT VISIT HI MDM: CPT | Mod: 25

## 2022-04-12 PROCEDURE — 0225U NFCT DS DNA&RNA 21 SARSCOV2: CPT

## 2022-04-12 PROCEDURE — 93005 ELECTROCARDIOGRAM TRACING: CPT

## 2022-04-12 PROCEDURE — 84484 ASSAY OF TROPONIN QUANT: CPT

## 2022-04-12 PROCEDURE — 80053 COMPREHEN METABOLIC PANEL: CPT

## 2022-04-12 PROCEDURE — 93010 ELECTROCARDIOGRAM REPORT: CPT

## 2022-04-12 RX ORDER — AZITHROMYCIN 500 MG/1
0.5 TABLET, FILM COATED ORAL
Qty: 2 | Refills: 0
Start: 2022-04-12 | End: 2022-04-15

## 2022-04-12 RX ORDER — AZITHROMYCIN 500 MG/1
500 TABLET, FILM COATED ORAL ONCE
Refills: 0 | Status: COMPLETED | OUTPATIENT
Start: 2022-04-12 | End: 2022-04-12

## 2022-04-12 RX ADMIN — AZITHROMYCIN 500 MILLIGRAM(S): 500 TABLET, FILM COATED ORAL at 16:31

## 2022-04-12 NOTE — ED PROVIDER NOTE - NSICDXFAMILYHX_GEN_ALL_CORE_FT
FAMILY HISTORY:  Family history of prothrombin gene mutation, first cousin    Father  Still living? Unknown  Family history of heart disease, Age at diagnosis: Age Unknown    Sibling  Still living? Yes, Estimated age: 71-80  Family history of macular degeneration, Age at diagnosis: Age Unknown    Child  Still living? Yes, Estimated age: 41-50  Family history of CHF (congestive heart failure), Age at diagnosis: Age Unknown  Family history of renal failure, Age at diagnosis: Age Unknown

## 2022-04-12 NOTE — ED PROVIDER NOTE - ATTENDING CONTRIBUTION TO CARE
76 yo female p/w one week of cough; seen at urgent care for same, COVID negative, discharged, now with persistent cough, some mild chest discomfort.  no respiratory distress on exam.  CTA chest --> concern for PNA.  otherwise on no supplemental O2.  antibiotics, outpatient follow-up.

## 2022-04-12 NOTE — ED PROVIDER NOTE - NSFOLLOWUPINSTRUCTIONS_ED_ALL_ED_FT
1- Azithromycin 250 mg daily for the next 4 days  2- Over the counter cough suppressants  3- Motrin / Tylenol as needed for pain  4- Call Dr Ricks tomorrow and make a follow up appointment  5-Return to ER for any new or worsening symptoms

## 2022-04-12 NOTE — ED PROVIDER NOTE - INTERPRETATION
EKG reviewed for rate, rhythm, axis, intervals and segments, including QRS morphology, P wave appearance T wave appearance, [place] interval, and QT interval.  I find the EKG to be unremarkable in all of these regards except as follows: L axis, LVH, PVCs

## 2022-04-12 NOTE — ED PROVIDER NOTE - PATIENT PORTAL LINK FT
You can access the FollowMyHealth Patient Portal offered by [facility] by registering at the following website: [email or web address]. By joining [employer]’s FollowMyHealth portal, you will also be able to view your health information using other applications (apps) compatible with our system.

## 2022-04-12 NOTE — ED PROVIDER NOTE - NS ED ATTENDING STATEMENT MOD
This was a shared visit with the PAGE. I reviewed and verified the documentation and independently performed the documented:

## 2022-04-12 NOTE — ED ADULT NURSE NOTE - NSICDXPASTMEDICALHX_GEN_ALL_CORE_FT
PAST MEDICAL HISTORY:  BMI 30.0-30.9,adult     DVT (deep venous thrombosis), right october 2013    GERD (gastroesophageal reflux disease)     Herniated lumbar intervertebral disc     History of pulmonary embolus (PE) 2012, 2018    HTN - Hypertension     Lower back pain     Osteoarthritis of left knee     Pacemaker implanted 9/28/18    PE (pulmonary embolism) 3 yrs ago, was on Lovenox and coumadin    Prothrombin gene mutation     Tachy-artur syndrome

## 2022-04-12 NOTE — ED ADULT NURSE NOTE - EXTENSIONS OF SELF_ADULT
Photodynamic Therapy Note.    PDT ordered per Dr. Kerr    Patient here today for 1 st treatment of actinic keratoses using photodynamic therapy.  Risks including but not limited to burning, stinging, redness, swelling, crusting or blistering of the skin of the area treated were discussed with patient.  Patient elects to proceed with photodynamic therapy.    Treatment area:  face  Treatment area cleaned with rubbing alcohol, Levulan Kerastick (1) applied evenly to entire surface and allowed to absorb for 105 minutes.  Patient then placed under Mau-U light for 16 minutes 40 seconds.  The orders were written for 90mins incubation but the pt lost track of time and the med sat on his face for 105 mins.    Patient tolerated treatment well with only mild but tolerable symptoms of discomfort.  Area washed gently with mild soap and water; Zinc oxide sunscreen applied.  Patient advised to avoid any significant light exposure (sun and artificial) for next 48 hours.    RTC:  In 1 month or sooner if concern arises.    
None

## 2022-04-12 NOTE — ED PROVIDER NOTE - NSICDXPASTSURGICALHX_GEN_ALL_CORE_FT
PAST SURGICAL HISTORY:  Artificial pacemaker MICRA    History of carpal tunnel surgery of right wrist 1990's    History of lumbar surgery 2018    History of total knee replacement, unspecified laterality     S/P arthroscopy of right knee 2012    S/P Breast Lumpectomy 2000's, 2004    S/P tubal ligation age 30

## 2022-04-12 NOTE — ED PROVIDER NOTE - NSICDXPASTMEDICALHX_GEN_ALL_CORE_FT
Gene - it was nice to talk to you and Agatha today!  I'm so glad you're doing so well!    1. No medication changes need at this time - it looks like things are going really well!    2. We reviewed your echocardiogram that you had done back in 2019. I thought Dr. Curtis had reviewed the echocardiogram at his appt with you 4/2019 but it appears he did not.   * Normal pumping function (EF 60-65%)   * Your aortic valve was found to be severely stenosed (tightened) - previously had been just moderately tight in 2017.         3. We discussed symptoms for tight aortic valve to watch out for: chest pain/tightness, increased fluid retention (swelling of legs, rapid weight gain, trouble breathing, difficulty laying flat in bed b/c breathing is too hard).  IF THIS HAPPENS, pls contact us ASAP so we can move up workup of this! [phone].    Right now, will plan to repeat echo ~3 months and have you meet with Dr. Fabian, who specializes in these valve abnormalites   PAST MEDICAL HISTORY:  BMI 30.0-30.9,adult     DVT (deep venous thrombosis), right october 2013    GERD (gastroesophageal reflux disease)     Herniated lumbar intervertebral disc     History of pulmonary embolus (PE) 2012, 2018    HTN - Hypertension     Lower back pain     Osteoarthritis of left knee     Pacemaker implanted 9/28/18    PE (pulmonary embolism) 3 yrs ago, was on Lovenox and coumadin    Prothrombin gene mutation     Tachy-artur syndrome

## 2022-04-12 NOTE — ED PROVIDER NOTE - CARE PROVIDERS DIRECT ADDRESSES
[email or web address] Transposition Flap Text: The defect edges were debeveled with a #15 scalpel blade.  Given the location of the defect and the proximity to free margins a transposition flap was deemed most appropriate.  Using a sterile surgical marker, an appropriate transposition flap was drawn incorporating the defect.    The area thus outlined was incised deep to adipose tissue with a #15 scalpel blade.  The skin margins were undermined to an appropriate distance in all directions utilizing iris scissors.

## 2022-04-12 NOTE — ED PROVIDER NOTE - CARE PROVIDER_API CALL
Arthur Ricks  INTERNAL MEDICINE  [address], [address]  [place] [address]  Phone: [phone]  Fax: [phone]  Follow Up Time:

## 2022-04-12 NOTE — ED ADULT NURSE NOTE - OBJECTIVE STATEMENT
PT is a 77 year old A&OX4 female with PMH of HTN, PE, GERD, and pacemaker who presents to the ED from home with c/o cough for 1 week. PT states she went to Urgent Care and they did not give her a dx or medication. PT had a COVID test that was negative. PT states that the cough is worse at night and that she now has muscle pain in the neck and back from coughing that she rated a 5/10. PT's  states the PT has finished 2 bottles of Robitussin in the last 5 days. PT denies SOB, N/V/D, fevers, chills, and urinary symptoms. PT is resting comfortably in bed and breathing unlabored with occasional coughing. Skin is warm and dry, no diaphoresis noted. No edema in B/L extremities. PT is ambulatory with steady gait. Safety and comfort maintained.  at bedside. PT is a 77 year old A&OX4 female with PMH of HTN, PE, GERD, and pacemaker who presents to the ED from home with c/o cough for 1 week. PT states she went to Urgent Care and they did not give her a dx or medication. but testing her for COVID, both rapid and PCR that were negative. PT states that the cough is worse at night and that she now has muscle pain in the neck and back from coughing that she rated a 5/10. PT's  states the PT has finished 2 bottles of Robitussin in the last 5 days. PT denies SOB, N/V/D, fevers, chills, and urinary symptoms. PT is resting comfortably in bed and breathing unlabored with occasional coughing. Skin is warm and dry, no diaphoresis noted. No edema in B/L extremities. PT is ambulatory with steady gait. Safety and comfort maintained.  at bedside. PT is a 77 year old A&OX4 female with PMH of HTN, PE, GERD, and pacemaker who presents to the ED from home with c/o cough for 1 week. PT states she went to Urgent Care and they did not give her a dx or medication. Urgent Care tested her for COVID, both rapid and PCR, that were negative. PT states that the cough is worse at night and that she now has muscle pain in the neck and back from coughing that she rated a 5/10. PT's  states the PT has finished 2 bottles of Robitussin in the last 5 days. PT denies SOB, N/V/D, fevers, chills, and urinary symptoms. PT is resting comfortably in bed and breathing unlabored with occasional coughing. Skin is warm and dry, no diaphoresis noted. No edema in B/L extremities. PT is ambulatory with steady gait. Safety and comfort maintained.  at bedside.

## 2022-04-12 NOTE — ED ADULT NURSE NOTE - NS_NURSE_DISC_TEACHING_YN_ED_ALL_ED
Telephone Encounter by Sindhu Odom Sindhu Odom ()              ROSHAN KUMAR    Patient Age: 31 Yes

## 2022-04-12 NOTE — ED PROVIDER NOTE - OBJECTIVE STATEMENT
76 y/o female PMH HTN, HLD, GERD, tachy-artur s/p MICRA PPM, DVT/PE on Coumadin coming in with a cough x 1 week. 76 y/o female PMH HTN, HLD, GERD, tachy-artur s/p MICRA PPM, DVT/PE on Coumadin coming in with a cough x 1 week.  Pt was seen at an UC when the symptoms started, had a negative COVID and was sent home (pt is also vaccinated x 2).  Here in the ER because her cough continues to get worse, cough is dry worse at night and the mornings.  No fevers, no chills, no shortness of breath.  No chest pain, no LE edema.  Has never had anything like this before.    Nothing improves her symptoms

## 2022-04-12 NOTE — ED ADULT NURSE REASSESSMENT NOTE - NS ED NURSE REASSESS COMMENT FT1
Pt returned from CT scan at this time. Pt is breathing unlabored on RA. Vital signs stable. Educated pt on plan of care. Safety and comfort maintained. Awaiting CT results. Call bell within reach.

## 2022-04-14 ENCOUNTER — EMERGENCY (EMERGENCY)
Facility: HOSPITAL | Age: 77
LOS: 1 days | Discharge: HOME | End: 2022-04-14
Attending: EMERGENCY MEDICINE
Payer: MEDICARE

## 2022-04-14 VITALS
TEMPERATURE: 97 F | DIASTOLIC BLOOD PRESSURE: 75 MMHG | OXYGEN SATURATION: 100 % | SYSTOLIC BLOOD PRESSURE: 105 MMHG | RESPIRATION RATE: 18 BRPM | HEART RATE: 76 BPM

## 2022-04-14 VITALS
WEIGHT: 143.96 LBS | OXYGEN SATURATION: 97 % | HEART RATE: 78 BPM | DIASTOLIC BLOOD PRESSURE: 73 MMHG | SYSTOLIC BLOOD PRESSURE: 102 MMHG | HEIGHT: 58 IN | TEMPERATURE: 99 F | RESPIRATION RATE: 20 BRPM

## 2022-04-14 DIAGNOSIS — Z96.659 PRESENCE OF UNSPECIFIED ARTIFICIAL KNEE JOINT: Chronic | ICD-10-CM

## 2022-04-14 DIAGNOSIS — Z98.51 TUBAL LIGATION STATUS: Chronic | ICD-10-CM

## 2022-04-14 DIAGNOSIS — Z98.890 OTHER SPECIFIED POSTPROCEDURAL STATES: Chronic | ICD-10-CM

## 2022-04-14 DIAGNOSIS — Z95.0 PRESENCE OF CARDIAC PACEMAKER: Chronic | ICD-10-CM

## 2022-04-14 DIAGNOSIS — Z98.89 OTHER SPECIFIED POSTPROCEDURAL STATES: Chronic | ICD-10-CM

## 2022-04-14 LAB
APTT BLD: 39.9 SEC — HIGH (ref 27.5–35.5)
INR BLD: 3.14 RATIO — HIGH (ref 0.88–1.16)
PROTHROM AB SERPL-ACNC: 36.5 SEC — HIGH (ref 10.5–13.4)

## 2022-04-14 PROCEDURE — 36415 COLL VENOUS BLD VENIPUNCTURE: CPT

## 2022-04-14 PROCEDURE — 93005 ELECTROCARDIOGRAM TRACING: CPT

## 2022-04-14 PROCEDURE — 99284 EMERGENCY DEPT VISIT MOD MDM: CPT

## 2022-04-14 PROCEDURE — 85730 THROMBOPLASTIN TIME PARTIAL: CPT

## 2022-04-14 PROCEDURE — 99283 EMERGENCY DEPT VISIT LOW MDM: CPT

## 2022-04-14 PROCEDURE — 85610 PROTHROMBIN TIME: CPT

## 2022-04-14 NOTE — ED PROVIDER NOTE - NSFOLLOWUPINSTRUCTIONS_ED_ALL_ED_FT
ffearns
You were seen for an abnormal EKG.    Your EKG here was normal.    Skip tonight's dose of coumadin.  Call Dr. Lomeli's office tomorrow morning to schedule an appointment.    Continue taking all your medications as previously prescribed, unless specifically instructed not to do so by a physician.    If you have any severe increase in pain, fever, uncontrollable nausea vomiting, inability to tolerate eating and drinking, or any other concerning symptoms, return immediately to the Emergency Department.

## 2022-04-14 NOTE — ED PROVIDER NOTE - PATIENT PORTAL LINK FT
You can access the FollowMyHealth Patient Portal offered by [facility] by registering at the following website: [email or web address]. By joining [employer]’s FollowMyHealth portal, you will also be able to view your health information using other applications (apps) compatible with our system.

## 2022-04-14 NOTE — ED PROVIDER NOTE - PHYSICAL EXAMINATION
GENERAL: Patient awake alert NAD.  HEENT: NC/AT.  LUNGS: CTAB, no wheezes or crackles.  CARDIAC: RRR, no m/r/g.  ABDOMEN: Soft, NT, ND, No rebound, guarding.  EXT: No edema. No calf tenderness.  MSK: No pain with movement, no deformities.  NEURO: A&Ox3. Moving all extremities.  SKIN: Warm and dry. No rash.  PSYCH: Normal affect.

## 2022-04-14 NOTE — ED PROVIDER NOTE - PROGRESS NOTE DETAILS
Yovanny PGY3 - INR > 3.  Will dc w/ cards f/u and return precautions.  Will instruct pt. to skip tonight's dose of coumadin.  Pt. aware and agrees w/ plan.  All other questions and concerns have been addressed.  Pt. will be dc/d.

## 2022-04-14 NOTE — ED ADULT TRIAGE NOTE - CHIEF COMPLAINT QUOTE
pt again referred by primary md chucky Ricks for eval of cough x 2 weeks was seen here 2 days ago for such complaint pt dry cough pt statrted on azithromycin from ER

## 2022-04-14 NOTE — ED PROVIDER NOTE - OBJECTIVE STATEMENT
77F w/ PMHx of PE on coumadin, HTN, HLD, GERD, tachy-artur arrhythmia s/p micra PPM sent in by PCP for afib.  Pt. was evaluated in the ED a few days ago for cough, congestion.  Was dc/d after a comprehensive negative w/u, including a CTA chest.  Pt. went to PCP, Dr. Ricks, who stated she was in afib.  Per Dr. Ricks, pt. was in significant discomfort and had soft BPs.  Pt. feels well here and has no CP, SOB, abd pain, palpitations.  PT. does say she has intermittent palpitations over the last two years.

## 2022-04-14 NOTE — ED ADULT NURSE NOTE - OBJECTIVE STATEMENT
Received pt alert and oriented x3, breathing even and unlabored no distress noted. Pt sent to ER for abnormal EKG. Pt also presents with persistent dry cough x 2 weeks.

## 2022-04-14 NOTE — ED PROVIDER NOTE - ATTENDING CONTRIBUTION TO CARE
77F hx of VTE on Coumadin, GERD, HTN, PPM for tachybrady syndrome, prothrombin gene mutation, here from PCP office for concern for new onset AFib. Pt with recent cough, URI sx, had ED eval on 4/12 with neg labs, CTA, RVP. Was DC on azithromycin which she has been taking. Still reports cough, no new sx. FU w PCP today as instructed. Office EKG with AFib, rate between 120 and 60 Bpm, here is in sinus w rate 70s. Normal BP. Normal perfusion and mentation. PE well appearing NAD dry cough, RRR no murmur CTA BL no wrr, abd soft ntnd ext wwp, no skin changes. No FND.  Pt is AC on coumadin and also takes verapamil. GIven recent comprehensive work up and likely PAF but already on AC and no signs of HD compromise will d/w cards Dr KAROLINE Lomeli as we feel pt is stable for OP FU.

## 2022-04-14 NOTE — ED PROVIDER NOTE - TOBACCO USE
Unknown if ever smoked
Plan: Refer to [facility] for further evaluation and treatment recommendations
Detail Level: Zone

## 2022-04-14 NOTE — CONSULT NOTE ADULT - SUBJECTIVE AND OBJECTIVE BOX
[facility] - [facility] - CONSULT NOTE    HPI: 77 year old gentlewoman, lifelong non-smoker, without known history of intrinsic lung disease. She has a history of prothrombin gene mutation complicated by a DVT/PE on long term coumadin, breast cancer s/p lumpectomy, and tachy-artur syndrome requiring a placement of a pacemaker. She was seen in the emergency room on 4/12 with a cough productive of scant sputum. COVID/RVP nasal swab was negative. CT revealed a left lower lobe pneumonia. The patient was discharged on azithromycin and over the counter anti-tussive medications. The patient was seen by her PCP (Dr. Moises Ricks) today feeling quite unwell with a worsening cough making it impossible to sleep. She was found to have a "soft" blood pressure and atrial fibrillation with RVR. She has no shortness of breath on room air. She has no significant sputum production, chest congestion or wheeze. She has no fevers, chills or sweats. No chest pain/pressure or palpitations Asked to evaluate    PMHX:  HTN - Hypertension    Hx of Breast Cancer    PE (pulmonary embolism)    Lower back pain    Herniated lumbar intervertebral disc    DVT (deep venous thrombosis), right    GERD (gastroesophageal reflux disease)    Prothrombin gene mutation    Osteoarthritis of left knee    Pacemaker    Lower back pain    BMI 30.0-30.9,adult    History of pulmonary embolus (PE)    Tachy-artur syndrome        PSHX:  S/P Wrist Surgery    h/o Wrist Surgery    S/P Breast Lumpectomy    S/P arthroscopy of right knee    S/P tubal ligation    History of carpal tunnel surgery of right wrist    History of lumbar surgery    History of total knee replacement, unspecified laterality    Artificial pacemaker        FAMILY HISTORY:  Family history of heart disease (Father)  father    Family history of prothrombin gene mutation  first cousin    Family history of renal failure (Child)    Family history of CHF (congestive heart failure) (Child)    Family history of macular degeneration (Sibling)        SOCIAL HISTORY:    Pulmonary Medications:   benzonatate 100 milliGRAM(s) Oral Once      Antimicrobials:      Cardiology:      Other:      Prn:  MEDICATIONS  (PRN):      Allergies    No Known Allergies    Intolerances        HOME MEDICATIONS: see  H & P    REVIEW OF SYSTEMS:  Constitutional: As per HPI  HEENT: Within normal limits  CV: As per HPI  Resp: As per HPI  GI: Within normal limits   : Within normal limits  Musculoskeletal: Within normal limits  Skin: Within normal limits  Neurological: Within normal limits  Psychiatric: Within normal limits  Endocrine: Within normal limits  Hematologic/Lymphatic: Within normal limits  Allergic/Immunologic: Within normal limits    [x] All other systems negative    OBJECTIVE:    I&O's Detail    Daily Height in cm: 147.32 (14 Apr 2022 16:49)    Daily       PHYSICAL EXAM:  ICU Vital Signs Last 24 Hrs  T(C): 36.3 (14 Apr 2022 19:29), Max: 37 (14 Apr 2022 16:49)  T(F): 97.3 (14 Apr 2022 19:29), Max: 98.6 (14 Apr 2022 16:49)  HR: 76 (14 Apr 2022 19:29) (76 - 78)  BP: 105/75 (14 Apr 2022 19:29) (102/73 - 105/75)  BP(mean): --  ABP: --  ABP(mean): --  RR: 18 (14 Apr 2022 19:29) (18 - 20)  SpO2: 100% (14 Apr 2022 19:29) (97% - 100%)    General: Awake. Alert. Cooperative. No distress. Appears stated age 	  HEENT:   Atraumatic. Normocephalic. Anicteric. Normal oral mucosa. PERRL. EOMI.  Neck: Supple. Trachea midline. Thyroid without enlargement/tenderness/nodules. No carotid bruit. No JVD.	  Cardiovascular: Regular rate and rhythm. S1 S2 normal. No murmurs, rubs or gallops.  Respiratory: Respirations unlabored. Clear to auscultation and percussion bilaterally. No curvature.  Abdomen: Soft. Non-tender. Non-distended. No organomegaly. No masses. Normal bowel sounds.  Extremities: Warm to touch. No clubbing or cyanosis. No pedal edema.  Pulses: 2+ peripheral pulses all extremities.	  Skin: Normal skin color. No rashes or lesions. No ecchymoses. No cyanosis. Warm to touch.  Lymph Nodes: Cervical, supraclavicular and axillary nodes normal  Neurological: Motor and sensory examination equal and normal. A and O x 3  Psychiatry: Appropriate mood and affect.      LABS:      CBC    WBC  8.84 <==    Hemoglobin  12.7 <<==    Hematocrit  38.9 <==    Platelets  214 <==      141  |  104  |  11  ----------------------------<  90    04-12  5.0   |  25  |  0.85    LYTES    sodium  141 <==    potassium   5.0 <==    chloride  104 <==    carbon dioxide  25 <==    =============================================================================================  RENAL FUNCTION:    Creatinine:   0.85  <<==    BUN:   11 <==    ============================================================================================    calcium   8.9 <==    phos       mag       ============================================================================================  LFTs    AST:   22 <==     ALT:  16  <==     AP:  75  <=    Bili:  0.3  <=    PT/INR - ( 14 Apr 2022 18:04 )   PT: 36.5 sec;   INR: 3.14 ratio         PTT - ( 14 Apr 2022 18:04 )  PTT:39.9 sec        Serum Pro-Brain Natriuretic Peptide: 227 pg/mL (04-12 @ 13:09)      CARDIAC MARKERS ( 12 Apr 2022 13:09 )  CPK x     /CKMB x     /CKMB Units x        troponin 8 ng/L        MICROBIOLOGY:     Respiratory Viral Panel with COVID-19 by PETRA (04.12.22 @ 13:07)   Rapid RVP Result: NotDetec   SARS-CoV-2: [facility]  This Respiratory Panel uses polymerase chain reaction (PCR) to detect for   adenovirus; coronavirus (HKU1, NL63, 229E, OC43); human metapneumovirus   (hMPV); human enterovirus/rhinovirus (Entero/RV); influenza A; influenza   A/H1; influenza A/H3; influenza A/H1-2009; influenza B; parainfluenza   viruses 1, 2, 3, 4; respiratory syncytial virus; Mycoplasma pneumoniae;   Chlamydophila pneumoniae; and SARS-CoV-2.       RADIOLOGY:  [x ] Chest radiographs reviewed and interpreted by me    EXAM:  CT ANGIO CHEST PULM ART [facility]                          PROCEDURE DATE:  04/12/2022        FINDINGS:    PULMONARY ANGIOGRAM: Many distal segmental and subsegmental pulmonary   artery branches are limited in assessment secondary to motion. Otherwise,   no pulmonary embolism detected.    LYMPH NODES: No lymphadenopathy.    HEART/VASCULATURE: Cardiomegaly. Right ventricular cardiac pacemaker.   Coronary artery calcifications.    AIRWAYS/LUNGS/PLEURA: Trachea and mainstem bronchi patent. Clustered   small nodular consolidative and ground-glass opacities multifocally in   the left lower lobe superior segment and left base; findings are new   compared to 10/6/2018.    UPPER ABDOMEN: Within normal limits.    BONES/SOFT TISSUES: Degenerative changes of the spine. Soft tissues are   unremarkable.    IMPRESSION:    No pulmonary embolism detected.    Multifocal nodular opacities in the left lower lobe, likely aspiration or   infection.    JUANCARLOS PINEDA MD; Resident Radiology  This document has been electronically signed.   FARHAT STRINGER MD; Attending Radiologist  This document has been electronically signed. Apr 12 2022  3:45PM    ---------------------------------------------------------------------------------------------------------------

## 2022-06-02 ENCOUNTER — RX RENEWAL (OUTPATIENT)
Age: 77
End: 2022-06-02

## 2022-06-02 ENCOUNTER — APPOINTMENT (OUTPATIENT)
Dept: ORTHOPEDIC SURGERY | Facility: CLINIC | Age: 77
End: 2022-06-02
Payer: MEDICARE

## 2022-06-02 DIAGNOSIS — M47.812 SPONDYLOSIS W/OUT MYELOPATHY OR RADICULOPATHY, CERVICAL REGION: ICD-10-CM

## 2022-06-02 PROCEDURE — 99214 OFFICE O/P EST MOD 30 MIN: CPT

## 2022-06-02 NOTE — HISTORY OF PRESENT ILLNESS
[de-identified] : 77 year old RHD female presents complaining of bilateral arm pain, R>L. She states that she cannot lift her arms, especially the right arm. She notes pain past her elbows and occasionally in her fingers. She notes loss of  strength. She feels like her shoulders are weak. She denies neck pain. She does have stiffness. She received a cortisone injection into the right shoulder joint on 12/22/21 and reports it helped for a couple of months. Her pain has returned and is worse than before. The pain does wake her from sleep at night. Does not use Voltaren gel.\par Pmhx of DVT and PE and is on Coumadin. She is COVID boosted.\par \par Radiology Results taken on 12/22/2021:\par o Cervical Spine : A/P and lateral views were obtained and revealed diffuse degenerative disc disease worse at C4/C5 and C5/C6, reversal of the normal cervical lordosis, a slight listing to the right of the head.\par o Right Shoulder : AP internal/external rotation and outlet views were obtained and revealed moderate degenerative arthritis of the right shoulder.\par o Left Shoulder : AP internal/external rotation and outlet views were obtained and revealed moderate degenerative arthritis of the right shoulder and AC joint.

## 2022-06-02 NOTE — PHYSICAL EXAM
[de-identified] : Constitutional\par o Appearance : well-nourished, well developed, alert, in no acute distress \par Head and Face\par o Head :\par ¦ Inspection : atraumatic, normocephalic\par o Face :\par ¦ Inspection : no visible rash or discoloration\par Respiratory\par o Respiratory Effort: breathing unlabored \par Neurologic\par o Sensation : Normal sensation \par Psychiatric\par o Mood and Affect: mood normal, affect appropriate \par Lymphatic\par o Additional Nodes : No palpable lymph nodes present \par \par o Cervical Spine\par ¦ Inspection/Palpation : alignment midline, normal degree of lordosis present, left paracervical tenderness\par ¦ Range of Motion : extension causes discomfort R>L, limited rotation and sidebending but they do not reproduce her symptoms\par ¦ Skin : normal appearance, no masses or tenderness, trachea midline\par ¦ Tests: Negative Spurling’s test\par \par Right Upper Extremity\par o Right Shoulder :\par ¦ Inspection/Palpation : anterior and posterior capsular tenderness, AC joint tenderness, no swelling or deformities\par ¦ Range of Motion : forward flexion to about 100° at which point there is pain, external rotation of about 35°, internal rotation to just above the PSIS, no crepitance, no pain with cross chest maneuvers, abduction causes pain\par ¦ Strength : forward elevation 4+/5, IR/ER at the side 5/5, external rotation at 90° of abduction 4+/5, supraspinatus 4+/5, adduction and abduction 4+/5, biceps/triceps 5/5, strength limited by pain\par ¦ Stability : no joint instability on provocative testing \par Tests: Schultz positive, Neer test positive, Cyndy test positive, drop arm test negative, Bedford's test positive \par \par Left Upper Extremity\par o Left Shoulder :\par ¦ Inspection/Palpation : mild anterior capsular tenderness, no swelling or deformities\par ¦ Range of Motion : FROM, pain with full forward flexion and at the extremes of rotation, no crepitance\par ¦ Strength : forward elevation 5/5, internal rotation 5/5, external rotation 5/5, external rotation at 90° of abduction 5/5, supraspinatus 5/5, adduction and abduction 5/5, biceps/triceps 5/5, strength slightly limited by pain\par ¦ Stability : no joint instability on provocative testing\par  Tests: Schultz positive, Neer test positive, Cyndy test positive, drop arm test negative\par \par Gait and Station:\par Gait: gait normal, no significant extremity swelling or lymphedema, good proprioception and balance

## 2022-06-02 NOTE — DISCUSSION/SUMMARY
[de-identified] : I went over the pathophysiology of the patient's symptoms in great detail with the patient and her . We discussed the use of Voltaren gel to relieve pain. A Pennsaid sample was provided. We are requesting authorization for a CT arthrogram  of the patient's right shoulder. A prescription was provided. Patient will follow-up to review the results. All of their questions were answered. They understand and consent to the plan.\par \par FU to review her right shoulder CT arthrogram.

## 2022-06-03 ENCOUNTER — LABORATORY RESULT (OUTPATIENT)
Age: 77
End: 2022-06-03

## 2022-06-03 ENCOUNTER — APPOINTMENT (OUTPATIENT)
Dept: CARDIOLOGY | Facility: CLINIC | Age: 77
End: 2022-06-03
Payer: MEDICARE

## 2022-06-03 VITALS
RESPIRATION RATE: 16 BRPM | DIASTOLIC BLOOD PRESSURE: 70 MMHG | HEART RATE: 71 BPM | SYSTOLIC BLOOD PRESSURE: 118 MMHG | TEMPERATURE: 98 F | WEIGHT: 137 LBS | BODY MASS INDEX: 27.62 KG/M2 | OXYGEN SATURATION: 97 % | HEIGHT: 59 IN

## 2022-06-03 PROCEDURE — 93000 ELECTROCARDIOGRAM COMPLETE: CPT

## 2022-06-03 PROCEDURE — 99214 OFFICE O/P EST MOD 30 MIN: CPT | Mod: 25

## 2022-06-03 RX ORDER — AZITHROMYCIN 500 MG/1
500 TABLET, FILM COATED ORAL
Qty: 2 | Refills: 0 | Status: DISCONTINUED | COMMUNITY
Start: 2022-04-12

## 2022-06-03 NOTE — CARDIOLOGY SUMMARY
[de-identified] : 6/3/22 [de-identified] : NUC: 7/6/2021 [de-identified] : 6/1/2021 [de-identified] : CATH: 8/6/2021

## 2022-06-03 NOTE — REASON FOR VISIT
[CV Risk Factors and Non-Cardiac Disease] : CV risk factors and non-cardiac disease [Hyperlipidemia] : hyperlipidemia [Hypertension] : hypertension [Coronary Artery Disease] : coronary artery disease [Other: ____] : [unfilled] [FreeTextEntry1] : This is a 77 year year old female here today for follow up cardiac evaluation. \par She has a past medical history significant for mild coronary artery disease (30% lesion in the left anterior descending artery cardiac catheterization August 6, 2021), for recurrent blood clots in the lower extremities, and pulmonary emboli on lifelong warfarin therapy, status post left knee replacement, status post tonsillectomy, status post wrist and thumb surgery, status post permanent pacemaker for tachybradycardia syndrome.\par \par CHIEF COMPLAINT:\par Today she is feeling generally well and does not have any complaints at this time. She remains on Atorvastatin 40mg PO DAILY, Zetia 10mg  PO Daily, Verapamil 120mg PO DAILY and Warfarin 5mg PO DAILY. \par \par She denies fever, chills, weight loss, malaise, rash, alteration bowel habits, weakness, abdominal  pain, bloating, changes in urination, visual disturbances, chest pain, headaches, dizziness, heart palpitations, recent episodes of syncope or falls at this time.\par \par

## 2022-06-03 NOTE — DISCUSSION/SUMMARY
[FreeTextEntry1] : Dr. Lomeli-(PRIOR VISIT and PMH WITH Dr. Lomeli): \par This is a 77-year-old female with past medical history significant mild coronary artery disease (30% lesion in the left anterior descending artery cardiac catheterization August 6, 2021), for recurrent blood clots in the lower extremities, and pulmonary emboli on lifelong warfarin therapy, status post left knee replacement, status post tonsillectomy, status post wrist and thumb surgery, status post permanent pacemaker for tachybradycardia syndrome, who was recently found to have a new heart murmur and comes in for cardiac follow up. \par \par The patient been complaining of atypical left-sided chest wall pain reproducible to touch usually occurring at rest.  She does carry a pocketbook on that side.  She denies shortness of breath, dizziness, palpitations, or syncope.\par \par Electrocardiogram done March 1, 2022 demonstrate normal sinus rhythm rate of 62 bpm is otherwise remarkable for poor R wave progression, left axis deviation.\par \par This pain is likely musculoskeletal in nature.  She is going to see her primary care physician later this week and will have blood work done for lipid profile.\par \par She is currently hemodynamically stable and asymptomatic from a cardiac standpoint.\par Given the presence of coronary artery disease, her LDL target is less than 70 mg/dL.\par She will use a heating pad for 20 minutes twice a day, Advil and Motrin as needed.\par \par Cardiac catheterization done August 6, 2021 which demonstrated a 30% proximal lesion left anterior descending artery, luminal irregularities in the first diagonal branch, right PDA and right coronary artery.\par \par Medical management is recommended with more aggressive reduction her LDL cholesterol to less than 70 mg/dL. The patient had blood work done July 28, 2021 which demonstrated a direct LDL cholesterol of 81 mg/dL, cholesterol 161, HDL of 52, triglycerides 178, and non-HDL cholesterol 109 mg/dL.\par In order to achieve target LDL, she will increase her Lipitor to 40 mg/day.  She understands she must have follow-up blood work in 6 to 8 weeks.\par \par Electrocardiogram done July 28, 2021 demonstrates normal sinus rhythm at a rate of 70 bpm is otherwise remarkable for left axis deviation, left anterior hemiblock, and left ventricular hypertrophy.\par \par PMH:\par Nuclear stress test done July 6, 2021 which demonstrated small to moderate fixed defect in the inferolateral wall inferior apical wall consistent with infarct, and hypokinesis involving the distal inferolateral wall and inferior apical walls.  The patient developed 3 out of 10 chest tightness at 4 minutes and 45 seconds of exercise that resolved after 5 minutes of the recovery period.  The patient also has abnormal resting electrocardiogram with ST–T wave changes consistent coronary artery disease.\par \par She did have one episode of chest pain the day after she received her second COVID-19 vaccine in April 2021.\par \par Electrocardiogram done May 18, 2021 demonstrated normal sinus rhythm rate 70 bpm otherwise remarkable for left axis deviation, and possible Q waves in V1 and V2.\par The Q waves in leads V1 and V2 represent a change compared to the electrocardiogram from her primary care physician.\par \par The patient understands that aerobic exercises must be increased to 40 minutes 4 times per week. A detailed discussion of lifestyle modification was done today. The patient has a good understanding of the diagnosis, and treatment plan. Lifestyle modification was also outlined.

## 2022-06-03 NOTE — ASSESSMENT
[FreeTextEntry1] : This is a 77 year year old female here today for follow up cardiac evaluation. \par She has a past medical history significant for mild coronary artery disease (30% lesion in the left anterior descending artery cardiac catheterization August 6, 2021), for recurrent blood clots in the lower extremities, and pulmonary emboli on lifelong warfarin therapy, status post left knee replacement, status post tonsillectomy, status post wrist and thumb surgery, status post permanent pacemaker for tachybradycardia syndrome.\par \par CHIEF COMPLAINT:\par Today she is feeling generally well and does not have any complaints at this time. She remains on Atorvastatin 40mg PO DAILY, Zetia 10mg  PO Daily, Verapamil 120mg PO DAILY and Warfarin 5mg PO DAILY. \par \par -She has no history of rheumatic fever.  She may drink 4 cups of coffee per day. Her cardiac risk factors include elevated cholesterol\par \par BLOOD PRESSURE:\par -BP is well controlled in today's visit.\par -She remains on Verapamil 120mg PO DAILY for management of her tachybrady syndrome and she does have a pace maker \par \par BLOOD WORK:\par -New blood work was done today, 06/03/2022  to evaluate lipid profile, CBC, BMP, hepatic function, A1C and TSH. \par -Blood work was done 9/7/2021 which demonstrated LDL of 83. She is on Atorvastatin 20mg PO DAILY. \par \par CHOLESTEROL CONTROL:\par -Patient will continue the advised  TLC diet and to continue follow-up for treatment of hyperlipidemia and repeat blood testing with diet and exercise. I have discussed different exercises and the importance of maintenance of optimal body weight. The importance of staying within guidelines and recommendations was stressed to the patient today and they acknowledged that they understand this to me verbally.\par \par  -Ms. BIANCHI was educated and advised that failure to follow-up with my medical recommendations to lower cholesterol can result in severe health consequences therefore, they will continuing a low saturated and low fat diet and to avoid excessive carbohydrates to help reduce triglycerides and that lowering LDL levels is associated with a significant decrease in serious cardiac events including but not limited to heart attack stroke and overall death. We will continue lipid lowering agents as advised based on blood test results and the patient understands to call if they develop severe muscle discomfort or if they have a reddish tinted discoloration in their urine.\par \par TESTING/REPORTS:\par -EKG done 3/1/22  which demonstrated regular sinus rhythm with nonspecific ST-T wave changes, BPM of 62.\par \par -EKG done Oct 25, 2021 which demonstrated regular sinus rhythm with nonspecific ST-T wave changes, BPM of 67 with occasional PAC. \par \par -Electrocardiogram done July 28, 2021 demonstrates normal sinus rhythm at a rate of 70 bpm is otherwise remarkable for left axis deviation, left anterior hemiblock, and left ventricular hypertrophy.\par \par -Electrocardiogram done May 18, 2021 demonstrated normal sinus rhythm rate 70 bpm otherwise remarkable for left axis deviation, and possible Q waves in V1 and V2. The Q waves in leads V1 and V2 represent a change compared to the electrocardiogram from her primary care physician. The patient denies any history of myocardial infarction is unclear why she has Q waves in the anterior septal region.\par \par -She had a nuclear stress test done July 6, 2021 which demonstrated small to moderate fixed defect in the inferolateral wall inferior apical wall consistent with infarct, and hypokinesis involving the distal inferolateral wall and inferior apical walls.  The patient developed 3 out of 10 chest tightness at 4 minutes and 45 seconds of exercise that resolved after 5 minutes of the recovery period.  \par \par -The patient also has abnormal resting electrocardiogram with ST–T wave changes consistent coronary artery disease.\par \par -The patient had a cardiac catheterization done August 6, 2021 which demonstrated a 30% proximal lesion left anterior descending artery, luminal irregularities in the first diagonal branch, right PDA and right coronary artery.\par \par PLAN:\par -She will continue with her usual medications and will contact the office if she is having any complaints between now and their next follow up appointment.\par -The patient will schedule an Echo Doppler examination to evaluate murmur, left ventricular function, chamber size, and rule out hypertrophy. \par \par I have discussed the plan of care with Ms. JOHNSON BIANCHI  and she  will follow up in 3 months. She is compliant with all of her medications.\par \par The patient understands that aerobic exercises must be increased to minutes 4 times/week and a detailed discussion of lifestyle modification was done today. \par The patient has a good understanding of the diagnosis, treatment plan and lifestyle modification. \par She will contact me at the office for any questions with their care or any changes in their health status.\par \par The plan of care was discussed with the patient with supervision physician Dr. Bolivar Lomeli and will be carried out as noted above.\par \par Nieves VARGAS

## 2022-06-23 ENCOUNTER — RESULT REVIEW (OUTPATIENT)
Age: 77
End: 2022-06-23

## 2022-06-23 ENCOUNTER — APPOINTMENT (OUTPATIENT)
Dept: RADIOLOGY | Facility: CLINIC | Age: 77
End: 2022-06-23
Payer: MEDICARE

## 2022-06-23 ENCOUNTER — APPOINTMENT (OUTPATIENT)
Dept: CT IMAGING | Facility: CLINIC | Age: 77
End: 2022-06-23
Payer: MEDICARE

## 2022-06-23 ENCOUNTER — OUTPATIENT (OUTPATIENT)
Dept: OUTPATIENT SERVICES | Facility: HOSPITAL | Age: 77
LOS: 1 days | End: 2022-06-23
Payer: MEDICARE

## 2022-06-23 DIAGNOSIS — Z96.659 PRESENCE OF UNSPECIFIED ARTIFICIAL KNEE JOINT: Chronic | ICD-10-CM

## 2022-06-23 DIAGNOSIS — Z98.890 OTHER SPECIFIED POSTPROCEDURAL STATES: Chronic | ICD-10-CM

## 2022-06-23 DIAGNOSIS — Z95.0 PRESENCE OF CARDIAC PACEMAKER: Chronic | ICD-10-CM

## 2022-06-23 DIAGNOSIS — M19.011 PRIMARY OSTEOARTHRITIS, RIGHT SHOULDER: ICD-10-CM

## 2022-06-23 DIAGNOSIS — Z98.51 TUBAL LIGATION STATUS: Chronic | ICD-10-CM

## 2022-06-23 DIAGNOSIS — Z98.89 OTHER SPECIFIED POSTPROCEDURAL STATES: Chronic | ICD-10-CM

## 2022-06-23 DIAGNOSIS — Z00.8 ENCOUNTER FOR OTHER GENERAL EXAMINATION: ICD-10-CM

## 2022-06-23 PROCEDURE — 77002 NEEDLE LOCALIZATION BY XRAY: CPT | Mod: 26

## 2022-06-23 PROCEDURE — 73201 CT UPPER EXTREMITY W/DYE: CPT

## 2022-06-23 PROCEDURE — 73201 CT UPPER EXTREMITY W/DYE: CPT | Mod: 26,RT

## 2022-06-23 PROCEDURE — 77002 NEEDLE LOCALIZATION BY XRAY: CPT

## 2022-06-23 PROCEDURE — 23350 INJECTION FOR SHOULDER X-RAY: CPT

## 2022-06-23 PROCEDURE — 23350 INJECTION FOR SHOULDER X-RAY: CPT | Mod: RT

## 2022-06-27 ENCOUNTER — NON-APPOINTMENT (OUTPATIENT)
Age: 77
End: 2022-06-27

## 2022-07-05 ENCOUNTER — APPOINTMENT (OUTPATIENT)
Dept: ELECTROPHYSIOLOGY | Facility: CLINIC | Age: 77
End: 2022-07-05

## 2022-07-05 ENCOUNTER — NON-APPOINTMENT (OUTPATIENT)
Age: 77
End: 2022-07-05

## 2022-07-05 PROCEDURE — 93296 REM INTERROG EVL PM/IDS: CPT

## 2022-07-05 PROCEDURE — 93294 REM INTERROG EVL PM/LDLS PM: CPT

## 2022-07-07 ENCOUNTER — APPOINTMENT (OUTPATIENT)
Dept: ORTHOPEDIC SURGERY | Facility: CLINIC | Age: 77
End: 2022-07-07

## 2022-07-07 DIAGNOSIS — M75.41 IMPINGEMENT SYNDROME OF RIGHT SHOULDER: ICD-10-CM

## 2022-07-07 PROCEDURE — 99214 OFFICE O/P EST MOD 30 MIN: CPT

## 2022-07-07 NOTE — DISCUSSION/SUMMARY
[de-identified] : I went over the pathophysiology of the patient's symptoms in great detail with the patient and her . I went over the patient's CT arthrogram with them in great detail and used models to aid in my explanation. I informed the patient that surgery, a right shoulder arthroscopy with a biceps release, will be required to provide them long term relief from their symptoms. She understands that a biceps release causes no loss of function, but can cause a "Tato" deformity. Patient understands that she needs to consider surgical intervention given conservative measures are not providing enough relief and due to the results of the MRI. We had a lengthy discussion about the patient's issues, and talked about the benefits and risks of the procedure. The patient understands the most common risks include infection, allergy to the anesthetic and stiffness of the shoulder. The risks are accepted. The patient was given no assurances that all the symptoms will be alleviated. She should begin to obtain medical clearance including a COVID-19 test. She should ask her doctor about being switched to Eliquis instead of Coumadin for her DVT and PEs. She will also be bridged with Lovenox while she is off of Coumadin for the surgery. All of their questions were answered. They understand and consent to the plan. \par \par FU 1 week after a right shoulder arthroscopy.

## 2022-07-07 NOTE — ADDENDUM
[FreeTextEntry1] : I, Thomas Hoang, acted solely as a scribe for Dr. Roland Kidd on this date 07/07/2022.\par All medical record entries made by the Scribe were at my, Dr. Roland Kidd, direction and personally dictated by me on 07/07/2022. I have reviewed the chart and agree that the record accurately reflects my personal performance of the history, physical exam, assessment and plan. I have also personally directed, reviewed, and agreed with the chart.

## 2022-07-07 NOTE — HISTORY OF PRESENT ILLNESS
[de-identified] : 77 year old RHD female presents with bilateral arm pain, R>L. She states that she cannot lift her arms, especially the right arm. She notes pain past her elbows and occasionally in her fingers. She notes loss of  strength. She feels like her shoulders are weak. She denies neck pain. She received a cortisone injection into the right shoulder joint on 12/22/21 and reports it helped for a couple of months. Her pain has returned and is worse than before. She complains of severe pain worse abut the biceps. It does wake her from sleep at night. She presents today to review her CT arthrogram results. \par Pmhx significant for DVT and PE and is on Coumadin. She is COVID boosted.\par \par CT Arthrogram Right Shoulder obtained at [facility] on 6/23/2022:\par 1.  Moderate/severe glenohumeral joint osteoarthritis with joint body seen within the superior subscapular recess.\par 2.  Intact rotator cuff without high-grade tear.\par 3.  Moderate intra-articular long head biceps tendinosis.\par 4.  Mild/moderate acromioclavicular arthrosis.\par \par Radiology Results 12/22/2021:\par o Cervical Spine : A/P and lateral views were obtained and revealed diffuse degenerative disc disease worse at C4/C5 and C5/C6, reversal of the normal cervical lordosis, a slight listing to the right of the head.\par o Right Shoulder : AP internal/external rotation and outlet views were obtained and revealed moderate degenerative arthritis of the right shoulder right side worse than left.\par o Left Shoulder : AP internal/external rotation and outlet views were obtained and revealed moderate degenerative arthritis of the right shoulder and AC joint.

## 2022-07-07 NOTE — PHYSICAL EXAM
[de-identified] : Constitutional\par o Appearance : well-nourished, well developed, alert, in no acute distress \par Head and Face\par o Head :\par ¦ Inspection : atraumatic, normocephalic\par o Face :\par ¦ Inspection : no visible rash or discoloration\par Respiratory\par o Respiratory Effort: breathing unlabored \par Neurologic\par o Sensation : Normal sensation \par Psychiatric\par o Mood and Affect: mood normal, affect appropriate \par Lymphatic\par o Additional Nodes : No palpable lymph nodes present \par \par o Cervical Spine\par ¦ Inspection/Palpation : alignment midline, normal degree of lordosis present, left paracervical tenderness\par ¦ Range of Motion : extension causes discomfort R>L, limited rotation and sidebending but they do not reproduce her symptoms\par ¦ Skin : normal appearance, no masses or tenderness, trachea midline\par ¦ Tests: Negative Spurling’s test\par \par Right Upper Extremity\par o Right Shoulder :\par ¦ Inspection/Palpation : anterior capsular tenderness, no swelling or deformities\par ¦ Range of Motion : pain with forward flexion past 120°, external rotation of about 35°, internal rotation to just above the PSIS, no crepitance, no pain with cross chest maneuvers, abduction causes pain\par ¦ Strength : forward elevation 4+/5, IR/ER at the side 5/5, external rotation at 90° of abduction 4+/5, supraspinatus 4+/5, adduction and abduction 4+/5, biceps/triceps 5/5, strength limited by pain\par ¦ Stability : no joint instability on provocative testing \par Tests: Schultz positive, Neer test positive, Cyndy test positive, drop arm test negative, [place] test positive \par \par Left Upper Extremity\par o Left Shoulder :\par ¦ Inspection/Palpation : mild anterior capsular tenderness, no swelling or deformities\par ¦ Range of Motion : FROM, pain with full forward flexion and at the extremes of rotation, no crepitance\par ¦ Strength : forward elevation 5/5, internal rotation 5/5, external rotation 5/5, external rotation at 90° of abduction 5/5, supraspinatus 5/5, adduction and abduction 5/5, biceps/triceps 5/5, strength slightly limited by pain\par ¦ Stability : no joint instability on provocative testing\par ¦ Tests: Schultz positive, Neer test positive, Cyndy test positive, drop arm test negative\par \par Gait and Station:\par Gait: gait normal, no significant extremity swelling or lymphedema, good proprioception and balance

## 2022-07-19 PROBLEM — D68.52 HETEROZYGOUS FOR PROTHROMBIN G20210A MUTATION: Status: ACTIVE | Noted: 2018-10-26

## 2022-07-20 ENCOUNTER — OUTPATIENT (OUTPATIENT)
Dept: OUTPATIENT SERVICES | Facility: HOSPITAL | Age: 77
LOS: 1 days | End: 2022-07-20
Payer: MEDICARE

## 2022-07-20 VITALS
DIASTOLIC BLOOD PRESSURE: 91 MMHG | RESPIRATION RATE: 14 BRPM | HEART RATE: 68 BPM | SYSTOLIC BLOOD PRESSURE: 161 MMHG | OXYGEN SATURATION: 98 % | TEMPERATURE: 97 F | WEIGHT: 141.98 LBS | HEIGHT: 57.5 IN

## 2022-07-20 DIAGNOSIS — Z98.89 OTHER SPECIFIED POSTPROCEDURAL STATES: Chronic | ICD-10-CM

## 2022-07-20 DIAGNOSIS — M75.21 BICIPITAL TENDINITIS, RIGHT SHOULDER: ICD-10-CM

## 2022-07-20 DIAGNOSIS — Z96.659 PRESENCE OF UNSPECIFIED ARTIFICIAL KNEE JOINT: Chronic | ICD-10-CM

## 2022-07-20 DIAGNOSIS — Z98.890 OTHER SPECIFIED POSTPROCEDURAL STATES: Chronic | ICD-10-CM

## 2022-07-20 DIAGNOSIS — M25.511 PAIN IN RIGHT SHOULDER: ICD-10-CM

## 2022-07-20 DIAGNOSIS — Z95.0 PRESENCE OF CARDIAC PACEMAKER: Chronic | ICD-10-CM

## 2022-07-20 DIAGNOSIS — Z98.1 ARTHRODESIS STATUS: Chronic | ICD-10-CM

## 2022-07-20 DIAGNOSIS — Z98.51 TUBAL LIGATION STATUS: Chronic | ICD-10-CM

## 2022-07-20 DIAGNOSIS — Z96.652 PRESENCE OF LEFT ARTIFICIAL KNEE JOINT: Chronic | ICD-10-CM

## 2022-07-20 LAB
ANION GAP SERPL CALC-SCNC: 10 MMOL/L — SIGNIFICANT CHANGE UP (ref 5–17)
APTT BLD: 43.5 SEC — HIGH (ref 27.5–35.5)
BUN SERPL-MCNC: 13 MG/DL — SIGNIFICANT CHANGE UP (ref 7–23)
CALCIUM SERPL-MCNC: 9.4 MG/DL — SIGNIFICANT CHANGE UP (ref 8.4–10.5)
CHLORIDE SERPL-SCNC: 108 MMOL/L — SIGNIFICANT CHANGE UP (ref 96–108)
CO2 SERPL-SCNC: 26 MMOL/L — SIGNIFICANT CHANGE UP (ref 22–31)
CREAT SERPL-MCNC: 0.92 MG/DL — SIGNIFICANT CHANGE UP (ref 0.5–1.3)
EGFR: 64 ML/MIN/1.73M2 — SIGNIFICANT CHANGE UP
GLUCOSE SERPL-MCNC: 94 MG/DL — SIGNIFICANT CHANGE UP (ref 70–99)
HCT VFR BLD CALC: 39.8 % — SIGNIFICANT CHANGE UP (ref 34.5–45)
HGB BLD-MCNC: 13 G/DL — SIGNIFICANT CHANGE UP (ref 11.5–15.5)
INR BLD: 2.89 RATIO — HIGH (ref 0.88–1.16)
MCHC RBC-ENTMCNC: 29.3 PG — SIGNIFICANT CHANGE UP (ref 27–34)
MCHC RBC-ENTMCNC: 32.7 GM/DL — SIGNIFICANT CHANGE UP (ref 32–36)
MCV RBC AUTO: 89.6 FL — SIGNIFICANT CHANGE UP (ref 80–100)
NRBC # BLD: 0 /100 WBCS — SIGNIFICANT CHANGE UP (ref 0–0)
PLATELET # BLD AUTO: 170 K/UL — SIGNIFICANT CHANGE UP (ref 150–400)
POTASSIUM SERPL-MCNC: 4.1 MMOL/L — SIGNIFICANT CHANGE UP (ref 3.5–5.3)
POTASSIUM SERPL-SCNC: 4.1 MMOL/L — SIGNIFICANT CHANGE UP (ref 3.5–5.3)
PROTHROM AB SERPL-ACNC: 33.6 SEC — HIGH (ref 10.5–13.4)
RBC # BLD: 4.44 M/UL — SIGNIFICANT CHANGE UP (ref 3.8–5.2)
RBC # FLD: 13.7 % — SIGNIFICANT CHANGE UP (ref 10.3–14.5)
SODIUM SERPL-SCNC: 144 MMOL/L — SIGNIFICANT CHANGE UP (ref 135–145)
WBC # BLD: 8.45 K/UL — SIGNIFICANT CHANGE UP (ref 3.8–10.5)
WBC # FLD AUTO: 8.45 K/UL — SIGNIFICANT CHANGE UP (ref 3.8–10.5)

## 2022-07-20 PROCEDURE — G0463: CPT

## 2022-07-20 PROCEDURE — 85730 THROMBOPLASTIN TIME PARTIAL: CPT

## 2022-07-20 PROCEDURE — 36415 COLL VENOUS BLD VENIPUNCTURE: CPT

## 2022-07-20 PROCEDURE — 85610 PROTHROMBIN TIME: CPT

## 2022-07-20 PROCEDURE — 85027 COMPLETE CBC AUTOMATED: CPT

## 2022-07-20 PROCEDURE — 80048 BASIC METABOLIC PNL TOTAL CA: CPT

## 2022-07-20 RX ORDER — EZETIMIBE 10 MG/1
1 TABLET ORAL
Qty: 0 | Refills: 0 | DISCHARGE

## 2022-07-20 RX ORDER — WARFARIN SODIUM 2.5 MG/1
1 TABLET ORAL
Qty: 0 | Refills: 0 | DISCHARGE

## 2022-07-20 RX ORDER — ATORVASTATIN CALCIUM 80 MG/1
1 TABLET, FILM COATED ORAL
Qty: 0 | Refills: 0 | DISCHARGE

## 2022-07-20 NOTE — H&P PST ADULT - PROBLEM SELECTOR PLAN 1
EUA, operative arthroscopy right shoulder. medical and cardiac clearances requested. Instructed to check with cardiology concerning holding warfarin. patient states that she was placed on lovenox as a bridge prior to the knee replacement. instructed to take verapamil and omeprazole AM of surgery with sips of water. obtain stress, echo, EKG and cardiac cath reports. surgical wash instructions reviewed and verbalized understanding. covid PCR appt. confirmed for 8/7/22 at 1330.

## 2022-07-20 NOTE — H&P PST ADULT - NSICDXFAMILYHX_GEN_ALL_CORE_FT
FAMILY HISTORY:  Family history of prothrombin gene mutation, first cousin    Father  Still living? No  Family history of heart disease, Age at diagnosis: Age Unknown    Sibling  Still living? Yes, Estimated age: 71-80  Family history of macular degeneration, Age at diagnosis: Age Unknown    Child  Still living? Yes, Estimated age: 41-50  Family history of arrhythmia, Age at diagnosis: Age Unknown  Family history of CHF (congestive heart failure), Age at diagnosis: Age Unknown  Family history of renal failure, Age at diagnosis: Age Unknown

## 2022-07-20 NOTE — H&P PST ADULT - HISTORY OF PRESENT ILLNESS
78 yo female presents with 1 year history of right shoulder pain. She denies injury. Had PT and 1 cortisone injection with no change in symptoms. She also reports worsening ROM and strength. Now reports 2-3/10 pain at rest and increased to 8-9/10 with certain movements and when lifting the arm. Pain is unrelieved with tylenol, ice or heat.

## 2022-07-20 NOTE — H&P PST ADULT - NSICDXPASTSURGICALHX_GEN_ALL_CORE_FT
PAST SURGICAL HISTORY:  Artificial pacemaker MICRA    History of carpal tunnel surgery of right wrist 1990's    History of left knee replacement 2019    History of lumbar spinal fusion 2018, L5-S1    S/P arthroscopy of right knee 2012    S/P Breast Lumpectomy 2000's, 2004 all benign    S/P tubal ligation age 30     PAST SURGICAL HISTORY:  Artificial pacemaker medtronic [id number], MICRA  TCP    History of carpal tunnel surgery of right wrist 1990's    History of left knee replacement 2019    History of lumbar spinal fusion 2018, L5-S1    S/P arthroscopy of right knee 2012    S/P Breast Lumpectomy 2000's, 2004 all benign    S/P tubal ligation age 30

## 2022-07-20 NOTE — H&P PST ADULT - NSICDXPASTMEDICALHX_GEN_ALL_CORE_FT
PAST MEDICAL HISTORY:  Bicipital tendinitis, right     BMI 30.0-30.9,adult     CAD (coronary artery disease) 30% blockage LAD    Cervical herniated disc x3, unsure levels    COVID-19 vaccine series completed     DVT (deep venous thrombosis), right october 2013    Early cataracts, bilateral     GERD (gastroesophageal reflux disease)     Heart attack 4/17/21, 1 day after second pfizer vaccine    Heart murmur     Herniated lumbar intervertebral disc     History of pulmonary embolus (PE) 2012, 2018 on warfarin, cause unknown    HTN - Hypertension     Hyperlipidemia     Leg swelling bilateral right >left    Lower back pain     Neck pain     Osteoarthritis     Osteoarthritis of left knee     Pacemaker implanted 9/28/18    Prothrombin gene mutation     Right shoulder pain     Tachy-artur syndrome      PAST MEDICAL HISTORY:  Bicipital tendinitis, right     BMI 30.0-30.9,adult     CAD (coronary artery disease) 30% blockage LAD    Cervical herniated disc x3, unsure levels    COVID-19 vaccine series completed     DVT (deep venous thrombosis), right october 2013    Early cataracts, bilateral     GERD (gastroesophageal reflux disease)     Heart attack 4/17/21, 1 day after second pfizer vaccine    Heart murmur     Herniated lumbar intervertebral disc     History of pulmonary embolus (PE) 2012, 2018 on warfarin, cause unknown    HTN - Hypertension     Hyperlipidemia     Leg swelling bilateral right >left    Lower back pain     Neck pain     Obesity, Class I, BMI 30-34.9     Osteoarthritis     Osteoarthritis of left knee     Pacemaker implanted 9/28/18    Prothrombin gene mutation     Right shoulder pain     Tachy-artur syndrome

## 2022-07-20 NOTE — H&P PST ADULT - MUSCULOSKELETAL
details… right shoulder/no joint swelling/no joint erythema/no joint warmth/no calf tenderness/decreased ROM/decreased ROM due to pain/extremities exam/decreased strength

## 2022-07-20 NOTE — H&P PST ADULT - NSANTHOSAYNRD_GEN_A_CORE
neck inches/No. DEENA screening performed.  STOP BANG Legend: 0-2 = LOW Risk; 3-4 = INTERMEDIATE Risk; 5-8 = HIGH Risk neck 13 inches/No. DEENA screening performed.  STOP BANG Legend: 0-2 = LOW Risk; 3-4 = INTERMEDIATE Risk; 5-8 = HIGH Risk

## 2022-07-20 NOTE — H&P PST ADULT - MEDICATION ADMINISTRATION INFO, PROFILE
Zyrtec:  Routing refill request to provider for review/approval because:  Appears to be a break in medication - should have been out around 12/9/17    Christiana Stevens, RN, BSN    
no concerns

## 2022-07-29 PROBLEM — M75.21 BICIPITAL TENDINITIS, RIGHT SHOULDER: Chronic | Status: ACTIVE | Noted: 2022-07-20

## 2022-07-29 PROBLEM — M79.89 OTHER SPECIFIED SOFT TISSUE DISORDERS: Chronic | Status: ACTIVE | Noted: 2022-07-20

## 2022-07-29 PROBLEM — M50.20 OTHER CERVICAL DISC DISPLACEMENT, UNSPECIFIED CERVICAL REGION: Chronic | Status: ACTIVE | Noted: 2022-07-20

## 2022-07-29 PROBLEM — M25.511 PAIN IN RIGHT SHOULDER: Chronic | Status: ACTIVE | Noted: 2022-07-20

## 2022-07-29 PROBLEM — Z86.711 PERSONAL HISTORY OF PULMONARY EMBOLISM: Chronic | Status: ACTIVE | Noted: 2019-01-09

## 2022-07-29 PROBLEM — I21.9 ACUTE MYOCARDIAL INFARCTION, UNSPECIFIED: Chronic | Status: ACTIVE | Noted: 2022-07-20

## 2022-07-29 PROBLEM — M19.90 UNSPECIFIED OSTEOARTHRITIS, UNSPECIFIED SITE: Chronic | Status: ACTIVE | Noted: 2022-07-20

## 2022-07-29 PROBLEM — E78.5 HYPERLIPIDEMIA, UNSPECIFIED: Chronic | Status: ACTIVE | Noted: 2022-07-20

## 2022-07-29 PROBLEM — H26.9 UNSPECIFIED CATARACT: Chronic | Status: ACTIVE | Noted: 2022-07-20

## 2022-07-29 PROBLEM — E66.9 OBESITY, UNSPECIFIED: Chronic | Status: ACTIVE | Noted: 2022-07-20

## 2022-07-29 PROBLEM — Z92.29 PERSONAL HISTORY OF OTHER DRUG THERAPY: Chronic | Status: ACTIVE | Noted: 2022-07-20

## 2022-07-29 PROBLEM — M54.2 CERVICALGIA: Chronic | Status: ACTIVE | Noted: 2022-07-20

## 2022-07-29 PROBLEM — R01.1 CARDIAC MURMUR, UNSPECIFIED: Chronic | Status: ACTIVE | Noted: 2022-07-20

## 2022-07-29 PROBLEM — I25.10 ATHEROSCLEROTIC HEART DISEASE OF NATIVE CORONARY ARTERY WITHOUT ANGINA PECTORIS: Chronic | Status: ACTIVE | Noted: 2022-07-20

## 2022-08-01 ENCOUNTER — NON-APPOINTMENT (OUTPATIENT)
Age: 77
End: 2022-08-01

## 2022-08-01 ENCOUNTER — APPOINTMENT (OUTPATIENT)
Dept: CARDIOLOGY | Facility: CLINIC | Age: 77
End: 2022-08-01

## 2022-08-01 VITALS
OXYGEN SATURATION: 98 % | SYSTOLIC BLOOD PRESSURE: 138 MMHG | HEART RATE: 55 BPM | TEMPERATURE: 97.7 F | RESPIRATION RATE: 14 BRPM | HEIGHT: 58 IN | WEIGHT: 138 LBS | DIASTOLIC BLOOD PRESSURE: 84 MMHG | BODY MASS INDEX: 28.97 KG/M2

## 2022-08-01 DIAGNOSIS — Z01.810 ENCOUNTER FOR PREPROCEDURAL CARDIOVASCULAR EXAMINATION: ICD-10-CM

## 2022-08-01 DIAGNOSIS — R07.89 OTHER CHEST PAIN: ICD-10-CM

## 2022-08-01 DIAGNOSIS — Z87.898 PERSONAL HISTORY OF OTHER SPECIFIED CONDITIONS: ICD-10-CM

## 2022-08-01 PROCEDURE — 93000 ELECTROCARDIOGRAM COMPLETE: CPT | Mod: NC

## 2022-08-01 PROCEDURE — 99215 OFFICE O/P EST HI 40 MIN: CPT | Mod: 25

## 2022-08-01 NOTE — ASSESSMENT
[FreeTextEntry1] : This is a 77 year year old female here today for follow up cardiac evaluation. \par She has a past medical history significant for mild coronary artery disease (30% lesion in the left anterior descending artery cardiac catheterization August 6, 2021), for recurrent blood clots in the lower extremities, and pulmonary emboli on lifelong warfarin therapy, status post left knee replacement, status post tonsillectomy, status post wrist and thumb surgery, status post permanent pacemaker for tachybradycardia syndrome.\par \par CHIEF COMPLAINT:\par Today she is feeling generally well and does not have any complaints at this time.  She is here today for cardiac clearance for right shoulder surgery to be done on August 9, 2022 by Dr. Kidd at [facility].  She states that she is having rotator cuff surgery.  She is feeling well today and does not have any chest pain or dyspnea on exertion.\par She remains on Atorvastatin 40mg PO DAILY, Zetia 10mg  PO Daily, Verapamil 120mg PO DAILY and Warfarin 5mg PO DAILY.  She admits that at times she forgets to take her cholesterol medications and that she did have a cup and a half of coffee this morning. \par \par -She has no history of rheumatic fever.  She may drink 4 cups of coffee per day. Her cardiac risk factors include elevated cholesterol\par \par BLOOD PRESSURE:\par -BP is borderline elevated in today's exam however this may be contributed due to her drinking some coffee this morning. Blood pressure has usually been more stable in the past.  She may also be in some pain from her shoulder.\par -She remains on Verapamil 120mg PO DAILY for management of her tachybrady syndrome and she does have a pace maker \par \par BLOOD WORK:\par -New blood work done [place] 3, 2022 demonstrated normal lipid profile with total cholesterol of 180 and LDL of 103.  I remind her to be compliant with her medications daily as prescribed.\par \par CHOLESTEROL CONTROL:\par -Patient will continue the advised  TLC diet and to continue follow-up for treatment of hyperlipidemia and repeat blood testing with diet and exercise. I have discussed different exercises and the importance of maintenance of optimal body weight. The importance of staying within guidelines and recommendations was stressed to the patient today and they acknowledged that they understand this to me verbally.\par \par  -Ms. BIANCHI was educated and advised that failure to follow-up with my medical recommendations to lower cholesterol can result in severe health consequences therefore, they will continuing a low saturated and low fat diet and to avoid excessive carbohydrates to help reduce triglycerides and that lowering LDL levels is associated with a significant decrease in serious cardiac events including but not limited to heart attack stroke and overall death. We will continue lipid lowering agents as advised based on blood test results and the patient understands to call if they develop severe muscle discomfort or if they have a reddish tinted discoloration in their urine.\par \par TESTING/REPORTS:\par -EKG done today 08/01/2022 which demonstrated regular sinus rhythm with nonspecific ST-T wave changes BPM of 55.\par \par -EKG done 3/1/22  which demonstrated regular sinus rhythm with nonspecific ST-T wave changes, BPM of 62.\par \par -EKG done Oct 25, 2021 which demonstrated regular sinus rhythm with nonspecific ST-T wave changes, BPM of 67 with occasional PAC. \par \par -Electrocardiogram done July 28, 2021 demonstrates normal sinus rhythm at a rate of 70 bpm is otherwise remarkable for left axis deviation, left anterior hemiblock, and left ventricular hypertrophy.\par \par -Electrocardiogram done May 18, 2021 demonstrated normal sinus rhythm rate 70 bpm otherwise remarkable for left axis deviation, and possible Q waves in V1 and V2. The Q waves in leads V1 and V2 represent a change compared to the electrocardiogram from her primary care physician. The patient denies any history of myocardial infarction is unclear why she has Q waves in the anterior septal region.\par \par -She had a nuclear stress test done July 6, 2021 which demonstrated small to moderate fixed defect in the inferolateral wall inferior apical wall consistent with infarct, and hypokinesis involving the distal inferolateral wall and inferior apical walls.  The patient developed 3 out of 10 chest tightness at 4 minutes and 45 seconds of exercise that resolved after 5 minutes of the recovery period.  \par \par -The patient also has abnormal resting electrocardiogram with ST–T wave changes consistent coronary artery disease.\par \par -The patient had a cardiac catheterization done August 6, 2021 which demonstrated a 30% proximal lesion left anterior descending artery, luminal irregularities in the first diagonal branch, right PDA and right coronary artery.\par \par PLAN:\par The patient is clear from a cardiac standpoint. Please avoid overhydration. The patient should be allowed to take their usual medications in the perioperative period. Maintain proper DVT prophylaxis. The patient should have an incentive spirometer in the perioperative period.  She is currently on warfarin for management of history for pulmonary embolism and she was told that she should stop this 3 full days prior to her surgery date which would be on this Friday August 5, 2022.\par -The patient will schedule an Echo Doppler examination to evaluate murmur, left ventricular function, chamber size, and rule out hypertrophy during her next follow-up appointment.\par -She will continue with her usual medications and will contact the office if she is having any complaints between now and their next follow up appointment.\par \par I have discussed the plan of care with Ms. JOHNSON BIANCHI  and she  will follow up in 3 months. She is compliant with all of her medications.\par \par The patient understands that aerobic exercises must be increased to minutes 4 times/week and a detailed discussion of lifestyle modification was done today. \par The patient has a good understanding of the diagnosis, treatment plan and lifestyle modification. \par She will contact me at the office for any questions with their care or any changes in their health status.\par \par The plan of care was discussed with the patient with supervision physician Dr. Bolivar Lomeli and will be carried out as noted above.\par \par Nieves VARGAS

## 2022-08-01 NOTE — REASON FOR VISIT
[CV Risk Factors and Non-Cardiac Disease] : CV risk factors and non-cardiac disease [Hyperlipidemia] : hyperlipidemia [Hypertension] : hypertension [Coronary Artery Disease] : coronary artery disease [Other: ____] : [unfilled] [FreeTextEntry1] : This is a 77 year year old female here today for follow up cardiac evaluation. \par She has a past medical history significant for mild coronary artery disease (30% lesion in the left anterior descending artery cardiac catheterization August 6, 2021), for recurrent blood clots in the lower extremities, and pulmonary emboli on lifelong warfarin therapy, status post left knee replacement, status post tonsillectomy, status post wrist and thumb surgery, status post permanent pacemaker for tachybradycardia syndrome.\par \par CHIEF COMPLAINT:\par Today she is feeling generally well and does not have any complaints at this time.  She is here today for cardiac clearance for right shoulder surgery to be done on August 9, 2022 by Dr. Kidd at [facility].  She states that she is having rotator cuff surgery.  She is feeling well today and does not have any chest pain or dyspnea on exertion.\par She remains on Atorvastatin 40mg PO DAILY, Zetia 10mg  PO Daily, Verapamil 120mg PO DAILY and Warfarin 5mg PO DAILY.  She admits that at times she forgets to take her cholesterol medications and that she did have a cup and a half of coffee this morning. \par \par She denies fever, chills, weight loss, malaise, rash, alteration bowel habits, weakness, abdominal  pain, bloating, changes in urination, visual disturbances, chest pain, headaches, dizziness, heart palpitations, recent episodes of syncope or falls at this time.\par \par

## 2022-08-01 NOTE — CARDIOLOGY SUMMARY
[de-identified] : 6/3/22 [de-identified] : NUC: 7/6/2021 [de-identified] : 6/1/2021 [de-identified] : CATH: 8/6/2021

## 2022-08-05 RX ORDER — ENOXAPARIN SODIUM 100 MG/ML
30 INJECTION SUBCUTANEOUS
Qty: 0 | Refills: 0 | DISCHARGE
Start: 2022-08-05 | End: 2022-08-06

## 2022-08-05 NOTE — ED ADULT NURSE NOTE - NS ED NURSE LEVEL OF CONSCIOUSNESS SPEECH
You were seen in the emergency department for left pelvic pain and back pain. Your imaging results show left hemorrhagic cyst.     Please follow-up with OB/GYN (contact information below).    If you have fever, chills, nausea, vomiting, new or worsening pain, or if you have any new symptoms return to the Emergency Department.    An ovarian cyst is a fluid-filled sac that grows in or on an ovary. You have 2 ovaries, 1 on each side of your uterus. They are small, about the shape of an almond. Ovarian cysts are common in women who have regular monthly cycles. During your monthly cycle, eggs are released from the ovaries. The cyst usually contains fluid but may sometimes have blood or tissue in it. Most ovarian cysts are harmless and go away without treatment in a few months. Some cysts can grow large, cause pain, or break open.         You can use 500-1000mg Tylenol every 6 hours for pain - as needed.  This is an over-the-counter medications - please respect the warnings on the label. This medication come with certain risks and side effects that you need to discuss with your doctor, especially if you are taking it for a prolonged period. Speaking Coherently

## 2022-08-07 ENCOUNTER — OUTPATIENT (OUTPATIENT)
Dept: OUTPATIENT SERVICES | Facility: HOSPITAL | Age: 77
LOS: 1 days | End: 2022-08-07
Payer: MEDICARE

## 2022-08-07 DIAGNOSIS — Z98.51 TUBAL LIGATION STATUS: Chronic | ICD-10-CM

## 2022-08-07 DIAGNOSIS — Z98.1 ARTHRODESIS STATUS: Chronic | ICD-10-CM

## 2022-08-07 DIAGNOSIS — Z98.89 OTHER SPECIFIED POSTPROCEDURAL STATES: Chronic | ICD-10-CM

## 2022-08-07 DIAGNOSIS — Z20.828 CONTACT WITH AND (SUSPECTED) EXPOSURE TO OTHER VIRAL COMMUNICABLE DISEASES: ICD-10-CM

## 2022-08-07 DIAGNOSIS — Z96.652 PRESENCE OF LEFT ARTIFICIAL KNEE JOINT: Chronic | ICD-10-CM

## 2022-08-07 DIAGNOSIS — Z98.890 OTHER SPECIFIED POSTPROCEDURAL STATES: Chronic | ICD-10-CM

## 2022-08-07 DIAGNOSIS — Z95.0 PRESENCE OF CARDIAC PACEMAKER: Chronic | ICD-10-CM

## 2022-08-07 LAB — SARS-COV-2 RNA SPEC QL NAA+PROBE: SIGNIFICANT CHANGE UP

## 2022-08-07 PROCEDURE — U0005: CPT

## 2022-08-07 PROCEDURE — U0003: CPT

## 2022-08-08 ENCOUNTER — TRANSCRIPTION ENCOUNTER (OUTPATIENT)
Age: 77
End: 2022-08-08

## 2022-08-08 NOTE — ASU PATIENT PROFILE, ADULT - NSICDXPASTMEDICALHX_GEN_ALL_CORE_FT
PAST MEDICAL HISTORY:  Bicipital tendinitis, right     BMI 30.0-30.9,adult     CAD (coronary artery disease) 30% blockage LAD    Cervical herniated disc x3, unsure levels    COVID-19 vaccine series completed     DVT (deep venous thrombosis), right october 2013    Early cataracts, bilateral     GERD (gastroesophageal reflux disease)     Heart attack 4/17/21, 1 day after second pfizer vaccine    Heart murmur     Herniated lumbar intervertebral disc     History of pulmonary embolus (PE) 2012, 2018 on warfarin, cause unknown    HTN - Hypertension     Hyperlipidemia     Leg swelling bilateral right >left    Lower back pain     Neck pain     Obesity, Class I, BMI 30-34.9     Osteoarthritis     Osteoarthritis of left knee     Pacemaker implanted 9/28/18    Prothrombin gene mutation     Right shoulder pain     Tachy-artur syndrome

## 2022-08-08 NOTE — ED ADULT TRIAGE NOTE - NSWEIGHTCALCTOOLDRUG_GEN_A_CORE
Goal Outcome Evaluation:        Problem: Gas Exchange Impaired  Goal: Optimal Gas Exchange  Outcome: Ongoing, Progressing  Intervention: Optimize Oxygenation and Ventilation  Recent Flowsheet Documentation  Taken 8/8/2022 0600 by Tao Tijerina, RN  Head of Bed (HOB) Positioning: HOB at 30 degrees     Problem: Fluid Volume Excess  Goal: Fluid Balance  Outcome: Ongoing, Progressing        Pt arrived to Room# 308 from ED via stretcher. A1 from stretcher to standing scale then to bed. Uses a walker/gait belt for ambulation. Pt became SOB and nauseated w/ exertions. SpO2 on RA was 89-90%. Placed on 2ltrs O2 via NC, which was effective in reducing SOB, also increased SpO2. IV furosemide administered as ordered, along with scheduled Metoprolol, IV Mag and PRN Compazine. No further nausea reported.     LS: diminished throughout, accessory muscle use, barrel chested.      Serrano Catheter: remained patent w/ clear yellow urine. Pt diuresing well.     Edema: 2+ pitting edema to BLE from knees to toes. Non-pitting edema to BUE.     Pt wears full upper/lower dentures which were left at her place of residence. Bilateral hearing aides present.      A&Ox4. Calm and pleasant towards writer and other staff. Receptive to all cares.     Active bleeding hemorrhoids, moderate amount of blood on removed brief.     Wound: PRESENT ON ADMISSION: Stage two pressure ulcer to coccyx/over boy prominence, wayne wound erythema. Site cleansed w/ NS, patted dry and Sacrum Mepilex applied. Sticky note left for provider requesting WOC consult and an alternating air mattress.     used

## 2022-08-08 NOTE — ASU PATIENT PROFILE, ADULT - FALL HARM RISK - UNIVERSAL INTERVENTIONS
Bed in lowest position, wheels locked, appropriate side rails in place/Call bell, personal items and telephone in reach/Instruct patient to call for assistance before getting out of bed or chair/Non-slip footwear when patient is out of bed/[place] to call system/Physically safe environment - no spills, clutter or unnecessary equipment/Purposeful Proactive Rounding/Room/bathroom lighting operational, light cord in reach

## 2022-08-08 NOTE — ASU PATIENT PROFILE, ADULT - NSICDXPASTSURGICALHX_GEN_ALL_CORE_FT
PAST SURGICAL HISTORY:  Artificial pacemaker medtronic [id number], [place]  TCP    History of carpal tunnel surgery of right wrist 1990's    History of left knee replacement 2019    History of lumbar spinal fusion 2018, L5-S1    S/P arthroscopy of right knee 2012    S/P Breast Lumpectomy 2000's, 2004 all benign    S/P tubal ligation age 30

## 2022-08-08 NOTE — ASU PATIENT PROFILE, ADULT - PASSIVE COMMENT
both sons smoke who live with pt.- pt educated on need to avoid passive smoke for healing and health

## 2022-08-09 ENCOUNTER — APPOINTMENT (OUTPATIENT)
Dept: ORTHOPEDIC SURGERY | Facility: HOSPITAL | Age: 77
End: 2022-08-09

## 2022-08-09 ENCOUNTER — INPATIENT (INPATIENT)
Facility: HOSPITAL | Age: 77
LOS: 0 days | Discharge: HOME | DRG: 500 | End: 2022-08-10
Attending: STUDENT IN AN ORGANIZED HEALTH CARE EDUCATION/TRAINING PROGRAM | Admitting: STUDENT IN AN ORGANIZED HEALTH CARE EDUCATION/TRAINING PROGRAM
Payer: MEDICARE

## 2022-08-09 VITALS
RESPIRATION RATE: 14 BRPM | HEART RATE: 67 BPM | WEIGHT: 138.01 LBS | OXYGEN SATURATION: 100 % | DIASTOLIC BLOOD PRESSURE: 85 MMHG | SYSTOLIC BLOOD PRESSURE: 136 MMHG | TEMPERATURE: 98 F | HEIGHT: 57 IN

## 2022-08-09 DIAGNOSIS — Z98.89 OTHER SPECIFIED POSTPROCEDURAL STATES: Chronic | ICD-10-CM

## 2022-08-09 DIAGNOSIS — Z98.51 TUBAL LIGATION STATUS: Chronic | ICD-10-CM

## 2022-08-09 DIAGNOSIS — Z95.0 PRESENCE OF CARDIAC PACEMAKER: Chronic | ICD-10-CM

## 2022-08-09 DIAGNOSIS — Z98.1 ARTHRODESIS STATUS: Chronic | ICD-10-CM

## 2022-08-09 DIAGNOSIS — M75.21 BICIPITAL TENDINITIS, RIGHT SHOULDER: ICD-10-CM

## 2022-08-09 DIAGNOSIS — Z98.890 OTHER SPECIFIED POSTPROCEDURAL STATES: Chronic | ICD-10-CM

## 2022-08-09 DIAGNOSIS — Z96.652 PRESENCE OF LEFT ARTIFICIAL KNEE JOINT: Chronic | ICD-10-CM

## 2022-08-09 LAB
ALBUMIN SERPL ELPH-MCNC: 3.6 G/DL — SIGNIFICANT CHANGE UP (ref 3.3–5)
ALP SERPL-CCNC: 61 U/L — SIGNIFICANT CHANGE UP (ref 30–120)
ALT FLD-CCNC: 34 U/L DA — SIGNIFICANT CHANGE UP (ref 10–60)
ANION GAP SERPL CALC-SCNC: 12 MMOL/L — SIGNIFICANT CHANGE UP (ref 5–17)
APTT BLD: 26.8 SEC — LOW (ref 27.5–35.5)
APTT BLD: 30.6 SEC — SIGNIFICANT CHANGE UP (ref 27.5–35.5)
AST SERPL-CCNC: 37 U/L — SIGNIFICANT CHANGE UP (ref 10–40)
BASE EXCESS BLDV CALC-SCNC: 3.6 MMOL/L — HIGH (ref -2–3)
BASOPHILS # BLD AUTO: 0.02 K/UL — SIGNIFICANT CHANGE UP (ref 0–0.2)
BASOPHILS NFR BLD AUTO: 0.3 % — SIGNIFICANT CHANGE UP (ref 0–2)
BILIRUB SERPL-MCNC: 0.4 MG/DL — SIGNIFICANT CHANGE UP (ref 0.2–1.2)
BUN SERPL-MCNC: 14 MG/DL — SIGNIFICANT CHANGE UP (ref 7–23)
CALCIUM SERPL-MCNC: 9 MG/DL — SIGNIFICANT CHANGE UP (ref 8.4–10.5)
CHLORIDE SERPL-SCNC: 104 MMOL/L — SIGNIFICANT CHANGE UP (ref 96–108)
CK MB BLD-MCNC: 1.1 % — SIGNIFICANT CHANGE UP (ref 0–3.5)
CK MB CFR SERPL CALC: 1.9 NG/ML — SIGNIFICANT CHANGE UP (ref 0–3.6)
CK SERPL-CCNC: 166 U/L — SIGNIFICANT CHANGE UP (ref 26–192)
CO2 SERPL-SCNC: 23 MMOL/L — SIGNIFICANT CHANGE UP (ref 22–31)
CREAT SERPL-MCNC: 0.88 MG/DL — SIGNIFICANT CHANGE UP (ref 0.5–1.3)
EGFR: 67 ML/MIN/1.73M2 — SIGNIFICANT CHANGE UP
EOSINOPHIL # BLD AUTO: 0.04 K/UL — SIGNIFICANT CHANGE UP (ref 0–0.5)
EOSINOPHIL NFR BLD AUTO: 0.5 % — SIGNIFICANT CHANGE UP (ref 0–6)
GAS PNL BLDV: SIGNIFICANT CHANGE UP
GLUCOSE BLDC GLUCOMTR-MCNC: 117 MG/DL — HIGH (ref 70–99)
GLUCOSE SERPL-MCNC: 125 MG/DL — HIGH (ref 70–99)
HCO3 BLDV-SCNC: 30 MMOL/L — HIGH (ref 22–29)
HCT VFR BLD CALC: 38.5 % — SIGNIFICANT CHANGE UP (ref 34.5–45)
HGB BLD-MCNC: 12.7 G/DL — SIGNIFICANT CHANGE UP (ref 11.5–15.5)
HOROWITZ INDEX BLDV+IHG-RTO: 100 — SIGNIFICANT CHANGE UP
IMM GRANULOCYTES NFR BLD AUTO: 0.3 % — SIGNIFICANT CHANGE UP (ref 0–1.5)
INR BLD: 1.18 RATIO — HIGH (ref 0.88–1.16)
INR BLD: 1.23 RATIO — HIGH (ref 0.88–1.16)
LYMPHOCYTES # BLD AUTO: 3.89 K/UL — HIGH (ref 1–3.3)
LYMPHOCYTES # BLD AUTO: 50.3 % — HIGH (ref 13–44)
MAGNESIUM SERPL-MCNC: 1.8 MG/DL — SIGNIFICANT CHANGE UP (ref 1.6–2.6)
MCHC RBC-ENTMCNC: 29.2 PG — SIGNIFICANT CHANGE UP (ref 27–34)
MCHC RBC-ENTMCNC: 33 GM/DL — SIGNIFICANT CHANGE UP (ref 32–36)
MCV RBC AUTO: 88.5 FL — SIGNIFICANT CHANGE UP (ref 80–100)
MONOCYTES # BLD AUTO: 0.17 K/UL — SIGNIFICANT CHANGE UP (ref 0–0.9)
MONOCYTES NFR BLD AUTO: 2.2 % — SIGNIFICANT CHANGE UP (ref 2–14)
NEUTROPHILS # BLD AUTO: 3.6 K/UL — SIGNIFICANT CHANGE UP (ref 1.8–7.4)
NEUTROPHILS NFR BLD AUTO: 46.4 % — SIGNIFICANT CHANGE UP (ref 43–77)
NRBC # BLD: 0 /100 WBCS — SIGNIFICANT CHANGE UP (ref 0–0)
PCO2 BLDV: 61 MMHG — HIGH (ref 39–42)
PH BLDV: 7.3 — LOW (ref 7.32–7.43)
PHOSPHATE SERPL-MCNC: 2.9 MG/DL — SIGNIFICANT CHANGE UP (ref 2.5–4.5)
PLATELET # BLD AUTO: 85 K/UL — LOW (ref 150–400)
PO2 BLDV: 44 MMHG — SIGNIFICANT CHANGE UP (ref 25–45)
POTASSIUM SERPL-MCNC: 3.8 MMOL/L — SIGNIFICANT CHANGE UP (ref 3.5–5.3)
POTASSIUM SERPL-SCNC: 3.8 MMOL/L — SIGNIFICANT CHANGE UP (ref 3.5–5.3)
PROT SERPL-MCNC: 6.8 G/DL — SIGNIFICANT CHANGE UP (ref 6–8.3)
PROTHROM AB SERPL-ACNC: 13.6 SEC — HIGH (ref 10.5–13.4)
PROTHROM AB SERPL-ACNC: 14.2 SEC — HIGH (ref 10.5–13.4)
RBC # BLD: 4.35 M/UL — SIGNIFICANT CHANGE UP (ref 3.8–5.2)
RBC # FLD: 13.5 % — SIGNIFICANT CHANGE UP (ref 10.3–14.5)
SAO2 % BLDV: 70.8 % — SIGNIFICANT CHANGE UP (ref 67–88)
SODIUM SERPL-SCNC: 139 MMOL/L — SIGNIFICANT CHANGE UP (ref 135–145)
TROPONIN I, HIGH SENSITIVITY RESULT: 11.1 NG/L — SIGNIFICANT CHANGE UP
TROPONIN I, HIGH SENSITIVITY RESULT: 7.5 NG/L — SIGNIFICANT CHANGE UP
WBC # BLD: 7.74 K/UL — SIGNIFICANT CHANGE UP (ref 3.8–10.5)
WBC # FLD AUTO: 7.74 K/UL — SIGNIFICANT CHANGE UP (ref 3.8–10.5)

## 2022-08-09 PROCEDURE — 93010 ELECTROCARDIOGRAM REPORT: CPT

## 2022-08-09 PROCEDURE — 71045 X-RAY EXAM CHEST 1 VIEW: CPT | Mod: 26

## 2022-08-09 PROCEDURE — 99222 1ST HOSP IP/OBS MODERATE 55: CPT

## 2022-08-09 RX ORDER — APIXABAN 2.5 MG/1
1 TABLET, FILM COATED ORAL
Qty: 2 | Refills: 0
Start: 2022-08-09 | End: 2022-08-09

## 2022-08-09 RX ORDER — NITROGLYCERIN 6.5 MG
0.4 CAPSULE, EXTENDED RELEASE ORAL ONCE
Refills: 0 | Status: COMPLETED | OUTPATIENT
Start: 2022-08-09 | End: 2022-08-09

## 2022-08-09 RX ORDER — MAGNESIUM SULFATE 500 MG/ML
2 VIAL (ML) INJECTION ONCE
Refills: 0 | Status: COMPLETED | OUTPATIENT
Start: 2022-08-09 | End: 2022-08-09

## 2022-08-09 RX ORDER — CEFAZOLIN SODIUM 1 G
2000 VIAL (EA) INJECTION ONCE
Refills: 0 | Status: COMPLETED | OUTPATIENT
Start: 2022-08-09 | End: 2022-08-09

## 2022-08-09 RX ORDER — APIXABAN 2.5 MG/1
5 TABLET, FILM COATED ORAL
Refills: 0 | Status: DISCONTINUED | OUTPATIENT
Start: 2022-08-09 | End: 2022-08-09

## 2022-08-09 RX ORDER — ATORVASTATIN CALCIUM 80 MG/1
40 TABLET, FILM COATED ORAL AT BEDTIME
Refills: 0 | Status: DISCONTINUED | OUTPATIENT
Start: 2022-08-09 | End: 2022-08-10

## 2022-08-09 RX ORDER — APIXABAN 2.5 MG/1
2.5 TABLET, FILM COATED ORAL EVERY 12 HOURS
Refills: 0 | Status: DISCONTINUED | OUTPATIENT
Start: 2022-08-10 | End: 2022-08-10

## 2022-08-09 RX ORDER — APIXABAN 2.5 MG/1
5 TABLET, FILM COATED ORAL EVERY 12 HOURS
Refills: 0 | Status: DISCONTINUED | OUTPATIENT
Start: 2022-08-11 | End: 2022-08-10

## 2022-08-09 RX ORDER — VERAPAMIL HCL 240 MG
120 CAPSULE, EXTENDED RELEASE PELLETS 24 HR ORAL DAILY
Refills: 0 | Status: DISCONTINUED | OUTPATIENT
Start: 2022-08-09 | End: 2022-08-09

## 2022-08-09 RX ORDER — APIXABAN 2.5 MG/1
1 TABLET, FILM COATED ORAL
Qty: 60 | Refills: 0
Start: 2022-08-09 | End: 2022-09-07

## 2022-08-09 RX ORDER — HYDROMORPHONE HYDROCHLORIDE 2 MG/ML
0.25 INJECTION INTRAMUSCULAR; INTRAVENOUS; SUBCUTANEOUS
Refills: 0 | Status: DISCONTINUED | OUTPATIENT
Start: 2022-08-09 | End: 2022-08-09

## 2022-08-09 RX ORDER — CHOLECALCIFEROL (VITAMIN D3) 125 MCG
1 CAPSULE ORAL
Qty: 0 | Refills: 0 | DISCHARGE

## 2022-08-09 RX ORDER — APIXABAN 2.5 MG/1
2.5 TABLET, FILM COATED ORAL
Refills: 0 | Status: DISCONTINUED | OUTPATIENT
Start: 2022-08-09 | End: 2022-08-09

## 2022-08-09 RX ORDER — VERAPAMIL HCL 240 MG
120 CAPSULE, EXTENDED RELEASE PELLETS 24 HR ORAL DAILY
Refills: 0 | Status: DISCONTINUED | OUTPATIENT
Start: 2022-08-09 | End: 2022-08-10

## 2022-08-09 RX ORDER — OXYCODONE HYDROCHLORIDE 5 MG/1
10 TABLET ORAL
Refills: 0 | Status: DISCONTINUED | OUTPATIENT
Start: 2022-08-09 | End: 2022-08-10

## 2022-08-09 RX ORDER — OXYCODONE HYDROCHLORIDE 5 MG/1
5 TABLET ORAL
Refills: 0 | Status: DISCONTINUED | OUTPATIENT
Start: 2022-08-09 | End: 2022-08-10

## 2022-08-09 RX ORDER — ACETAMINOPHEN 500 MG
2 TABLET ORAL
Qty: 0 | Refills: 0 | DISCHARGE

## 2022-08-09 RX ORDER — ALBUTEROL 90 UG/1
2.5 AEROSOL, METERED ORAL ONCE
Refills: 0 | Status: COMPLETED | OUTPATIENT
Start: 2022-08-09 | End: 2022-08-09

## 2022-08-09 RX ORDER — WARFARIN SODIUM 2.5 MG/1
1 TABLET ORAL
Qty: 0 | Refills: 0 | DISCHARGE

## 2022-08-09 RX ORDER — APREPITANT 80 MG/1
40 CAPSULE ORAL ONCE
Refills: 0 | Status: COMPLETED | OUTPATIENT
Start: 2022-08-09 | End: 2022-08-09

## 2022-08-09 RX ORDER — CHLORHEXIDINE GLUCONATE 213 G/1000ML
1 SOLUTION TOPICAL ONCE
Refills: 0 | Status: COMPLETED | OUTPATIENT
Start: 2022-08-09 | End: 2022-08-09

## 2022-08-09 RX ORDER — HYDROMORPHONE HYDROCHLORIDE 2 MG/ML
0.5 INJECTION INTRAMUSCULAR; INTRAVENOUS; SUBCUTANEOUS
Refills: 0 | Status: DISCONTINUED | OUTPATIENT
Start: 2022-08-09 | End: 2022-08-10

## 2022-08-09 RX ORDER — CEFAZOLIN SODIUM 1 G
1000 VIAL (EA) INJECTION ONCE
Refills: 0 | Status: COMPLETED | OUTPATIENT
Start: 2022-08-09 | End: 2022-08-09

## 2022-08-09 RX ORDER — EZETIMIBE 10 MG/1
1 TABLET ORAL
Qty: 0 | Refills: 0 | DISCHARGE

## 2022-08-09 RX ORDER — ATORVASTATIN CALCIUM 80 MG/1
40 TABLET, FILM COATED ORAL AT BEDTIME
Refills: 0 | Status: DISCONTINUED | OUTPATIENT
Start: 2022-08-09 | End: 2022-08-09

## 2022-08-09 RX ORDER — SODIUM CHLORIDE 9 MG/ML
1000 INJECTION, SOLUTION INTRAVENOUS
Refills: 0 | Status: DISCONTINUED | OUTPATIENT
Start: 2022-08-09 | End: 2022-08-09

## 2022-08-09 RX ORDER — ONDANSETRON 8 MG/1
4 TABLET, FILM COATED ORAL ONCE
Refills: 0 | Status: DISCONTINUED | OUTPATIENT
Start: 2022-08-09 | End: 2022-08-09

## 2022-08-09 RX ORDER — ONDANSETRON 8 MG/1
4 TABLET, FILM COATED ORAL EVERY 6 HOURS
Refills: 0 | Status: DISCONTINUED | OUTPATIENT
Start: 2022-08-09 | End: 2022-08-10

## 2022-08-09 RX ORDER — ATORVASTATIN CALCIUM 80 MG/1
1 TABLET, FILM COATED ORAL
Qty: 0 | Refills: 0 | DISCHARGE

## 2022-08-09 RX ORDER — HYDROMORPHONE HYDROCHLORIDE 2 MG/ML
0.5 INJECTION INTRAMUSCULAR; INTRAVENOUS; SUBCUTANEOUS
Refills: 0 | Status: DISCONTINUED | OUTPATIENT
Start: 2022-08-09 | End: 2022-08-09

## 2022-08-09 RX ADMIN — ALBUTEROL 2.5 MILLIGRAM(S): 90 AEROSOL, METERED ORAL at 13:39

## 2022-08-09 RX ADMIN — CHLORHEXIDINE GLUCONATE 1 APPLICATION(S): 213 SOLUTION TOPICAL at 08:46

## 2022-08-09 RX ADMIN — Medication 25 GRAM(S): at 15:26

## 2022-08-09 RX ADMIN — ATORVASTATIN CALCIUM 40 MILLIGRAM(S): 80 TABLET, FILM COATED ORAL at 21:19

## 2022-08-09 RX ADMIN — Medication 0.4 MILLIGRAM(S): at 13:07

## 2022-08-09 RX ADMIN — SODIUM CHLORIDE 75 MILLILITER(S): 9 INJECTION, SOLUTION INTRAVENOUS at 13:22

## 2022-08-09 RX ADMIN — Medication 120 MILLIGRAM(S): at 15:27

## 2022-08-09 RX ADMIN — Medication 100 MILLIGRAM(S): at 17:10

## 2022-08-09 RX ADMIN — APREPITANT 40 MILLIGRAM(S): 80 CAPSULE ORAL at 08:46

## 2022-08-09 NOTE — CONSULT NOTE ADULT - SUBJECTIVE AND OBJECTIVE BOX
Date/Time Patient Seen:  		  Referring MD:   Data Reviewed	       Patient is a 77y old  Female who presents with a chief complaint of right shoulder surgery (09 Aug 2022 13:36)      Subjective/HPI   Pre-Op Diagnosis, Post-Op Diagnosis and Procedure:    Pre-Op, Post-Op and Procedure Selector:  ·  PRE-OP DIAGNOSIS:  Shoulder impingement, right 09-Aug-2022 12:30:30  Jaymie Gregory.  ·  POST-OP DIAGNOSIS:  Impingement syndrome of shoulder 09-Aug-2022 12:31:07 right , biceps tendonitis Jaymie Gregory  Impingement syndrome of shoulder 09-Aug-2022 12:31:07 right , biceps tendonitis Jaymie Gregory.  ·  PROCEDURES:  Right shoulder arthroscopy 09-Aug-2022 12:24:31 bicips release, subacromial decopression, debridement labrum Jaymie Gregory.  PAST MEDICAL & SURGICAL HISTORY:  HTN - Hypertension    Hx of Breast Cancer    PE (pulmonary embolism)  3 yrs ago, was on Lovenox and coumadin    Lower back pain    Herniated lumbar intervertebral disc    DVT (deep venous thrombosis), right  october 2013    GERD (gastroesophageal reflux disease)    Prothrombin gene mutation    Osteoarthritis of left knee    Pacemaker  implanted 9/28/18    Lower back pain    BMI 30.0-30.9,adult    History of pulmonary embolus (PE)  2012, 2018 on warfarin, cause unknown    Tachy-artur syndrome    Cervical herniated disc  x3, unsure levels    Right shoulder pain    Bicipital tendinitis, right    CAD (coronary artery disease)  30% blockage LAD    Heart murmur    Hyperlipidemia    COVID-19 vaccine series completed    Heart attack  4/17/21, 1 day after second pfizer vaccine    Neck pain    Osteoarthritis    Leg swelling  bilateral right &gt;left    Early cataracts, bilateral    Obesity, Class I, BMI 30-34.9    S/P Wrist Surgery    h/o Wrist Surgery    S/P Breast Lumpectomy  2000&#x27;s, 2004 all benign    S/P arthroscopy of right knee  2012    S/P tubal ligation  age 30    History of carpal tunnel surgery of right wrist  1990&#x27;s    History of lumbar surgery  2018    History of total knee replacement, unspecified laterality    Artificial pacemaker  medtronic [id number], MI[employer]  TCP    History of lumbar spinal fusion  2018, L5-S1    History of left knee replacement  2019          Medication list         MEDICATIONS  (STANDING):  lactated ringers. 1000 milliLiter(s) (75 mL/Hr) IV Continuous <Continuous>    MEDICATIONS  (PRN):  ALBUTerol    0.083%. 2.5 milliGRAM(s) Nebulizer once PRN Wheezing  HYDROmorphone  Injectable 0.25 milliGRAM(s) IV Push every 10 minutes PRN Moderate Pain (4 - 6)  HYDROmorphone  Injectable 0.5 milliGRAM(s) IV Push every 10 minutes PRN Severe Pain (7 - 10)  ondansetron Injectable 4 milliGRAM(s) IV Push once PRN Nausea and/or Vomiting         Vitals log        ICU Vital Signs Last 24 Hrs  T(C): 36.4 (09 Aug 2022 12:16), Max: 36.5 (09 Aug 2022 08:37)  T(F): 97.6 (09 Aug 2022 12:16), Max: 97.7 (09 Aug 2022 08:37)  HR: 75 (09 Aug 2022 13:45) (54 - 133)  BP: 114/67 (09 Aug 2022 13:45) (102/70 - 155/89)  BP(mean): --  ABP: --  ABP(mean): --  RR: 16 (09 Aug 2022 13:45) (14 - 26)  SpO2: 100% (09 Aug 2022 13:45) (98% - 100%)    O2 Parameters below as of 09 Aug 2022 13:45  Patient On (Oxygen Delivery Method): BiPAP/CPAP,I/E ratio 8/4    O2 Concentration (%): 60             Input and Output:  I&O's Detail      Lab Data                        12.7   7.74  )-----------( 85       ( 09 Aug 2022 12:54 )             38.5     08-09    139  |  104  |  14  ----------------------------<  125<H>  3.8   |  23  |  0.88    Ca    9.0      09 Aug 2022 12:54  Phos  2.9     08-09  Mg     1.8     08-09    TPro  6.8  /  Alb  3.6  /  TBili  0.4  /  DBili  x   /  AST  37  /  ALT  34  /  AlkPhos  61  08-09      CARDIAC MARKERS ( 09 Aug 2022 13:06 )  x     / x     / 166 U/L / x     / 1.9 ng/mL        Review of Systems	      Objective     Physical Examination        Pertinent Lab findings & Imaging      Bonner:  NO   Adequate UO     I&O's Detail           Discussed with:     Cultures:	        Radiology    EXAM: [id number] - CT ARTHROGRAM SHOULDER RT#  - ORDERED BY: RASHMI VENTURA      PROCEDURE DATE:  06/23/2022           INTERPRETATION:  RIGHT SHOULDER CT ARTHROGRAM    CLINICAL INFORMATION: Right shoulder pain.  TECHNIQUE: Axial CT of the right shoulder was obtained of the   intra-articular administration of Omnipaque 240. Coronal and sagittal   reformations were created and reviewed.  COMPARISON: None.  FINDINGS:    ROTATOR CUFF: Intact rotator cuff without high-grade tear.  BICEPS TENDON: Moderate intra-articular long head biceps tendinosis.   Extra articular long head biceps tendon is not well evaluated.  AC JOINT: Mild/moderate arthrosis.  GLENOID LABRUM AND GLENOHUMERAL LIGAMENTS: Nondisplaced degenerative   tearing of the posterosuperior labrum. Degenerative blunting/fraying of   the anterior labrum.  GLENOHUMERAL CARTILAGE AND SUBCHONDRAL BONE: Diffuse moderate to   high-grade cartilage loss on both sides of the joint with full-thickness   components. Osteophyte formation.  SYNOVIUM/JOINT FLUID: The joint is distended with injected fluid and   contrast. There is a 0.8 cm body in the superior subscapular recess.  BONES: Subcortical cystic changes within the subscapularis and   infraspinatus footprint. No acute displaced fracture.  MUSCLES: No disproportionate muscle atrophy.  SOFT TISSUES: There is a 6 mm hypoattenuating nodule in the right lobe of   the thyroid.    IMPRESSION:    Moderate/severe glenohumeral joint osteoarthritis with joint body seen   within the superior subscapular recess.    Intact rotator cuff without high-grade tear.    Moderate intra-articular long head biceps tendinosis.    Mild/moderate acromioclavicular arthrosis.    --- End of Report ---             GIORGIO STARK M.D., RADIOLOGY FELLOW  This document has been electronically signed.  NIKKIE CUEVA M.D., ATTENDING RADIOLOGIST   This document has been electronically signed. Jun 24 2022  9:24AM                           Date/Time Patient Seen:  		  Referring MD:   Data Reviewed	       Patient is a 77y old  Female who presents with a chief complaint of right shoulder surgery (09 Aug 2022 13:36)      Subjective/HPI  in bed  seen and examined  vs noted  labs reviewed  H and P reviewed  on O2 support  cardio eval noted  frail  weak  alert  verbal  non smoker  non drinker  lives with family at home  retired  ros - weakness     Pre-Op Diagnosis, Post-Op Diagnosis and Procedure:    Pre-Op, Post-Op and Procedure Selector:  ·  PRE-OP DIAGNOSIS:  Shoulder impingement, right 09-Aug-2022 12:30:30  Jaymie Gregory.  ·  POST-OP DIAGNOSIS:  Impingement syndrome of shoulder 09-Aug-2022 12:31:07 right , biceps tendonitis Jaymie Gregory  Impingement syndrome of shoulder 09-Aug-2022 12:31:07 right , biceps tendonitis Jaymie Gregory.  ·  PROCEDURES:  Right shoulder arthroscopy 09-Aug-2022 12:24:31 bicips release, subacromial decopression, debridement labrum Jaymie Gregory.  PAST MEDICAL & SURGICAL HISTORY:  HTN - Hypertension    Hx of Breast Cancer    PE (pulmonary embolism)  3 yrs ago, was on Lovenox and coumadin    Lower back pain    Herniated lumbar intervertebral disc    DVT (deep venous thrombosis), right  october 2013    GERD (gastroesophageal reflux disease)    Prothrombin gene mutation    Osteoarthritis of left knee    Pacemaker  implanted 9/28/18    Lower back pain    BMI 30.0-30.9,adult    History of pulmonary embolus (PE)  2012, 2018 on warfarin, cause unknown    Tachy-artur syndrome    Cervical herniated disc  x3, unsure levels    Right shoulder pain    Bicipital tendinitis, right    CAD (coronary artery disease)  30% blockage LAD    Heart murmur    Hyperlipidemia    COVID-19 vaccine series completed    Heart attack  4/17/21, 1 day after second pfizer vaccine    Neck pain    Osteoarthritis    Leg swelling  bilateral right &gt;left    Early cataracts, bilateral    Obesity, Class I, BMI 30-34.9    S/P Wrist Surgery    h/o Wrist Surgery    S/P Breast Lumpectomy  2000&#x27;s, 2004 all benign    S/P arthroscopy of right knee  2012    S/P tubal ligation  age 30    History of carpal tunnel surgery of right wrist  1990&#x27;s    History of lumbar surgery  2018    History of total knee replacement, unspecified laterality    Artificial pacemaker  medtronic [id number], MI[employer]  TCP    History of lumbar spinal fusion  2018, L5-S1    History of left knee replacement  2019          Medication list         MEDICATIONS  (STANDING):  lactated ringers. 1000 milliLiter(s) (75 mL/Hr) IV Continuous <Continuous>    MEDICATIONS  (PRN):  ALBUTerol    0.083%. 2.5 milliGRAM(s) Nebulizer once PRN Wheezing  HYDROmorphone  Injectable 0.25 milliGRAM(s) IV Push every 10 minutes PRN Moderate Pain (4 - 6)  HYDROmorphone  Injectable 0.5 milliGRAM(s) IV Push every 10 minutes PRN Severe Pain (7 - 10)  ondansetron Injectable 4 milliGRAM(s) IV Push once PRN Nausea and/or Vomiting         Vitals log        ICU Vital Signs Last 24 Hrs  T(C): 36.4 (09 Aug 2022 12:16), Max: 36.5 (09 Aug 2022 08:37)  T(F): 97.6 (09 Aug 2022 12:16), Max: 97.7 (09 Aug 2022 08:37)  HR: 75 (09 Aug 2022 13:45) (54 - 133)  BP: 114/67 (09 Aug 2022 13:45) (102/70 - 155/89)  BP(mean): --  ABP: --  ABP(mean): --  RR: 16 (09 Aug 2022 13:45) (14 - 26)  SpO2: 100% (09 Aug 2022 13:45) (98% - 100%)    O2 Parameters below as of 09 Aug 2022 13:45  Patient On (Oxygen Delivery Method): BiPAP/CPAP,I/E ratio 8/4    O2 Concentration (%): 60             Input and Output:  I&O's Detail      Lab Data                        12.7   7.74  )-----------( 85       ( 09 Aug 2022 12:54 )             38.5     08-09    139  |  104  |  14  ----------------------------<  125<H>  3.8   |  23  |  0.88    Ca    9.0      09 Aug 2022 12:54  Phos  2.9     08-09  Mg     1.8     08-09    TPro  6.8  /  Alb  3.6  /  TBili  0.4  /  DBili  x   /  AST  37  /  ALT  34  /  AlkPhos  61  08-09      CARDIAC MARKERS ( 09 Aug 2022 13:06 )  x     / x     / 166 U/L / x     / 1.9 ng/mL        Review of Systems	  resp distress      Objective     Physical Examination  on o2 support  heart s1s2  lung dec BS  abd soft  right shoulder in sling  head nc  head at  verbal  alert        Pertinent Lab findings & Imaging      Bonner:  NO   Adequate UO     I&O's Detail           Discussed with:     Cultures:	        Radiology    EXAM: [id number] - CT ARTHROGRAM SHOULDER RT#  - ORDERED BY: RASHMI VENTURA      PROCEDURE DATE:  06/23/2022           INTERPRETATION:  RIGHT SHOULDER CT ARTHROGRAM    CLINICAL INFORMATION: Right shoulder pain.  TECHNIQUE: Axial CT of the right shoulder was obtained of the   intra-articular administration of Omnipaque 240. Coronal and sagittal   reformations were created and reviewed.  COMPARISON: None.  FINDINGS:    ROTATOR CUFF: Intact rotator cuff without high-grade tear.  BICEPS TENDON: Moderate intra-articular long head biceps tendinosis.   Extra articular long head biceps tendon is not well evaluated.  AC JOINT: Mild/moderate arthrosis.  GLENOID LABRUM AND GLENOHUMERAL LIGAMENTS: Nondisplaced degenerative   tearing of the posterosuperior labrum. Degenerative blunting/fraying of   the anterior labrum.  GLENOHUMERAL CARTILAGE AND SUBCHONDRAL BONE: Diffuse moderate to   high-grade cartilage loss on both sides of the joint with full-thickness   components. Osteophyte formation.  SYNOVIUM/JOINT FLUID: The joint is distended with injected fluid and   contrast. There is a 0.8 cm body in the superior subscapular recess.  BONES: Subcortical cystic changes within the subscapularis and   infraspinatus footprint. No acute displaced fracture.  MUSCLES: No disproportionate muscle atrophy.  SOFT TISSUES: There is a 6 mm hypoattenuating nodule in the right lobe of   the thyroid.    IMPRESSION:    Moderate/severe glenohumeral joint osteoarthritis with joint body seen   within the superior subscapular recess.    Intact rotator cuff without high-grade tear.    Moderate intra-articular long head biceps tendinosis.    Mild/moderate acromioclavicular arthrosis.    --- End of Report ---             GIORGIO STARK M.D., RADIOLOGY FELLOW  This document has been electronically signed.  NIKKIE CUEVA M.D., ATTENDING RADIOLOGIST   This document has been electronically signed. Jun 24 2022  9:24AM

## 2022-08-09 NOTE — CONSULT NOTE ADULT - ASSESSMENT
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 Unchanged from prior.  First troponin negative. Rule out an acute MI with two sets of cardiac enzymes.  Continue verapamil 120 mg daily  Continue atorvastatin 40 mg daily    Thrombocytopenia  Monitor platelets - if drops below 50, anticoagulation would need to be reconsidered.    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 Unchanged from prior.  First troponin negative. Rule out an acute MI with two sets of cardiac enzymes.  Continue verapamil 120 mg daily  Continue atorvastatin 40 mg daily    Thrombocytopenia  Monitor platelets - if drops below 50, anticoagulation would need to be reconsidered.    DVT ppx  Per primary team  Outpatient heme recommend Eliquis as per discussion with Dr. Kidd, to start tomorrow

## 2022-08-09 NOTE — ASU PREOP CHECKLIST - PATIENT'S PERSONAL PROPERTY REMOVED
lower denture/dentures/glasses lower denture, yellow metal ring- see security envelope/dentures/glasses/jewelry

## 2022-08-09 NOTE — BRIEF OPERATIVE NOTE - NSICDXBRIEFPOSTOP_GEN_ALL_CORE_FT
POST-OP DIAGNOSIS:  Impingement syndrome of shoulder 09-Aug-2022 12:31:07 right , biceps tendonitis Jaymie Gregory  Impingement syndrome of shoulder 09-Aug-2022 12:31:07 right , biceps tendonitis Jaymie Gregory

## 2022-08-09 NOTE — BRIEF OPERATIVE NOTE - NSICDXBRIEFPROCEDURE_GEN_ALL_CORE_FT
PROCEDURES:  Right shoulder arthroscopy 09-Aug-2022 12:24:31 bicips release, subacromial decopression, debridement labrum Jaymie Gregory

## 2022-08-09 NOTE — PROGRESS NOTE ADULT - ASSESSMENT
78 y/o female with pmhx of HTN, DVT now with:    1. post op monitoring s/p right shoulder surgery  2. chest pain  3. thrombocytopenia      - trop negative x 2. EKG no acute ischemic changes  - encourage incentive spirometry. early ambulation  - satting well on room air  - trend platelets. no active bleeding keep above > 20,000.  - continue elquis for now unless plt continue to downtrend  - analgesia prn 76 y/o female with pmhx of HTN, DVT now with:    1. post op monitoring s/p right shoulder surgery  2. chest pain  3. thrombocytopenia      - trop negative x 2. EKG no acute ischemic changes  - encourage incentive spirometry. early ambulation  - satting well on room air  - trend platelets. no active bleeding keep above > 20,000.  - continue elquis for now unless plt continue to downtrend  - analgesia prn  - chest pain likely musculoskeletal s/p surgery. if reoccurs consider TTE

## 2022-08-09 NOTE — ASU DISCHARGE PLAN (ADULT/PEDIATRIC) - CARE PROVIDER_API CALL
Roland Kidd)  Orthopaedic Surgery  [address]  [place] [address]  Phone: [phone]  Fax: [phone]  Follow Up Time:

## 2022-08-09 NOTE — ASU DISCHARGE PLAN (ADULT/PEDIATRIC) - NS MD DC FALL RISK RISK
For information on Fall & Injury Prevention, visit: [email or web address] OR  [email or web address] OR  https://www.cdc.gov/steadi/patient.html

## 2022-08-09 NOTE — CONSULT NOTE ADULT - ASSESSMENT
The patient is a 77 year old female with a history of HTN, DVT/PE, leadless pacemaker, non-obstructive CAD who presented for elective shoulder surgery with post-operative chest pain.    Plan:  - There is some reproducibility of pain on exam suggestive of a musculoskeletal etiology. Diaphragmatic issues post-anesthesia remains a possibility as well.  - ECG with no evidence of ischemia or infarction. Unchanged from prior.  - First troponin negative. Rule out an acute MI with two sets of cardiac enzymes.  - CXR with atelectasis and poor inspiration  - Continue anticoagulation for DVT/PE. Monitor platelets - if drops below 50, anticoagulation would need to be reconsidered.  - Continue verapamil 120 mg daily  - Continue atorvastatin 40 mg daily

## 2022-08-09 NOTE — CONSULT NOTE ADULT - ASSESSMENT
76 yo female presents with 1 year history of right shoulder pain    dyspnea  resp distress  s/p right shoulder surgery  OP  OA  GERD  HTN    labs reviewed  cxr noted  enc use of I yolanda  o2 support - wean down as tolerated - keep sat > 88 pct  pulse ox and card monitoring  s/p NIPPV  alert - verbal -   vs noted  spoke with Anesthesia and PACU RN  s/p nerve block for R Shoulder Surgery  monitor in SPCU overnight  dvt p  monitor Plt  assist with needs - ADL  OT - PT eval  old records reviewed  pain assessment - caution with Opioids -   will consider CXR repeat in am

## 2022-08-09 NOTE — CONSULT NOTE ADULT - SUBJECTIVE AND OBJECTIVE BOX
Patient is a 77y old  Female who presents with a chief complaint of right shoulder rotator cuff repair (09 Aug 2022 12:56)      FROM ADMISSION H+P:   HPI: 76 yo female presents with 1 year history of right shoulder pain. She denies injury. Had PT and 1 cortisone injection with no change in symptoms. Patient seen post op. Tolerated procedure well without complications. RRT was called in recovery room for c/o chest pain and tachypnea. Patient has extensive cardiac hx and hx of PE on Coumadin managed by hematologist. Anesthesia and surgeon at bedside. Patient with interscalene nerve block performed for surgical procedure.  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  Pulmonology called and patient to be seen, advised to start CPAP.      ----  PAST MEDICAL & SURGICAL HISTORY:  HTN - Hypertension      Herniated lumbar intervertebral disc      DVT (deep venous thrombosis), right  october 2013      GERD (gastroesophageal reflux disease)      Prothrombin gene mutation      Osteoarthritis of left knee      Pacemaker  implanted 9/28/18      Lower back pain      BMI 30.0-30.9,adult      History of pulmonary embolus (PE)  2012, 2018 on warfarin, cause unknown      Tachy-artur syndrome      Cervical herniated disc  x3, unsure levels      Right shoulder pain      Bicipital tendinitis, right      CAD (coronary artery disease)  30% blockage LAD      Heart murmur      Hyperlipidemia      COVID-19 vaccine series completed      Heart attack  4/17/21, 1 day after second pfizer vaccine      Neck pain      Osteoarthritis      Leg swelling  bilateral right &gt;left      Early cataracts, bilateral      Obesity, Class I, BMI 30-34.9      S/P Breast Lumpectomy  2000&#x27;s, 2004 all benign      S/P arthroscopy of right knee  2012      S/P tubal ligation  age 30      History of carpal tunnel surgery of right wrist  1990&#x27;s      Artificial pacemaker  medtronic [id number], MICRA  TCP      History of lumbar spinal fusion  2018, L5-S1      History of left knee replacement  2019          ----  Home Medications:  atorvastatin 40 mg oral tablet: 1 tab(s) orally once a day (at bedtime) (09 Aug 2022 09:42)  ezetimibe 10 mg oral tablet: 1 tab(s) orally once a day (at bedtime) (09 Aug 2022 09:42)  omeprazole 40 mg oral delayed release capsule: 1 cap(s) orally once a day (09 Aug 2022 09:42)  verapamil 120 mg oral tablet: 1 tab(s) orally once a day (09 Aug 2022 09:42)  Vitamin D3 50 mcg (2000 intl units) oral tablet: 1 tab(s) orally once a day (09 Aug 2022 09:42)    ----  FAMILY HISTORY:  Family history of heart disease (Father)  father    Family history of prothrombin gene mutation  first cousin    Family history of renal failure (Child)    Family history of CHF (congestive heart failure) (Child)    Family history of macular degeneration (Sibling)    Family history of arrhythmia (Child)        ----  Allergies    No Known Allergies    Intolerances        ----  Social History:  Denies current alcohol, tobacco or drug use     ----  REVIEW OF SYSTEMS:  Unable to obtain meaningful ROS due to mental status    ----  PHYSICAL EXAM:  GENERAL: appears uncomfortable   EYES: sclera clear, no exudates  LUNGS: diminished bs at bases, on supplemental O2   HEART: soft S1/S2, regular rate and rhythm, no murmurs noted, no noted edema to bilateral lower extremities  GASTROINTESTINAL: abdomen is soft, nontender, nondistended, normoactive bowel sounds, no palpable masses  INTEGUMENT: good skin turgor, appropriate for ethnicity, appears well perfused, no jaundice noted  MUSCULOSKELETAL: R shoulder swelling, dressing clean/dry/intact, ROM limited due to pain  NEUROLOGIC: sleepy but arousable, no obvious sensory deficits    T(C): 36.5 (08-09-22 @ 08:37), Max: 36.5 (08-09-22 @ 08:37)  HR: 54 (08-09-22 @ 10:45) (54 - 67)  BP: 113/63 (08-09-22 @ 10:45) (107/69 - 144/77)  RR: 16 (08-09-22 @ 10:45) (14 - 16)  SpO2: 100% (08-09-22 @ 10:45) (99% - 100%)  Wt(kg): --    ----  INPATIENT MEDICATIONS  HYDROmorphone  Injectable 0.25 milliGRAM(s) IV Push every 10 minutes PRN  HYDROmorphone  Injectable 0.5 milliGRAM(s) IV Push every 10 minutes PRN  lactated ringers. 1000 milliLiter(s) IV Continuous <Continuous>  nitroglycerin     SubLingual 0.4 milliGRAM(s) SubLingual once  ondansetron Injectable 4 milliGRAM(s) IV Push once PRN      ----  I&O's Summary      LABS:          PT/INR - ( 09 Aug 2022 09:05 )   PT: 14.2 sec;   INR: 1.23 ratio         PTT - ( 09 Aug 2022 09:05 )  PTT:26.8 sec    CAPILLARY BLOOD GLUCOSE      POCT Blood Glucose.: 117 mg/dL (09 Aug 2022 12:34)                ----  Personally reviewed:  Vital sign trends: [ x ] yes    [  ] no     [  ] n/a  Laboratory results: [x  ] yes    [  ] no     [  ] n/a

## 2022-08-09 NOTE — CONSULT NOTE ADULT - SUBJECTIVE AND OBJECTIVE BOX
History of Present Illness: The patient is a 77 year old female with a history of HTN, DVT/PE, leadless pacemaker, non-obstructive CAD who presented for elective shoulder surgery. Post-operatively, she noted lower and left-sided chest discomfort. It is sharp, worse with inspiration, and worse to the touch. She notes associated shortness of breath. No dizziness, nausea, palpitations.    Past Medical/Surgical History:  HTN, DVT/PE, leadless pacemaker, non-obstructive CAD    Medications:  Home Medications:  atorvastatin 40 mg oral tablet: 1 tab(s) orally once a day (at bedtime) (09 Aug 2022 09:42)  ezetimibe 10 mg oral tablet: 1 tab(s) orally once a day (at bedtime) (09 Aug 2022 09:42)  omeprazole 40 mg oral delayed release capsule: 1 cap(s) orally once a day (09 Aug 2022 09:42)  verapamil 120 mg oral tablet: 1 tab(s) orally once a day (09 Aug 2022 09:42)  Vitamin D3 50 mcg (2000 intl units) oral tablet: 1 tab(s) orally once a day (09 Aug 2022 09:42)      Family History: Non-contributory family history of premature cardiovascular atherosclerotic disease    Social History: No tobacco, alcohol or drug use    Review of Systems:  General: No fevers, chills, weight gain  Skin: No rashes, color changes  Cardiovascular: +chest pain, orthopnea  Respiratory: +shortness of breath, cough  Gastrointestinal: No nausea, abdominal pain  Genitourinary: No incontinence, pain with urination  Musculoskeletal: No pain, swelling, decreased range of motion  Neurological: No headache, weakness  Psychiatric: No depression, anxiety  Endocrine: No weight gain, increased thirst  All other systems are comprehensively negative.    Physical Exam:  Vitals:        Vital Signs Last 24 Hrs  T(C): 36.4 (09 Aug 2022 12:16), Max: 36.5 (09 Aug 2022 08:37)  T(F): 97.6 (09 Aug 2022 12:16), Max: 97.7 (09 Aug 2022 08:37)  HR: 75 (09 Aug 2022 13:45) (54 - 133)  BP: 114/67 (09 Aug 2022 13:45) (102/70 - 155/89)  BP(mean): --  RR: 16 (09 Aug 2022 13:45) (14 - 26)  SpO2: 100% (09 Aug 2022 13:45) (98% - 100%)  Parameters below as of 09 Aug 2022 13:45  Patient On (Oxygen Delivery Method): BiPAP/CPAP,I/E ratio 8/4  O2 Concentration (%): 60  General: NAD  HEENT: MMM  Neck: No JVD, no carotid bruit  Lungs: CTAB  CV: RRR, nl S1/S2, no M/R/G  Abdomen: S/NT/ND, +BS  Extremities: No LE edema, no cyanosis  Neuro: AAOx3, non-focal  Skin: No rash    Labs:                        12.7   7.74  )-----------( 85       ( 09 Aug 2022 12:54 )             38.5     08-09    139  |  104  |  14  ----------------------------<  125<H>  3.8   |  23  |  0.88    Ca    9.0      09 Aug 2022 12:54  Phos  2.9     08-09  Mg     1.8     08-09    TPro  6.8  /  Alb  3.6  /  TBili  0.4  /  DBili  x   /  AST  37  /  ALT  34  /  AlkPhos  61  08-09    CARDIAC MARKERS ( 09 Aug 2022 13:06 )  x     / x     / 166 U/L / x     / 1.9 ng/mL      PT/INR - ( 09 Aug 2022 09:05 )   PT: 14.2 sec;   INR: 1.23 ratio         PTT - ( 09 Aug 2022 09:05 )  PTT:26.8 sec    ECG: NSR, LAD, poor R wave progression

## 2022-08-09 NOTE — ASU PREOP CHECKLIST - 1.
coags drawn and seen by Dr Kidd and Anesthesiologist coags drawn and results seen by Dr Kidd and Anesthesiologist

## 2022-08-10 ENCOUNTER — TRANSCRIPTION ENCOUNTER (OUTPATIENT)
Age: 77
End: 2022-08-10

## 2022-08-10 VITALS
HEART RATE: 66 BPM | OXYGEN SATURATION: 95 % | DIASTOLIC BLOOD PRESSURE: 61 MMHG | SYSTOLIC BLOOD PRESSURE: 116 MMHG | RESPIRATION RATE: 16 BRPM | TEMPERATURE: 98 F

## 2022-08-10 LAB
ALBUMIN SERPL ELPH-MCNC: 3.6 G/DL — SIGNIFICANT CHANGE UP (ref 3.3–5)
ALP SERPL-CCNC: 58 U/L — SIGNIFICANT CHANGE UP (ref 30–120)
ALT FLD-CCNC: 37 U/L DA — SIGNIFICANT CHANGE UP (ref 10–60)
ANION GAP SERPL CALC-SCNC: 6 MMOL/L — SIGNIFICANT CHANGE UP (ref 5–17)
AST SERPL-CCNC: 26 U/L — SIGNIFICANT CHANGE UP (ref 10–40)
BASOPHILS # BLD AUTO: 0.01 K/UL — SIGNIFICANT CHANGE UP (ref 0–0.2)
BASOPHILS NFR BLD AUTO: 0.1 % — SIGNIFICANT CHANGE UP (ref 0–2)
BILIRUB SERPL-MCNC: 0.3 MG/DL — SIGNIFICANT CHANGE UP (ref 0.2–1.2)
BUN SERPL-MCNC: 14 MG/DL — SIGNIFICANT CHANGE UP (ref 7–23)
CALCIUM SERPL-MCNC: 9.1 MG/DL — SIGNIFICANT CHANGE UP (ref 8.4–10.5)
CHLORIDE SERPL-SCNC: 106 MMOL/L — SIGNIFICANT CHANGE UP (ref 96–108)
CO2 SERPL-SCNC: 28 MMOL/L — SIGNIFICANT CHANGE UP (ref 22–31)
CREAT SERPL-MCNC: 0.9 MG/DL — SIGNIFICANT CHANGE UP (ref 0.5–1.3)
EGFR: 65 ML/MIN/1.73M2 — SIGNIFICANT CHANGE UP
EOSINOPHIL # BLD AUTO: 0 K/UL — SIGNIFICANT CHANGE UP (ref 0–0.5)
EOSINOPHIL NFR BLD AUTO: 0 % — SIGNIFICANT CHANGE UP (ref 0–6)
GLUCOSE SERPL-MCNC: 136 MG/DL — HIGH (ref 70–99)
HCT VFR BLD CALC: 35.7 % — SIGNIFICANT CHANGE UP (ref 34.5–45)
HGB BLD-MCNC: 12.3 G/DL — SIGNIFICANT CHANGE UP (ref 11.5–15.5)
IMM GRANULOCYTES NFR BLD AUTO: 0.3 % — SIGNIFICANT CHANGE UP (ref 0–1.5)
LYMPHOCYTES # BLD AUTO: 2.77 K/UL — SIGNIFICANT CHANGE UP (ref 1–3.3)
LYMPHOCYTES # BLD AUTO: 21.7 % — SIGNIFICANT CHANGE UP (ref 13–44)
MAGNESIUM SERPL-MCNC: 2.5 MG/DL — SIGNIFICANT CHANGE UP (ref 1.6–2.6)
MCHC RBC-ENTMCNC: 30.1 PG — SIGNIFICANT CHANGE UP (ref 27–34)
MCHC RBC-ENTMCNC: 34.5 GM/DL — SIGNIFICANT CHANGE UP (ref 32–36)
MCV RBC AUTO: 87.5 FL — SIGNIFICANT CHANGE UP (ref 80–100)
MONOCYTES # BLD AUTO: 0.63 K/UL — SIGNIFICANT CHANGE UP (ref 0–0.9)
MONOCYTES NFR BLD AUTO: 4.9 % — SIGNIFICANT CHANGE UP (ref 2–14)
NEUTROPHILS # BLD AUTO: 9.33 K/UL — HIGH (ref 1.8–7.4)
NEUTROPHILS NFR BLD AUTO: 73 % — SIGNIFICANT CHANGE UP (ref 43–77)
NRBC # BLD: 0 /100 WBCS — SIGNIFICANT CHANGE UP (ref 0–0)
PHOSPHATE SERPL-MCNC: 3.1 MG/DL — SIGNIFICANT CHANGE UP (ref 2.5–4.5)
PLATELET # BLD AUTO: 166 K/UL — SIGNIFICANT CHANGE UP (ref 150–400)
POTASSIUM SERPL-MCNC: 4.2 MMOL/L — SIGNIFICANT CHANGE UP (ref 3.5–5.3)
POTASSIUM SERPL-SCNC: 4.2 MMOL/L — SIGNIFICANT CHANGE UP (ref 3.5–5.3)
PROT SERPL-MCNC: 6.8 G/DL — SIGNIFICANT CHANGE UP (ref 6–8.3)
RBC # BLD: 4.08 M/UL — SIGNIFICANT CHANGE UP (ref 3.8–5.2)
RBC # FLD: 13.7 % — SIGNIFICANT CHANGE UP (ref 10.3–14.5)
SODIUM SERPL-SCNC: 140 MMOL/L — SIGNIFICANT CHANGE UP (ref 135–145)
WBC # BLD: 12.78 K/UL — HIGH (ref 3.8–10.5)
WBC # FLD AUTO: 12.78 K/UL — HIGH (ref 3.8–10.5)

## 2022-08-10 PROCEDURE — 85610 PROTHROMBIN TIME: CPT

## 2022-08-10 PROCEDURE — 85025 COMPLETE CBC W/AUTO DIFF WBC: CPT

## 2022-08-10 PROCEDURE — 71045 X-RAY EXAM CHEST 1 VIEW: CPT | Mod: 26

## 2022-08-10 PROCEDURE — 80053 COMPREHEN METABOLIC PANEL: CPT

## 2022-08-10 PROCEDURE — 36415 COLL VENOUS BLD VENIPUNCTURE: CPT

## 2022-08-10 PROCEDURE — 82962 GLUCOSE BLOOD TEST: CPT

## 2022-08-10 PROCEDURE — 83735 ASSAY OF MAGNESIUM: CPT

## 2022-08-10 PROCEDURE — 99238 HOSP IP/OBS DSCHRG MGMT 30/<: CPT

## 2022-08-10 PROCEDURE — 82550 ASSAY OF CK (CPK): CPT

## 2022-08-10 PROCEDURE — 84484 ASSAY OF TROPONIN QUANT: CPT

## 2022-08-10 PROCEDURE — 84100 ASSAY OF PHOSPHORUS: CPT

## 2022-08-10 PROCEDURE — 82553 CREATINE MB FRACTION: CPT

## 2022-08-10 PROCEDURE — 71045 X-RAY EXAM CHEST 1 VIEW: CPT

## 2022-08-10 PROCEDURE — 82803 BLOOD GASES ANY COMBINATION: CPT

## 2022-08-10 PROCEDURE — 94664 DEMO&/EVAL PT USE INHALER: CPT

## 2022-08-10 PROCEDURE — 93005 ELECTROCARDIOGRAM TRACING: CPT

## 2022-08-10 PROCEDURE — 97161 PT EVAL LOW COMPLEX 20 MIN: CPT

## 2022-08-10 PROCEDURE — 85730 THROMBOPLASTIN TIME PARTIAL: CPT

## 2022-08-10 PROCEDURE — 97165 OT EVAL LOW COMPLEX 30 MIN: CPT

## 2022-08-10 RX ORDER — APIXABAN 2.5 MG/1
1 TABLET, FILM COATED ORAL
Qty: 1 | Refills: 0
Start: 2022-08-10 | End: 2022-08-10

## 2022-08-10 RX ADMIN — APIXABAN 2.5 MILLIGRAM(S): 2.5 TABLET, FILM COATED ORAL at 08:03

## 2022-08-10 RX ADMIN — OXYCODONE HYDROCHLORIDE 5 MILLIGRAM(S): 5 TABLET ORAL at 13:13

## 2022-08-10 RX ADMIN — OXYCODONE HYDROCHLORIDE 5 MILLIGRAM(S): 5 TABLET ORAL at 12:31

## 2022-08-10 NOTE — OCCUPATIONAL THERAPY INITIAL EVALUATION ADULT - ADDITIONAL COMMENTS
Private  house  KRISTYN +railing lives on 1 st floor +stall   family will assist with all activities   OT reviewed with pt how to perform ADL's  Right hand dominant

## 2022-08-10 NOTE — PHYSICAL THERAPY INITIAL EVALUATION ADULT - GENERAL OBSERVATIONS, REHAB EVAL
Pt received supine in bed. All lines intact. Pt agreeable to physical therapy. + telemetry + right UE sling +SCD

## 2022-08-10 NOTE — DISCHARGE NOTE PROVIDER - NSDCCPCAREPLAN_GEN_ALL_CORE_FT
PRINCIPAL DISCHARGE DIAGNOSIS  Diagnosis: Pain in the chest  Assessment and Plan of Treatment: Post Surgery and appears to be musculoskeletal related.  Cardiac evaluatoin was done and an acute cardiac event was ruled out.

## 2022-08-10 NOTE — DISCHARGE NOTE PROVIDER - HOSPITAL COURSE
The patient is a 77 year old female with a history of HTN, DVT/PE, leadless pacemaker, non-obstructive CAD who presented for elective shoulder surgery. Post-operatively, she noted lower and left-sided chest discomfort. It is sharp, worse with inspiration, and worse to the touch. She notes associated shortness of breath. No dizziness, nausea, palpitations.      - There is some reproducibility of pain on exam suggestive of a musculoskeletal etiology. Diaphragmatic issues post-anesthesia remains a possibility as well.  - ECG with no evidence of ischemia or infarction. Unchanged from prior.  - An acute MI is ruled out with two sets of cardiac enzymes  - CXR with atelectasis and poor inspiration  - Continue anticoagulation for DVT/PE  - Continue verapamil 120 mg daily  - Continue atorvastatin 40 mg daily  - Discharge home and follow up with Orthopedics

## 2022-08-10 NOTE — PROGRESS NOTE ADULT - ASSESSMENT
78 yo female presents with 1 year history of right shoulder pain    dyspnea  resp distress  s/p right shoulder surgery  OP  OA  GERD  HTN    vs noted  pain reported  caution with Opioids  CXR this am reviewed  cardio follow up  SPCU monitored overnight    labs reviewed  cxr noted  enc use of I yolanda  o2 support - wean down as tolerated - keep sat > 88 pct  pulse ox and card monitoring  s/p NIPPV  alert - verbal -   vs noted  spoke with Anesthesia and PACU RN  s/p nerve block for R Shoulder Surgery  dvt p  monitor Plt  assist with needs - ADL  OT - PT eval  old records reviewed  pain assessment - caution with Opioids -

## 2022-08-10 NOTE — OCCUPATIONAL THERAPY INITIAL EVALUATION ADULT - RANGE OF MOTION EXAMINATION, UPPER EXTREMITY
Right Shld n/t due to surgical precautions elbow-digits WFL"s/Left UE Active ROM was WNL (within normal limits)

## 2022-08-10 NOTE — DISCHARGE NOTE NURSING/CASE MANAGEMENT/SOCIAL WORK - PATIENT PORTAL LINK FT
You can access the FollowMyHealth Patient Portal offered by [facility] by registering at the following website: [email or web address]. By joining [employer]’s FollowMyHealth portal, you will also be able to view your health information using other applications (apps) compatible with our system.

## 2022-08-10 NOTE — PROGRESS NOTE ADULT - ASSESSMENT
The patient is a 77 year old female with a history of HTN, DVT/PE, leadless pacemaker, non-obstructive CAD who presented for elective shoulder surgery with post-operative chest pain.    Plan:  - There is some reproducibility of pain on exam suggestive of a musculoskeletal etiology. Diaphragmatic issues post-anesthesia remains a possibility as well.  - ECG with no evidence of ischemia or infarction. Unchanged from prior.  - An acute MI is ruled out with two sets of cardiac enzymes  - CXR with atelectasis and poor inspiration  - Continue anticoagulation for DVT/PE  - Continue verapamil 120 mg daily  - Continue atorvastatin 40 mg daily  - Discharge planning

## 2022-08-10 NOTE — PHYSICAL THERAPY INITIAL EVALUATION ADULT - PERTINENT HX OF CURRENT PROBLEM, REHAB EVAL
78 y/o female with pmhx of HTN, DVT admitted on 8/9 for elective shoulder surgery underwent right shoulder arthroscopy, subacromial decompression, biceps release, and labrum debridement with post op course complicated by development of chest pain radiating to left arm. EKG no ischemic changes. Troponin negative x 2. admitted to spcu post op for monitoring.

## 2022-08-10 NOTE — PATIENT PROFILE ADULT - FALL HARM RISK - HARM RISK INTERVENTIONS

## 2022-08-10 NOTE — PROGRESS NOTE ADULT - SUBJECTIVE AND OBJECTIVE BOX
Cardiology called for evaluation since patient complaining of chest pain.  Patient given nitroglycerin with little to no improvement in chest pain.  Troponins sent which were negative and current EKG reviewed with previous which demonstrates no marked acute changes.  
Patient is a 77y old  Female who presents with a chief complaint of right shoulder surgery (09 Aug 2022 13:36)      BRIEF HOSPITAL COURSE: 78 y/o female with pmhx of HTN, DVT admitted on 8/9 for elective shoulder surgery underwent right shoulder arthroscopy, subacromial decompression, biceps release, and labrum debridement with post op course complicated by development of chest pain radiating to left arm. EKG no ischemic changes. Troponin negative x 2. admitted to spcu post op for monitoring.     Events last 24 hours: chest pain resolved. resting comfortably in bed.    PAST MEDICAL & SURGICAL HISTORY:  HTN - Hypertension      Herniated lumbar intervertebral disc      DVT (deep venous thrombosis), right  october 2013      GERD (gastroesophageal reflux disease)      Prothrombin gene mutation      Osteoarthritis of left knee      Pacemaker  implanted 9/28/18      Lower back pain      BMI 30.0-30.9,adult      History of pulmonary embolus (PE)  2012, 2018 on warfarin, cause unknown      Tachy-artur syndrome      Cervical herniated disc  x3, unsure levels      Right shoulder pain      Bicipital tendinitis, right      CAD (coronary artery disease)  30% blockage LAD      Heart murmur      Hyperlipidemia      COVID-19 vaccine series completed      Heart attack  4/17/21, 1 day after second pfizer vaccine      Neck pain      Osteoarthritis      Leg swelling  bilateral right &gt;left      Early cataracts, bilateral      Obesity, Class I, BMI 30-34.9      S/P Breast Lumpectomy  2000&#x27;s, 2004 all benign      S/P arthroscopy of right knee  2012      S/P tubal ligation  age 30      History of carpal tunnel surgery of right wrist  1990&#x27;s      Artificial pacemaker  medtronic [id number], [employer]  TCP      History of lumbar spinal fusion  2018, L5-S1      History of left knee replacement  2019          Review of Systems:  CONSTITUTIONAL: No fever, chills, or fatigue  EYES: No eye pain, visual disturbances, or discharge  ENMT:  No difficulty hearing, tinnitus, vertigo; No sinus or throat pain  NECK: No pain or stiffness  RESPIRATORY: No cough, wheezing, chills or hemoptysis; No shortness of breath  CARDIOVASCULAR: No chest pain, palpitations, dizziness, or leg swelling  GASTROINTESTINAL: No abdominal or epigastric pain. No nausea, vomiting, or hematemesis; No diarrhea or constipation. No melena or hematochezia.  GENITOURINARY: No dysuria, frequency, hematuria, or incontinence  NEUROLOGICAL: No headaches, memory loss, loss of strength, numbness, or tremors  SKIN: No itching, burning, rashes, or lesions   MUSCULOSKELETAL: No joint pain or swelling; No muscle, back, or extremity pain  PSYCHIATRIC: No depression, anxiety, mood swings, or difficulty sleeping      Medications:    verapamil  milliGRAM(s) Oral daily      HYDROmorphone  Injectable 0.5 milliGRAM(s) IV Push every 3 hours PRN  ondansetron Injectable 4 milliGRAM(s) IV Push every 6 hours PRN  oxyCODONE    IR 10 milliGRAM(s) Oral every 3 hours PRN  oxyCODONE    IR 5 milliGRAM(s) Oral every 3 hours PRN            atorvastatin 40 milliGRAM(s) Oral at bedtime                  ICU Vital Signs Last 24 Hrs  T(C): 36.1 (09 Aug 2022 16:15), Max: 36.5 (09 Aug 2022 08:37)  T(F): 97 (09 Aug 2022 16:15), Max: 97.7 (09 Aug 2022 08:37)  HR: 74 (09 Aug 2022 17:46) (54 - 133)  BP: 111/64 (09 Aug 2022 17:00) (100/57 - 155/89)  BP(mean): 78 (09 Aug 2022 17:00) (78 - 95)  ABP: --  ABP(mean): --  RR: 21 (09 Aug 2022 17:00) (14 - 26)  SpO2: 97% (09 Aug 2022 17:46) (97% - 100%)    O2 Parameters below as of 09 Aug 2022 17:00  Patient On (Oxygen Delivery Method): nasal cannula  O2 Flow (L/min): 2              I&O's Detail    09 Aug 2022 07:01  -  09 Aug 2022 19:36  --------------------------------------------------------  IN:    IV PiggyBack: 50 mL    Lactated Ringers: 1250 mL    Oral Fluid: 200 mL  Total IN: 1500 mL    OUT:    Blood Loss (mL): 5 mL    Voided (mL): 450 mL  Total OUT: 455 mL    Total NET: 1045 mL            LABS:                        12.7   7.74  )-----------( 85       ( 09 Aug 2022 12:54 )             38.5     08-09    139  |  104  |  14  ----------------------------<  125<H>  3.8   |  23  |  0.88    Ca    9.0      09 Aug 2022 12:54  Phos  2.9     08-09  Mg     1.8     08-09    TPro  6.8  /  Alb  3.6  /  TBili  0.4  /  DBili  x   /  AST  37  /  ALT  34  /  AlkPhos  61  08-09      CARDIAC MARKERS ( 09 Aug 2022 13:06 )  x     / x     / 166 U/L / x     / 1.9 ng/mL      CAPILLARY BLOOD GLUCOSE      POCT Blood Glucose.: 117 mg/dL (09 Aug 2022 12:34)    PT/INR - ( 09 Aug 2022 17:22 )   PT: 13.6 sec;   INR: 1.18 ratio         PTT - ( 09 Aug 2022 17:16 )  PTT:30.6 sec    CULTURES:      Physical Examination:    General: No acute distress.      HEENT: Pupils equal, reactive to light.  Symmetric.    PULM: Clear to auscultation bilaterally, no significant sputum production    NECK: Supple, no lymphadenopathy, trachea midline    CVS: Regular rate and rhythm, no murmurs, rubs, or gallops    ABD: Soft, nondistended, nontender, normoactive bowel sounds, no masses    EXT: No edema, nontender    SKIN: Warm and well perfused, no rashes noted.    NEURO: Alert, oriented, interactive, nonfocal    DEVICES: none  
Subjective: Patient seen and examined. No overnight events. Wants to go home.  Chest pain is negative and resolved.     MEDICATIONS  (STANDING):  apixaban 2.5 milliGRAM(s) Oral every 12 hours  atorvastatin 40 milliGRAM(s) Oral at bedtime  verapamil  milliGRAM(s) Oral daily    MEDICATIONS  (PRN):  HYDROmorphone  Injectable 0.5 milliGRAM(s) IV Push every 3 hours PRN breakthrough  ondansetron Injectable 4 milliGRAM(s) IV Push every 6 hours PRN Nausea and/or Vomiting  oxyCODONE    IR 10 milliGRAM(s) Oral every 3 hours PRN Severe Pain (7 - 10)  oxyCODONE    IR 5 milliGRAM(s) Oral every 3 hours PRN Moderate Pain (4 - 6)      Allergies    No Known Allergies    Intolerances        Vital Signs Last 24 Hrs  T(C): 36.5 (10 Aug 2022 08:30), Max: 36.9 (10 Aug 2022 04:43)  T(F): 97.7 (10 Aug 2022 08:30), Max: 98.4 (10 Aug 2022 04:43)  HR: 56 (10 Aug 2022 08:00) (54 - 133)  BP: 104/55 (10 Aug 2022 08:00) (86/52 - 155/89)  BP(mean): 71 (10 Aug 2022 08:00) (64 - 95)  RR: 16 (10 Aug 2022 08:00) (14 - 26)  SpO2: 96% (10 Aug 2022 08:00) (95% - 100%)    Parameters below as of 10 Aug 2022 08:00  Patient On (Oxygen Delivery Method): room air        PHYSICAL EXAM:  GENERAL: NAD, well-groomed, well-developed  HEAD:  Atraumatic, Normocephalic  ENMT: Moist mucous membranes,   NECK: Supple, No JVD, Normal thyroid  NERVOUS SYSTEM:  All 4 extremities mobile, no gross sensory deficits.   CHEST/LUNG: Clear to auscultation bilaterally; No rales, rhonchi, wheezing, or rubs  HEART: Regular rate and rhythm; No murmurs, rubs, or gallops  ABDOMEN: Soft, Nontender, Nondistended; Bowel sounds present  EXTREMITIES:  2+ Peripheral Pulses, No clubbing, cyanosis, or edema      LABS:                        12.3   12.78 )-----------( 166      ( 10 Aug 2022 06:00 )             35.7     10 Aug 2022 06:00    140    |  106    |  14     ----------------------------<  136    4.2     |  28     |  0.90     Ca    9.1        10 Aug 2022 06:00  Phos  3.1       10 Aug 2022 06:00  Mg     2.5       10 Aug 2022 06:00    TPro  6.8    /  Alb  3.6    /  TBili  0.3    /  DBili  x      /  AST  26     /  ALT  37     /  AlkPhos  58     10 Aug 2022 06:00    PT/INR - ( 09 Aug 2022 17:22 )   PT: 13.6 sec;   INR: 1.18 ratio         PTT - ( 09 Aug 2022 17:16 )  PTT:30.6 sec    CAPILLARY BLOOD GLUCOSE      POCT Blood Glucose.: 117 mg/dL (09 Aug 2022 12:34)      RADIOLOGY & ADDITIONAL TESTS:    Imaging Personally Reviewed:  [ ] YES     Consultant(s) Notes Reviewed:      Care Discussed with Consultants/Other Providers:    Advanced Directives: [ ] DNR  [ ] No feeding tube  [ ] MOLST in chart  [ ] MOLST completed today  [ ] Unknown  
Chief Complaint: Shoulder surgery    Interval Events: No events overnight.    Review of Systems:  General: No fevers, chills, weight gain  Skin: No rashes, color changes  Cardiovascular: No chest pain, orthopnea  Respiratory: No shortness of breath, cough  Gastrointestinal: No nausea, abdominal pain  Genitourinary: No incontinence, pain with urination  Musculoskeletal: No pain, swelling, decreased range of motion  Neurological: No headache, weakness  Psychiatric: No depression, anxiety  Endocrine: No weight gain, increased thirst  All other systems are comprehensively negative.    Physical Exam:  Vitals:        Vital Signs Last 24 Hrs  T(C): 36.5 (10 Aug 2022 08:30), Max: 36.9 (10 Aug 2022 04:43)  T(F): 97.7 (10 Aug 2022 08:30), Max: 98.4 (10 Aug 2022 04:43)  HR: 56 (10 Aug 2022 08:00) (54 - 133)  BP: 104/55 (10 Aug 2022 08:00) (86/52 - 155/89)  BP(mean): 71 (10 Aug 2022 08:00) (64 - 95)  RR: 16 (10 Aug 2022 08:00) (14 - 26)  SpO2: 96% (10 Aug 2022 08:00) (95% - 100%)  Parameters below as of 10 Aug 2022 08:00  Patient On (Oxygen Delivery Method): room air  General: NAD  HEENT: MMM  Neck: No JVD, no carotid bruit  Lungs: CTAB  CV: RRR, nl S1/S2, no M/R/G  Abdomen: S/NT/ND, +BS  Extremities: No LE edema, no cyanosis  Neuro: AAOx3, non-focal  Skin: No rash    Labs:                        12.3   12.78 )-----------( 166      ( 10 Aug 2022 06:00 )             35.7     08-10    140  |  106  |  14  ----------------------------<  136<H>  4.2   |  28  |  0.90    Ca    9.1      10 Aug 2022 06:00  Phos  3.1     08-10  Mg     2.5     08-10    TPro  6.8  /  Alb  3.6  /  TBili  0.3  /  DBili  x   /  AST  26  /  ALT  37  /  AlkPhos  58  08-10    CARDIAC MARKERS ( 09 Aug 2022 13:06 )  x     / x     / 166 U/L / x     / 1.9 ng/mL      PT/INR - ( 09 Aug 2022 17:22 )   PT: 13.6 sec;   INR: 1.18 ratio         PTT - ( 09 Aug 2022 17:16 )  PTT:30.6 sec    Telemetry: Sinus rhythm
Date/Time Patient Seen:  		  Referring MD:   Data Reviewed	       Patient is a 77y old  Female who presents with a chief complaint of right shoulder surgery (09 Aug 2022 13:36)      Subjective/HPI     PAST MEDICAL & SURGICAL HISTORY:  HTN - Hypertension    Hx of Breast Cancer    PE (pulmonary embolism)  3 yrs ago, was on Lovenox and coumadin    Lower back pain    Herniated lumbar intervertebral disc    DVT (deep venous thrombosis), right  october 2013    GERD (gastroesophageal reflux disease)    Prothrombin gene mutation    Osteoarthritis of left knee    Pacemaker  implanted 9/28/18    Lower back pain    BMI 30.0-30.9,adult    History of pulmonary embolus (PE)  2012, 2018 on warfarin, cause unknown    Tachy-artur syndrome    Cervical herniated disc  x3, unsure levels    Right shoulder pain    Bicipital tendinitis, right    CAD (coronary artery disease)  30% blockage LAD    Heart murmur    Hyperlipidemia    COVID-19 vaccine series completed    Heart attack  4/17/21, 1 day after second pfizer vaccine    Neck pain    Osteoarthritis    Leg swelling  bilateral right &gt;left    Early cataracts, bilateral    Obesity, Class I, BMI 30-34.9    S/P Wrist Surgery    h/o Wrist Surgery    S/P Breast Lumpectomy  2000&#x27;s, 2004 all benign    S/P arthroscopy of right knee  2012    S/P tubal ligation  age 30    History of carpal tunnel surgery of right wrist  1990&#x27;s    History of lumbar surgery  2018    History of total knee replacement, unspecified laterality    Artificial pacemaker  medtronic [id number], [employer]  TCP    History of lumbar spinal fusion  2018, L5-S1    History of left knee replacement  2019          Medication list         MEDICATIONS  (STANDING):  apixaban 2.5 milliGRAM(s) Oral every 12 hours  atorvastatin 40 milliGRAM(s) Oral at bedtime  verapamil  milliGRAM(s) Oral daily    MEDICATIONS  (PRN):  HYDROmorphone  Injectable 0.5 milliGRAM(s) IV Push every 3 hours PRN breakthrough  ondansetron Injectable 4 milliGRAM(s) IV Push every 6 hours PRN Nausea and/or Vomiting  oxyCODONE    IR 10 milliGRAM(s) Oral every 3 hours PRN Severe Pain (7 - 10)  oxyCODONE    IR 5 milliGRAM(s) Oral every 3 hours PRN Moderate Pain (4 - 6)         Vitals log        ICU Vital Signs Last 24 Hrs  T(C): 36.9 (10 Aug 2022 04:43), Max: 36.9 (10 Aug 2022 04:43)  T(F): 98.4 (10 Aug 2022 04:43), Max: 98.4 (10 Aug 2022 04:43)  HR: 54 (10 Aug 2022 06:00) (54 - 133)  BP: 121/65 (10 Aug 2022 06:00) (86/52 - 155/89)  BP(mean): 83 (10 Aug 2022 06:00) (64 - 95)  ABP: --  ABP(mean): --  RR: 15 (10 Aug 2022 06:00) (14 - 26)  SpO2: 97% (10 Aug 2022 06:00) (95% - 100%)    O2 Parameters below as of 10 Aug 2022 06:00  Patient On (Oxygen Delivery Method): nasal cannula  O2 Flow (L/min): 2               Input and Output:  I&O's Detail    09 Aug 2022 07:01  -  10 Aug 2022 06:57  --------------------------------------------------------  IN:    IV PiggyBack: 50 mL    Lactated Ringers: 1250 mL    Oral Fluid: 200 mL  Total IN: 1500 mL    OUT:    Blood Loss (mL): 5 mL    Voided (mL): 1350 mL  Total OUT: 1355 mL    Total NET: 145 mL          Lab Data                        12.3   12.78 )-----------( x        ( 10 Aug 2022 06:00 )             35.7     08-09    139  |  104  |  14  ----------------------------<  125<H>  3.8   |  23  |  0.88    Ca    9.0      09 Aug 2022 12:54  Phos  2.9     08-09  Mg     1.8     08-09    TPro  6.8  /  Alb  3.6  /  TBili  0.4  /  DBili  x   /  AST  37  /  ALT  34  /  AlkPhos  61  08-09      CARDIAC MARKERS ( 09 Aug 2022 13:06 )  x     / x     / 166 U/L / x     / 1.9 ng/mL        Review of Systems	      Objective     Physical Examination    heart s1s2  lung dec BS  abd soft  head nc      Pertinent Lab findings & Imaging      Ha:  NO   Adequate UO     I&O's Detail    09 Aug 2022 07:01  -  10 Aug 2022 06:57  --------------------------------------------------------  IN:    IV PiggyBack: 50 mL    Lactated Ringers: 1250 mL    Oral Fluid: 200 mL  Total IN: 1500 mL    OUT:    Blood Loss (mL): 5 mL    Voided (mL): 1350 mL  Total OUT: 1355 mL    Total NET: 145 mL               Discussed with:     Cultures:	        Radiology                            
Patient seen and assessed.  Patient with post operative respiratory failure.   Patient with interscalene nerve block performed for surgical procedure.  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  Pulmonology called and patient to be seen, advised to start CPAP.

## 2022-08-10 NOTE — DISCHARGE NOTE PROVIDER - NSDCMRMEDTOKEN_GEN_ALL_CORE_FT
atorvastatin 40 mg oral tablet: 1 tab(s) orally once a day (at bedtime)  Eliquis 5 mg oral tablet: 1 tab(s) orally every 12 hours start thurs8/11/22  ezetimibe 10 mg oral tablet: 1 tab(s) orally once a day (at bedtime)  omeprazole 40 mg oral delayed release capsule: 1 cap(s) orally once a day  oxycodone-acetaminophen 5 mg-325 mg oral tablet: 1 tab(s) orally every 6 hours, As Needed -for moderate pain MDD:4  verapamil 120 mg oral tablet: 1 tab(s) orally once a day  Vitamin D3 50 mcg (2000 intl units) oral tablet: 1 tab(s) orally once a day

## 2022-08-10 NOTE — DISCHARGE NOTE NURSING/CASE MANAGEMENT/SOCIAL WORK - NSDCPEFALRISK_GEN_ALL_CORE
For information on Fall & Injury Prevention, visit: [email or web address] OR  [email or web address] OR  https://www.cdc.gov/steadi/patient.html

## 2022-08-10 NOTE — DISCHARGE NOTE NURSING/CASE MANAGEMENT/SOCIAL WORK - NSDCVIVACCINE_GEN_ALL_CORE_FT
influenza, injectable, quadrivalent, preservative free; 01-Oct-2018 11:55; Brissa Shore (CESAR); [employer]; GY29L (Exp. Date: 30-Jun-2019); IntraMuscular; Deltoid Left.; 0.5 milliLiter(s); VIS (VIS Published: 07-Aug-2015, VIS Presented: 01-Oct-2018);

## 2022-08-10 NOTE — PROGRESS NOTE ADULT - ASSESSMENT
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 Unchanged from prior.  2 Sets of troponin negative.   Continue verapamil 120 mg daily  Continue atorvastatin 40 mg daily    Thrombocytopenia  Resolved    DVT ppx  Per primary team  Outpatient heme recommend Eliquis as per discussion with Dr. Kidd  Patient medically optimized for discharge home if cleared by PT and Ortho.

## 2022-08-10 NOTE — DISCHARGE NOTE PROVIDER - NSDCFUSCHEDAPPT_GEN_ALL_CORE_FT
[facility]  Cardio Electro [address]  Scheduled Appointment: 08/17/2022    [facility]  ELECTROPH [address]  Scheduled Appointment: 10/06/2022

## 2022-08-15 NOTE — ED PROVIDER NOTE - CONSTITUTIONAL NEGATIVE STATEMENT, MLM
NEW PATIENT VISIT  [place] [facility] INTERVENTIONAL PAIN MANAGEMENT    REFERRING PROVIDER: Boris Restrepo MD    CHIEF COMPLAINT:   Chief Complaint   Patient presents with   • Office Visit   • Consultation     Referral Boris Restrepo MD    • Back Pain     Lower      • Buttocks Pain     Right    • Leg Pain     Right      ROS and PFSH reviewed with patient.    HISTORY OF PRESENT ILLNESS:   Patient is a 54 year old female being seen today for an initial visit with [facility] with pmhx significant for sciatica, sprain of MCL, chondromalacia of patella, marijuana use (last used 2014). PDMP reviewed.     Reports low back pain started in 2006 after she fell down some steps and ended up shattering her tailbone. She eventually had surgery to get this removed, and unfortunately fell down the same stairs a year later and has struggled with chronic back pain since then. She has seen various pain managements over the years and was most recently seeing Dr. Lee. Dr. Lee suggested she transfer to a pain management that does med management only because he focuses mostly on interventional procedures, and she was not interested in these. Pain is axial with radiation to her right leg. Pain is described as stabbing, burning, and throbbing. Pain is aggravated with transitioning from sitting to standing, as well as prolonged walking/standing. Pain is alleviated with her medication and sometimes heat/cold.  Denies any loss of bowel or bladder control or any saddle anesthesia. She reports tingling in her right toes. She denies any numbness, or weakness of the LE's. Past treatments as documented below. Current pain 7/10.     She is currently prescribed Norco  QID PRN. She continues this with benefit, but also feels as if it is not effective as it used to be. She last took this morning.     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 Tramadol/Morphine/Opana-ineffective, Norco/Percocet-effective    Underlying/coexisting conditions:  sciatica, sprain of MCL, chondromalacia of patella, marijuana use    Family member impression (if available): Not applicable  Name of last practitioner treating pain: Boris Restrepo MD                Reason for Leaving them: pain management    PAIN SEVERITY SCORE 8/10  PAIN INTERFERENCE SCORE 6/10   MOOD SCORE 6/10  Reports mood is not from depression and/or sadness.  No further evaluation is necessary, will continue to monitor.  Pt denies SI or thoughts to hurt self or others.    PAST TREATMENTS:   Physical Therapy: Yes-ineffective  Chiropractor: No  Acupuncture: Yes-effective  Massage: No  Epidurals/Injections: Yes-ineffective  Trigger Point Injections: No    TENS: Ineffective  Muscle Relaxers: Tizanidine-effective at night, Skelaxin-inefective  NSAIDS: Ibuprofen-ineffective, Naproxen-ineffective, Voltaren-ineffective, Celebrex-ineffective, Mobic-ineffective  Gabapentin: Yes-ineffective may have caused side effects  Lyrica: Yes-ineffective   Cymbalta: Yes-ineffective  Effexor: No  Topicals: Voltaren 1% gel-ineffective, lidocaine patches-ineffective, lidocaine 4% cream-temporary relief  Tylenol-ineffective    CURRENT MEDICATIONS FOR PAIN:   Topamax 25 mg up to TID: started today  Voltaren 1% gel  Norco  QID PRN  Tizanidine 4 mg at bedtime PRN    PAST MEDICAL HISTORY:     Esophageal reflux                                             Other and unspecified hyperlipidemia                          Depression                                                    Chronic back pain                                             Urinary tract infection                                         Comment: HX    Anxiety                                                       SOB (shortness of breath)                                       Comment: COUGH, SOB WITH OVEREXERTION- USES INHALER PRN    Difficulty in  urination                                         Comment: \" WITH EXTREME STRESS\" ; HAD CATHETER IN PAST-                2012    Mastoiditis                                     2004            Comment: HX    Skin yeast infection                                            Comment: CHRONIC UNDER BREASTS- USES POWDER/ CREAM    Personal history of traumatic fracture                          Comment: COCCYX- SURGERY  ALLERGIES:  Augmentin [amoclan], Flagyl [metronidazole hcl], and Codeine    MEDICATIONS:   Per Epic record    SOCIAL HISTORY:   Social History     Socioeconomic History   • Marital status: /Civil Union     Spouse name: Not on file   • Number of children: Not on file   • Years of education: Not on file   • Highest education level: Not on file   Occupational History   • Not on file   Tobacco Use   • Smoking status: Current Some Day Smoker     Types: Cigarettes   • Smokeless tobacco: Never Used   • Tobacco comment: pack lasts 4 to 5 days   Substance and Sexual Activity   • Alcohol use: No   • Drug use: Yes     Types: Marijuana, Other     Comment: Hasn't for year   • Sexual activity: Not on file   Other Topics Concern   • Not on file   Social History Narrative   • Not on file     Social Determinants of Health     Financial Resource Strain: Not on file   Food Insecurity: Not on file   Transportation Needs: Not on file   Physical Activity: Not on file   Stress: Not on file   Social Connections: Not on file   Intimate Partner Violence: Not on file      Family History   Problem Relation Age of Onset   • Heart disease Sister       PATIENT REPORTS:  Smoker? : Yes, one pack every 4-5 days  Drinks alcohol? : No  Treated for addiction? : No  Ever used illegal drugs in last 5 years? : No    REVIEW OF SYSTEMS:  General: Fatigue and feeling tired  Skin:  None  HEENT:  None  Cardiovascular/Pulmonary:  None  Gastrointestinal:  None   Genitourinary:  None  Hematological:  Easy bruising  Musculoskeletal:  Back  pain  Neurological:  Numbness  Psychological:  Depression    PHYSICAL EXAMINATION:  Visit Vitals  Pulse 65   Wt 121.4 kg (267 lb 9.6 oz)   SpO2 98%   BMI 45.93 kg/m²   GENERAL: Patient is a 54 year old female in no distress. Pleasant and appropriate, well-groomed, appears stated age. Does not appear to be intoxicated or somnolent from medications or otherwise. Appears in no pain. Patient exhibits appropriate mood/affect. Answers questions appropriately.  EYES: Pupils equal size, are not inappropriately constricted or dilated. Are without scleral icterus, conjunctival injection, increased lacrimation, or discharge.  NECK: full ROM, no tenderness, no increased muscle tone, without fasciculations, masses, or atrophy. No anterior cervical chain, posterior cervical chain, occipital, or supraclavicular adenopathy.  LUNGS: Patient is breathing with a regular, unlabored effort.  Able to speak in complete sentences, without tripoding or retractions.   CARDIAC: No jugular venous distention or edema.  ABDOMEN: Without distention.  SKIN: Skin is warm and dry, without central cyanosis. Skin and fingernails are without trophic changes.  NEUROLOGIC:   A&Ox3. Coordination intact for age.  Able to rise from a seated to standing position.  GAIT: Normal, casual, non-spastic, non-antalgic gait.     Able to rise on heels and toes bilaterally multiple times with hands supported on wall.   SENSATION: Intact to light touch in the extremities bilaterally.   MOTOR: Tone is normal in all four extremities without fasciculations, atrophy, or myoclonus. There are no involuntary movements.    SPINE: Without scarring, ecchymosis, rigidity, fasciculations, or atrophy. Lumbar region is tender to palpation.   Patient is able to forward bend with pain. Able to backward bend past midline without pain.  Trochanters and sacroiliac joints: Non tender with palpation.   Bilateral straight leg raise: No radiculopathy noted   External and internal rotation of  hips:   LE: Bilateral lower extremities with normal muscular tone, 5/5 strength throughout.   UE: Bilateral upper extremities with normal muscular tone, 5/5 strength throughout.     IMAGING & OTHER TESTING:  CRCL: 169    XR Sacrum and Coccyx 10/14/19  Scanned into chart    MRI Lumbar Spine 10/14/19  Scanned into chart    SUBSTANCE ABUSE RISK EVALUATION:  ORT SCORE: 1   Most recent UDS: None available  Evidence of past substance abuse (see history, problem list): marijuana use (remote, last used 2014)  Current/past aberrant opiate behaviors: None apparent.   Comorbidities that increase opioid related harms: None  Today's medication count: not done today  Opioid agreement in place? yes.   Total MDE: 40  [place] State Rx monitoring website reviewed?: Yes, no abberancies noted.  High-risk psychiatric conditions: Yes, depression     Social instability: None apparent.   Evidence of non-compliance in past: None apparent.   Opioid risk assessment: MODERATE    Workup adequate to establish a condition requiring opioids? While workup appropriate, multiple meta-analyses have not been able to show benefit from chronic opioid therapy.    Presence of interacting medications: Sertraline 100 mg nightly    Is the patient a potentially fertile female?  No   If so, has the discussion on LTEC occurred?  NA    Is patient a male?  No    If so, date of testosterone discussion:  NA    Has patient been prescribed naloxone for use at home if necessary?  NA, she is below 50 ME's and low risk patient    The fact that having opioids continually present in the blood stream increases the annualized risk of dying from a heart problem by 65% was discussed with the patient. In light of this, the patient: would like to continue their opiate    ASSESSMENT:  1. Pain management contract agreement    2. Chronic midline low back pain without sciatica    3. Long term current use of opiate analgesic       Opioid risk assessment is: MODERATE    PLAN:    I  explained our clinic emphasizes the use of non-narcotic medications like antidepressants and anticonvulsants, which are the mainstay of treatment for chronic pain, as well as multiple meta-analyses have not been able to show benefit from chronic opioid therapy, while the evidence of risk associated with it is overwhelming, including addiction and death.     1. Goal of 30% sustained symptomatic decrease in pain.  2. Goal of 30% improvement of functional (life goals) ability.  3. Start Topamax 25 mg up to TID. Instructions and side effects reviewed with patient, and included on AVS.  4. Continue Voltaren 1% gel  5. Ok to continue Norco  QID PRN. Pain agreement signed and UDS ordered. Next script pended for  9/12/22.  6. Continue Tizanidine 4 mg at bedtime PRN    Lifestyle Modifications  Continue HEP    Manual Therapies   Referral to acupuncture    Follow-up in 6 weeks    KEY CONSIDERATIONS OF THIS PATIENT:   None at this time       All questions were answered at today's visit. She should call us in the meantime with any further questions or concerns. The patient was given a copy of my business card.    I would like to thank you Boris Restrepo MD for allowing me to participate in the care of Estela Marie.  Please feel free to call me if you have any questions about the diagnosis or treatment plan.      Christina Broderick NP/Collaborating Physician: Laura Loera DO     CC: Boris Restrepo MD   no fever and no chills.

## 2022-08-17 ENCOUNTER — APPOINTMENT (OUTPATIENT)
Dept: ORTHOPEDIC SURGERY | Facility: CLINIC | Age: 77
End: 2022-08-17

## 2022-08-17 ENCOUNTER — NON-APPOINTMENT (OUTPATIENT)
Age: 77
End: 2022-08-17

## 2022-08-17 ENCOUNTER — APPOINTMENT (OUTPATIENT)
Dept: ELECTROPHYSIOLOGY | Facility: CLINIC | Age: 77
End: 2022-08-17

## 2022-08-17 VITALS — OXYGEN SATURATION: 99 % | SYSTOLIC BLOOD PRESSURE: 148 MMHG | HEART RATE: 62 BPM | DIASTOLIC BLOOD PRESSURE: 87 MMHG

## 2022-08-17 PROCEDURE — 93000 ELECTROCARDIOGRAM COMPLETE: CPT | Mod: 59

## 2022-08-17 PROCEDURE — 93279 PRGRMG DEV EVAL PM/LDLS PM: CPT

## 2022-08-17 PROCEDURE — 99024 POSTOP FOLLOW-UP VISIT: CPT

## 2022-08-17 PROCEDURE — 73030 X-RAY EXAM OF SHOULDER: CPT | Mod: RT

## 2022-08-17 NOTE — DISCUSSION/SUMMARY
[de-identified] : I went over the pathophysiology of the patient's symptoms in great detail with the patient and her . I went over her x-rays and arthroscopic pictures with them in great detail. I advised her to ask her medical doctor and cardiologist which dose of Eliquis they want her on: 5mg BID or 2.5mg BID. She should d/c wearing the sling. I instructed her on ROM exercises to do at home. She should purchase an over-the-door pulley and begin a diligent at-home exercise program. At this time, she will start a course of physical therapy for strengthening and flexibility. A prescription was provided. I would like to see the patient back in the office in 6 weeks to reassess her condition. All of their questions were answered. They understand and consent to the plan.\par \par FU in 6 weeks.\par after undergoing PT for her right shoulder.

## 2022-08-17 NOTE — HISTORY OF PRESENT ILLNESS
[de-identified] : 77 year old RHD female returns for the 1st postoperative visit, 8 days s/p EUA, arthroscopy right  shoulder, debridement of GH joint with biceps release and tenodesis, debridement of labral tear and removal of loose bodies and subacromial decompression; right shoulder. DOS 8/9/2022. The patient is recovering at home. She reports mild postoperative pain. She presents wearing a sling. She has been doing postop ROM exercises. I went over the arthroscopic pictures with them in great detail. \par Of note, She last had an intraarticular steroid shot in the right shoulder on 12/22/21. Pmhx significant for DVT and PE. 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

## 2022-08-17 NOTE — PHYSICAL EXAM
[de-identified] : Constitutional\par o Appearance : well-nourished, well developed, alert, in no acute distress \par Head and Face\par o Head :\par ¦ Inspection : atraumatic, normocephalic\par o Face :\par ¦ Inspection : no visible rash or discoloration\par Respiratory\par o Respiratory Effort: breathing unlabored \par Neurologic\par o Sensation : Normal sensation \par Psychiatric\par o Mood and Affect: mood normal, affect appropriate \par Lymphatic\par o Additional Nodes : No palpable lymph nodes present \par \par o Cervical Spine\par ¦ Inspection/Palpation : alignment midline, normal degree of lordosis present, left paracervical tenderness\par ¦ Range of Motion : extension causes discomfort R>L, limited rotation and sidebending but they do not reproduce her symptoms\par ¦ Skin : normal appearance, no masses or tenderness, trachea midline\par ¦ Tests: Negative Spurling’s test\par \par Right Upper Extremity\par o Right Shoulder :\par ¦ Inspection/Palpation : sutures were removed, steri strips and benzoin were applied, well-healed incisions, ecchymosis into the biceps\par ¦ Range of Motion : forward flexion to about 120°, full external rotation, internal rotation limited to the PSIS\par ¦ Strength : forward elevation 4/5, IR/ER at the side 4/5, external rotation at 90° of abduction 4/5, supraspinatus 4/5, adduction and abduction 4/5, biceps/triceps 5/5\par ¦ Stability : no joint instability on provocative testing \par ¦ Tests: Schlutz positive, Neer test positive, Cyndy test positive, drop arm test negative\par \par Left Upper Extremity\par o Left Shoulder :\par ¦ Inspection/Palpation : mild anterior capsular tenderness, no swelling or deformities\par ¦ Range of Motion : FROM, pain with full forward flexion and at the extremes of rotation, no crepitance\par ¦ Strength : forward elevation 5/5, internal rotation 5/5, external rotation 5/5, external rotation at 90° of abduction 5/5, supraspinatus 5/5, adduction and abduction 5/5, biceps/triceps 5/5, strength slightly limited by pain\par ¦ Stability : no joint instability on provocative testing\par ¦ Tests: Schultz positive, Neer test positive, Cyndy test positive, drop arm test negative\par \par Gait and Station:\par Gait: gait normal, no significant extremity swelling or lymphedema, good proprioception and balance\par \par Radiology Results\par o Right Shoulder : AP internal/external rotation and outlet views were obtained and reveal concentric reduction of the shoulder with degenerative arthritis of the glenohumeral joint no loose body seen.

## 2022-08-17 NOTE — REASON FOR VISIT
[Post Operative Visit] : a post operative visit for [Aftercare Following Surgery] : aftercare following surgery [Spouse] : spouse [FreeTextEntry2] : s/p EUA, arthroscopy right  shoulder, debridement of GH joint with biceps release and tenodesis, debridement of labral tear and removal of loose bodies and subacromial decompression; right shoulder. DOS 8/9/2022.

## 2022-08-17 NOTE — ADDENDUM
[FreeTextEntry1] : I, Thomas Hoang, acted solely as a scribe for Dr. Roland Kidd on this date 08/17/2022.\par All medical record entries made by the Scribe were at my, Dr. Roland Kidd, direction and personally dictated by me on 08/17/2022. I have reviewed the chart and agree that the record accurately reflects my personal performance of the history, physical exam, assessment and plan. I have also personally directed, reviewed, and agreed with the chart.

## 2022-09-29 ENCOUNTER — APPOINTMENT (OUTPATIENT)
Dept: ORTHOPEDIC SURGERY | Facility: CLINIC | Age: 77
End: 2022-09-29

## 2022-09-29 PROCEDURE — 99024 POSTOP FOLLOW-UP VISIT: CPT

## 2022-09-29 NOTE — PHYSICAL EXAM
[de-identified] : Constitutional\par o Appearance : well-nourished, well developed, alert, in no acute distress \par Head and Face\par o Head :\par ¦ Inspection : atraumatic, normocephalic\par o Face :\par ¦ Inspection : no visible rash or discoloration\par Respiratory\par o Respiratory Effort: breathing unlabored \par Neurologic\par o Sensation : Normal sensation \par Psychiatric\par o Mood and Affect: mood normal, affect appropriate \par Lymphatic\par o Additional Nodes : No palpable lymph nodes present \par \par o Cervical Spine\par ¦ Inspection/Palpation : alignment midline, normal degree of lordosis present, left paracervical tenderness\par ¦ Range of Motion : extension causes discomfort R>L, limited rotation and sidebending but they do not reproduce her symptoms\par ¦ Skin : normal appearance, no masses or tenderness, trachea midline\par ¦ Tests: Negative Spurling’s test\par \par Right Upper Extremity\par o Right Shoulder :\par ¦ Inspection/Palpation : well-healed arthroscopic portals, mild anterior capsular tenderness, no swelling\par ¦ Range of Motion : forward flexion to 140° with pain at the extreme, full external rotation, slightly limited internal rotation by about 1-2 vertebral levels, cross chest maneuvers cause mild discomfort \par ¦ Strength : forward elevation 4/5, IR/ER at the side 4/5, external rotation at 90° of abduction 4+/5, supraspinatus 4-/5, adduction and abduction 4/5, biceps/triceps 5/5, pronation and supination 5/5\par ¦ Stability : no joint instability on provocative testing \par ¦ Tests: Schultz positive, Neer test positive, Cyndy test positive, drop arm test negative, Holmes's test negative\par \par Left Upper Extremity\par o Left Shoulder :\par ¦ Inspection/Palpation : mild anterior capsular tenderness, no swelling or deformities\par ¦ Range of Motion : FROM, pain with full forward flexion and at the extremes of rotation, no crepitance\par ¦ Strength : forward elevation 5/5, internal rotation 5/5, external rotation 5/5, external rotation at 90° of abduction 5/5, supraspinatus 5/5, adduction and abduction 5/5, biceps/triceps 5/5, strength slightly limited by pain\par ¦ Stability : no joint instability on provocative testing\par ¦ Tests: Schultz positive, Neer test positive, Cyndy test positive, drop arm test negative\par \par Gait and Station:\par Gait: gait normal, no significant extremity swelling or lymphedema, good proprioception and balance

## 2022-09-29 NOTE — DISCUSSION/SUMMARY
[de-identified] : I went over the pathophysiology of the patient's symptoms in great detail with them. We discussed the use of ice and Voltaren gel to relieve pain. She should purchase an over-the-door pulley and begin a diligent at-home exercise program. I am recommending the patient continues going to physical therapy for massage, ROM, stretching, and strengthening of the right shoulder. A prescription was provided. I would like to see the patient back in the office in 6 weeks to reassess her condition. If she is still having some discomfort I will give her a cortisone injection. All of their questions were answered. They understand and consent to the plan.\par \par FU in 6 weeks.\par after continuing PT for her right shoulder.

## 2022-09-29 NOTE — ADDENDUM
[FreeTextEntry1] : I, Thomas Hoang, acted solely as a scribe for Dr. Roland Kidd on this date 09/29/2022.\par \par All medical record entries made by the Scribe were at my, Dr. Roland Kidd, direction and personally dictated by me on 09/29/2022. I have reviewed the chart and agree that the record accurately reflects my personal performance of the history, physical exam, assessment and plan. I have also personally directed, reviewed, and agreed with the chart.

## 2022-09-29 NOTE — REASON FOR VISIT
[Post Operative Visit] : a post operative visit for [Spouse] : spouse [FreeTextEntry2] : s/p EUA, arthroscopy right  shoulder, debridement of GH joint with biceps release and tenodesis, debridement of labral tear and removal of loose bodies and subacromial decompression; right shoulder. DOS 8/9/2022.

## 2022-09-29 NOTE — HISTORY OF PRESENT ILLNESS
[de-identified] : 77 year old RHD female returns for the 2nd postoperative visit, 7 weeks s/p EUA, arthroscopy right  shoulder, debridement of GH joint with biceps release and tenodesis, debridement of labral tear and removal of loose bodies and subacromial decompression; right shoulder. DOS 8/9/2022. She is attending PT. She complains about shoulder pain when she moves it too much, localized over the biceps. She is frustrated with the progress and thought she would be better sooner. She is not using Voltaren gel or a pulley at home.\par Of Note: She last had an intraarticular steroid shot in the right shoulder on 12/22/21. PMHx of DVT and PE. She is currently on Eliquis 5mg BID.\par \par Radiology Results 8/17/2022:\par o Right Shoulder : AP internal/external rotation and outlet views were obtained and reveal concentric reduction of the shoulder with degenerative arthritis of the glenohumeral joint no loose body seen.\par \par Radiology Results 12/22/2021:\par o Cervical Spine : A/P and lateral views were obtained and revealed diffuse degenerative disc disease worse at C4/C5 and C5/C6, reversal of the normal cervical lordosis, a slight listing to the right of the head.\par o Left Shoulder : AP internal/external rotation and outlet views were obtained and revealed moderate degenerative arthritis of the right shoulder and AC joint. \par

## 2022-10-26 NOTE — DISCHARGE NOTE ADULT - NS AS DC VTE INSTRUCTION
25-Oct-2022 23:41 Coumadin/Warfarin - Follow-up monitoring.../Coumadin/Warfarin - Potential for adverse drug reactions and interactions/Coumadin/Warfarin - Dietary Advice.../Coumadin/Warfarin - Compliance...

## 2022-11-14 ENCOUNTER — LABORATORY RESULT (OUTPATIENT)
Age: 77
End: 2022-11-14

## 2022-11-14 ENCOUNTER — NON-APPOINTMENT (OUTPATIENT)
Age: 77
End: 2022-11-14

## 2022-11-14 ENCOUNTER — APPOINTMENT (OUTPATIENT)
Dept: CARDIOLOGY | Facility: CLINIC | Age: 77
End: 2022-11-14

## 2022-11-14 ENCOUNTER — APPOINTMENT (OUTPATIENT)
Dept: ORTHOPEDIC SURGERY | Facility: CLINIC | Age: 77
End: 2022-11-14

## 2022-11-14 VITALS
RESPIRATION RATE: 16 BRPM | HEART RATE: 63 BPM | WEIGHT: 139 LBS | DIASTOLIC BLOOD PRESSURE: 60 MMHG | OXYGEN SATURATION: 98 % | TEMPERATURE: 97.1 F | SYSTOLIC BLOOD PRESSURE: 110 MMHG | BODY MASS INDEX: 29.99 KG/M2 | HEIGHT: 57 IN

## 2022-11-14 DIAGNOSIS — R94.39 ABNORMAL RESULT OF OTHER CARDIOVASCULAR FUNCTION STUDY: ICD-10-CM

## 2022-11-14 DIAGNOSIS — R06.09 OTHER FORMS OF DYSPNEA: ICD-10-CM

## 2022-11-14 PROCEDURE — 93306 TTE W/DOPPLER COMPLETE: CPT

## 2022-11-14 PROCEDURE — 93000 ELECTROCARDIOGRAM COMPLETE: CPT

## 2022-11-14 PROCEDURE — 20611 DRAIN/INJ JOINT/BURSA W/US: CPT | Mod: RT

## 2022-11-14 PROCEDURE — 99214 OFFICE O/P EST MOD 30 MIN: CPT | Mod: 25

## 2022-11-14 PROCEDURE — 73030 X-RAY EXAM OF SHOULDER: CPT | Mod: RT

## 2022-11-14 RX ORDER — APIXABAN 5 MG/1
5 TABLET, FILM COATED ORAL
Qty: 60 | Refills: 0 | Status: ACTIVE | COMMUNITY
Start: 2022-11-10

## 2022-11-14 NOTE — REASON FOR VISIT
[CV Risk Factors and Non-Cardiac Disease] : CV risk factors and non-cardiac disease [Hyperlipidemia] : hyperlipidemia [Hypertension] : hypertension [Coronary Artery Disease] : coronary artery disease [Other: ____] : [unfilled] [FreeTextEntry1] : This is a 77 year year old female here today for follow up cardiac evaluation. \par She has a past medical history significant for mild coronary artery disease (30% lesion in the left anterior descending artery cardiac catheterization August 6, 2021), for recurrent blood clots in the lower extremities, and pulmonary emboli on lifelong anticoagulation therapy, status post left knee replacement, status post tonsillectomy, status post wrist and thumb surgery, status post permanent pacemaker for tachybradycardia syndrome.\par \par CHIEF COMPLAINT:\par Today she is feeling generally well and does not have any complaints at this time.  \par She remains on Atorvastatin 40mg PO DAILY, Zetia 10mg  PO Daily, Verapamil 120mg PO DAILY and on Eliquis 5 mg twice daily.  This was changed from Coumadin as per her orthopedic after her rotator cuff surgery.\par \par She denies fever, chills, weight loss, malaise, rash, alteration bowel habits, weakness, abdominal  pain, bloating, changes in urination, visual disturbances, chest pain, headaches, dizziness, heart palpitations, recent episodes of syncope or falls at this time.\par \par

## 2022-11-14 NOTE — CARDIOLOGY SUMMARY
[de-identified] : 6/3/22 [de-identified] : NUC: 7/6/2021 [de-identified] : 6/1/2021 [de-identified] : CATH: 8/6/2021

## 2022-11-14 NOTE — ASSESSMENT
[FreeTextEntry1] : This is a 77 year year old female here today for follow up cardiac evaluation. \par She has a past medical history significant for mild coronary artery disease (30% lesion in the left anterior descending artery cardiac catheterization August 6, 2021), for recurrent blood clots in the lower extremities, and pulmonary emboli on lifelong anticoagulation therapy, status post left knee replacement, status post tonsillectomy, status post wrist and thumb surgery, status post permanent pacemaker for tachybradycardia syndrome.\par \par CHIEF COMPLAINT:\par Today she is feeling generally well and does not have any complaints at this time.  \par She remains on Atorvastatin 40mg PO DAILY, Zetia 10mg  PO Daily, Verapamil 120mg PO DAILY and on Eliquis 5 mg twice daily.  This was changed from Coumadin as per her orthopedic after her rotator cuff surgery.\par \par *She is on Eliquis for history of DVT.\par \par She is s/p right shoulder surgery to be done on August 9, 2022 by Dr. Kidd at [facility] (rotator cuff surgery).\par \par -She has no history of rheumatic fever.  She may drink 4 cups of coffee per day. Her cardiac risk factors include elevated cholesterol\par \par BLOOD PRESSURE:\par -BP is controlled on today's exam.  She states she does have some shoulder pain but is following up with her orthopedic routinely.\par \par -She remains on Verapamil 120mg PO DAILY for management of her tachybrady syndrome and she does have a pace maker \par \par BLOOD WORK:\par -New blood work was done 11/14/2022  to evaluate lipid profile, CBC, BMP, hepatic function, A1C and TSH. \par -New blood work done [place] 3, 2022 demonstrated normal lipid profile with total cholesterol of 180 and LDL of 103.  I remind her to be compliant with her medications daily as prescribed.\par \par CHOLESTEROL CONTROL:\par -Patient will continue the advised  TLC diet and to continue follow-up for treatment of hyperlipidemia and repeat blood testing with diet and exercise. I have discussed different exercises and the importance of maintenance of optimal body weight. The importance of staying within guidelines and recommendations was stressed to the patient today and they acknowledged that they understand this to me verbally.\par \par  -Ms. BIANCHI was educated and advised that failure to follow-up with my medical recommendations to lower cholesterol can result in severe health consequences therefore, they will continuing a low saturated and low fat diet and to avoid excessive carbohydrates to help reduce triglycerides and that lowering LDL levels is associated with a significant decrease in serious cardiac events including but not limited to heart attack stroke and overall death. We will continue lipid lowering agents as advised based on blood test results and the patient understands to call if they develop severe muscle discomfort or if they have a reddish tinted discoloration in their urine.\par \par TESTING/REPORTS:\par -EKG done 11/14/2022 which demonstrated regular sinus rhythm with nonspecific ST-T wave changes BPM of 63.\par \par -Echocardiogram done in the office 11/14/2022 and results are pending.\par \par -EKG done 08/01/2022 which demonstrated regular sinus rhythm with nonspecific ST-T wave changes BPM of 55.\par \par -EKG done 3/1/22  which demonstrated regular sinus rhythm with nonspecific ST-T wave changes, BPM of 62.\par \par -EKG done Oct 25, 2021 which demonstrated regular sinus rhythm with nonspecific ST-T wave changes, BPM of 67 with occasional PAC. \par \par -Electrocardiogram done July 28, 2021 demonstrates normal sinus rhythm at a rate of 70 bpm is otherwise remarkable for left axis deviation, left anterior hemiblock, and left ventricular hypertrophy.\par \par -Electrocardiogram done May 18, 2021 demonstrated normal sinus rhythm rate 70 bpm otherwise remarkable for left axis deviation, and possible Q waves in V1 and V2. The Q waves in leads V1 and V2 represent a change compared to the electrocardiogram from her primary care physician. The patient denies any history of myocardial infarction is unclear why she has Q waves in the anterior septal region.\par \par -She had a nuclear stress test done July 6, 2021 which demonstrated small to moderate fixed defect in the inferolateral wall inferior apical wall consistent with infarct, and hypokinesis involving the distal inferolateral wall and inferior apical walls.  The patient developed 3 out of 10 chest tightness at 4 minutes and 45 seconds of exercise that resolved after 5 minutes of the recovery period.  \par \par -The patient also has abnormal resting electrocardiogram with ST–T wave changes consistent coronary artery disease.\par \par -The patient had a cardiac catheterization done August 6, 2021 which demonstrated a 30% proximal lesion left anterior descending artery, luminal irregularities in the first diagonal branch, right PDA and right coronary artery.\par \par PLAN:\par -She will continue with her current medications and will contact the office if she is having any complaints between now and their next follow up appointment.\par  \par I have discussed the plan of care with Ms. JOHNSON BIANCHI  and she  will follow up in 3 months. She is compliant with all of her medications.\par \par The patient understands that aerobic exercises must be increased to minutes 4 times/week and a detailed discussion of lifestyle modification was done today. \par The patient has a good understanding of the diagnosis, treatment plan and lifestyle modification. \par She will contact me at the office for any questions with their care or any changes in their health status.\par \par The plan of care was discussed with the patient with supervision physician Dr. Bolivar Lomeli and will be carried out as noted above.\par \par Nieves VARGAS

## 2022-11-16 ENCOUNTER — APPOINTMENT (OUTPATIENT)
Dept: ELECTROPHYSIOLOGY | Facility: CLINIC | Age: 77
End: 2022-11-16

## 2022-11-16 ENCOUNTER — FORM ENCOUNTER (OUTPATIENT)
Age: 77
End: 2022-11-16

## 2022-12-09 ENCOUNTER — APPOINTMENT (OUTPATIENT)
Dept: CARDIOLOGY | Facility: CLINIC | Age: 77
End: 2022-12-09

## 2022-12-16 ENCOUNTER — APPOINTMENT (OUTPATIENT)
Dept: ELECTROPHYSIOLOGY | Facility: CLINIC | Age: 77
End: 2022-12-16

## 2022-12-16 ENCOUNTER — NON-APPOINTMENT (OUTPATIENT)
Age: 77
End: 2022-12-16

## 2022-12-16 PROCEDURE — 93294 REM INTERROG EVL PM/LDLS PM: CPT

## 2022-12-16 PROCEDURE — 93296 REM INTERROG EVL PM/IDS: CPT

## 2022-12-19 ENCOUNTER — APPOINTMENT (OUTPATIENT)
Dept: ORTHOPEDIC SURGERY | Facility: CLINIC | Age: 77
End: 2022-12-19

## 2022-12-19 DIAGNOSIS — M75.21 BICIPITAL TENDINITIS, RIGHT SHOULDER: ICD-10-CM

## 2022-12-19 DIAGNOSIS — M19.011 PRIMARY OSTEOARTHRITIS, RIGHT SHOULDER: ICD-10-CM

## 2022-12-19 PROCEDURE — 99213 OFFICE O/P EST LOW 20 MIN: CPT

## 2022-12-26 PROBLEM — R06.09 DYSPNEA ON EXERTION: Status: ACTIVE | Noted: 2021-06-15

## 2023-01-18 NOTE — H&P ADULT - FAMILY HISTORY
Patient comes to clinic for follow up anticoagulation visit. Last INR on 12/1/23 was 2.5. Dose maintained. Today's INR is 2.6 and is within goal range. Current warfarin total weekly dose of 25 mg verified. Informed the INR result is within therapeutic range and instructed to maintain current dose per protocol. Discussed dose and return date of 3/1/23 (6 weeks) for next INR. See Anticoagulation flowsheet. Dr. Tam Briceño is in the office today supervising the treatment. Instructed to contact the clinic with any unusual bleeding or bruising, any changes in medications, diet, health status, lifestyle, or any other changes, questions or concerns. Verbalized understanding of all discussed.      Referring Provider  Sophia SULLIVAN  Renewal Expires  4/11/23
Father  Still living? Unknown  Family history of heart disease, Age at diagnosis: Age Unknown     Mother  Still living? Unknown  Family history of prothrombin gene mutation, Age at diagnosis: Age Unknown

## 2023-01-18 NOTE — DISCHARGE NOTE ADULT - CARE PROVIDER_API CALL
with patient Roland Kidd)  Orthopaedic Surgery  [address], [address]  [place] [address]  Phone: [phone]  Fax: [phone]

## 2023-03-17 ENCOUNTER — APPOINTMENT (OUTPATIENT)
Dept: ELECTROPHYSIOLOGY | Facility: CLINIC | Age: 78
End: 2023-03-17

## 2023-03-19 ENCOUNTER — FORM ENCOUNTER (OUTPATIENT)
Age: 78
End: 2023-03-19

## 2023-03-27 ENCOUNTER — APPOINTMENT (OUTPATIENT)
Dept: ELECTROPHYSIOLOGY | Facility: CLINIC | Age: 78
End: 2023-03-27
Payer: MEDICARE

## 2023-03-27 ENCOUNTER — NON-APPOINTMENT (OUTPATIENT)
Age: 78
End: 2023-03-27

## 2023-03-27 PROCEDURE — 93294 REM INTERROG EVL PM/LDLS PM: CPT

## 2023-03-27 PROCEDURE — 93296 REM INTERROG EVL PM/IDS: CPT

## 2023-04-18 ENCOUNTER — APPOINTMENT (OUTPATIENT)
Dept: CARDIOLOGY | Facility: CLINIC | Age: 78
End: 2023-04-18

## 2023-04-20 ENCOUNTER — LABORATORY RESULT (OUTPATIENT)
Age: 78
End: 2023-04-20

## 2023-04-20 ENCOUNTER — NON-APPOINTMENT (OUTPATIENT)
Age: 78
End: 2023-04-20

## 2023-04-20 ENCOUNTER — APPOINTMENT (OUTPATIENT)
Dept: CARDIOLOGY | Facility: CLINIC | Age: 78
End: 2023-04-20
Payer: MEDICARE

## 2023-04-20 VITALS
RESPIRATION RATE: 16 BRPM | WEIGHT: 140 LBS | DIASTOLIC BLOOD PRESSURE: 80 MMHG | HEART RATE: 63 BPM | HEIGHT: 57 IN | BODY MASS INDEX: 30.2 KG/M2 | OXYGEN SATURATION: 97 % | TEMPERATURE: 98 F | SYSTOLIC BLOOD PRESSURE: 118 MMHG

## 2023-04-20 DIAGNOSIS — R94.31 ABNORMAL ELECTROCARDIOGRAM [ECG] [EKG]: ICD-10-CM

## 2023-04-20 DIAGNOSIS — I49.5 SICK SINUS SYNDROME: ICD-10-CM

## 2023-04-20 PROCEDURE — 93000 ELECTROCARDIOGRAM COMPLETE: CPT

## 2023-04-20 PROCEDURE — 99214 OFFICE O/P EST MOD 30 MIN: CPT | Mod: 25

## 2023-04-20 NOTE — ASSESSMENT
[FreeTextEntry1] : This is a 78 year year old female here today for follow up cardiac evaluation. \par She has a past medical history significant for mild coronary artery disease (30% lesion in the left anterior descending artery cardiac catheterization August 6, 2021), for recurrent blood clots in the lower extremities, and pulmonary emboli on lifelong anticoagulation therapy, status post left knee replacement, status post tonsillectomy, status post wrist and thumb surgery, status post permanent pacemaker for tachybradycardia syndrome.\par \par CHIEF COMPLAINT:\par Today she is feeling generally well and does not have any complaints at this time.  \par \par She remains on Atorvastatin 40mg PO DAILY, Zetia 10mg  PO Daily, Verapamil 120mg PO DAILY and on Eliquis 5 mg twice daily.  This was changed from Coumadin as per her orthopedic after her rotator cuff surgery.\par \par *She is on Eliquis for history of DVT.\par \par She is s/p right shoulder surgery to be done on August 9, 2022 by Dr. Kidd at [facility] (rotator cuff surgery).\par \par -She has no history of rheumatic fever.  She may drink 4 cups of coffee per day. Her cardiac risk factors include elevated cholesterol\par \par BLOOD PRESSURE:\par -BP is controlled on today's exam. \par \par -She remains on Verapamil 120mg PO DAILY for management of her tachybrady syndrome and she does have a pace maker \par \par BLOOD WORK:\par -New blood work was done 04/20/2023  to evaluate lipid profile, CBC, BMP, hepatic function, A1C and TSH. \par -New blood work was done 11/14/2022 which demonstrated normal lipid profile.  Her A1c 5.7%.\par -New blood work done [place] 3, 2022 demonstrated normal lipid profile with total cholesterol of 180 and LDL of 103.  I remind her to be compliant with her medications daily as prescribed.\par \par TESTING/REPORTS:\par -EKG done 04/20/2023 which demonstrated regular sinus rhythm with nonspecific ST-T wave changes BPM of 63.\par \par -EKG done 11/14/2022 which demonstrated regular sinus rhythm with nonspecific ST-T wave changes BPM of 63.\par \par -Echocardiogram done in the office 11/14/2022 which demonstrated mild concentric LVH, trace aortic regurgitation, mild calcification of the mitral valve annulus, mild mitral valve regurgitation, mild tricuspid regurgitation, mild thickening of the aortic valve leaflets, pulmonic regurgitation device wire visualized in the right heart, thinning of the interatrial septum with color flow across the septum consistent with small atrial septal defect versus small patent foramen ovale with normal left ventricular systolic function ejection fraction 63%.\par \par -EKG done 08/01/2022 which demonstrated regular sinus rhythm with nonspecific ST-T wave changes BPM of 55.\par \par -EKG done 3/1/22  which demonstrated regular sinus rhythm with nonspecific ST-T wave changes, BPM of 62.\par \par -EKG done Oct 25, 2021 which demonstrated regular sinus rhythm with nonspecific ST-T wave changes, BPM of 67 with occasional PAC. \par \par -Electrocardiogram done July 28, 2021 demonstrates normal sinus rhythm at a rate of 70 bpm is otherwise remarkable for left axis deviation, left anterior hemiblock, and left ventricular hypertrophy.\par \par -Electrocardiogram done May 18, 2021 demonstrated normal sinus rhythm rate 70 bpm otherwise remarkable for left axis deviation, and possible Q waves in V1 and V2. The Q waves in leads V1 and V2 represent a change compared to the electrocardiogram from her primary care physician. The patient denies any history of myocardial infarction is unclear why she has Q waves in the anterior septal region.\par \par -She had a nuclear stress test done July 6, 2021 which demonstrated small to moderate fixed defect in the inferolateral wall inferior apical wall consistent with infarct, and hypokinesis involving the distal inferolateral wall and inferior apical walls.  The patient developed 3 out of 10 chest tightness at 4 minutes and 45 seconds of exercise that resolved after 5 minutes of the recovery period.  \par \par -The patient also has abnormal resting electrocardiogram with ST–T wave changes consistent coronary artery disease.\par \par -The patient had a cardiac catheterization done August 6, 2021 which demonstrated a 30% proximal lesion left anterior descending artery, luminal irregularities in the first diagonal branch, right PDA and right coronary artery.\par \par PLAN:\par -The patient is clinically stable from a cardiac standpoint on today's exam. \par -She will continue with her current medications and will contact the office if she is having any complaints between now and their next follow up appointment.\par  \par I have discussed the plan of care with Ms. ERINE PIERIDES  and she  will follow up in 4-6 months. She is compliant with all of her medications.\par \par The patient understands that aerobic exercises must be increased to minutes 4 times/week and a detailed discussion of lifestyle modification was done today. \par The patient has a good understanding of the diagnosis, treatment plan and lifestyle modification. \par She will contact me at the office for any questions with their care or any changes in their health status.\par \par The plan of care was discussed with the patient with supervision physician Dr. Bolivar Lomeli and will be carried out as noted above.\par \par Nieves VARGAS

## 2023-04-20 NOTE — REASON FOR VISIT
[CV Risk Factors and Non-Cardiac Disease] : CV risk factors and non-cardiac disease [Hyperlipidemia] : hyperlipidemia [Hypertension] : hypertension [Coronary Artery Disease] : coronary artery disease [Other: ____] : [unfilled] [FreeTextEntry1] : This is a 78 year year old female here today for follow up cardiac evaluation. \par She has a past medical history significant for mild coronary artery disease (30% lesion in the left anterior descending artery cardiac catheterization August 6, 2021), for recurrent blood clots in the lower extremities, and pulmonary emboli on lifelong anticoagulation therapy, status post left knee replacement, status post tonsillectomy, status post wrist and thumb surgery, status post permanent pacemaker for tachybradycardia syndrome.\par \par \par

## 2023-04-20 NOTE — CARDIOLOGY SUMMARY
[de-identified] : 6/3/22 [de-identified] : NUC: 7/6/2021 [de-identified] : 6/1/2021 [de-identified] : CATH: 8/6/2021

## 2023-05-12 NOTE — ED PROVIDER NOTE - CHIEF COMPLAINT
The patient is a 77y Female complaining of  5-Fu Counseling: 5-Fluorouracil Counseling:  I discussed with the patient the risks of 5-fluorouracil including but not limited to erythema, scaling, itching, weeping, crusting, and pain.

## 2023-06-21 NOTE — CONSULT NOTE ADULT - TIME BILLING
The total amount of time listed was spent reviewing the hospital notes, laboratory values, imaging findings, assessing/counseling the patient, discussing with anesthesia, surgery and nursing staff. Post-Care Instructions: I reviewed with the patient in detail post-care instructions. Patient is to keep the biopsy site dry overnight, and then apply bacitracin twice daily until healed. Patient may apply hydrogen peroxide soaks to remove any crusting.

## 2023-06-26 ENCOUNTER — APPOINTMENT (OUTPATIENT)
Dept: ELECTROPHYSIOLOGY | Facility: CLINIC | Age: 78
End: 2023-06-26

## 2023-06-27 ENCOUNTER — FORM ENCOUNTER (OUTPATIENT)
Age: 78
End: 2023-06-27

## 2023-07-12 ENCOUNTER — APPOINTMENT (OUTPATIENT)
Dept: CARDIOLOGY | Facility: CLINIC | Age: 78
End: 2023-07-12

## 2023-07-18 NOTE — PHYSICAL EXAM
[Well Developed] : well developed [Well Nourished] : well nourished [No Acute Distress] : no acute distress [Normal Conjunctiva] : normal conjunctiva [Normal Venous Pressure] : normal venous pressure [No Carotid Bruit] : no carotid bruit [Normal S1, S2] : normal S1, S2 [No Murmur] : no murmur [No Rub] : no rub [Normal] : normal [5th Left ICS - MCL] : palpated at the 5th LICS in the midclavicular line [No Precordial Heave] : no precordial heave was noted [Normal Rate] : normal [Rhythm Regular] : regular [No Gallop] : no gallop heard [II] : a grade 2 [I] : a grade 1 [2+] : left 2+ [Clear Lung Fields] : clear lung fields [Good Air Entry] : good air entry [No Respiratory Distress] : no respiratory distress  [Soft] : abdomen soft [Non Tender] : non-tender [No Masses/organomegaly] : no masses/organomegaly [Normal Bowel Sounds] : normal bowel sounds [Normal Gait] : normal gait [No Edema] : no edema [No Cyanosis] : no cyanosis [No Clubbing] : no clubbing [No Varicosities] : no varicosities [No Rash] : no rash [No Skin Lesions] : no skin lesions [Moves all extremities] : moves all extremities [No Focal Deficits] : no focal deficits [Normal Speech] : normal speech [Alert and Oriented] : alert and oriented [Normal memory] : normal memory [Click] : no click [Pericardial Rub] : no pericardial rub Enbrel Counseling:  I discussed with the patient the risks of etanercept including but not limited to myelosuppression, immunosuppression, autoimmune hepatitis, demyelinating diseases, lymphoma, and infections.  The patient understands that monitoring is required including a PPD at baseline and must alert us or the primary physician if symptoms of infection or other concerning signs are noted.

## 2023-07-19 ENCOUNTER — APPOINTMENT (OUTPATIENT)
Dept: ELECTROPHYSIOLOGY | Facility: CLINIC | Age: 78
End: 2023-07-19
Payer: MEDICARE

## 2023-07-19 ENCOUNTER — NON-APPOINTMENT (OUTPATIENT)
Age: 78
End: 2023-07-19

## 2023-07-19 PROCEDURE — 93296 REM INTERROG EVL PM/IDS: CPT

## 2023-07-19 PROCEDURE — 93294 REM INTERROG EVL PM/LDLS PM: CPT

## 2023-08-18 ENCOUNTER — APPOINTMENT (OUTPATIENT)
Dept: ELECTROPHYSIOLOGY | Facility: CLINIC | Age: 78
End: 2023-08-18

## 2023-08-21 NOTE — ED ADULT TRIAGE NOTE - DATE OF FIRST COVID-19 BOOSTER
Pt lvm stating sore throat losing voice and would like to what meds to take  
Take a home COVID test to make sure this is not the source.   Can take tylenol for pain and also throat lozenges or throat spray.       Thank you,   Lillie Herrera MD    
02-Nov-2021

## 2023-10-18 ENCOUNTER — APPOINTMENT (OUTPATIENT)
Dept: CARDIOLOGY | Facility: CLINIC | Age: 78
End: 2023-10-18

## 2023-11-20 ENCOUNTER — NON-APPOINTMENT (OUTPATIENT)
Age: 78
End: 2023-11-20

## 2023-11-20 ENCOUNTER — APPOINTMENT (OUTPATIENT)
Dept: ELECTROPHYSIOLOGY | Facility: CLINIC | Age: 78
End: 2023-11-20
Payer: MEDICARE

## 2023-11-20 PROCEDURE — 93294 REM INTERROG EVL PM/LDLS PM: CPT

## 2023-11-20 PROCEDURE — 93296 REM INTERROG EVL PM/IDS: CPT

## 2023-12-07 ENCOUNTER — RX RENEWAL (OUTPATIENT)
Age: 78
End: 2023-12-07

## 2023-12-11 ENCOUNTER — LABORATORY RESULT (OUTPATIENT)
Age: 78
End: 2023-12-11

## 2023-12-11 ENCOUNTER — APPOINTMENT (OUTPATIENT)
Dept: CARDIOLOGY | Facility: CLINIC | Age: 78
End: 2023-12-11
Payer: MEDICARE

## 2023-12-11 ENCOUNTER — NON-APPOINTMENT (OUTPATIENT)
Age: 78
End: 2023-12-11

## 2023-12-11 VITALS
HEIGHT: 57 IN | WEIGHT: 137 LBS | SYSTOLIC BLOOD PRESSURE: 115 MMHG | TEMPERATURE: 98 F | RESPIRATION RATE: 16 BRPM | OXYGEN SATURATION: 95 % | DIASTOLIC BLOOD PRESSURE: 70 MMHG | HEART RATE: 65 BPM | BODY MASS INDEX: 29.56 KG/M2

## 2023-12-11 DIAGNOSIS — Z86.79 PERSONAL HISTORY OF OTHER DISEASES OF THE CIRCULATORY SYSTEM: ICD-10-CM

## 2023-12-11 PROCEDURE — 99214 OFFICE O/P EST MOD 30 MIN: CPT | Mod: 25

## 2023-12-11 PROCEDURE — 93000 ELECTROCARDIOGRAM COMPLETE: CPT

## 2024-02-01 NOTE — ADDENDUM
[FreeTextEntry1] : I, Thomas Hoang, acted solely as a scribe for Dr. Roland Kidd on this date 06/02/2022.\par All medical record entries made by the Scribe were at my, Dr. Roland Kidd, direction and personally dictated by me on 06/02/2022. I have reviewed the chart and agree that the record accurately reflects my personal performance of the history, physical exam, assessment and plan. I have also personally directed, reviewed, and agreed with the chart.  Respiratory

## 2024-02-12 NOTE — ED ADULT NURSE NOTE - CHIEF COMPLAINT QUOTE
I'm on coumadin and for the past couple of weeks I feel pressure in the back of my head and all I do is sleep.
yes

## 2024-02-22 ENCOUNTER — APPOINTMENT (OUTPATIENT)
Dept: ELECTROPHYSIOLOGY | Facility: CLINIC | Age: 79
End: 2024-02-22
Payer: MEDICARE

## 2024-02-22 ENCOUNTER — NON-APPOINTMENT (OUTPATIENT)
Age: 79
End: 2024-02-22

## 2024-02-22 PROCEDURE — 93294 REM INTERROG EVL PM/LDLS PM: CPT

## 2024-02-22 PROCEDURE — 93296 REM INTERROG EVL PM/IDS: CPT

## 2024-03-27 NOTE — H&P PST ADULT - EXTREMITIES COMMENTS
"OPEN LEFT INGUINAL HERNIA REPAIR WITH MESH (Left: Groin) - Dr. Chavez, 3/22/24.    Pt calling.  States he is having pain in left groin incision and groin area.  Pt rates the pain 6-7/10 and states pain is worse when he sits and also worse at night.  Pt tried Tylenol 1000 mg but it is not helping.  He cannot take  Ibuprofen due to his cardiac history.  Pt does not have any Norco left, and sent a request for a refill on it earlier today.  Denies any fever, redness, drainage, swelling of area or other concerns.      Pt asking if Norco script can be sent in.  [facility] Shola Grossman  .   [phone]      1. SYMPTOM: \"What's the main symptom you're concerned about?\" (e.g., redness, pain, drainage)      Left groin pain  2. ONSET: \"When did pain  start?\"      Since surgery 3/22/24  3. SURGERY: \"What surgery was performed?\"      Left inguinal hernia repair with mesh  4. DATE of SURGERY: \"When was surgery performed?\"       3/22/24  5. INCISION SITE: \"Where is the incision located?\"       Left groin  6. REDNESS: \"Is there any redness at the incision site?\" If yes, ask: \"How wide across is the redness?\" (Inches, centimeters)       Denies  7. PAIN: \"Is there any pain?\" If Yes, ask: \"How bad is it?\"  (Scale 1-10; or mild, moderate, severe)      Denies  8. BLEEDING: \"Is there any bleeding?\" If Yes, ask: \"How much?\" and \"Where?\"      Denies  9. DRAINAGE: \"Is there any drainage from the incision site?\" If yes, ask: \"What color and how much?\" (e.g., red, cloudy, pus; drops, teaspoon)      Denies    Reason for Disposition   INCREASING pain in incision and over 2 days (48 hours) since surgery    Protocols used: Post-Op Incision Symptoms and Questions-ADULT-[place]    "
no signs or symptoms of DVT

## 2024-04-01 ENCOUNTER — APPOINTMENT (OUTPATIENT)
Dept: ORTHOPEDIC SURGERY | Facility: CLINIC | Age: 79
End: 2024-04-01

## 2024-04-01 DIAGNOSIS — M47.812 SPONDYLOSIS W/OUT MYELOPATHY OR RADICULOPATHY, CERVICAL REGION: ICD-10-CM

## 2024-04-01 DIAGNOSIS — M19.019 PRIMARY OSTEOARTHRITIS, UNSPECIFIED SHOULDER: ICD-10-CM

## 2024-04-01 DIAGNOSIS — M75.22 BICIPITAL TENDINITIS, LEFT SHOULDER: ICD-10-CM

## 2024-04-01 NOTE — HISTORY OF PRESENT ILLNESS
[de-identified] : 77 year old RHD female presents for follow-up evaluation. She is complaining of bilateral shoulder pain. She states her left shoulder is worse than the right. She states the pain radiates up to her neck. Patient denies that she has any numbness or tingling. She is s/p right shoulder arthroscopy done on 8/09/22. She received a cortisone injection in the right GH joint on 11/14/22. She is no longer attending formal PT. She thinks the surgery was helpful and sees improvement.  PMHx of DVT and PE. She is currently on Eliquis 5 mg BID.  Radiology Results 11/14/2022: o Right Shoulder : AP internal/external rotation and outlet views were obtained and reveal degenerative arthritis of the GH joint, a downsloping acromion, excellent subacromial decompression.   Radiology Results 12/22/2021: o Cervical Spine : A/P and lateral views were obtained and revealed diffuse degenerative disc disease worse at C4/C5 and C5/C6, reversal of the normal cervical lordosis, a slight listing to the right of the head. o Left Shoulder : AP internal/external rotation and outlet views were obtained and revealed moderate degenerative arthritis of the right shoulder and AC joint.

## 2024-04-01 NOTE — DISCUSSION/SUMMARY
[de-identified] : I went over the pathophysiology of the patient's symptoms in great detail with the patient. I discussed the underlying pathophysiology of the patient's condition in great detail with the patient. I went over the patient's x-rays with them in great detail. We discussed the use of ice, Tylenol and anti-inflammatories to relieve pain. We discussed the use of moist heat to relieve spasm. At this time, she will start a course of physical therapy for strengthening and flexibility. A prescription was provided. We discussed the use of Salon Pas.   All of their questions were answered. They understand and consent to the plan.   FU in one month.

## 2024-04-01 NOTE — PHYSICAL EXAM
[de-identified] : Constitutional o Appearance : well-nourished, well developed, alert, in no acute distress  Head and Face o Head :  Inspection : atraumatic, normocephalic o Face :  Inspection : no visible rash or discoloration Respiratory o Respiratory Effort: breathing unlabored  Neurologic o Sensation : Normal sensation  Psychiatric o Mood and Affect: mood normal, affect appropriate  Lymphatic o Additional Nodes : No palpable lymph nodes present   o Cervical Spine  Inspection/Palpation : alignment midline, normal degree of lordosis present, left paracervical tenderness  Range of Motion : extension causes discomfort paracervical discomfort , limited rotation right side worse than left, and sidebending but they do not reproduce her symptoms right and left paracervical tenderness   Skin : normal appearance, no masses or tenderness, trachea midline  Tests: Negative Spurling's test  Right Upper Extremity o Right Shoulder :  Inspection/Palpation : no anterior capsular tenderness, no swelling, well-healed arthroscopic portals   Range of Motion : pain with forward flexion and pain with IR and ER, +crepitance  Strength : forward elevation 5/5, IR 5/5, ER 5/5, ER at 90 of abduction 5/5, supraspinatus 5/5, adduction 5/5, abduction 5/5, biceps/triceps 5/5,  5/5   Stability : no joint instability on provocative testing   Tests: Schultz negative, Neer negative, Cyndy negative, negative drop arm test, negative [place]'s test  Left Upper Extremity o Left Shoulder :  Inspection/Palpation : anterior capsular tenderness, no swelling or deformities  Range of Motion : pain with full forward flexion symmetrical IR with pain, pain with ER,   Strength : forward elevation 5/5, IR 5/5, ER 5/5, ER at 90 of abduction 5/5, supraspinatus 5/5, adduction 5/5, abduction 5/5, biceps/triceps 5/5,  5/5   Stability : no joint instability on provocative testing  Tests: Schultz negative, Neer positive, Cyndy negative, negative drop arm test, positive Obreins test   Gait and Station: Gait: gait normal, no significant extremity swelling or lymphedema, good proprioception and balance  Radiology Results 4/1/2024 o Cervical Spine : AP and lateral views were obtained and revealed reversal of the normal cervical lordosis with severe degenerative disc disease at C3-4 C4-5 C5-6 and C6-7  o Right Shoulder : AP internal/external rotation and outlet views were obtained and revealed severe degenerative arthritis  o Left Shoulder : AP internal/external rotation and outlet views were obtained and revealed moderate to severe degenerative arthritis and AC joint

## 2024-04-01 NOTE — REASON FOR VISIT
[Follow-Up Visit] : a follow-up visit for [Shoulder Pain] : shoulder pain [FreeTextEntry2] : s/p EUA, arthroscopy right  shoulder, debridement of GH joint with biceps release and tenodesis, debridement of labral tear and removal of loose bodies and subacromial decompression; right shoulder. DOS 8/9/2022.

## 2024-04-01 NOTE — ADDENDUM
[FreeTextEntry1] : I, ELHAM PEÑA, acted solely as a scribe for Dr. Roland Kidd on this date 04/01/2024.  All medical record entries made by the Scribe were at my, Dr. Roland Kidd, direction and personally dictated by me on 04/01/2024. I have reviewed the chart and agree that the record accurately reflects my personal performance of the history, physical exam, assessment and plan. I have also personally directed, reviewed, and agreed with the chart.

## 2024-04-03 ENCOUNTER — APPOINTMENT (OUTPATIENT)
Dept: CARDIOLOGY | Facility: CLINIC | Age: 79
End: 2024-04-03
Payer: MEDICARE

## 2024-04-03 VITALS
WEIGHT: 138.56 LBS | OXYGEN SATURATION: 96 % | BODY MASS INDEX: 29.89 KG/M2 | HEIGHT: 57 IN | SYSTOLIC BLOOD PRESSURE: 122 MMHG | TEMPERATURE: 97.9 F | RESPIRATION RATE: 16 BRPM | DIASTOLIC BLOOD PRESSURE: 68 MMHG | HEART RATE: 76 BPM

## 2024-04-03 DIAGNOSIS — Z95.0 PRESENCE OF CARDIAC PACEMAKER: ICD-10-CM

## 2024-04-03 DIAGNOSIS — I07.1 RHEUMATIC TRICUSPID INSUFFICIENCY: ICD-10-CM

## 2024-04-03 DIAGNOSIS — I34.0 NONRHEUMATIC MITRAL (VALVE) INSUFFICIENCY: ICD-10-CM

## 2024-04-03 DIAGNOSIS — Z79.01 LONG TERM (CURRENT) USE OF ANTICOAGULANTS: ICD-10-CM

## 2024-04-03 DIAGNOSIS — R03.0 ELEVATED BLOOD-PRESSURE READING, W/OUT DIAGNOSIS OF HYPERTENSION: ICD-10-CM

## 2024-04-03 DIAGNOSIS — E78.5 HYPERLIPIDEMIA, UNSPECIFIED: ICD-10-CM

## 2024-04-03 DIAGNOSIS — I25.10 ATHEROSCLEROTIC HEART DISEASE OF NATIVE CORONARY ARTERY W/OUT ANGINA PECTORIS: ICD-10-CM

## 2024-04-03 PROCEDURE — 93306 TTE W/DOPPLER COMPLETE: CPT

## 2024-04-03 PROCEDURE — G2211 COMPLEX E/M VISIT ADD ON: CPT

## 2024-04-03 PROCEDURE — 99214 OFFICE O/P EST MOD 30 MIN: CPT

## 2024-04-03 RX ORDER — EZETIMIBE 10 MG/1
10 TABLET ORAL
Qty: 90 | Refills: 1 | Status: ACTIVE | COMMUNITY
Start: 2021-10-25 | End: 1900-01-01

## 2024-04-03 RX ORDER — ATORVASTATIN CALCIUM 40 MG/1
40 TABLET, FILM COATED ORAL DAILY
Qty: 90 | Refills: 1 | Status: ACTIVE | COMMUNITY
Start: 2021-06-15 | End: 1900-01-01

## 2024-04-03 NOTE — DISCUSSION/SUMMARY
[FreeTextEntry1] : Dr. Lomeli-(PRIOR VISIT and PMH WITH Dr. 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 \par  \par  The patient been complaining of atypical left-sided chest wall pain reproducible to touch usually occurring at rest.  She does carry a pocketbook on that side.  She denies shortness of breath, dizziness, palpitations, or syncope.\par  \par  Electrocardiogram done March 1, 2022 demonstrate normal sinus rhythm rate of 62 bpm is otherwise remarkable for poor R wave progression, left axis deviation.\par  \par  This pain is likely musculoskeletal in nature.  She is going to see her primary care physician later this week and will have blood work done for lipid profile.\par  \par  She is currently hemodynamically stable and asymptomatic from a cardiac standpoint.\par  Given the presence of coronary artery disease, her LDL target is less than 70 mg/dL.\par  She will use a heating pad for 20 minutes twice a day, Advil and Motrin as needed.\par  \par  Cardiac catheterization done August 6, 2021 which demonstrated a 30% proximal lesion left anterior descending artery, luminal irregularities in the first diagonal branch, right PDA and right coronary artery.\par  \par  Medical management is recommended with more aggressive reduction her LDL cholesterol to less than 70 mg/dL. The patient had blood work done July 28, 2021 which demonstrated a direct LDL cholesterol of 81 mg/dL, cholesterol 161, HDL of 52, triglycerides 178, and non-HDL cholesterol 109 mg/dL.\par  In order to achieve target LDL, she will increase her Lipitor to 40 mg/day.  She understands she must have follow-up blood work in 6 to 8 weeks.\par  \par  Electrocardiogram done July 28, 2021 demonstrates normal sinus rhythm at a rate of 70 bpm is otherwise remarkable for left axis deviation, left anterior hemiblock, and left ventricular hypertrophy.\par  \par  PMH:\par  Nuclear stress test done July 6, 2021 which demonstrated small to moderate fixed defect in the inferolateral wall inferior apical wall consistent with infarct, and hypokinesis involving the distal inferolateral wall and inferior apical walls.  The patient developed 3 out of 10 chest tightness at 4 minutes and 45 seconds of exercise that resolved after 5 minutes of the recovery period.  The patient also has abnormal resting electrocardiogram with ST-T wave changes consistent coronary artery disease.\par  \par  She did have one episode of chest pain the day after she received her second COVID-19 vaccine in April 2021.\par  \par  Electrocardiogram done May 18, 2021 demonstrated normal sinus rhythm rate 70 bpm otherwise remarkable for left axis deviation, and possible Q waves in V1 and V2.\par  The Q waves in leads V1 and V2 represent a change compared to the electrocardiogram from her primary care physician.\par  \par  The patient understands that aerobic exercises must be increased to 40 minutes 4 times per week. A detailed discussion of lifestyle modification was done today. The patient has a good understanding of the diagnosis, and treatment plan. Lifestyle modification was also outlined.

## 2024-04-03 NOTE — ASSESSMENT
[FreeTextEntry1] : This is an 79-year-old female here today for follow-up cardiac evaluation.  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  She has no history of rheumatic fever. She may drink 4 cups of coffee per day.  Her cardiac risk factors include elevated cholesterol.   HPI: She is feeling generally well today and denies chest pain, dizziness, heart palpitations, recent episodes of syncope or falls, SOB, or dyspnea at this time.   Current Medications: Atorvastatin 40mg DAILY, Zetia 10mg Daily, Verapamil 120mg DAILY, and Eliquis 5 mg BID.  (This was changed from Coumadin as per her orthopedic after her rotator cuff surgery.)  *She is on Eliquis for history of DVT.   BLOOD PRESSURE: -BP is controlled on today's exam.  -She remains on Verapamil 120mg PO DAILY for management of her tachy-artur syndrome and she does have a pacemaker   BLOOD WORK: -Lipid panel done December 2023 demonstrated triglycerides 77, cholesterol 129, HDL 63, LDL 50, non-HDL 66, LDL direct 51.  -New blood work done 04/20/2023 demonstrated cholesterol 123, triglycerides 133, HDL 50, LDL 47, Non-HDL 73, LDL direct 47.  -New blood work was done 11/14/2022 which demonstrated normal lipid profile. Her A1c 5.7%. -New blood work done [place] 3, 2022 demonstrated normal lipid profile with total cholesterol of 180 and LDL of 103. I remind her to be compliant with her medications daily as prescribed.   TESTING/REPORTS: -Echocardiogram done today 04/03/2024 in office with results pending.   -EKG done 12/11/2023 demonstrated regular sinus rhythm rate 65 bpm otherwise unremarkable.  -EKG done 04/20/2023 which demonstrated regular sinus rhythm with nonspecific ST-T wave changes BPM of 63.  -EKG done 11/14/2022 which demonstrated regular sinus rhythm with nonspecific ST-T wave changes BPM of 63.  -Echocardiogram done in the office 11/14/2022 which demonstrated mild concentric LVH, trace aortic regurgitation, mild calcification of the mitral valve annulus, mild mitral valve regurgitation, mild tricuspid regurgitation, mild thickening of the aortic valve leaflets, pulmonic regurgitation device wire visualized in the right heart, thinning of the interatrial septum with color flow across the septum consistent with small atrial septal defect versus small patent foramen ovale with normal left ventricular systolic function ejection fraction 63%.  -EKG done 08/01/2022 which demonstrated regular sinus rhythm with nonspecific ST-T wave changes BPM of 55.  -EKG done 3/1/22 which demonstrated regular sinus rhythm with nonspecific ST-T wave changes, BPM of 62.  -EKG done Oct 25, 2021 which demonstrated regular sinus rhythm with nonspecific ST-T wave changes, BPM of 67 with occasional PAC.  -Electrocardiogram done July 28, 2021 demonstrates normal sinus rhythm at a rate of 70 bpm is otherwise remarkable for left axis deviation, left anterior hemiblock, and left ventricular hypertrophy.  -Electrocardiogram done May 18, 2021 demonstrated normal sinus rhythm rate 70 bpm otherwise remarkable for left axis deviation, and possible Q waves in V1 and V2. The Q waves in leads V1 and V2 represent a change compared to the electrocardiogram from her primary care physician. The patient denies any history of myocardial infarction is unclear why she has Q waves in the anterior septal region.  -The patient had a cardiac catheterization done August 6, 2021 which demonstrated a 30% proximal lesion left anterior descending artery, luminal irregularities in the first diagonal branch, right PDA and right coronary artery.  -She had a nuclear stress test done July 6, 2021 which demonstrated small to moderate fixed defect in the inferolateral wall inferior apical wall consistent with infarct, and hypokinesis involving the distal inferolateral wall and inferior apical walls. The patient developed 3 out of 10 chest tightness at 4 minutes and 45 seconds of exercise that resolved after 5 minutes of the recovery period.  -The patient also has abnormal resting electrocardiogram with ST-T wave changes consistent coronary artery disease.   PLAN: -The patient is clinically stable from a cardiac standpoint on today's exam. -She will continue with her current medications and will contact the office if she is having any complaints between now and their next follow up appointment.  I have discussed the plan of care with Ms. JOHNSON BIANCHI and she will follow up in 3-4 months. She is compliant with all of her medications.  The patient understands that aerobic exercises must be increased to ~30 minutes 4 times/week and a detailed discussion of lifestyle modification was done today.  The patient has a good understanding of the diagnosis, treatment plan and lifestyle modification.  She will contact me at the office for any questions with their care or any changes in their health status.  The plan of care was discussed with the patient with supervision physician Dr. Bolivar Lomeli and will be carried out as noted above.  OWEN Garg NP

## 2024-04-03 NOTE — PHYSICAL EXAM
[Well Developed] : well developed [Well Nourished] : well nourished [Normal Conjunctiva] : normal conjunctiva [No Acute Distress] : no acute distress [No Carotid Bruit] : no carotid bruit [Normal Venous Pressure] : normal venous pressure [Normal S1, S2] : normal S1, S2 [No Rub] : no rub [Normal] : normal [5th Left ICS - MCL] : palpated at the 5th LICS in the midclavicular line [No Precordial Heave] : no precordial heave was noted [Rhythm Regular] : regular [Normal Rate] : normal [No Gallop] : no gallop heard [II] : a grade 2 [I] : a grade 1 [2+] : left 2+ [Clear Lung Fields] : clear lung fields [Good Air Entry] : good air entry [No Respiratory Distress] : no respiratory distress  [Soft] : abdomen soft [Non Tender] : non-tender [No Masses/organomegaly] : no masses/organomegaly [Normal Bowel Sounds] : normal bowel sounds [Normal Gait] : normal gait [No Edema] : no edema [No Cyanosis] : no cyanosis [No Clubbing] : no clubbing [No Varicosities] : no varicosities [No Rash] : no rash [No Skin Lesions] : no skin lesions [Moves all extremities] : moves all extremities [No Focal Deficits] : no focal deficits [Normal Speech] : normal speech [Normal memory] : normal memory [Alert and Oriented] : alert and oriented [Click] : no click [Pericardial Rub] : no pericardial rub

## 2024-04-03 NOTE — REASON FOR VISIT
[CV Risk Factors and Non-Cardiac Disease] : CV risk factors and non-cardiac disease [Hyperlipidemia] : hyperlipidemia [Hypertension] : hypertension [Coronary Artery Disease] : coronary artery disease [Other: ____] : [unfilled] [FreeTextEntry1] : This is a 78 year year old female here today for follow up cardiac evaluation. 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 Ricks

## 2024-04-03 NOTE — CARDIOLOGY SUMMARY
[de-identified] : NUC: 7/6/2021 [de-identified] : 6/3/22 [de-identified] : 6/1/2021 [de-identified] : CATH: 8/6/2021

## 2024-05-08 NOTE — PHYSICAL THERAPY INITIAL EVALUATION ADULT - PHYSICAL ASSIST/NONPHYSICAL ASSIST: SIT/SUPINE, REHAB EVAL
1 person assist
I will STOP taking the medications listed below when I get home from the hospital:  None

## 2024-05-23 ENCOUNTER — NON-APPOINTMENT (OUTPATIENT)
Age: 79
End: 2024-05-23

## 2024-05-23 ENCOUNTER — APPOINTMENT (OUTPATIENT)
Dept: ELECTROPHYSIOLOGY | Facility: CLINIC | Age: 79
End: 2024-05-23
Payer: MEDICARE

## 2024-05-23 PROCEDURE — 93296 REM INTERROG EVL PM/IDS: CPT

## 2024-05-23 PROCEDURE — 93294 REM INTERROG EVL PM/LDLS PM: CPT

## 2024-07-12 NOTE — DIETITIAN INITIAL EVALUATION ADULT. - NUTRITION DIAGNOSTIC TERMINOLOGY #1
Error, call disconnected, taken per nasrin HERNANDEZ     Reason for Disposition   Caller has already spoken with another triager and has no further questions.    Additional Information   Negative: Caller has already spoken with the PCP and has no further questions.   Negative: Caller has already spoken with another triager or PCP AND has further questions AND triager able to answer questions.    Protocols used: No Contact or Duplicate Contact Call-A-    
Weight

## 2024-07-25 ENCOUNTER — APPOINTMENT (OUTPATIENT)
Dept: ORTHOPEDIC SURGERY | Facility: CLINIC | Age: 79
End: 2024-07-25
Payer: MEDICARE

## 2024-07-25 VITALS — HEIGHT: 58 IN | WEIGHT: 140 LBS | BODY MASS INDEX: 29.39 KG/M2

## 2024-07-25 DIAGNOSIS — M19.019 PRIMARY OSTEOARTHRITIS, UNSPECIFIED SHOULDER: ICD-10-CM

## 2024-07-25 PROCEDURE — 20611 DRAIN/INJ JOINT/BURSA W/US: CPT | Mod: LT

## 2024-07-25 PROCEDURE — 99214 OFFICE O/P EST MOD 30 MIN: CPT | Mod: 25

## 2024-07-25 NOTE — PHYSICAL EXAM
[de-identified] : Constitutional o Appearance : well-nourished, well developed, alert, in no acute distress  Head and Face o Head :  Inspection : atraumatic, normocephalic o Face :  Inspection : no visible rash or discoloration Respiratory o Respiratory Effort: breathing unlabored  Neurologic o Sensation : Normal sensation  Psychiatric o Mood and Affect: mood normal, affect appropriate  Lymphatic o Additional Nodes : No palpable lymph nodes present   o Cervical Spine  Inspection/Palpation : alignment midline, normal degree of lordosis present, left paracervical tenderness  Range of Motion : extension causes discomfort paracervical discomfort , limited rotation right side worse than left, and sidebending but they do not reproduce her symptoms right and left paracervical tenderness   Skin : normal appearance, no masses or tenderness, trachea midline  Tests: Negative Spurling's test  Right Upper Extremity o Right Shoulder :  Inspection/Palpation : no anterior capsular tenderness, no swelling, well-healed arthroscopic portals   Range of Motion : pain with forward flexion and pain with IR and ER full but with pain, +crepitance  Strength : forward elevation 5/5, IR 5/5, ER 5/5, ER at 90 of abduction 5/5, supraspinatus 5/5, adduction 5/5, abduction 5/5, biceps/triceps 5/5,  5/5   Stability : no joint instability on provocative testing   Tests: Schultz negative, Neer negative, Cyndy negative, negative drop arm test, negative [place]'s test  Left Upper Extremity o Left Shoulder :  Inspection/Palpation : anterior capsular tenderness, no swelling or deformities  Range of Motion : pain with full forward flexion to 120 pain with the extreme, ER to 30 with pain, IR with pain,  symmetrical IR with pain, pain with ER,   Strength : forward elevation 5/5, IR 5/5, ER 5/5, ER at 90 of abduction 5/5, supraspinatus 5/5, adduction 5/5, abduction 5/5, biceps/triceps 5/5,  5/5   Stability : no joint instability on provocative testing  Tests: Schultz negative, Neer positive, Cyndy negative, negative drop arm test, positive Obreins test   Gait and Station: Gait: gait normal, no significant extremity swelling or lymphedema, good proprioception and balance  o Shoulder: Indication- left shoulder arthritis, Anatomic location left intra-articular joint, Spray-area was sterilized with Betadine and alcohol and anesthetized with Ethyl Chloride , Needle used - 22G, Medications given- 5cc's lidocaine, 0.5cc's kenalog, 0.5cc's dexamethasone. The patient tolerated the procedure well. This was done under Ultrasound guidance.

## 2024-07-25 NOTE — DISCUSSION/SUMMARY
[de-identified] : I went over the pathophysiology of the patient's symptoms in great detail with the patient. The patient elected to receive a cortisone injection into her left shoulder today, and tolerated it well. I instructed the patient on ROM exercises, and told them to take it easy. The use of ice and rest was reviewed with the patient. The patient may resume activities tomorrow.   All of their questions were answered. They understand and consent to the plan.   FU in 2-3  weeks.for a right shoulder injection , viscosupplimentation was discussed.

## 2024-07-25 NOTE — HISTORY OF PRESENT ILLNESS
[de-identified] : 77 year old RHD female presents for follow-up evaluation of bilateral shoulder. She notes her left shoulder is worse than the right. She notes she has been going to physical therapy and that is not helping at this time. Patient denies that she has any numbness or tingling. She notes she is having referred pain down her arms. She is s/p right shoulder arthroscopy done on 8/09/22. She received a cortisone injection in the right GH joint on 11/14/22.  PMHx of DVT and PE. She is currently on Eliquis 5 mg BID.  Radiology Results 4/1/2024 o Cervical Spine : AP and lateral views were obtained and revealed reversal of the normal cervical lordosis with severe degenerative disc disease at C3-4 C4-5 C5-6 and C6-7  o Right Shoulder : AP internal/external rotation and outlet views were obtained and revealed severe degenerative arthritis  o Left Shoulder : AP internal/external rotation and outlet views were obtained and revealed moderate to severe degenerative arthritis and AC joint   Radiology Results 11/14/2022: o Right Shoulder : AP internal/external rotation and outlet views were obtained and reveal degenerative arthritis of the GH joint, a downsloping acromion, excellent subacromial decompression.   Radiology Results 12/22/2021: o Cervical Spine : A/P and lateral views were obtained and revealed diffuse degenerative disc disease worse at C4/C5 and C5/C6, reversal of the normal cervical lordosis, a slight listing to the right of the head. o Left Shoulder : AP internal/external rotation and outlet views were obtained and revealed moderate degenerative arthritis of the right shoulder and AC joint.

## 2024-07-25 NOTE — ADDENDUM
[FreeTextEntry1] : I, ELHAM PEÑA, acted solely as a scribe for Dr. Roland Kidd on this date 07/25/2024.  All medical record entries made by the Scribe were at my, Dr. Roland Kidd, direction and personally dictated by me on 07/25/2024. I have reviewed the chart and agree that the record accurately reflects my personal performance of the history, physical exam, assessment and plan. I have also personally directed, reviewed, and agreed with the chart.

## 2024-08-02 ENCOUNTER — LABORATORY RESULT (OUTPATIENT)
Age: 79
End: 2024-08-02

## 2024-08-02 ENCOUNTER — APPOINTMENT (OUTPATIENT)
Dept: CARDIOLOGY | Facility: CLINIC | Age: 79
End: 2024-08-02
Payer: MEDICARE

## 2024-08-02 ENCOUNTER — NON-APPOINTMENT (OUTPATIENT)
Age: 79
End: 2024-08-02

## 2024-08-02 VITALS
OXYGEN SATURATION: 98 % | RESPIRATION RATE: 16 BRPM | HEART RATE: 62 BPM | BODY MASS INDEX: 27.92 KG/M2 | DIASTOLIC BLOOD PRESSURE: 72 MMHG | WEIGHT: 133 LBS | TEMPERATURE: 98.1 F | SYSTOLIC BLOOD PRESSURE: 120 MMHG | HEIGHT: 58 IN

## 2024-08-02 DIAGNOSIS — I07.1 RHEUMATIC TRICUSPID INSUFFICIENCY: ICD-10-CM

## 2024-08-02 DIAGNOSIS — Z79.01 LONG TERM (CURRENT) USE OF ANTICOAGULANTS: ICD-10-CM

## 2024-08-02 DIAGNOSIS — I25.10 ATHEROSCLEROTIC HEART DISEASE OF NATIVE CORONARY ARTERY W/OUT ANGINA PECTORIS: ICD-10-CM

## 2024-08-02 DIAGNOSIS — I34.0 NONRHEUMATIC MITRAL (VALVE) INSUFFICIENCY: ICD-10-CM

## 2024-08-02 DIAGNOSIS — Z13.31 ENCOUNTER FOR SCREENING FOR DEPRESSION: ICD-10-CM

## 2024-08-02 DIAGNOSIS — Z95.0 PRESENCE OF CARDIAC PACEMAKER: ICD-10-CM

## 2024-08-02 DIAGNOSIS — E78.5 HYPERLIPIDEMIA, UNSPECIFIED: ICD-10-CM

## 2024-08-02 DIAGNOSIS — R03.0 ELEVATED BLOOD-PRESSURE READING, W/OUT DIAGNOSIS OF HYPERTENSION: ICD-10-CM

## 2024-08-02 PROCEDURE — 93000 ELECTROCARDIOGRAM COMPLETE: CPT | Mod: 59

## 2024-08-02 PROCEDURE — 99214 OFFICE O/P EST MOD 30 MIN: CPT

## 2024-08-02 PROCEDURE — G0444 DEPRESSION SCREEN ANNUAL: CPT

## 2024-08-02 PROCEDURE — G2211 COMPLEX E/M VISIT ADD ON: CPT

## 2024-08-02 NOTE — REASON FOR VISIT
[CV Risk Factors and Non-Cardiac Disease] : CV risk factors and non-cardiac disease [Hyperlipidemia] : hyperlipidemia [Hypertension] : hypertension [Coronary Artery Disease] : coronary artery disease [Other: ____] : [unfilled] [FreeTextEntry3] : Dr. Ricks [FreeTextEntry1] : This is a 78 year year old female here today for follow up cardiac evaluation. 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

## 2024-08-02 NOTE — PHYSICAL EXAM
[Well Developed] : well developed [Well Nourished] : well nourished [No Acute Distress] : no acute distress [Normal Conjunctiva] : normal conjunctiva [Normal Venous Pressure] : normal venous pressure [No Carotid Bruit] : no carotid bruit [Normal S1, S2] : normal S1, S2 [No Rub] : no rub [5th Left ICS - MCL] : palpated at the 5th LICS in the midclavicular line [Normal] : normal [No Precordial Heave] : no precordial heave was noted [Normal Rate] : normal [Rhythm Regular] : regular [No Gallop] : no gallop heard [II] : a grade 2 [I] : a grade 1 [2+] : left 2+ [Clear Lung Fields] : clear lung fields [Good Air Entry] : good air entry [Soft] : abdomen soft [No Respiratory Distress] : no respiratory distress  [Non Tender] : non-tender [No Masses/organomegaly] : no masses/organomegaly [Normal Bowel Sounds] : normal bowel sounds [Normal Gait] : normal gait [No Edema] : no edema [No Cyanosis] : no cyanosis [No Clubbing] : no clubbing [No Varicosities] : no varicosities [No Rash] : no rash [No Skin Lesions] : no skin lesions [Moves all extremities] : moves all extremities [No Focal Deficits] : no focal deficits [Normal Speech] : normal speech [Alert and Oriented] : alert and oriented [Normal memory] : normal memory [Click] : no click [Pericardial Rub] : no pericardial rub

## 2024-08-02 NOTE — ASSESSMENT
[FreeTextEntry1] : This is an 79-year-old female here today for follow-up cardiac evaluation.  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  She has no history of rheumatic fever.  Her cardiac risk factors include elevated cholesterol.    HPI: She is feeling generally well today and denies chest pain, dizziness, heart palpitations, recent episodes of syncope or falls, SOB, or dyspnea at this time.  Current Medications: Atorvastatin 40mg DAILY, Zetia 10mg Daily, Verapamil 120mg DAILY, and Eliquis 5 mg BID.  (This was changed from Coumadin as per her orthopedic after her rotator cuff surgery.)  *She is on Eliquis for history of DVT.   BLOOD PRESSURE: -BP is controlled on today's exam. *She remains on Verapamil 120mg PO DAILY for management of her tachy-artur syndrome and she does have a pacemaker   BLOOD WORK:  *LDL target goal < 70* -New blood work was done 08/02/2024 to evaluate lipid profile, CBC, BMP, hepatic function, A1C and TSH. -Lipid panel done December 2023 demonstrated triglycerides 77, cholesterol 129, HDL 63, LDL 50, non-HDL 66, LDL direct 51.  -New blood work done 04/20/2023 demonstrated cholesterol 123, triglycerides 133, HDL 50, LDL 47, Non-HDL 73, LDL direct 47.  -New blood work was done 11/14/2022 which demonstrated normal lipid profile. Her A1c 5.7%. -New blood work done [place] 3, 2022 demonstrated normal lipid profile with total cholesterol of 180 and LDL of 103.    TESTING/REPORTS: -EKG done 08/02/2024 demonstrated regular sinus rhythm rate 62 BPM otherwise unremarkable.   -Echocardiogram done April 2024 demonstrated normal left ventricular systolic function EF 62%, mild mitral regurgitation, mild to moderate tricuspid regurgitation, trace aortic regurgitation, mild pulmonic regurgitation.  -EKG done 12/11/2023 demonstrated regular sinus rhythm rate 65 bpm otherwise unremarkable.  -EKG done 04/20/2023 which demonstrated regular sinus rhythm with nonspecific ST-T wave changes BPM of 63.  -EKG done 11/14/2022 which demonstrated regular sinus rhythm with nonspecific ST-T wave changes BPM of 63.  -Echocardiogram done in the office 11/14/2022 which demonstrated mild concentric LVH, trace aortic regurgitation, mild calcification of the mitral valve annulus, mild mitral valve regurgitation, mild tricuspid regurgitation, mild thickening of the aortic valve leaflets, pulmonic regurgitation device wire visualized in the right heart, thinning of the interatrial septum with color flow across the septum consistent with small atrial septal defect versus small patent foramen ovale with normal left ventricular systolic function ejection fraction 63%.  -EKG done 08/01/2022 which demonstrated regular sinus rhythm with nonspecific ST-T wave changes BPM of 55.  -EKG done 3/1/22 which demonstrated regular sinus rhythm with nonspecific ST-T wave changes, BPM of 62.  -EKG done Oct 25, 2021 which demonstrated regular sinus rhythm with nonspecific ST-T wave changes, BPM of 67 with occasional PAC.  -Electrocardiogram done July 28, 2021 demonstrates normal sinus rhythm at a rate of 70 bpm is otherwise remarkable for left axis deviation, left anterior hemiblock, and left ventricular hypertrophy.  -Electrocardiogram done May 18, 2021 demonstrated normal sinus rhythm rate 70 bpm otherwise remarkable for left axis deviation, and possible Q waves in V1 and V2. The Q waves in leads V1 and V2 represent a change compared to the electrocardiogram from her primary care physician. The patient denies any history of myocardial infarction is unclear why she has Q waves in the anterior septal region.  -The patient had a cardiac catheterization done August 6, 2021 which demonstrated a 30% proximal lesion left anterior descending artery, luminal irregularities in the first diagonal branch, right PDA and right coronary artery.  -She had a nuclear stress test done July 6, 2021 which demonstrated small to moderate fixed defect in the inferolateral wall inferior apical wall consistent with infarct, and hypokinesis involving the distal inferolateral wall and inferior apical walls. The patient developed 3 out of 10 chest tightness at 4 minutes and 45 seconds of exercise that resolved after 5 minutes of the recovery period.  -The patient also has abnormal resting electrocardiogram with ST-T wave changes consistent coronary artery disease.   PLAN: -The patient is clinically stable from a cardiac standpoint on today's exam. -She will continue with her current medications and will contact the office if she is having any complaints between now and their next follow up appointment.   I have discussed the plan of care with Ms. JOHNSON BIANCHI and she will follow up in 3-4 months. She is compliant with all of her medications.  The patient understands that aerobic exercises must be increased to ~30 minutes 4 times/week and a detailed discussion of lifestyle modification was done today.  The patient has a good understanding of the diagnosis, treatment plan and lifestyle modification.  She will contact me at the office for any questions with their care or any changes in their health status.  The plan of care was discussed with the patient with supervision physician Dr. Bolivar Lomeli and will be carried out as noted above.  OWEN Garg NP

## 2024-08-02 NOTE — CARDIOLOGY SUMMARY
[de-identified] : 6/3/22 [de-identified] : NUC: 7/6/2021 [de-identified] : 6/1/2021 [de-identified] : CATH: 8/6/2021

## 2024-08-22 ENCOUNTER — APPOINTMENT (OUTPATIENT)
Dept: ELECTROPHYSIOLOGY | Facility: CLINIC | Age: 79
End: 2024-08-22

## 2024-08-22 PROCEDURE — 93294 REM INTERROG EVL PM/LDLS PM: CPT

## 2024-08-22 PROCEDURE — 93296 REM INTERROG EVL PM/IDS: CPT

## 2024-09-17 ENCOUNTER — APPOINTMENT (OUTPATIENT)
Dept: ORTHOPEDIC SURGERY | Facility: CLINIC | Age: 79
End: 2024-09-17

## 2024-10-15 ENCOUNTER — RX RENEWAL (OUTPATIENT)
Age: 79
End: 2024-10-15

## 2024-11-21 ENCOUNTER — APPOINTMENT (OUTPATIENT)
Dept: ELECTROPHYSIOLOGY | Facility: CLINIC | Age: 79
End: 2024-11-21

## 2024-11-21 PROCEDURE — 93294 REM INTERROG EVL PM/LDLS PM: CPT

## 2024-11-21 PROCEDURE — 93296 REM INTERROG EVL PM/IDS: CPT

## 2024-12-11 ENCOUNTER — LABORATORY RESULT (OUTPATIENT)
Age: 79
End: 2024-12-11

## 2024-12-11 ENCOUNTER — APPOINTMENT (OUTPATIENT)
Dept: CARDIOLOGY | Facility: CLINIC | Age: 79
End: 2024-12-11
Payer: MEDICARE

## 2024-12-11 VITALS
HEART RATE: 77 BPM | WEIGHT: 137 LBS | RESPIRATION RATE: 16 BRPM | TEMPERATURE: 98.1 F | DIASTOLIC BLOOD PRESSURE: 79 MMHG | HEIGHT: 58 IN | OXYGEN SATURATION: 98 % | SYSTOLIC BLOOD PRESSURE: 126 MMHG | BODY MASS INDEX: 28.76 KG/M2

## 2024-12-11 DIAGNOSIS — I34.0 NONRHEUMATIC MITRAL (VALVE) INSUFFICIENCY: ICD-10-CM

## 2024-12-11 DIAGNOSIS — Z95.0 PRESENCE OF CARDIAC PACEMAKER: ICD-10-CM

## 2024-12-11 DIAGNOSIS — E78.5 HYPERLIPIDEMIA, UNSPECIFIED: ICD-10-CM

## 2024-12-11 DIAGNOSIS — I25.10 ATHEROSCLEROTIC HEART DISEASE OF NATIVE CORONARY ARTERY W/OUT ANGINA PECTORIS: ICD-10-CM

## 2024-12-11 PROCEDURE — G2211 COMPLEX E/M VISIT ADD ON: CPT

## 2024-12-11 PROCEDURE — 99214 OFFICE O/P EST MOD 30 MIN: CPT

## 2025-02-19 ENCOUNTER — OUTPATIENT (OUTPATIENT)
Dept: OUTPATIENT SERVICES | Facility: HOSPITAL | Age: 80
LOS: 1 days | End: 2025-02-19
Payer: MEDICARE

## 2025-02-19 ENCOUNTER — APPOINTMENT (OUTPATIENT)
Dept: RADIOLOGY | Facility: IMAGING CENTER | Age: 80
End: 2025-02-19
Payer: MEDICARE

## 2025-02-19 DIAGNOSIS — Z98.51 TUBAL LIGATION STATUS: Chronic | ICD-10-CM

## 2025-02-19 DIAGNOSIS — Z95.0 PRESENCE OF CARDIAC PACEMAKER: Chronic | ICD-10-CM

## 2025-02-19 DIAGNOSIS — Z98.1 ARTHRODESIS STATUS: Chronic | ICD-10-CM

## 2025-02-19 DIAGNOSIS — Z00.8 ENCOUNTER FOR OTHER GENERAL EXAMINATION: ICD-10-CM

## 2025-02-19 DIAGNOSIS — Z96.652 PRESENCE OF LEFT ARTIFICIAL KNEE JOINT: Chronic | ICD-10-CM

## 2025-02-19 DIAGNOSIS — Z98.890 OTHER SPECIFIED POSTPROCEDURAL STATES: Chronic | ICD-10-CM

## 2025-02-19 DIAGNOSIS — Z98.89 OTHER SPECIFIED POSTPROCEDURAL STATES: Chronic | ICD-10-CM

## 2025-02-19 PROCEDURE — 71046 X-RAY EXAM CHEST 2 VIEWS: CPT | Mod: 26

## 2025-02-19 PROCEDURE — 71046 X-RAY EXAM CHEST 2 VIEWS: CPT

## 2025-02-20 ENCOUNTER — APPOINTMENT (OUTPATIENT)
Dept: ELECTROPHYSIOLOGY | Facility: CLINIC | Age: 80
End: 2025-02-20

## 2025-02-28 ENCOUNTER — APPOINTMENT (OUTPATIENT)
Dept: CARDIOLOGY | Facility: CLINIC | Age: 80
End: 2025-02-28

## 2025-02-28 ENCOUNTER — APPOINTMENT (OUTPATIENT)
Dept: ELECTROPHYSIOLOGY | Facility: CLINIC | Age: 80
End: 2025-02-28

## 2025-02-28 PROCEDURE — 93296 REM INTERROG EVL PM/IDS: CPT

## 2025-02-28 PROCEDURE — 93294 REM INTERROG EVL PM/LDLS PM: CPT

## 2025-04-11 ENCOUNTER — RX RENEWAL (OUTPATIENT)
Age: 80
End: 2025-04-11

## 2025-04-29 NOTE — ED PROVIDER NOTE - ATTENDING CONTRIBUTION TO CARE
Cough, sore throat, and runny nose since Sunday. Denies sick contacts or recent travel. Afebrile. VSS.   
Patient with history of clotting disorder on coumadin presenting c/o posterior headache x10 days.  Does not feel like prior migranes.  Compliant with coumadin.  No head injuries, no fevers, no chills.    On exam patient well appearing, vital signs within normal limits, no meningismus, neuro intact, palpation of L suboccipital musculature reproduces complaint.    Suspect tension headache given exam, however with history of clotting disorder on coumadin plan for screening labs/imaging to r/o vascular source of headache.

## 2025-05-08 NOTE — PATIENT PROFILE ADULT. - HEALTH/HEALTHCARE ANXIETIES, PROFILE
Goal Outcome Evaluation:  Plan of Care Reviewed With: patient        Progress: improving  Outcome Evaluation: Pt resting well with spouse at bedside. No c/o pain voiced. Bilateral groins intact and soft. Neuros intact. Pulses found with doppler. IV abx infused. Ambulated in hallway with sba. Voiding with good UOP. Safety maintained.                             
Goal Outcome Evaluation:  Plan of Care Reviewed With: patient        Progress: no change  Outcome Evaluation: No complaints of pain. Bilateral groin site, CDI. DTV. IV abx infusing per order, otherwise IID. VSS. Safety maintained.                             
none at this time

## 2025-05-30 ENCOUNTER — NON-APPOINTMENT (OUTPATIENT)
Age: 80
End: 2025-05-30

## 2025-05-30 ENCOUNTER — APPOINTMENT (OUTPATIENT)
Dept: ELECTROPHYSIOLOGY | Facility: CLINIC | Age: 80
End: 2025-05-30

## 2025-05-30 PROCEDURE — 93296 REM INTERROG EVL PM/IDS: CPT

## 2025-05-30 PROCEDURE — 93294 REM INTERROG EVL PM/LDLS PM: CPT

## 2025-06-09 ENCOUNTER — APPOINTMENT (OUTPATIENT)
Dept: CARDIOLOGY | Facility: CLINIC | Age: 80
End: 2025-06-09
Payer: MEDICARE

## 2025-06-09 VITALS
RESPIRATION RATE: 16 BRPM | TEMPERATURE: 97.9 F | HEART RATE: 55 BPM | WEIGHT: 132 LBS | OXYGEN SATURATION: 99 % | HEIGHT: 58 IN | BODY MASS INDEX: 27.71 KG/M2

## 2025-06-09 PROCEDURE — 93000 ELECTROCARDIOGRAM COMPLETE: CPT

## 2025-06-09 PROCEDURE — 99214 OFFICE O/P EST MOD 30 MIN: CPT

## 2025-06-09 PROCEDURE — G2211 COMPLEX E/M VISIT ADD ON: CPT

## 2025-06-30 ENCOUNTER — RX RENEWAL (OUTPATIENT)
Age: 80
End: 2025-06-30

## 2025-07-03 ENCOUNTER — RX RENEWAL (OUTPATIENT)
Age: 80
End: 2025-07-03

## 2025-08-25 ENCOUNTER — APPOINTMENT (OUTPATIENT)
Dept: INTERNAL MEDICINE | Facility: CLINIC | Age: 80
End: 2025-08-25
Payer: MEDICARE

## 2025-08-25 VITALS
TEMPERATURE: 98 F | SYSTOLIC BLOOD PRESSURE: 134 MMHG | OXYGEN SATURATION: 95 % | HEART RATE: 63 BPM | DIASTOLIC BLOOD PRESSURE: 83 MMHG | BODY MASS INDEX: 27.59 KG/M2 | WEIGHT: 132 LBS

## 2025-08-25 DIAGNOSIS — R41.3 OTHER AMNESIA: ICD-10-CM

## 2025-08-25 DIAGNOSIS — Z79.01 LONG TERM (CURRENT) USE OF ANTICOAGULANTS: ICD-10-CM

## 2025-08-25 DIAGNOSIS — E78.5 HYPERLIPIDEMIA, UNSPECIFIED: ICD-10-CM

## 2025-08-25 PROCEDURE — 36415 COLL VENOUS BLD VENIPUNCTURE: CPT

## 2025-08-25 PROCEDURE — 99203 OFFICE O/P NEW LOW 30 MIN: CPT

## 2025-08-26 LAB
ALBUMIN SERPL ELPH-MCNC: 4.4 G/DL
ALP BLD-CCNC: 64 U/L
ALT SERPL-CCNC: 17 U/L
ANION GAP SERPL CALC-SCNC: 12 MMOL/L
AST SERPL-CCNC: 19 U/L
BASOPHILS # BLD AUTO: 0.04 K/UL
BASOPHILS NFR BLD AUTO: 0.5 %
BILIRUB SERPL-MCNC: 0.5 MG/DL
BUN SERPL-MCNC: 12 MG/DL
CALCIUM SERPL-MCNC: 9.5 MG/DL
CHLORIDE SERPL-SCNC: 106 MMOL/L
CHOLEST SERPL-MCNC: 126 MG/DL
CK SERPL-CCNC: 81 U/L
CO2 SERPL-SCNC: 25 MMOL/L
CREAT SERPL-MCNC: 0.84 MG/DL
EGFRCR SERPLBLD CKD-EPI 2021: 70 ML/MIN/1.73M2
EOSINOPHIL # BLD AUTO: 0.09 K/UL
EOSINOPHIL NFR BLD AUTO: 1.1 %
GLUCOSE SERPL-MCNC: 93 MG/DL
HCT VFR BLD CALC: 39.9 %
HDLC SERPL-MCNC: 52 MG/DL
HGB BLD-MCNC: 13 G/DL
IMM GRANULOCYTES NFR BLD AUTO: 0.3 %
LDLC SERPL-MCNC: 56 MG/DL
LYMPHOCYTES # BLD AUTO: 4.24 K/UL
LYMPHOCYTES NFR BLD AUTO: 53.5 %
MAN DIFF?: NORMAL
MCHC RBC-ENTMCNC: 30.4 PG
MCHC RBC-ENTMCNC: 32.6 G/DL
MCV RBC AUTO: 93.2 FL
MONOCYTES # BLD AUTO: 0.45 K/UL
MONOCYTES NFR BLD AUTO: 5.7 %
NEUTROPHILS # BLD AUTO: 3.08 K/UL
NEUTROPHILS NFR BLD AUTO: 38.9 %
NONHDLC SERPL-MCNC: 73 MG/DL
PLATELET # BLD AUTO: 187 K/UL
POTASSIUM SERPL-SCNC: 4.8 MMOL/L
PROT SERPL-MCNC: 6.9 G/DL
RBC # BLD: 4.28 M/UL
RBC # FLD: 13.4 %
SODIUM SERPL-SCNC: 143 MMOL/L
TRIGL SERPL-MCNC: 91 MG/DL
WBC # FLD AUTO: 7.92 K/UL

## 2025-09-03 ENCOUNTER — NON-APPOINTMENT (OUTPATIENT)
Age: 80
End: 2025-09-03

## 2025-09-03 ENCOUNTER — APPOINTMENT (OUTPATIENT)
Dept: ELECTROPHYSIOLOGY | Facility: CLINIC | Age: 80
End: 2025-09-03
Payer: MEDICARE

## 2025-09-03 PROCEDURE — 93296 REM INTERROG EVL PM/IDS: CPT

## 2025-09-03 PROCEDURE — 93294 REM INTERROG EVL PM/LDLS PM: CPT

## (undated) DEVICE — WAND COBLATION WEREWOLF FLOW 90

## (undated) DEVICE — SPONGE LAP X-RAY DETECTABLE 4X18"

## (undated) DEVICE — GLV 7.5 ESTEEM BLUE

## (undated) DEVICE — BLANKET WARMER UPPER ADULT

## (undated) DEVICE — WRAP COMPRESSION CALF MED

## (undated) DEVICE — POSITIONER FOAM HEAD CRADLE

## (undated) DEVICE — SOLIDIFIER CANN EXPRESS 3K

## (undated) DEVICE — BOOT COVER NONSKID IMPERVIOUS

## (undated) DEVICE — DVC SUCTION FLR PUDDLE GUPPY

## (undated) DEVICE — NDL SPINAL 18G X 3.5"

## (undated) DEVICE — CANISTER SUCTION LINER 1500 CC

## (undated) DEVICE — POSITIONER PATIENT SAFETY STRAP 3X60"

## (undated) DEVICE — DRAPE 3/4 SHEET 52X76"

## (undated) DEVICE — SOL IRR BAG NS 0.9% 3000ML

## (undated) DEVICE — SHAVER BLADE ULTRAGATOR 3.5MM

## (undated) DEVICE — PACK KNEE ARTHROSCOPY

## (undated) DEVICE — CANISTER SUCTION LID GUARD 3000CC

## (undated) DEVICE — SUT MONOSOF 4-0 18" C-13

## (undated) DEVICE — WAND COBLATION WEREWOLF FLOW 50

## (undated) DEVICE — TUBING SET GRAVITY 4 SPIKE

## (undated) DEVICE — GLV 7.5 PROTEXIS NEUTHERA

## (undated) DEVICE — CUFF TOURNIQUET 34" DUAL PORT W PLC

## (undated) DEVICE — TUBING SUCTION 20FT